# Patient Record
Sex: FEMALE | URBAN - METROPOLITAN AREA
[De-identification: names, ages, dates, MRNs, and addresses within clinical notes are randomized per-mention and may not be internally consistent; named-entity substitution may affect disease eponyms.]

---

## 2020-06-11 PROCEDURE — U0003 INFECTIOUS AGENT DETECTION BY NUCLEIC ACID (DNA OR RNA); SEVERE ACUTE RESPIRATORY SYNDROME CORONAVIRUS 2 (SARS-COV-2) (CORONAVIRUS DISEASE [COVID-19]), AMPLIFIED PROBE TECHNIQUE, MAKING USE OF HIGH THROUGHPUT TECHNOLOGIES AS DESCRIBED BY CMS-2020-01-R: HCPCS | Performed by: INTERNAL MEDICINE

## 2020-06-12 ENCOUNTER — LAB REQUISITION (OUTPATIENT)
Dept: LAB | Facility: HOSPITAL | Age: 70
End: 2020-06-12
Payer: COMMERCIAL

## 2020-06-12 DIAGNOSIS — U07.1 COVID-19: ICD-10-CM

## 2020-06-13 LAB — SARS-COV-2 N GENE RESP QL NAA+PROBE: NEGATIVE

## 2020-09-09 ENCOUNTER — LAB REQUISITION (OUTPATIENT)
Dept: LAB | Facility: HOSPITAL | Age: 70
End: 2020-09-09
Payer: COMMERCIAL

## 2020-09-09 DIAGNOSIS — U07.1 COVID-19: ICD-10-CM

## 2020-09-09 PROCEDURE — U0003 INFECTIOUS AGENT DETECTION BY NUCLEIC ACID (DNA OR RNA); SEVERE ACUTE RESPIRATORY SYNDROME CORONAVIRUS 2 (SARS-COV-2) (CORONAVIRUS DISEASE [COVID-19]), AMPLIFIED PROBE TECHNIQUE, MAKING USE OF HIGH THROUGHPUT TECHNOLOGIES AS DESCRIBED BY CMS-2020-01-R: HCPCS | Performed by: INTERNAL MEDICINE

## 2020-09-10 LAB — SARS-COV-2 RNA SPEC QL NAA+PROBE: NOT DETECTED

## 2020-09-15 ENCOUNTER — LAB REQUISITION (OUTPATIENT)
Dept: LAB | Facility: HOSPITAL | Age: 70
End: 2020-09-15
Payer: COMMERCIAL

## 2020-09-15 DIAGNOSIS — Z11.59 ENCOUNTER FOR SCREENING FOR OTHER VIRAL DISEASES: ICD-10-CM

## 2020-09-15 PROCEDURE — U0003 INFECTIOUS AGENT DETECTION BY NUCLEIC ACID (DNA OR RNA); SEVERE ACUTE RESPIRATORY SYNDROME CORONAVIRUS 2 (SARS-COV-2) (CORONAVIRUS DISEASE [COVID-19]), AMPLIFIED PROBE TECHNIQUE, MAKING USE OF HIGH THROUGHPUT TECHNOLOGIES AS DESCRIBED BY CMS-2020-01-R: HCPCS | Performed by: INTERNAL MEDICINE

## 2020-09-17 LAB — SARS-COV-2 RNA SPEC QL NAA+PROBE: NOT DETECTED

## 2020-09-22 ENCOUNTER — LAB REQUISITION (OUTPATIENT)
Dept: LAB | Facility: HOSPITAL | Age: 70
End: 2020-09-22
Payer: COMMERCIAL

## 2020-09-22 DIAGNOSIS — Z11.59 ENCOUNTER FOR SCREENING FOR OTHER VIRAL DISEASES: ICD-10-CM

## 2020-09-22 PROCEDURE — 87635 SARS-COV-2 COVID-19 AMP PRB: CPT | Performed by: INTERNAL MEDICINE

## 2020-09-23 LAB — SARS-COV-2 RNA RESP QL NAA+PROBE: NEGATIVE

## 2020-09-29 ENCOUNTER — LAB REQUISITION (OUTPATIENT)
Dept: LAB | Facility: HOSPITAL | Age: 70
End: 2020-09-29
Payer: COMMERCIAL

## 2020-09-29 DIAGNOSIS — Z11.59 ENCOUNTER FOR SCREENING FOR OTHER VIRAL DISEASES: ICD-10-CM

## 2020-09-29 PROCEDURE — U0003 INFECTIOUS AGENT DETECTION BY NUCLEIC ACID (DNA OR RNA); SEVERE ACUTE RESPIRATORY SYNDROME CORONAVIRUS 2 (SARS-COV-2) (CORONAVIRUS DISEASE [COVID-19]), AMPLIFIED PROBE TECHNIQUE, MAKING USE OF HIGH THROUGHPUT TECHNOLOGIES AS DESCRIBED BY CMS-2020-01-R: HCPCS | Performed by: INTERNAL MEDICINE

## 2020-09-30 LAB — SARS-COV-2 RNA RESP QL NAA+PROBE: NEGATIVE

## 2020-10-06 ENCOUNTER — LAB REQUISITION (OUTPATIENT)
Dept: LAB | Facility: HOSPITAL | Age: 70
End: 2020-10-06
Payer: COMMERCIAL

## 2020-10-06 DIAGNOSIS — U07.1 COVID-19: ICD-10-CM

## 2020-10-06 PROCEDURE — U0003 INFECTIOUS AGENT DETECTION BY NUCLEIC ACID (DNA OR RNA); SEVERE ACUTE RESPIRATORY SYNDROME CORONAVIRUS 2 (SARS-COV-2) (CORONAVIRUS DISEASE [COVID-19]), AMPLIFIED PROBE TECHNIQUE, MAKING USE OF HIGH THROUGHPUT TECHNOLOGIES AS DESCRIBED BY CMS-2020-01-R: HCPCS | Performed by: INTERNAL MEDICINE

## 2020-10-07 LAB — SARS-COV-2 RNA RESP QL NAA+PROBE: NEGATIVE

## 2020-10-13 PROCEDURE — U0003 INFECTIOUS AGENT DETECTION BY NUCLEIC ACID (DNA OR RNA); SEVERE ACUTE RESPIRATORY SYNDROME CORONAVIRUS 2 (SARS-COV-2) (CORONAVIRUS DISEASE [COVID-19]), AMPLIFIED PROBE TECHNIQUE, MAKING USE OF HIGH THROUGHPUT TECHNOLOGIES AS DESCRIBED BY CMS-2020-01-R: HCPCS | Performed by: INTERNAL MEDICINE

## 2020-10-14 ENCOUNTER — LAB REQUISITION (OUTPATIENT)
Dept: LAB | Facility: HOSPITAL | Age: 70
End: 2020-10-14
Payer: COMMERCIAL

## 2020-10-14 DIAGNOSIS — Z03.818 ENCOUNTER FOR OBSERVATION FOR SUSPECTED EXPOSURE TO OTHER BIOLOGICAL AGENTS RULED OUT: ICD-10-CM

## 2020-10-14 DIAGNOSIS — Z11.59 ENCOUNTER FOR SCREENING FOR OTHER VIRAL DISEASES: ICD-10-CM

## 2020-10-15 LAB — SARS-COV-2 RNA SPEC QL NAA+PROBE: NOT DETECTED

## 2020-11-17 ENCOUNTER — LAB REQUISITION (OUTPATIENT)
Dept: LAB | Facility: HOSPITAL | Age: 70
End: 2020-11-17
Payer: COMMERCIAL

## 2020-11-17 DIAGNOSIS — U07.1 COVID-19: ICD-10-CM

## 2020-11-17 PROCEDURE — U0003 INFECTIOUS AGENT DETECTION BY NUCLEIC ACID (DNA OR RNA); SEVERE ACUTE RESPIRATORY SYNDROME CORONAVIRUS 2 (SARS-COV-2) (CORONAVIRUS DISEASE [COVID-19]), AMPLIFIED PROBE TECHNIQUE, MAKING USE OF HIGH THROUGHPUT TECHNOLOGIES AS DESCRIBED BY CMS-2020-01-R: HCPCS | Performed by: INTERNAL MEDICINE

## 2020-11-19 LAB — SARS-COV-2 RNA SPEC QL NAA+PROBE: NOT DETECTED

## 2020-11-24 PROCEDURE — U0003 INFECTIOUS AGENT DETECTION BY NUCLEIC ACID (DNA OR RNA); SEVERE ACUTE RESPIRATORY SYNDROME CORONAVIRUS 2 (SARS-COV-2) (CORONAVIRUS DISEASE [COVID-19]), AMPLIFIED PROBE TECHNIQUE, MAKING USE OF HIGH THROUGHPUT TECHNOLOGIES AS DESCRIBED BY CMS-2020-01-R: HCPCS | Performed by: INTERNAL MEDICINE

## 2020-11-25 ENCOUNTER — LAB REQUISITION (OUTPATIENT)
Dept: LAB | Facility: HOSPITAL | Age: 70
End: 2020-11-25
Payer: COMMERCIAL

## 2020-11-25 DIAGNOSIS — U07.1 COVID-19: ICD-10-CM

## 2020-11-26 LAB — SARS-COV-2 RNA SPEC QL NAA+PROBE: NOT DETECTED

## 2020-12-01 PROCEDURE — U0003 INFECTIOUS AGENT DETECTION BY NUCLEIC ACID (DNA OR RNA); SEVERE ACUTE RESPIRATORY SYNDROME CORONAVIRUS 2 (SARS-COV-2) (CORONAVIRUS DISEASE [COVID-19]), AMPLIFIED PROBE TECHNIQUE, MAKING USE OF HIGH THROUGHPUT TECHNOLOGIES AS DESCRIBED BY CMS-2020-01-R: HCPCS | Performed by: INTERNAL MEDICINE

## 2020-12-02 ENCOUNTER — LAB REQUISITION (OUTPATIENT)
Dept: LAB | Facility: HOSPITAL | Age: 70
End: 2020-12-02
Payer: COMMERCIAL

## 2020-12-02 DIAGNOSIS — U07.1 COVID-19: ICD-10-CM

## 2020-12-03 ENCOUNTER — LAB REQUISITION (OUTPATIENT)
Dept: LAB | Facility: HOSPITAL | Age: 70
End: 2020-12-03
Payer: COMMERCIAL

## 2020-12-03 DIAGNOSIS — Z11.59 ENCOUNTER FOR SCREENING FOR OTHER VIRAL DISEASES: ICD-10-CM

## 2020-12-03 LAB — SARS-COV-2 RNA SPEC QL NAA+PROBE: NOT DETECTED

## 2020-12-03 PROCEDURE — U0003 INFECTIOUS AGENT DETECTION BY NUCLEIC ACID (DNA OR RNA); SEVERE ACUTE RESPIRATORY SYNDROME CORONAVIRUS 2 (SARS-COV-2) (CORONAVIRUS DISEASE [COVID-19]), AMPLIFIED PROBE TECHNIQUE, MAKING USE OF HIGH THROUGHPUT TECHNOLOGIES AS DESCRIBED BY CMS-2020-01-R: HCPCS | Performed by: INTERNAL MEDICINE

## 2020-12-05 LAB — SARS-COV-2 RNA SPEC QL NAA+PROBE: NOT DETECTED

## 2020-12-10 PROCEDURE — U0003 INFECTIOUS AGENT DETECTION BY NUCLEIC ACID (DNA OR RNA); SEVERE ACUTE RESPIRATORY SYNDROME CORONAVIRUS 2 (SARS-COV-2) (CORONAVIRUS DISEASE [COVID-19]), AMPLIFIED PROBE TECHNIQUE, MAKING USE OF HIGH THROUGHPUT TECHNOLOGIES AS DESCRIBED BY CMS-2020-01-R: HCPCS | Performed by: INTERNAL MEDICINE

## 2020-12-11 ENCOUNTER — LAB REQUISITION (OUTPATIENT)
Dept: LAB | Facility: HOSPITAL | Age: 70
End: 2020-12-11
Payer: COMMERCIAL

## 2020-12-11 DIAGNOSIS — Z11.59 ENCOUNTER FOR SCREENING FOR OTHER VIRAL DISEASES: ICD-10-CM

## 2020-12-12 LAB — SARS-COV-2 RNA SPEC QL NAA+PROBE: NOT DETECTED

## 2020-12-14 ENCOUNTER — LAB REQUISITION (OUTPATIENT)
Dept: LAB | Facility: HOSPITAL | Age: 70
End: 2020-12-14
Payer: COMMERCIAL

## 2020-12-14 DIAGNOSIS — Z11.59 ENCOUNTER FOR SCREENING FOR OTHER VIRAL DISEASES: ICD-10-CM

## 2020-12-14 PROCEDURE — U0003 INFECTIOUS AGENT DETECTION BY NUCLEIC ACID (DNA OR RNA); SEVERE ACUTE RESPIRATORY SYNDROME CORONAVIRUS 2 (SARS-COV-2) (CORONAVIRUS DISEASE [COVID-19]), AMPLIFIED PROBE TECHNIQUE, MAKING USE OF HIGH THROUGHPUT TECHNOLOGIES AS DESCRIBED BY CMS-2020-01-R: HCPCS | Performed by: INTERNAL MEDICINE

## 2020-12-15 LAB — SARS-COV-2 RNA SPEC QL NAA+PROBE: NOT DETECTED

## 2020-12-17 ENCOUNTER — LAB REQUISITION (OUTPATIENT)
Dept: LAB | Facility: HOSPITAL | Age: 70
End: 2020-12-17
Payer: COMMERCIAL

## 2020-12-17 DIAGNOSIS — Z11.59 ENCOUNTER FOR SCREENING FOR OTHER VIRAL DISEASES: ICD-10-CM

## 2020-12-17 PROCEDURE — U0003 INFECTIOUS AGENT DETECTION BY NUCLEIC ACID (DNA OR RNA); SEVERE ACUTE RESPIRATORY SYNDROME CORONAVIRUS 2 (SARS-COV-2) (CORONAVIRUS DISEASE [COVID-19]), AMPLIFIED PROBE TECHNIQUE, MAKING USE OF HIGH THROUGHPUT TECHNOLOGIES AS DESCRIBED BY CMS-2020-01-R: HCPCS | Performed by: INTERNAL MEDICINE

## 2020-12-19 LAB — SARS-COV-2 RNA SPEC QL NAA+PROBE: NOT DETECTED

## 2020-12-23 ENCOUNTER — LAB REQUISITION (OUTPATIENT)
Dept: LAB | Facility: HOSPITAL | Age: 70
End: 2020-12-23
Payer: COMMERCIAL

## 2020-12-23 DIAGNOSIS — Z11.59 ENCOUNTER FOR SCREENING FOR OTHER VIRAL DISEASES: ICD-10-CM

## 2020-12-23 PROCEDURE — U0003 INFECTIOUS AGENT DETECTION BY NUCLEIC ACID (DNA OR RNA); SEVERE ACUTE RESPIRATORY SYNDROME CORONAVIRUS 2 (SARS-COV-2) (CORONAVIRUS DISEASE [COVID-19]), AMPLIFIED PROBE TECHNIQUE, MAKING USE OF HIGH THROUGHPUT TECHNOLOGIES AS DESCRIBED BY CMS-2020-01-R: HCPCS | Performed by: INTERNAL MEDICINE

## 2020-12-24 LAB — SARS-COV-2 RNA SPEC QL NAA+PROBE: NOT DETECTED

## 2020-12-28 ENCOUNTER — LAB REQUISITION (OUTPATIENT)
Dept: LAB | Facility: HOSPITAL | Age: 70
End: 2020-12-28
Payer: COMMERCIAL

## 2020-12-28 DIAGNOSIS — Z11.59 ENCOUNTER FOR SCREENING FOR OTHER VIRAL DISEASES: ICD-10-CM

## 2020-12-28 PROCEDURE — U0003 INFECTIOUS AGENT DETECTION BY NUCLEIC ACID (DNA OR RNA); SEVERE ACUTE RESPIRATORY SYNDROME CORONAVIRUS 2 (SARS-COV-2) (CORONAVIRUS DISEASE [COVID-19]), AMPLIFIED PROBE TECHNIQUE, MAKING USE OF HIGH THROUGHPUT TECHNOLOGIES AS DESCRIBED BY CMS-2020-01-R: HCPCS | Performed by: INTERNAL MEDICINE

## 2020-12-29 LAB — SARS-COV-2 RNA SPEC QL NAA+PROBE: NOT DETECTED

## 2020-12-30 ENCOUNTER — LAB REQUISITION (OUTPATIENT)
Dept: LAB | Facility: HOSPITAL | Age: 70
End: 2020-12-30
Payer: COMMERCIAL

## 2020-12-30 DIAGNOSIS — Z11.59 ENCOUNTER FOR SCREENING FOR OTHER VIRAL DISEASES: ICD-10-CM

## 2020-12-30 PROCEDURE — U0003 INFECTIOUS AGENT DETECTION BY NUCLEIC ACID (DNA OR RNA); SEVERE ACUTE RESPIRATORY SYNDROME CORONAVIRUS 2 (SARS-COV-2) (CORONAVIRUS DISEASE [COVID-19]), AMPLIFIED PROBE TECHNIQUE, MAKING USE OF HIGH THROUGHPUT TECHNOLOGIES AS DESCRIBED BY CMS-2020-01-R: HCPCS | Performed by: INTERNAL MEDICINE

## 2020-12-31 LAB — SARS-COV-2 RNA SPEC QL NAA+PROBE: NOT DETECTED

## 2021-01-07 ENCOUNTER — LAB REQUISITION (OUTPATIENT)
Dept: LAB | Facility: HOSPITAL | Age: 71
End: 2021-01-07
Payer: COMMERCIAL

## 2021-01-07 DIAGNOSIS — Z11.59 ENCOUNTER FOR SCREENING FOR OTHER VIRAL DISEASES: ICD-10-CM

## 2021-01-07 PROCEDURE — U0003 INFECTIOUS AGENT DETECTION BY NUCLEIC ACID (DNA OR RNA); SEVERE ACUTE RESPIRATORY SYNDROME CORONAVIRUS 2 (SARS-COV-2) (CORONAVIRUS DISEASE [COVID-19]), AMPLIFIED PROBE TECHNIQUE, MAKING USE OF HIGH THROUGHPUT TECHNOLOGIES AS DESCRIBED BY CMS-2020-01-R: HCPCS | Performed by: INTERNAL MEDICINE

## 2021-01-07 PROCEDURE — U0005 INFEC AGEN DETEC AMPLI PROBE: HCPCS | Performed by: INTERNAL MEDICINE

## 2021-01-08 LAB — SARS-COV-2 RNA SPEC QL NAA+PROBE: NOT DETECTED

## 2021-01-11 ENCOUNTER — LAB REQUISITION (OUTPATIENT)
Dept: LAB | Facility: HOSPITAL | Age: 71
End: 2021-01-11
Payer: COMMERCIAL

## 2021-01-11 DIAGNOSIS — Z11.59 ENCOUNTER FOR SCREENING FOR OTHER VIRAL DISEASES: ICD-10-CM

## 2021-01-11 DIAGNOSIS — Z03.818 ENCOUNTER FOR OBSERVATION FOR SUSPECTED EXPOSURE TO OTHER BIOLOGICAL AGENTS RULED OUT: ICD-10-CM

## 2021-01-11 PROCEDURE — U0005 INFEC AGEN DETEC AMPLI PROBE: HCPCS | Performed by: INTERNAL MEDICINE

## 2021-01-11 PROCEDURE — U0003 INFECTIOUS AGENT DETECTION BY NUCLEIC ACID (DNA OR RNA); SEVERE ACUTE RESPIRATORY SYNDROME CORONAVIRUS 2 (SARS-COV-2) (CORONAVIRUS DISEASE [COVID-19]), AMPLIFIED PROBE TECHNIQUE, MAKING USE OF HIGH THROUGHPUT TECHNOLOGIES AS DESCRIBED BY CMS-2020-01-R: HCPCS | Performed by: INTERNAL MEDICINE

## 2021-01-12 LAB — SARS-COV-2 RNA SPEC QL NAA+PROBE: NOT DETECTED

## 2021-01-14 ENCOUNTER — LAB REQUISITION (OUTPATIENT)
Dept: LAB | Facility: HOSPITAL | Age: 71
End: 2021-01-14
Payer: COMMERCIAL

## 2021-01-14 DIAGNOSIS — Z11.59 ENCOUNTER FOR SCREENING FOR OTHER VIRAL DISEASES: ICD-10-CM

## 2021-01-14 PROCEDURE — U0005 INFEC AGEN DETEC AMPLI PROBE: HCPCS | Performed by: INTERNAL MEDICINE

## 2021-01-14 PROCEDURE — U0003 INFECTIOUS AGENT DETECTION BY NUCLEIC ACID (DNA OR RNA); SEVERE ACUTE RESPIRATORY SYNDROME CORONAVIRUS 2 (SARS-COV-2) (CORONAVIRUS DISEASE [COVID-19]), AMPLIFIED PROBE TECHNIQUE, MAKING USE OF HIGH THROUGHPUT TECHNOLOGIES AS DESCRIBED BY CMS-2020-01-R: HCPCS | Performed by: INTERNAL MEDICINE

## 2021-01-15 LAB — SARS-COV-2 RNA SPEC QL NAA+PROBE: NOT DETECTED

## 2021-01-20 ENCOUNTER — LAB REQUISITION (OUTPATIENT)
Dept: LAB | Facility: HOSPITAL | Age: 71
End: 2021-01-20
Payer: COMMERCIAL

## 2021-01-20 DIAGNOSIS — Z11.59 ENCOUNTER FOR SCREENING FOR OTHER VIRAL DISEASES: ICD-10-CM

## 2021-01-20 PROCEDURE — U0003 INFECTIOUS AGENT DETECTION BY NUCLEIC ACID (DNA OR RNA); SEVERE ACUTE RESPIRATORY SYNDROME CORONAVIRUS 2 (SARS-COV-2) (CORONAVIRUS DISEASE [COVID-19]), AMPLIFIED PROBE TECHNIQUE, MAKING USE OF HIGH THROUGHPUT TECHNOLOGIES AS DESCRIBED BY CMS-2020-01-R: HCPCS | Performed by: INTERNAL MEDICINE

## 2021-01-20 PROCEDURE — U0005 INFEC AGEN DETEC AMPLI PROBE: HCPCS | Performed by: INTERNAL MEDICINE

## 2021-01-22 LAB — SARS-COV-2 N GENE RESP QL NAA+PROBE: NEGATIVE

## 2021-01-25 ENCOUNTER — LAB REQUISITION (OUTPATIENT)
Dept: LAB | Facility: HOSPITAL | Age: 71
End: 2021-01-25
Payer: COMMERCIAL

## 2021-01-25 DIAGNOSIS — U07.1 COVID-19: ICD-10-CM

## 2021-01-25 LAB — SARS-COV-2 N GENE RESP QL NAA+PROBE: NEGATIVE

## 2021-01-25 PROCEDURE — U0003 INFECTIOUS AGENT DETECTION BY NUCLEIC ACID (DNA OR RNA); SEVERE ACUTE RESPIRATORY SYNDROME CORONAVIRUS 2 (SARS-COV-2) (CORONAVIRUS DISEASE [COVID-19]), AMPLIFIED PROBE TECHNIQUE, MAKING USE OF HIGH THROUGHPUT TECHNOLOGIES AS DESCRIBED BY CMS-2020-01-R: HCPCS | Performed by: INTERNAL MEDICINE

## 2021-01-25 PROCEDURE — U0005 INFEC AGEN DETEC AMPLI PROBE: HCPCS | Performed by: INTERNAL MEDICINE

## 2021-01-28 ENCOUNTER — LAB REQUISITION (OUTPATIENT)
Dept: LAB | Facility: HOSPITAL | Age: 71
End: 2021-01-28
Payer: COMMERCIAL

## 2021-01-28 DIAGNOSIS — Z11.59 ENCOUNTER FOR SCREENING FOR OTHER VIRAL DISEASES: ICD-10-CM

## 2021-01-28 PROCEDURE — U0005 INFEC AGEN DETEC AMPLI PROBE: HCPCS | Performed by: INTERNAL MEDICINE

## 2021-01-28 PROCEDURE — U0003 INFECTIOUS AGENT DETECTION BY NUCLEIC ACID (DNA OR RNA); SEVERE ACUTE RESPIRATORY SYNDROME CORONAVIRUS 2 (SARS-COV-2) (CORONAVIRUS DISEASE [COVID-19]), AMPLIFIED PROBE TECHNIQUE, MAKING USE OF HIGH THROUGHPUT TECHNOLOGIES AS DESCRIBED BY CMS-2020-01-R: HCPCS | Performed by: INTERNAL MEDICINE

## 2021-01-29 LAB — SARS-COV-2 N GENE RESP QL NAA+PROBE: NEGATIVE

## 2021-02-04 ENCOUNTER — LAB REQUISITION (OUTPATIENT)
Dept: LAB | Facility: HOSPITAL | Age: 71
End: 2021-02-04
Payer: COMMERCIAL

## 2021-02-04 DIAGNOSIS — Z11.59 ENCOUNTER FOR SCREENING FOR OTHER VIRAL DISEASES: ICD-10-CM

## 2021-02-04 PROCEDURE — U0005 INFEC AGEN DETEC AMPLI PROBE: HCPCS | Performed by: INTERNAL MEDICINE

## 2021-02-04 PROCEDURE — U0003 INFECTIOUS AGENT DETECTION BY NUCLEIC ACID (DNA OR RNA); SEVERE ACUTE RESPIRATORY SYNDROME CORONAVIRUS 2 (SARS-COV-2) (CORONAVIRUS DISEASE [COVID-19]), AMPLIFIED PROBE TECHNIQUE, MAKING USE OF HIGH THROUGHPUT TECHNOLOGIES AS DESCRIBED BY CMS-2020-01-R: HCPCS | Performed by: INTERNAL MEDICINE

## 2021-02-05 LAB — SARS-COV-2 RNA RESP QL NAA+PROBE: NEGATIVE

## 2021-02-08 ENCOUNTER — LAB REQUISITION (OUTPATIENT)
Dept: LAB | Facility: HOSPITAL | Age: 71
End: 2021-02-08
Payer: COMMERCIAL

## 2021-02-08 DIAGNOSIS — Z11.59 ENCOUNTER FOR SCREENING FOR OTHER VIRAL DISEASES: ICD-10-CM

## 2021-02-08 PROCEDURE — U0005 INFEC AGEN DETEC AMPLI PROBE: HCPCS | Performed by: INTERNAL MEDICINE

## 2021-02-08 PROCEDURE — U0003 INFECTIOUS AGENT DETECTION BY NUCLEIC ACID (DNA OR RNA); SEVERE ACUTE RESPIRATORY SYNDROME CORONAVIRUS 2 (SARS-COV-2) (CORONAVIRUS DISEASE [COVID-19]), AMPLIFIED PROBE TECHNIQUE, MAKING USE OF HIGH THROUGHPUT TECHNOLOGIES AS DESCRIBED BY CMS-2020-01-R: HCPCS | Performed by: INTERNAL MEDICINE

## 2021-02-09 LAB — SARS-COV-2 RNA RESP QL NAA+PROBE: NEGATIVE

## 2021-02-11 ENCOUNTER — LAB REQUISITION (OUTPATIENT)
Dept: LAB | Facility: HOSPITAL | Age: 71
End: 2021-02-11
Payer: COMMERCIAL

## 2021-02-11 DIAGNOSIS — U07.1 COVID-19: ICD-10-CM

## 2021-02-11 LAB — SARS-COV-2 RNA RESP QL NAA+PROBE: NEGATIVE

## 2021-02-11 PROCEDURE — U0005 INFEC AGEN DETEC AMPLI PROBE: HCPCS | Performed by: INTERNAL MEDICINE

## 2021-02-11 PROCEDURE — U0003 INFECTIOUS AGENT DETECTION BY NUCLEIC ACID (DNA OR RNA); SEVERE ACUTE RESPIRATORY SYNDROME CORONAVIRUS 2 (SARS-COV-2) (CORONAVIRUS DISEASE [COVID-19]), AMPLIFIED PROBE TECHNIQUE, MAKING USE OF HIGH THROUGHPUT TECHNOLOGIES AS DESCRIBED BY CMS-2020-01-R: HCPCS | Performed by: INTERNAL MEDICINE

## 2021-02-18 PROCEDURE — U0003 INFECTIOUS AGENT DETECTION BY NUCLEIC ACID (DNA OR RNA); SEVERE ACUTE RESPIRATORY SYNDROME CORONAVIRUS 2 (SARS-COV-2) (CORONAVIRUS DISEASE [COVID-19]), AMPLIFIED PROBE TECHNIQUE, MAKING USE OF HIGH THROUGHPUT TECHNOLOGIES AS DESCRIBED BY CMS-2020-01-R: HCPCS | Performed by: INTERNAL MEDICINE

## 2021-02-18 PROCEDURE — U0005 INFEC AGEN DETEC AMPLI PROBE: HCPCS | Performed by: INTERNAL MEDICINE

## 2021-02-19 ENCOUNTER — LAB REQUISITION (OUTPATIENT)
Dept: LAB | Facility: HOSPITAL | Age: 71
End: 2021-02-19
Payer: COMMERCIAL

## 2021-02-19 DIAGNOSIS — Z11.59 ENCOUNTER FOR SCREENING FOR OTHER VIRAL DISEASES: ICD-10-CM

## 2021-02-19 LAB — SARS-COV-2 RNA RESP QL NAA+PROBE: NEGATIVE

## 2021-02-22 ENCOUNTER — LAB REQUISITION (OUTPATIENT)
Dept: LAB | Facility: HOSPITAL | Age: 71
End: 2021-02-22
Payer: COMMERCIAL

## 2021-02-22 DIAGNOSIS — Z11.59 ENCOUNTER FOR SCREENING FOR OTHER VIRAL DISEASES: ICD-10-CM

## 2021-02-22 LAB — SARS-COV-2 RNA RESP QL NAA+PROBE: NEGATIVE

## 2021-02-22 PROCEDURE — U0005 INFEC AGEN DETEC AMPLI PROBE: HCPCS | Performed by: INTERNAL MEDICINE

## 2021-02-22 PROCEDURE — U0003 INFECTIOUS AGENT DETECTION BY NUCLEIC ACID (DNA OR RNA); SEVERE ACUTE RESPIRATORY SYNDROME CORONAVIRUS 2 (SARS-COV-2) (CORONAVIRUS DISEASE [COVID-19]), AMPLIFIED PROBE TECHNIQUE, MAKING USE OF HIGH THROUGHPUT TECHNOLOGIES AS DESCRIBED BY CMS-2020-01-R: HCPCS | Performed by: INTERNAL MEDICINE

## 2021-02-25 ENCOUNTER — LAB REQUISITION (OUTPATIENT)
Dept: LAB | Facility: HOSPITAL | Age: 71
End: 2021-02-25
Payer: COMMERCIAL

## 2021-02-25 DIAGNOSIS — Z11.59 ENCOUNTER FOR SCREENING FOR OTHER VIRAL DISEASES: ICD-10-CM

## 2021-02-25 LAB — SARS-COV-2 RNA RESP QL NAA+PROBE: NEGATIVE

## 2021-02-25 PROCEDURE — U0003 INFECTIOUS AGENT DETECTION BY NUCLEIC ACID (DNA OR RNA); SEVERE ACUTE RESPIRATORY SYNDROME CORONAVIRUS 2 (SARS-COV-2) (CORONAVIRUS DISEASE [COVID-19]), AMPLIFIED PROBE TECHNIQUE, MAKING USE OF HIGH THROUGHPUT TECHNOLOGIES AS DESCRIBED BY CMS-2020-01-R: HCPCS | Performed by: INTERNAL MEDICINE

## 2021-02-25 PROCEDURE — U0005 INFEC AGEN DETEC AMPLI PROBE: HCPCS | Performed by: INTERNAL MEDICINE

## 2021-03-04 ENCOUNTER — LAB REQUISITION (OUTPATIENT)
Dept: LAB | Facility: HOSPITAL | Age: 71
End: 2021-03-04
Payer: COMMERCIAL

## 2021-03-04 DIAGNOSIS — Z11.59 ENCOUNTER FOR SCREENING FOR OTHER VIRAL DISEASES: ICD-10-CM

## 2021-03-04 PROCEDURE — U0003 INFECTIOUS AGENT DETECTION BY NUCLEIC ACID (DNA OR RNA); SEVERE ACUTE RESPIRATORY SYNDROME CORONAVIRUS 2 (SARS-COV-2) (CORONAVIRUS DISEASE [COVID-19]), AMPLIFIED PROBE TECHNIQUE, MAKING USE OF HIGH THROUGHPUT TECHNOLOGIES AS DESCRIBED BY CMS-2020-01-R: HCPCS | Performed by: INTERNAL MEDICINE

## 2021-03-04 PROCEDURE — U0005 INFEC AGEN DETEC AMPLI PROBE: HCPCS | Performed by: INTERNAL MEDICINE

## 2021-03-05 LAB — SARS-COV-2 RNA RESP QL NAA+PROBE: NEGATIVE

## 2021-03-08 ENCOUNTER — LAB REQUISITION (OUTPATIENT)
Dept: LAB | Facility: HOSPITAL | Age: 71
End: 2021-03-08
Payer: COMMERCIAL

## 2021-03-08 DIAGNOSIS — Z11.59 ENCOUNTER FOR SCREENING FOR OTHER VIRAL DISEASES: ICD-10-CM

## 2021-03-08 PROCEDURE — U0003 INFECTIOUS AGENT DETECTION BY NUCLEIC ACID (DNA OR RNA); SEVERE ACUTE RESPIRATORY SYNDROME CORONAVIRUS 2 (SARS-COV-2) (CORONAVIRUS DISEASE [COVID-19]), AMPLIFIED PROBE TECHNIQUE, MAKING USE OF HIGH THROUGHPUT TECHNOLOGIES AS DESCRIBED BY CMS-2020-01-R: HCPCS | Performed by: INTERNAL MEDICINE

## 2021-03-08 PROCEDURE — U0005 INFEC AGEN DETEC AMPLI PROBE: HCPCS | Performed by: INTERNAL MEDICINE

## 2021-03-09 LAB — SARS-COV-2 RNA RESP QL NAA+PROBE: NEGATIVE

## 2021-03-18 ENCOUNTER — LAB REQUISITION (OUTPATIENT)
Dept: LAB | Facility: HOSPITAL | Age: 71
End: 2021-03-18
Payer: COMMERCIAL

## 2021-03-18 DIAGNOSIS — Z11.59 ENCOUNTER FOR SCREENING FOR OTHER VIRAL DISEASES: ICD-10-CM

## 2021-03-18 PROCEDURE — U0003 INFECTIOUS AGENT DETECTION BY NUCLEIC ACID (DNA OR RNA); SEVERE ACUTE RESPIRATORY SYNDROME CORONAVIRUS 2 (SARS-COV-2) (CORONAVIRUS DISEASE [COVID-19]), AMPLIFIED PROBE TECHNIQUE, MAKING USE OF HIGH THROUGHPUT TECHNOLOGIES AS DESCRIBED BY CMS-2020-01-R: HCPCS | Performed by: INTERNAL MEDICINE

## 2021-03-18 PROCEDURE — U0005 INFEC AGEN DETEC AMPLI PROBE: HCPCS | Performed by: INTERNAL MEDICINE

## 2021-03-19 LAB — SARS-COV-2 RNA RESP QL NAA+PROBE: NEGATIVE

## 2021-03-22 ENCOUNTER — LAB REQUISITION (OUTPATIENT)
Dept: LAB | Facility: HOSPITAL | Age: 71
End: 2021-03-22
Payer: COMMERCIAL

## 2021-03-22 DIAGNOSIS — Z11.59 ENCOUNTER FOR SCREENING FOR OTHER VIRAL DISEASES: ICD-10-CM

## 2021-03-22 LAB — SARS-COV-2 RNA RESP QL NAA+PROBE: NEGATIVE

## 2021-03-22 PROCEDURE — U0003 INFECTIOUS AGENT DETECTION BY NUCLEIC ACID (DNA OR RNA); SEVERE ACUTE RESPIRATORY SYNDROME CORONAVIRUS 2 (SARS-COV-2) (CORONAVIRUS DISEASE [COVID-19]), AMPLIFIED PROBE TECHNIQUE, MAKING USE OF HIGH THROUGHPUT TECHNOLOGIES AS DESCRIBED BY CMS-2020-01-R: HCPCS | Performed by: INTERNAL MEDICINE

## 2021-03-22 PROCEDURE — U0005 INFEC AGEN DETEC AMPLI PROBE: HCPCS | Performed by: INTERNAL MEDICINE

## 2021-04-01 ENCOUNTER — LAB REQUISITION (OUTPATIENT)
Dept: LAB | Facility: HOSPITAL | Age: 71
End: 2021-04-01
Payer: COMMERCIAL

## 2021-04-01 DIAGNOSIS — Z11.59 ENCOUNTER FOR SCREENING FOR OTHER VIRAL DISEASES: ICD-10-CM

## 2021-04-01 PROCEDURE — U0003 INFECTIOUS AGENT DETECTION BY NUCLEIC ACID (DNA OR RNA); SEVERE ACUTE RESPIRATORY SYNDROME CORONAVIRUS 2 (SARS-COV-2) (CORONAVIRUS DISEASE [COVID-19]), AMPLIFIED PROBE TECHNIQUE, MAKING USE OF HIGH THROUGHPUT TECHNOLOGIES AS DESCRIBED BY CMS-2020-01-R: HCPCS | Performed by: INTERNAL MEDICINE

## 2021-04-01 PROCEDURE — U0005 INFEC AGEN DETEC AMPLI PROBE: HCPCS | Performed by: INTERNAL MEDICINE

## 2021-04-02 LAB — SARS-COV-2 RNA RESP QL NAA+PROBE: NEGATIVE

## 2021-04-08 ENCOUNTER — LAB REQUISITION (OUTPATIENT)
Dept: LAB | Facility: HOSPITAL | Age: 71
End: 2021-04-08
Payer: COMMERCIAL

## 2021-04-08 DIAGNOSIS — Z11.59 ENCOUNTER FOR SCREENING FOR OTHER VIRAL DISEASES: ICD-10-CM

## 2021-04-08 PROCEDURE — U0003 INFECTIOUS AGENT DETECTION BY NUCLEIC ACID (DNA OR RNA); SEVERE ACUTE RESPIRATORY SYNDROME CORONAVIRUS 2 (SARS-COV-2) (CORONAVIRUS DISEASE [COVID-19]), AMPLIFIED PROBE TECHNIQUE, MAKING USE OF HIGH THROUGHPUT TECHNOLOGIES AS DESCRIBED BY CMS-2020-01-R: HCPCS | Performed by: INTERNAL MEDICINE

## 2021-04-08 PROCEDURE — U0005 INFEC AGEN DETEC AMPLI PROBE: HCPCS | Performed by: INTERNAL MEDICINE

## 2021-04-09 LAB — SARS-COV-2 RNA RESP QL NAA+PROBE: NEGATIVE

## 2021-04-14 ENCOUNTER — LAB REQUISITION (OUTPATIENT)
Dept: LAB | Facility: HOSPITAL | Age: 71
End: 2021-04-14
Payer: COMMERCIAL

## 2021-04-14 DIAGNOSIS — Z11.59 ENCOUNTER FOR SCREENING FOR OTHER VIRAL DISEASES: ICD-10-CM

## 2021-04-14 LAB — SARS-COV-2 RNA RESP QL NAA+PROBE: NEGATIVE

## 2021-04-14 PROCEDURE — U0005 INFEC AGEN DETEC AMPLI PROBE: HCPCS | Performed by: INTERNAL MEDICINE

## 2021-04-14 PROCEDURE — U0003 INFECTIOUS AGENT DETECTION BY NUCLEIC ACID (DNA OR RNA); SEVERE ACUTE RESPIRATORY SYNDROME CORONAVIRUS 2 (SARS-COV-2) (CORONAVIRUS DISEASE [COVID-19]), AMPLIFIED PROBE TECHNIQUE, MAKING USE OF HIGH THROUGHPUT TECHNOLOGIES AS DESCRIBED BY CMS-2020-01-R: HCPCS | Performed by: INTERNAL MEDICINE

## 2021-04-19 ENCOUNTER — LAB REQUISITION (OUTPATIENT)
Dept: LAB | Facility: HOSPITAL | Age: 71
End: 2021-04-19
Payer: COMMERCIAL

## 2021-04-19 DIAGNOSIS — Z11.59 ENCOUNTER FOR SCREENING FOR OTHER VIRAL DISEASES: ICD-10-CM

## 2021-04-19 PROCEDURE — U0003 INFECTIOUS AGENT DETECTION BY NUCLEIC ACID (DNA OR RNA); SEVERE ACUTE RESPIRATORY SYNDROME CORONAVIRUS 2 (SARS-COV-2) (CORONAVIRUS DISEASE [COVID-19]), AMPLIFIED PROBE TECHNIQUE, MAKING USE OF HIGH THROUGHPUT TECHNOLOGIES AS DESCRIBED BY CMS-2020-01-R: HCPCS | Performed by: INTERNAL MEDICINE

## 2021-04-19 PROCEDURE — U0005 INFEC AGEN DETEC AMPLI PROBE: HCPCS | Performed by: INTERNAL MEDICINE

## 2021-04-20 LAB — SARS-COV-2 RNA RESP QL NAA+PROBE: NEGATIVE

## 2021-04-21 ENCOUNTER — LAB REQUISITION (OUTPATIENT)
Dept: LAB | Facility: HOSPITAL | Age: 71
End: 2021-04-21
Payer: COMMERCIAL

## 2021-04-21 DIAGNOSIS — Z11.59 ENCOUNTER FOR SCREENING FOR OTHER VIRAL DISEASES: ICD-10-CM

## 2021-04-21 PROCEDURE — U0003 INFECTIOUS AGENT DETECTION BY NUCLEIC ACID (DNA OR RNA); SEVERE ACUTE RESPIRATORY SYNDROME CORONAVIRUS 2 (SARS-COV-2) (CORONAVIRUS DISEASE [COVID-19]), AMPLIFIED PROBE TECHNIQUE, MAKING USE OF HIGH THROUGHPUT TECHNOLOGIES AS DESCRIBED BY CMS-2020-01-R: HCPCS | Performed by: INTERNAL MEDICINE

## 2021-04-21 PROCEDURE — U0005 INFEC AGEN DETEC AMPLI PROBE: HCPCS | Performed by: INTERNAL MEDICINE

## 2021-04-22 LAB — SARS-COV-2 RNA RESP QL NAA+PROBE: NEGATIVE

## 2021-04-28 ENCOUNTER — LAB REQUISITION (OUTPATIENT)
Dept: LAB | Facility: HOSPITAL | Age: 71
End: 2021-04-28
Payer: COMMERCIAL

## 2021-04-28 DIAGNOSIS — Z11.59 ENCOUNTER FOR SCREENING FOR OTHER VIRAL DISEASES: ICD-10-CM

## 2021-04-28 PROCEDURE — U0005 INFEC AGEN DETEC AMPLI PROBE: HCPCS | Performed by: INTERNAL MEDICINE

## 2021-04-28 PROCEDURE — U0003 INFECTIOUS AGENT DETECTION BY NUCLEIC ACID (DNA OR RNA); SEVERE ACUTE RESPIRATORY SYNDROME CORONAVIRUS 2 (SARS-COV-2) (CORONAVIRUS DISEASE [COVID-19]), AMPLIFIED PROBE TECHNIQUE, MAKING USE OF HIGH THROUGHPUT TECHNOLOGIES AS DESCRIBED BY CMS-2020-01-R: HCPCS | Performed by: INTERNAL MEDICINE

## 2021-04-29 LAB — SARS-COV-2 RNA RESP QL NAA+PROBE: NEGATIVE

## 2021-05-03 ENCOUNTER — LAB REQUISITION (OUTPATIENT)
Dept: LAB | Facility: HOSPITAL | Age: 71
End: 2021-05-03
Payer: COMMERCIAL

## 2021-05-03 DIAGNOSIS — Z11.59 ENCOUNTER FOR SCREENING FOR OTHER VIRAL DISEASES: ICD-10-CM

## 2021-05-03 PROCEDURE — U0005 INFEC AGEN DETEC AMPLI PROBE: HCPCS | Performed by: INTERNAL MEDICINE

## 2021-05-03 PROCEDURE — U0003 INFECTIOUS AGENT DETECTION BY NUCLEIC ACID (DNA OR RNA); SEVERE ACUTE RESPIRATORY SYNDROME CORONAVIRUS 2 (SARS-COV-2) (CORONAVIRUS DISEASE [COVID-19]), AMPLIFIED PROBE TECHNIQUE, MAKING USE OF HIGH THROUGHPUT TECHNOLOGIES AS DESCRIBED BY CMS-2020-01-R: HCPCS | Performed by: INTERNAL MEDICINE

## 2021-05-04 LAB — SARS-COV-2 RNA RESP QL NAA+PROBE: NEGATIVE

## 2021-05-26 ENCOUNTER — LAB REQUISITION (OUTPATIENT)
Dept: LAB | Facility: HOSPITAL | Age: 71
End: 2021-05-26
Payer: COMMERCIAL

## 2021-05-26 DIAGNOSIS — Z11.59 ENCOUNTER FOR SCREENING FOR OTHER VIRAL DISEASES: ICD-10-CM

## 2021-05-26 PROCEDURE — U0003 INFECTIOUS AGENT DETECTION BY NUCLEIC ACID (DNA OR RNA); SEVERE ACUTE RESPIRATORY SYNDROME CORONAVIRUS 2 (SARS-COV-2) (CORONAVIRUS DISEASE [COVID-19]), AMPLIFIED PROBE TECHNIQUE, MAKING USE OF HIGH THROUGHPUT TECHNOLOGIES AS DESCRIBED BY CMS-2020-01-R: HCPCS | Performed by: INTERNAL MEDICINE

## 2021-05-26 PROCEDURE — U0005 INFEC AGEN DETEC AMPLI PROBE: HCPCS | Performed by: INTERNAL MEDICINE

## 2021-05-27 LAB — SARS-COV-2 RNA RESP QL NAA+PROBE: NEGATIVE

## 2021-06-01 ENCOUNTER — LAB REQUISITION (OUTPATIENT)
Dept: LAB | Facility: HOSPITAL | Age: 71
End: 2021-06-01
Payer: COMMERCIAL

## 2021-06-01 DIAGNOSIS — Z11.59 ENCOUNTER FOR SCREENING FOR OTHER VIRAL DISEASES: ICD-10-CM

## 2021-06-01 PROCEDURE — U0003 INFECTIOUS AGENT DETECTION BY NUCLEIC ACID (DNA OR RNA); SEVERE ACUTE RESPIRATORY SYNDROME CORONAVIRUS 2 (SARS-COV-2) (CORONAVIRUS DISEASE [COVID-19]), AMPLIFIED PROBE TECHNIQUE, MAKING USE OF HIGH THROUGHPUT TECHNOLOGIES AS DESCRIBED BY CMS-2020-01-R: HCPCS | Performed by: INTERNAL MEDICINE

## 2021-06-01 PROCEDURE — U0005 INFEC AGEN DETEC AMPLI PROBE: HCPCS | Performed by: INTERNAL MEDICINE

## 2021-06-02 LAB — SARS-COV-2 RNA RESP QL NAA+PROBE: NEGATIVE

## 2021-08-13 PROCEDURE — U0005 INFEC AGEN DETEC AMPLI PROBE: HCPCS | Performed by: INTERNAL MEDICINE

## 2021-08-13 PROCEDURE — U0003 INFECTIOUS AGENT DETECTION BY NUCLEIC ACID (DNA OR RNA); SEVERE ACUTE RESPIRATORY SYNDROME CORONAVIRUS 2 (SARS-COV-2) (CORONAVIRUS DISEASE [COVID-19]), AMPLIFIED PROBE TECHNIQUE, MAKING USE OF HIGH THROUGHPUT TECHNOLOGIES AS DESCRIBED BY CMS-2020-01-R: HCPCS | Performed by: INTERNAL MEDICINE

## 2021-08-14 ENCOUNTER — LAB REQUISITION (OUTPATIENT)
Dept: LAB | Facility: HOSPITAL | Age: 71
End: 2021-08-14
Payer: COMMERCIAL

## 2021-08-14 DIAGNOSIS — Z11.59 ENCOUNTER FOR SCREENING FOR OTHER VIRAL DISEASES: ICD-10-CM

## 2021-08-14 LAB — SARS-COV-2 RNA RESP QL NAA+PROBE: NEGATIVE

## 2021-08-17 PROCEDURE — U0003 INFECTIOUS AGENT DETECTION BY NUCLEIC ACID (DNA OR RNA); SEVERE ACUTE RESPIRATORY SYNDROME CORONAVIRUS 2 (SARS-COV-2) (CORONAVIRUS DISEASE [COVID-19]), AMPLIFIED PROBE TECHNIQUE, MAKING USE OF HIGH THROUGHPUT TECHNOLOGIES AS DESCRIBED BY CMS-2020-01-R: HCPCS | Performed by: INTERNAL MEDICINE

## 2021-08-17 PROCEDURE — U0005 INFEC AGEN DETEC AMPLI PROBE: HCPCS | Performed by: INTERNAL MEDICINE

## 2021-08-18 ENCOUNTER — LAB REQUISITION (OUTPATIENT)
Dept: LAB | Facility: HOSPITAL | Age: 71
End: 2021-08-18
Payer: COMMERCIAL

## 2021-08-18 DIAGNOSIS — Z11.59 ENCOUNTER FOR SCREENING FOR OTHER VIRAL DISEASES: ICD-10-CM

## 2021-08-18 LAB — SARS-COV-2 RNA RESP QL NAA+PROBE: NEGATIVE

## 2021-08-25 ENCOUNTER — LAB REQUISITION (OUTPATIENT)
Dept: LAB | Facility: HOSPITAL | Age: 71
End: 2021-08-25
Payer: COMMERCIAL

## 2021-08-25 DIAGNOSIS — Z11.59 ENCOUNTER FOR SCREENING FOR OTHER VIRAL DISEASES: ICD-10-CM

## 2021-08-25 LAB — SARS-COV-2 RNA RESP QL NAA+PROBE: NEGATIVE

## 2021-08-25 PROCEDURE — U0005 INFEC AGEN DETEC AMPLI PROBE: HCPCS | Performed by: INTERNAL MEDICINE

## 2021-08-25 PROCEDURE — U0003 INFECTIOUS AGENT DETECTION BY NUCLEIC ACID (DNA OR RNA); SEVERE ACUTE RESPIRATORY SYNDROME CORONAVIRUS 2 (SARS-COV-2) (CORONAVIRUS DISEASE [COVID-19]), AMPLIFIED PROBE TECHNIQUE, MAKING USE OF HIGH THROUGHPUT TECHNOLOGIES AS DESCRIBED BY CMS-2020-01-R: HCPCS | Performed by: INTERNAL MEDICINE

## 2021-08-31 PROCEDURE — U0005 INFEC AGEN DETEC AMPLI PROBE: HCPCS | Performed by: INTERNAL MEDICINE

## 2021-08-31 PROCEDURE — U0003 INFECTIOUS AGENT DETECTION BY NUCLEIC ACID (DNA OR RNA); SEVERE ACUTE RESPIRATORY SYNDROME CORONAVIRUS 2 (SARS-COV-2) (CORONAVIRUS DISEASE [COVID-19]), AMPLIFIED PROBE TECHNIQUE, MAKING USE OF HIGH THROUGHPUT TECHNOLOGIES AS DESCRIBED BY CMS-2020-01-R: HCPCS | Performed by: INTERNAL MEDICINE

## 2021-09-01 ENCOUNTER — LAB REQUISITION (OUTPATIENT)
Dept: LAB | Facility: HOSPITAL | Age: 71
End: 2021-09-01
Payer: COMMERCIAL

## 2021-09-01 DIAGNOSIS — Z11.59 ENCOUNTER FOR SCREENING FOR OTHER VIRAL DISEASES: ICD-10-CM

## 2021-09-01 LAB — SARS-COV-2 RNA RESP QL NAA+PROBE: NEGATIVE

## 2021-09-07 PROCEDURE — U0003 INFECTIOUS AGENT DETECTION BY NUCLEIC ACID (DNA OR RNA); SEVERE ACUTE RESPIRATORY SYNDROME CORONAVIRUS 2 (SARS-COV-2) (CORONAVIRUS DISEASE [COVID-19]), AMPLIFIED PROBE TECHNIQUE, MAKING USE OF HIGH THROUGHPUT TECHNOLOGIES AS DESCRIBED BY CMS-2020-01-R: HCPCS | Performed by: INTERNAL MEDICINE

## 2021-09-07 PROCEDURE — U0005 INFEC AGEN DETEC AMPLI PROBE: HCPCS | Performed by: INTERNAL MEDICINE

## 2021-09-08 ENCOUNTER — LAB REQUISITION (OUTPATIENT)
Dept: LAB | Facility: HOSPITAL | Age: 71
End: 2021-09-08
Payer: COMMERCIAL

## 2021-09-08 DIAGNOSIS — Z11.59 ENCOUNTER FOR SCREENING FOR OTHER VIRAL DISEASES: ICD-10-CM

## 2021-09-08 LAB — SARS-COV-2 RNA RESP QL NAA+PROBE: NEGATIVE

## 2023-01-18 ENCOUNTER — APPOINTMENT (EMERGENCY)
Dept: RADIOLOGY | Facility: HOSPITAL | Age: 73
End: 2023-01-18

## 2023-01-18 ENCOUNTER — HOSPITAL ENCOUNTER (INPATIENT)
Facility: HOSPITAL | Age: 73
LOS: 5 days | End: 2023-01-23
Attending: EMERGENCY MEDICINE | Admitting: FAMILY MEDICINE

## 2023-01-18 ENCOUNTER — APPOINTMENT (EMERGENCY)
Dept: CT IMAGING | Facility: HOSPITAL | Age: 73
End: 2023-01-18

## 2023-01-18 DIAGNOSIS — S72.002A CLOSED FRACTURE OF LEFT HIP, INITIAL ENCOUNTER (HCC): Primary | ICD-10-CM

## 2023-01-18 DIAGNOSIS — M25.552 LEFT HIP PAIN: ICD-10-CM

## 2023-01-18 DIAGNOSIS — R03.0 ELEVATED BLOOD PRESSURE READING: ICD-10-CM

## 2023-01-18 DIAGNOSIS — I10 ESSENTIAL HYPERTENSION: ICD-10-CM

## 2023-01-18 DIAGNOSIS — R09.02 HYPOXIA: ICD-10-CM
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
## 2023-01-18 DIAGNOSIS — I26.94 MULTIPLE SUBSEGMENTAL PULMONARY EMBOLI WITHOUT ACUTE COR PULMONALE (HCC): ICD-10-CM

## 2023-01-18 DIAGNOSIS — I82.452 ACUTE DEEP VEIN THROMBOSIS (DVT) OF LEFT PERONEAL VEIN (HCC): ICD-10-CM

## 2023-01-18 DIAGNOSIS — I44.7 LBBB (LEFT BUNDLE BRANCH BLOCK): ICD-10-CM

## 2023-01-18 DIAGNOSIS — I26.99 PULMONARY EMBOLI (HCC): ICD-10-CM

## 2023-01-18 DIAGNOSIS — S72.002D CLOSED FRACTURE OF LEFT HIP WITH ROUTINE HEALING, SUBSEQUENT ENCOUNTER: ICD-10-CM

## 2023-01-18 PROBLEM — D72.829 LEUKOCYTOSIS: Status: ACTIVE | Noted: 2023-01-18

## 2023-01-18 PROBLEM — I50.32 CHRONIC DIASTOLIC CONGESTIVE HEART FAILURE (HCC): Status: ACTIVE | Noted: 2023-01-18

## 2023-01-18 PROBLEM — E78.49 OTHER HYPERLIPIDEMIA: Status: ACTIVE | Noted: 2023-01-18

## 2023-01-18 PROBLEM — Z01.818 ENCOUNTER FOR PREOPERATIVE ASSESSMENT: Status: ACTIVE | Noted: 2023-01-18

## 2023-01-18 LAB
ANION GAP SERPL CALCULATED.3IONS-SCNC: 7 MMOL/L (ref 4–13)
ATRIAL RATE: 79 BPM
BASE EX.OXY STD BLDV CALC-SCNC: 90.6 % (ref 60–80)
BASE EXCESS BLDV CALC-SCNC: 1.5 MMOL/L
BASOPHILS # BLD AUTO: 0.05 THOUSANDS/ÂΜL (ref 0–0.1)
BASOPHILS NFR BLD AUTO: 0 % (ref 0–1)
BUN SERPL-MCNC: 19 MG/DL (ref 5–25)
CALCIUM SERPL-MCNC: 9.3 MG/DL (ref 8.4–10.2)
CHLORIDE SERPL-SCNC: 103 MMOL/L (ref 96–108)
CO2 SERPL-SCNC: 29 MMOL/L (ref 21–32)
CREAT SERPL-MCNC: 0.73 MG/DL (ref 0.6–1.3)
EOSINOPHIL # BLD AUTO: 0.06 THOUSAND/ÂΜL (ref 0–0.61)
EOSINOPHIL NFR BLD AUTO: 0 % (ref 0–6)
ERYTHROCYTE [DISTWIDTH] IN BLOOD BY AUTOMATED COUNT: 13.6 % (ref 11.6–15.1)
GFR SERPL CREATININE-BSD FRML MDRD: 82 ML/MIN/1.73SQ M
GLUCOSE SERPL-MCNC: 111 MG/DL (ref 65–140)
HCO3 BLDV-SCNC: 28.8 MMOL/L (ref 24–30)
HCT VFR BLD AUTO: 39 % (ref 34.8–46.1)
HGB BLD-MCNC: 13.1 G/DL (ref 11.5–15.4)
IMM GRANULOCYTES # BLD AUTO: 0.06 THOUSAND/UL (ref 0–0.2)
IMM GRANULOCYTES NFR BLD AUTO: 0 % (ref 0–2)
LYMPHOCYTES # BLD AUTO: 1.11 THOUSANDS/ÂΜL (ref 0.6–4.47)
LYMPHOCYTES NFR BLD AUTO: 8 % (ref 14–44)
MCH RBC QN AUTO: 30.2 PG (ref 26.8–34.3)
MCHC RBC AUTO-ENTMCNC: 33.6 G/DL (ref 31.4–37.4)
MCV RBC AUTO: 90 FL (ref 82–98)
MONOCYTES # BLD AUTO: 0.94 THOUSAND/ÂΜL (ref 0.17–1.22)
MONOCYTES NFR BLD AUTO: 7 % (ref 4–12)
NEUTROPHILS # BLD AUTO: 11.56 THOUSANDS/ÂΜL (ref 1.85–7.62)
NEUTS SEG NFR BLD AUTO: 85 % (ref 43–75)
NRBC BLD AUTO-RTO: 0 /100 WBCS
O2 CT BLDV-SCNC: 18 ML/DL
P AXIS: 65 DEGREES
PCO2 BLDV: 56.8 MM HG (ref 42–50)
PH BLDV: 7.32 [PH] (ref 7.3–7.4)
PLATELET # BLD AUTO: 237 THOUSANDS/UL (ref 149–390)
PMV BLD AUTO: 9.8 FL (ref 8.9–12.7)
PO2 BLDV: 70.9 MM HG (ref 35–45)
POTASSIUM SERPL-SCNC: 4 MMOL/L (ref 3.5–5.3)
PR INTERVAL: 168 MS
QRS AXIS: -80 DEGREES
QRSD INTERVAL: 146 MS
QT INTERVAL: 402 MS
QTC INTERVAL: 460 MS
RBC # BLD AUTO: 4.34 MILLION/UL (ref 3.81–5.12)
SODIUM SERPL-SCNC: 139 MMOL/L (ref 135–147)
T WAVE AXIS: 82 DEGREES
VENTRICULAR RATE: 79 BPM
WBC # BLD AUTO: 13.78 THOUSAND/UL (ref 4.31–10.16)

## 2023-01-18 RX ORDER — ENOXAPARIN SODIUM 100 MG/ML
40 INJECTION SUBCUTANEOUS DAILY
Status: DISCONTINUED | OUTPATIENT
Start: 2023-01-19 | End: 2023-01-19

## 2023-01-18 RX ORDER — OXYCODONE HYDROCHLORIDE 5 MG/1
5 TABLET ORAL EVERY 4 HOURS PRN
Status: DISCONTINUED | OUTPATIENT
Start: 2023-01-18 | End: 2023-01-19

## 2023-01-18 RX ORDER — CETIRIZINE HYDROCHLORIDE 10 MG/1
10 TABLET ORAL DAILY
COMMUNITY

## 2023-01-18 RX ORDER — ONDANSETRON 2 MG/ML
4 INJECTION INTRAMUSCULAR; INTRAVENOUS ONCE
Status: COMPLETED | OUTPATIENT
Start: 2023-01-18 | End: 2023-01-18

## 2023-01-18 RX ORDER — LORATADINE 10 MG/1
10 TABLET ORAL DAILY
Status: DISCONTINUED | OUTPATIENT
Start: 2023-01-19 | End: 2023-01-23 | Stop reason: HOSPADM

## 2023-01-18 RX ORDER — SIMVASTATIN 20 MG
20 TABLET ORAL EVERY EVENING
COMMUNITY
Start: 2022-09-11

## 2023-01-18 RX ORDER — ASPIRIN 81 MG/1
81 TABLET ORAL DAILY
COMMUNITY
End: 2023-01-23

## 2023-01-18 RX ORDER — ACETAMINOPHEN 325 MG/1
650 TABLET ORAL EVERY 6 HOURS PRN
Status: DISCONTINUED | OUTPATIENT
Start: 2023-01-18 | End: 2023-01-18

## 2023-01-18 RX ORDER — ACETAMINOPHEN 325 MG/1
975 TABLET ORAL EVERY 8 HOURS SCHEDULED
Status: DISCONTINUED | OUTPATIENT
Start: 2023-01-18 | End: 2023-01-23

## 2023-01-18 RX ORDER — LOSARTAN POTASSIUM 50 MG/1
100 TABLET ORAL DAILY
Status: DISCONTINUED | OUTPATIENT
Start: 2023-01-19 | End: 2023-01-21

## 2023-01-18 RX ORDER — ATORVASTATIN CALCIUM 20 MG/1
20 TABLET, FILM COATED ORAL
Status: DISCONTINUED | OUTPATIENT
Start: 2023-01-18 | End: 2023-01-23 | Stop reason: HOSPADM

## 2023-01-18 RX ORDER — FENTANYL CITRATE 50 UG/ML
25 INJECTION, SOLUTION INTRAMUSCULAR; INTRAVENOUS ONCE
Status: COMPLETED | OUTPATIENT
Start: 2023-01-18 | End: 2023-01-18

## 2023-01-18 RX ORDER — ONDANSETRON 2 MG/ML
4 INJECTION INTRAMUSCULAR; INTRAVENOUS EVERY 6 HOURS PRN
Status: DISCONTINUED | OUTPATIENT
Start: 2023-01-18 | End: 2023-01-19 | Stop reason: SDUPTHER

## 2023-01-18 RX ORDER — METOPROLOL TARTRATE 50 MG/1
50 TABLET, FILM COATED ORAL 2 TIMES DAILY
COMMUNITY
Start: 2022-10-13

## 2023-01-18 RX ORDER — METOPROLOL TARTRATE 50 MG/1
50 TABLET, FILM COATED ORAL 2 TIMES DAILY
Status: DISCONTINUED | OUTPATIENT
Start: 2023-01-18 | End: 2023-01-23 | Stop reason: HOSPADM

## 2023-01-18 RX ORDER — ASPIRIN 81 MG/1
81 TABLET ORAL DAILY
Status: DISCONTINUED | OUTPATIENT
Start: 2023-01-19 | End: 2023-01-21

## 2023-01-18 RX ORDER — HYDROMORPHONE HCL IN WATER/PF 6 MG/30 ML
0.2 PATIENT CONTROLLED ANALGESIA SYRINGE INTRAVENOUS EVERY 4 HOURS PRN
Status: DISCONTINUED | OUTPATIENT
Start: 2023-01-18 | End: 2023-01-23

## 2023-01-18 RX ORDER — LOSARTAN POTASSIUM 100 MG/1
100 TABLET ORAL DAILY
COMMUNITY
Start: 2023-01-10 | End: 2023-01-23

## 2023-01-18 RX ADMIN — HYDROMORPHONE HYDROCHLORIDE 0.2 MG: 0.2 INJECTION, SOLUTION INTRAMUSCULAR; INTRAVENOUS; SUBCUTANEOUS at 21:21

## 2023-01-18 RX ADMIN — FENTANYL CITRATE 25 MCG: 50 INJECTION, SOLUTION INTRAMUSCULAR; INTRAVENOUS at 15:20

## 2023-01-18 RX ADMIN — OXYCODONE HYDROCHLORIDE 5 MG: 5 TABLET ORAL at 17:41

## 2023-01-18 RX ADMIN — METOPROLOL TARTRATE 50 MG: 50 TABLET, FILM COATED ORAL at 17:42

## 2023-01-18 RX ADMIN — ONDANSETRON 4 MG: 2 INJECTION INTRAMUSCULAR; INTRAVENOUS at 15:20

## 2023-01-18 RX ADMIN — ACETAMINOPHEN 975 MG: 325 TABLET ORAL at 17:42

## 2023-01-18 RX ADMIN — ATORVASTATIN CALCIUM 20 MG: 20 TABLET, FILM COATED ORAL at 17:42

## 2023-01-18 NOTE — ASSESSMENT & PLAN NOTE
· Patient's risk factors include age, history of hypertension and hyperlipidemia  · Her recent 2D echo from 12/2/2022 at the [facility] was unremarkable with normal ejection fraction of 56%, grade 1 diastolic function, no evidence of valvular disease  · Patient tolerates moderate daily activity without significant symptoms >4 METs  · Patient is a intermediate preoperative risk, would proceed with surgery as indicated

## 2023-01-18 NOTE — H&P
[address] 1950, 67 y o  female MRN: [id number]  Unit/Bed#: E2 -01 Encounter: [id number]  Primary Care Provider: No primary care provider on file     Date and time admitted to hospital: 1/18/2023  2:15 PM    * Closed left hip fracture [facility])  Assessment & Plan  Patient presents with left hip fracture status post mechanical fall at [facility] facility question slipped on floor  · Admitted under medical service  · N p o  after midnight  · DVT prophylaxis  · Pain control  · PT/OT  · As below, the patient is deemed intermediate preoperative risk and would proceed with surgery as indicated    Encounter for preoperative assessment  Assessment & Plan  · Patient's risk factors include age, history of hypertension and hyperlipidemia  · Her recent 2D echo from 12/2/2022 at the [facility] was unremarkable with normal ejection fraction of 73%, grade 1 diastolic function, no evidence of valvular disease  · Patient tolerates moderate daily activity without significant symptoms >4 METs  · Patient is a intermediate preoperative risk, would proceed with surgery as indicated    Leukocytosis  Assessment & Plan  Most likely reactive process, no evidence of infection    Chronic diastolic congestive heart failure ([facility] [address] )  Assessment & Plan  Recent 2D echo done at [facility] 12/2022 revealed grade 1 diastolic dysfunction  Monitor volume status        Hypoxia  Assessment & Plan  · Patient developed hypoxia following dose of fentanyl for pain management requiring 2L O2  · Continue with narcotic medications with caution, nasal cannula to keep oxygen saturations above 90%    Other hyperlipidemia  Assessment & Plan  Continue statin     Essential hypertension  Assessment & Plan  Controlled on losartan and metoprolol, continue     VTE Pharmacologic Prophylaxis: VTE Score: 8 High Risk (Score >/= 5) - Pharmacological DVT Prophylaxis Ordered: enoxaparin (Lovenox)  Sequential Compression Devices Ordered  Code Status: Level 1 - Full Code   Discussion with family: patient    Anticipated Length of Stay: Patient will be admitted on an inpatient basis with an anticipated length of stay of greater than 2 midnights secondary to surgical intervention   Total Time for Visit, including Counseling / Coordination of Care: 75 minutes  Greater than 50% of this total time spent on direct patient counseling and coordination of care  Chief Complaint: fall     History of Present Illness:  Juan Carlos Martinez is a 67 y o  female with a PMH of hypertension, hyperlipidemia, grade 1 diastolic dysfunction and prior history of right hip fracture approximately 10 years ago who presents with left hip fracture status post mechanical fall  The patient states that 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[facility] where she slipped on the wet floor and fell onto her left side  She denies any prodromal symptoms such as chest pain, palpitations or lightheadedness  The patient reports pain when turning in bed or moving her left leg  She became hypoxic when receiving fentanyl in the emergency department  Prior to her fall she reports feeling well and being at her baseline state of health  She denies exertional symptoms including exertional shortness of breath or chest pain  She is able to tolerate moderate physical activity and 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  She denies recent illnesses  Denies GI or urinary symptoms  Remainder of 12 point review of systems is otherwise negative  Review of Systems:  Review of Systems   Constitutional: Negative for appetite change, fatigue and fever  HENT: Negative for congestion  Respiratory: Negative for shortness of breath and wheezing  Cardiovascular: Negative for chest pain, palpitations and leg swelling     Gastrointestinal: Negative for abdominal pain, constipation, diarrhea, nausea and vomiting  Endocrine: Negative for polydipsia and polyuria  Genitourinary: Negative for dysuria  Musculoskeletal: Negative for gait problem  Left hip pain    Neurological: Negative for dizziness  Hematological: Negative for adenopathy  Psychiatric/Behavioral: Negative for confusion  Past Medical and Surgical History:   Past Medical History:   Diagnosis Date   • Hypertension        Past Surgical History:   Procedure Laterality Date   • HIP FRACTURE SURGERY     • REDUCTION MAMMAPLASTY         Meds/Allergies:  Prior to Admission medications    Medication Sig Start Date End Date Taking? Authorizing Provider   cetirizine (ZyrTEC) 10 mg tablet Take 10 mg by mouth daily   Yes Historical Provider, MD   losartan (COZAAR) 100 MG tablet Take 100 mg by mouth daily 1/10/23 1/10/24 Yes Historical Provider, MD   metoprolol tartrate (LOPRESSOR) 50 mg tablet Take 50 mg by mouth 2 (two) times a day 10/13/22  Yes Historical Provider, MD   simvastatin (ZOCOR) 20 mg tablet Take 20 mg by mouth every evening 9/11/22  Yes Historical Provider, MD   aspirin (ECOTRIN LOW STRENGTH) 81 mg EC tablet Take 81 mg by mouth daily    Historical Provider, MD     I have reviewed home medications with patient personally  Allergies: Allergies   Allergen Reactions   • Amoxicillin Angioedema   • Sulfa Antibiotics Angioedema and Anaphylaxis     throat "closes "     • Lisinopril Swelling   • Medical Tape Other (See Comments)     rash, blisters       Social History:  Marital Status:     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  Patient Pre-hospital Living Situation: Home  Patient Pre-hospital Level of Mobility: walks  Patient Pre-hospital Diet Restrictions: none   Substance Use History:   Social History     Substance and Sexual Activity   Alcohol Use Never     Social History     Tobacco Use   Smoking Status Never   Smokeless Tobacco Never     Social History     Substance and Sexual Activity   Drug Use Never       Family History:  History reviewed  No pertinent family history  Physical Exam:     Vitals:   Blood Pressure: 120/73 (01/18/23 1741)  Pulse: 86 (01/18/23 1741)  Temperature: (!) 97 2 °F (36 2 °C) (01/18/23 1739)  Temp Source: Temporal (01/18/23 1739)  Respirations: 20 (01/18/23 1739)  SpO2: (!) 89 % (01/18/23 1748)    Physical Exam  Constitutional:       General: She is not in acute distress  HENT:      Head: Normocephalic and atraumatic  Nose: No congestion  Mouth/Throat:      Pharynx: Oropharynx is clear  Eyes:      Conjunctiva/sclera: Conjunctivae normal    Cardiovascular:      Rate and Rhythm: Normal rate and regular rhythm  Heart sounds: No murmur heard  Pulmonary:      Effort: No respiratory distress  Breath sounds: No wheezing or rales  Abdominal:      General: There is no distension  Tenderness: There is no abdominal tenderness  There is no guarding  Musculoskeletal:      Right lower leg: No edema  Left lower leg: No edema  Comments: LLE externally rotated    Skin:     General: Skin is warm and dry  Neurological:      Mental Status: She is oriented to person, place, and time     Psychiatric:         Mood and Affect: Mood normal           Additional Data:     Lab Results:  Results from last 7 days   Lab Units 01/18/23  1641   WBC Thousand/uL 13 78*   HEMOGLOBIN g/dL 13 1   HEMATOCRIT % 39 0   PLATELETS Thousands/uL 237   NEUTROS PCT % 85*   LYMPHS PCT % 8*   MONOS PCT % 7   EOS PCT % 0     Results from last 7 days   Lab Units 01/18/23  1641   SODIUM mmol/L 139   POTASSIUM mmol/L 4 0   CHLORIDE mmol/L 103   CO2 mmol/L 29   BUN mg/dL 19   CREATININE mg/dL 0 73   ANION GAP mmol/L 7   CALCIUM mg/dL 9 3   GLUCOSE RANDOM mg/dL 111                       Lines/Drains:  Invasive Devices     Peripheral Intravenous Line  Duration           Peripheral IV 01/18/23 Left Antecubital <1 day                    Imaging:   XR hip/pelv 2-3 vws left if performed   ED Interpretation by Lidya Wagner MD (01/18 1618)   Left hip/intertrochanteric fracture      CT head without contrast   Final Result by Tomy Escudero MD (01/18 1613)      No acute intracranial abnormality  Workstation performed: [id number]         XR femur 2 views LEFT    (Results Pending)       EKG and Other Studies Reviewed on Admission:   · EKG:   Normal sinus rhythm  Left axis deviation  Left bundle branch block  Abnormal ECG  No previous ECGs available  Confirmed by Ayad Rondon ([id number]) on 1/18/2023 4:53:16 PM    ** Please Note: This note has been constructed using a voice recognition system   **

## 2023-01-18 NOTE — ED PROVIDER NOTES
History  Chief Complaint   Patient presents with   • Hip Pain     Pt arrived via ems from work  Pt slipped and fell on L hip  Outward rotation noted  Hx R sided hip fracture  Patient is a 57-year-old female, with a history significant for osteoporosis and prior right hip fracture as well as daily aspirin use, who presents to the ED today, via EMS, due to inability to ambulate after fall  Patient was in her usual state of health shortly prior to arrival   When she was walking over a recently washed and wet floor  Patient reports that she slipped and she denies any prodromal symptoms such as lightheadedness, chest pain, dyspnea  Patient reports mild intensity discomfort at the left hip that is throbbing in character  Patient has not taken anything to remit her symptoms  Movement exacerbates her discomfort  Patient denies head strike as well as pain or injury anywhere else  Patient is without other concerns at this time     - No language barrier    - History obtained from patient  - There are no limitations to the history obtained  - Previous charting was reviewed          Prior to Admission Medications   Prescriptions Last Dose Informant Patient Reported? Taking?   aspirin (ECOTRIN LOW STRENGTH) 81 mg EC tablet   Yes No   Sig: Take 81 mg by mouth daily   cetirizine (ZyrTEC) 10 mg tablet   Yes Yes   Sig: Take 10 mg by mouth daily   losartan (COZAAR) 100 MG tablet   Yes Yes   Sig: Take 100 mg by mouth daily   metoprolol tartrate (LOPRESSOR) 50 mg tablet   Yes Yes   Sig: Take 50 mg by mouth 2 (two) times a day   simvastatin (ZOCOR) 20 mg tablet   Yes Yes   Sig: Take 20 mg by mouth every evening      Facility-Administered Medications: None       Past Medical History:   Diagnosis Date   • Hypertension        Past Surgical History:   Procedure Laterality Date   • HIP FRACTURE SURGERY     • REDUCTION MAMMAPLASTY         History reviewed  No pertinent family history    I have reviewed and agree with the history as documented  E-Cigarette/Vaping     E-Cigarette/Vaping Substances     Social History     Tobacco Use   • Smoking status: Never   • Smokeless tobacco: Never   Substance Use Topics   • Alcohol use: Never   • Drug use: Never        Review of Systems   Constitutional: Negative for fever  HENT: Negative for trouble swallowing  Eyes: Negative for visual disturbance  Respiratory: Negative for shortness of breath  Cardiovascular: Negative for chest pain  Gastrointestinal: Negative for abdominal pain  Endocrine: Negative for polyuria  Genitourinary: Negative for dysuria  Musculoskeletal: Positive for arthralgias and gait problem  Skin: Negative for rash  Allergic/Immunologic: Positive for environmental allergies  Neurological: Negative for weakness and numbness  Hematological: Negative for adenopathy  Psychiatric/Behavioral: Negative for confusion  All other systems reviewed and are negative  Physical Exam  ED Triage Vitals   Temperature Pulse Respirations Blood Pressure SpO2   01/18/23 1418 01/18/23 1418 01/18/23 1418 01/18/23 1418 01/18/23 1418   98 °F (36 7 °C) 82 22 132/92 91 %      Temp Source Heart Rate Source Patient Position - Orthostatic VS BP Location FiO2 (%)   01/18/23 1418 01/18/23 1418 01/18/23 1418 01/18/23 1418 --   Oral Monitor Lying Left arm       Pain Score       01/18/23 1520       10 - Worst Possible Pain             Orthostatic Vital Signs  Vitals:    01/18/23 1418 01/18/23 1608   BP: 132/92 132/67   Pulse: 82 80   Patient Position - Orthostatic VS: Lying        Physical Exam  Vitals and nursing note reviewed  Constitutional:       General: She is not in acute distress  Appearance: She is not toxic-appearing or diaphoretic  HENT:      Head: Normocephalic and atraumatic  Right Ear: External ear normal       Left Ear: External ear normal       Nose: Nose normal  No rhinorrhea        Mouth/Throat:      Mouth: Mucous membranes are moist       Pharynx: Oropharynx is clear  No oropharyngeal exudate or posterior oropharyngeal erythema  Comments: No submandibular mass/swelling  Eyes:      General: No scleral icterus  Right eye: No discharge  Left eye: No discharge  Conjunctiva/sclera: Conjunctivae normal       Pupils: Pupils are equal, round, and reactive to light  Neck:      Comments: Patient is spontaneously rotating their neck to the left and right during the history and physical exam interaction without difficulty or apparent discomfort    Cardiovascular:      Rate and Rhythm: Normal rate and regular rhythm  Pulses: Normal pulses  Heart sounds: Normal heart sounds  No murmur heard  No friction rub  No gallop  Comments: 2+ Radial and 2+ DP bilaterally  Pulmonary:      Effort: Pulmonary effort is normal  No respiratory distress  Breath sounds: Normal breath sounds  No stridor  No wheezing, rhonchi or rales  Abdominal:      General: Abdomen is flat  There is no distension  Palpations: Abdomen is soft  Tenderness: There is no abdominal tenderness  There is no right CVA tenderness, left CVA tenderness, guarding or rebound  Musculoskeletal:         General: Deformity present  Cervical back: Neck supple  No tenderness  No muscular tenderness  Comments: No tenderness to palpation of the bilateral shoulders, elbows, arms, thighs, knees, legs  No chest wall tenderness to palpation     No midline C, T, L spine tenderness to palpation     Left pelvic tenderness to palpation  No overlying skin defect    Left lower extremity shortened and externally rotated when compared to right   Lymphadenopathy:      Cervical: No cervical adenopathy  Skin:     General: Skin is warm and dry  Capillary Refill: Capillary refill takes less than 2 seconds  Neurological:      Mental Status: She is alert  Comments: Patient is speaking clearly in complete sentences    Patient is answering appropriately and able follow commands  Patient is moving all four extremities spontaneously  No facial droop  Tongue midline  Intact sensation in the superficial and deep peroneal nerves, dorsal lateral cutaneous nerve, saphenous nerve distributions bilaterally   Psychiatric:         Mood and Affect: Mood normal          Behavior: Behavior normal          ED Medications  Medications   fentanyl citrate (PF) 100 MCG/2ML 25 mcg (25 mcg Intravenous Given 1/18/23 1520)   ondansetron (ZOFRAN) injection 4 mg (4 mg Intravenous Given 1/18/23 1520)       Diagnostic Studies  Results Reviewed     Procedure Component Value Units Date/Time    Blood gas, venous [[id number]]     Lab Status: No result Specimen: Blood                  XR hip/pelv 2-3 vws left if performed   ED Interpretation by Luis Talley MD (01/18 1618)   Left hip/intertrochanteric fracture      CT head without contrast   Final Result by Yun Fitzpatrick MD (01/18 1613)      No acute intracranial abnormality  Workstation performed: [id number]         XR femur 2 views LEFT    (Results Pending)         Procedures  Procedures      ED Course  ED Course as of 01/18/23 1630   Wed Jan 18, 2023   1440 ECG: Normal rate, regular rhythm, no ectopy, leftward axis, LBBB present   1521 Patient now describing a sensation of numbness in the anterior left thigh  Pain medication requested prior to undergoing x-ray   1618 Results reviewed with patient  Patient mentating well  SPO2 noted to be 80% on room air after getting fentanyl  Patient denies chest pain or dyspnea  Shallow respirations noted  Improved to mid to high 90s on 3 L O2 via nasal cannula  SBIRT 20yo+    Flowsheet Row Most Recent Value   SBIRT (25 yo +)    In order to provide better care to our patients, we are screening all of our patients for alcohol and drug use  Would it be okay to ask you these screening questions?  Yes Filed at: 01/18/2023 1440   Initial Alcohol Screen: US AUDIT-C     1  How often do you have a drink containing alcohol? 0 Filed at: 01/18/2023 1440   2  How many drinks containing alcohol do you have on a typical day you are drinking? 0 Filed at: 01/18/2023 1440   3a  Male UNDER 65: How often do you have five or more drinks on one occasion? 0 Filed at: 01/18/2023 1440   3b  FEMALE Any Age, or MALE 65+: How often do you have 4 or more drinks on one occassion? 0 Filed at: 01/18/2023 1440   Audit-C Score 0 Filed at: 01/18/2023 1440   CELSO: How many times in the past year have you    Used an illegal drug or used a prescription medication for non-medical reasons? Never Filed at: 01/18/2023 1440                Medical Decision Making  Patient is a 77-year-old female, with a history significant for osteoporosis and prior right hip fracture as well as daily aspirin use, who presents to the ED today, via EMS, due to inability to ambulate after fall  Patient was in her usual state of health shortly prior to arrival   When she was walking over a recently washed and wet floor  Patient reports that she slipped and she denies any prodromal symptoms such as lightheadedness, chest pain, dyspnea  Patient is currently afebrile and hemodynamically stable  Her physical exam is notable for shortened and externally rotated left lower extremity as well as tenderness to palpation of the left pelvis with a grossly neurovascularly intact left lower extremity  This presentation is concerning for: Hip fracture  I also considered dislocation, ICH given aspirin use and age  No reason to suspect syncope at this time based on history and physical exam   Will investigate with x-rays of the left femur and pelvis as well as CT head  Will manage based upon work-up as patient does not desire analgesia or antiemetics at this time       Closed fracture of left hip, initial encounter [facility]): acute illness or injury  Elevated blood pressure reading: acute illness or injury  Fall, initial encounter: acute illness or injury  LBBB (left bundle branch block): acute illness or injury  Left hip pain: acute illness or injury  Amount and/or Complexity of Data Reviewed  Labs: ordered  Radiology: ordered and independent interpretation performed  Risk  Prescription drug management  Decision regarding hospitalization  Disposition  Final diagnoses:   Fall, initial encounter   Left hip pain   Elevated blood pressure reading   LBBB (left bundle branch block)   Closed fracture of left hip, initial encounter [facility])     Time reflects when diagnosis was documented in both MDM as applicable and the Disposition within this note     Time User Action Codes Description Comment    1/18/2023  2:41 PM Aureliano Marie Add [X18  RVCE] Fall, initial encounter     1/18/2023  2:41 PM Aureliano Marie A Add [M25 552] Left hip pain     1/18/2023  2:41 PM Legare, Gordan Oceana A Add [R03 0] Elevated blood pressure reading     1/18/2023  2:41 PM Legare, Gordan Oceana A Add [I44 7] LBBB (left bundle branch block)     1/18/2023  4:08 PM Legare, Gordan Oceana A Add [S72 002A] Closed fracture of left hip, initial encounter ([facility] [address] )     1/18/2023  4:08 PM Aureliano Marie Modify [Z32  Annette Compa Fall, initial encounter     1/18/2023  4:08 PM Legare, Gordan Oceana A Modify [S72 002A] Closed fracture of left hip, initial encounter [facility])       ED Disposition     ED Disposition   Admit    Condition   Stable    Date/Time   Wed Jan 18, 2023  4:29 PM    Comment   Case was discussed with NELIA and the patient's admission status was agreed to be Admission Status: inpatient status to the service of Dr Rancho Keith   Follow-up Information    None         Patient's Medications   Discharge Prescriptions    No medications on file     No discharge procedures on file  PDMP Review     None           ED Provider  Attending physically available and evaluated Mc Quinteros  JORGE managed the patient along with the ED Attending      Electronically Signed by         Deborah Vila MD  01/18/23 3272

## 2023-01-18 NOTE — ASSESSMENT & PLAN NOTE
Patient presents with left hip fracture status post mechanical fall at [facility] facility question slipped on floor  · Admitted under medical service  · N p o  after midnight  · DVT prophylaxis  · Pain control  · PT/OT  · As below, the patient is deemed intermediate preoperative risk and would proceed with surgery as indicated

## 2023-01-18 NOTE — ASSESSMENT & PLAN NOTE
· Patient developed hypoxia following dose of fentanyl for pain management requiring 2L O2  · Continue with narcotic medications with caution, nasal cannula to keep oxygen saturations above 90%

## 2023-01-18 NOTE — PLAN OF CARE
Problem: Potential for Falls  Goal: Patient will remain free of falls  Description: INTERVENTIONS:  - Educate patient/family on patient safety including physical limitations  - Instruct patient to call for assistance with activity   - Consult OT/PT to assist with strengthening/mobility   - Keep Call bell within reach  - Keep bed low and locked with side rails adjusted as appropriate  - Keep care items and personal belongings within reach  - Initiate and maintain comfort rounds  - Make Fall Risk Sign visible to staff  -Stephen Jose in place, assess every 8 hrs and change tubing and canister every 8 hrs   - Apply yellow socks and bracelet for high fall risk patients  - Consider moving patient to room near nurses station  Outcome: Progressing     Problem: MOBILITY - ADULT  Goal: Maintain or return to baseline ADL function  Description: INTERVENTIONS:  -  Assess patient's ability to carry out ADLs; assess patient's baseline for ADL function and identify physical deficits which impact ability to perform ADLs (bathing, care of mouth/teeth, toileting, grooming, dressing, etc )  - Assess/evaluate cause of self-care deficits   - Assess range of motion  - Assess patient's mobility; develop plan if impaired  - Assess patient's need for assistive devices and provide as appropriate  - Encourage maximum independence but intervene and supervise when necessary  - Involve family in performance of ADLs  - Assess for home care needs following discharge   - Consider OT consult to assist with ADL evaluation and planning for discharge  - Provide patient education as appropriate  Outcome: Progressing     Problem: Prexisting or High Potential for Compromised Skin Integrity  Goal: Skin integrity is maintained or improved  Description: INTERVENTIONS:  - Identify patients at risk for skin breakdown  - Assess and monitor skin integrity  - Assess and monitor nutrition and hydration status  - Monitor labs   - Assess for incontinence   - Turn and reposition patient  - Assist with mobility/ambulation  - Relieve pressure over bony prominences  - Avoid friction and shearing  - Provide appropriate hygiene as needed including keeping skin clean and dry  - Evaluate need for skin moisturizer/barrier cream  - Collaborate with interdisciplinary team   - Patient/family teaching  - Consider wound care consult   Outcome: Progressing

## 2023-01-18 NOTE — ASSESSMENT & PLAN NOTE
Recent 2D echo done at [facility] 12/2022 revealed grade 1 diastolic dysfunction  Monitor volume status

## 2023-01-19 ENCOUNTER — APPOINTMENT (INPATIENT)
Dept: RADIOLOGY | Facility: HOSPITAL | Age: 73
End: 2023-01-19

## 2023-01-19 ENCOUNTER — ANESTHESIA EVENT (INPATIENT)
Dept: PERIOP | Facility: HOSPITAL | Age: 73
End: 2023-01-19

## 2023-01-19 ENCOUNTER — ANESTHESIA (INPATIENT)
Dept: PERIOP | Facility: HOSPITAL | Age: 73
End: 2023-01-19

## 2023-01-19 LAB
ABO GROUP BLD: NORMAL
ALBUMIN SERPL BCP-MCNC: 4 G/DL (ref 3.5–5)
ALP SERPL-CCNC: 81 U/L (ref 34–104)
ALT SERPL W P-5'-P-CCNC: 17 U/L (ref 7–52)
ANION GAP SERPL CALCULATED.3IONS-SCNC: 5 MMOL/L (ref 4–13)
AST SERPL W P-5'-P-CCNC: 19 U/L (ref 13–39)
BASOPHILS # BLD AUTO: 0.04 THOUSANDS/ÂΜL (ref 0–0.1)
BASOPHILS NFR BLD AUTO: 0 % (ref 0–1)
BILIRUB SERPL-MCNC: 0.58 MG/DL (ref 0.2–1)
BLD GP AB SCN SERPL QL: NEGATIVE
BUN SERPL-MCNC: 19 MG/DL (ref 5–25)
CALCIUM SERPL-MCNC: 8.9 MG/DL (ref 8.4–10.2)
CHLORIDE SERPL-SCNC: 100 MMOL/L (ref 96–108)
CO2 SERPL-SCNC: 32 MMOL/L (ref 21–32)
CREAT SERPL-MCNC: 0.66 MG/DL (ref 0.6–1.3)
EOSINOPHIL # BLD AUTO: 0.04 THOUSAND/ÂΜL (ref 0–0.61)
EOSINOPHIL NFR BLD AUTO: 0 % (ref 0–6)
ERYTHROCYTE [DISTWIDTH] IN BLOOD BY AUTOMATED COUNT: 14 % (ref 11.6–15.1)
GFR SERPL CREATININE-BSD FRML MDRD: 88 ML/MIN/1.73SQ M
GLUCOSE SERPL-MCNC: 102 MG/DL (ref 65–140)
GLUCOSE SERPL-MCNC: 155 MG/DL (ref 65–140)
HCT VFR BLD AUTO: 38.9 % (ref 34.8–46.1)
HGB BLD-MCNC: 12.6 G/DL (ref 11.5–15.4)
IMM GRANULOCYTES # BLD AUTO: 0.06 THOUSAND/UL (ref 0–0.2)
IMM GRANULOCYTES NFR BLD AUTO: 1 % (ref 0–2)
INR PPP: 1 (ref 0.84–1.19)
LYMPHOCYTES # BLD AUTO: 1.45 THOUSANDS/ÂΜL (ref 0.6–4.47)
LYMPHOCYTES NFR BLD AUTO: 12 % (ref 14–44)
MCH RBC QN AUTO: 29.7 PG (ref 26.8–34.3)
MCHC RBC AUTO-ENTMCNC: 32.4 G/DL (ref 31.4–37.4)
MCV RBC AUTO: 92 FL (ref 82–98)
MONOCYTES # BLD AUTO: 0.9 THOUSAND/ÂΜL (ref 0.17–1.22)
MONOCYTES NFR BLD AUTO: 8 % (ref 4–12)
NEUTROPHILS # BLD AUTO: 9.26 THOUSANDS/ÂΜL (ref 1.85–7.62)
NEUTS SEG NFR BLD AUTO: 79 % (ref 43–75)
NRBC BLD AUTO-RTO: 0 /100 WBCS
PLATELET # BLD AUTO: 255 THOUSANDS/UL (ref 149–390)
PMV BLD AUTO: 10.6 FL (ref 8.9–12.7)
POTASSIUM SERPL-SCNC: 4.3 MMOL/L (ref 3.5–5.3)
PROT SERPL-MCNC: 6.7 G/DL (ref 6.4–8.4)
PROTHROMBIN TIME: 13.2 SECONDS (ref 11.6–14.5)
RBC # BLD AUTO: 4.24 MILLION/UL (ref 3.81–5.12)
RH BLD: POSITIVE
SODIUM SERPL-SCNC: 137 MMOL/L (ref 135–147)
SPECIMEN EXPIRATION DATE: NORMAL
WBC # BLD AUTO: 11.75 THOUSAND/UL (ref 4.31–10.16)

## 2023-01-19 PROCEDURE — 0QS706Z REPOSITION LEFT UPPER FEMUR WITH INTRAMEDULLARY INTERNAL FIXATION DEVICE, OPEN APPROACH: ICD-10-PCS | Performed by: ORTHOPAEDIC SURGERY

## 2023-01-19 DEVICE — 11MM/130 DEG TI CANN TFNA 360MM/LEFT - STERILE
Type: IMPLANTABLE DEVICE | Site: HIP | Status: FUNCTIONAL
Brand: TFN-ADVANCE

## 2023-01-19 DEVICE — 5.0MM TI LOCKING SCREW W/T25 STARDRIVE 38MM F/IM NAIL-STER: Type: IMPLANTABLE DEVICE | Site: HIP | Status: FUNCTIONAL

## 2023-01-19 DEVICE — TFNA FENESTRATED SCREW 95MM - STERILE
Type: IMPLANTABLE DEVICE | Site: HIP | Status: FUNCTIONAL
Brand: TFN-ADVANCE

## 2023-01-19 RX ORDER — EPHEDRINE SULFATE 50 MG/ML
INJECTION INTRAVENOUS AS NEEDED
Status: DISCONTINUED | OUTPATIENT
Start: 2023-01-19 | End: 2023-01-19

## 2023-01-19 RX ORDER — DOCUSATE SODIUM 100 MG/1
100 CAPSULE, LIQUID FILLED ORAL 2 TIMES DAILY
Status: DISCONTINUED | OUTPATIENT
Start: 2023-01-19 | End: 2023-01-23 | Stop reason: HOSPADM

## 2023-01-19 RX ORDER — PANTOPRAZOLE SODIUM 40 MG/1
40 TABLET, DELAYED RELEASE ORAL DAILY
Status: DISCONTINUED | OUTPATIENT
Start: 2023-01-20 | End: 2023-01-23 | Stop reason: HOSPADM

## 2023-01-19 RX ORDER — SODIUM CHLORIDE 9 MG/ML
125 INJECTION, SOLUTION INTRAVENOUS CONTINUOUS
Status: DISCONTINUED | OUTPATIENT
Start: 2023-01-19 | End: 2023-01-19

## 2023-01-19 RX ORDER — FLUMAZENIL 0.1 MG/ML
0.2 INJECTION INTRAVENOUS ONCE
Status: COMPLETED | OUTPATIENT
Start: 2023-01-19 | End: 2023-01-19

## 2023-01-19 RX ORDER — OXYCODONE HYDROCHLORIDE 5 MG/1
5 TABLET ORAL EVERY 6 HOURS PRN
Status: DISCONTINUED | OUTPATIENT
Start: 2023-01-19 | End: 2023-01-23

## 2023-01-19 RX ORDER — FLUMAZENIL 0.1 MG/ML
INJECTION INTRAVENOUS AS NEEDED
Status: DISCONTINUED | OUTPATIENT
Start: 2023-01-19 | End: 2023-01-19

## 2023-01-19 RX ORDER — ONDANSETRON 2 MG/ML
4 INJECTION INTRAMUSCULAR; INTRAVENOUS EVERY 6 HOURS PRN
Status: DISCONTINUED | OUTPATIENT
Start: 2023-01-19 | End: 2023-01-23

## 2023-01-19 RX ORDER — LEVALBUTEROL INHALATION SOLUTION 0.63 MG/3ML
0.63 SOLUTION RESPIRATORY (INHALATION)
Status: DISCONTINUED | OUTPATIENT
Start: 2023-01-19 | End: 2023-01-20

## 2023-01-19 RX ORDER — MIDAZOLAM HYDROCHLORIDE 2 MG/2ML
INJECTION, SOLUTION INTRAMUSCULAR; INTRAVENOUS AS NEEDED
Status: DISCONTINUED | OUTPATIENT
Start: 2023-01-19 | End: 2023-01-19

## 2023-01-19 RX ORDER — IBUPROFEN 400 MG/1
400 TABLET ORAL EVERY 8 HOURS PRN
Status: DISCONTINUED | OUTPATIENT
Start: 2023-01-19 | End: 2023-01-19

## 2023-01-19 RX ORDER — NALOXONE HYDROCHLORIDE 0.4 MG/ML
0.2 INJECTION, SOLUTION INTRAMUSCULAR; INTRAVENOUS; SUBCUTANEOUS ONCE
Status: COMPLETED | OUTPATIENT
Start: 2023-01-19 | End: 2023-01-19

## 2023-01-19 RX ORDER — CLINDAMYCIN PHOSPHATE 600 MG/50ML
600 INJECTION INTRAVENOUS
Status: COMPLETED | OUTPATIENT
Start: 2023-01-19 | End: 2023-01-19

## 2023-01-19 RX ORDER — SENNOSIDES 8.6 MG
1 TABLET ORAL DAILY
Status: DISCONTINUED | OUTPATIENT
Start: 2023-01-20 | End: 2023-01-23 | Stop reason: HOSPADM

## 2023-01-19 RX ORDER — OXYCODONE HYDROCHLORIDE 10 MG/1
10 TABLET ORAL EVERY 6 HOURS PRN
Status: DISCONTINUED | OUTPATIENT
Start: 2023-01-19 | End: 2023-01-23

## 2023-01-19 RX ORDER — CALCIUM CARBONATE 200(500)MG
1000 TABLET,CHEWABLE ORAL DAILY PRN
Status: DISCONTINUED | OUTPATIENT
Start: 2023-01-19 | End: 2023-01-23 | Stop reason: HOSPADM

## 2023-01-19 RX ORDER — ENOXAPARIN SODIUM 100 MG/ML
40 INJECTION SUBCUTANEOUS DAILY
Status: DISCONTINUED | OUTPATIENT
Start: 2023-01-20 | End: 2023-01-20

## 2023-01-19 RX ORDER — CLINDAMYCIN PHOSPHATE 600 MG/50ML
600 INJECTION INTRAVENOUS EVERY 8 HOURS
Status: COMPLETED | OUTPATIENT
Start: 2023-01-19 | End: 2023-01-20

## 2023-01-19 RX ORDER — PROPOFOL 10 MG/ML
INJECTION, EMULSION INTRAVENOUS AS NEEDED
Status: DISCONTINUED | OUTPATIENT
Start: 2023-01-19 | End: 2023-01-19

## 2023-01-19 RX ORDER — LIDOCAINE HYDROCHLORIDE 10 MG/ML
INJECTION, SOLUTION EPIDURAL; INFILTRATION; INTRACAUDAL; PERINEURAL AS NEEDED
Status: DISCONTINUED | OUTPATIENT
Start: 2023-01-19 | End: 2023-01-19

## 2023-01-19 RX ORDER — FENTANYL CITRATE 50 UG/ML
INJECTION, SOLUTION INTRAMUSCULAR; INTRAVENOUS AS NEEDED
Status: DISCONTINUED | OUTPATIENT
Start: 2023-01-19 | End: 2023-01-19

## 2023-01-19 RX ORDER — ALBUTEROL SULFATE 2.5 MG/3ML
2.5 SOLUTION RESPIRATORY (INHALATION) EVERY 6 HOURS PRN
Status: DISCONTINUED | OUTPATIENT
Start: 2023-01-19 | End: 2023-01-19 | Stop reason: HOSPADM

## 2023-01-19 RX ORDER — ROCURONIUM BROMIDE 10 MG/ML
INJECTION, SOLUTION INTRAVENOUS AS NEEDED
Status: DISCONTINUED | OUTPATIENT
Start: 2023-01-19 | End: 2023-01-19

## 2023-01-19 RX ORDER — SODIUM CHLORIDE, SODIUM LACTATE, POTASSIUM CHLORIDE, CALCIUM CHLORIDE 600; 310; 30; 20 MG/100ML; MG/100ML; MG/100ML; MG/100ML
75 INJECTION, SOLUTION INTRAVENOUS CONTINUOUS
Status: DISCONTINUED | OUTPATIENT
Start: 2023-01-19 | End: 2023-01-21

## 2023-01-19 RX ORDER — MAGNESIUM HYDROXIDE 1200 MG/15ML
LIQUID ORAL AS NEEDED
Status: DISCONTINUED | OUTPATIENT
Start: 2023-01-19 | End: 2023-01-19 | Stop reason: HOSPADM

## 2023-01-19 RX ORDER — DEXAMETHASONE SODIUM PHOSPHATE 10 MG/ML
INJECTION, SOLUTION INTRAMUSCULAR; INTRAVENOUS AS NEEDED
Status: DISCONTINUED | OUTPATIENT
Start: 2023-01-19 | End: 2023-01-19

## 2023-01-19 RX ORDER — ACETAMINOPHEN 325 MG/1
650 TABLET ORAL EVERY 6 HOURS PRN
Status: DISCONTINUED | OUTPATIENT
Start: 2023-01-19 | End: 2023-01-23 | Stop reason: HOSPADM

## 2023-01-19 RX ORDER — HYDROMORPHONE HCL/PF 1 MG/ML
0.5 SYRINGE (ML) INJECTION
Status: DISCONTINUED | OUTPATIENT
Start: 2023-01-19 | End: 2023-01-19 | Stop reason: HOSPADM

## 2023-01-19 RX ORDER — LABETALOL HYDROCHLORIDE 5 MG/ML
10 INJECTION, SOLUTION INTRAVENOUS ONCE
Status: COMPLETED | OUTPATIENT
Start: 2023-01-19 | End: 2023-01-19

## 2023-01-19 RX ORDER — ONDANSETRON 2 MG/ML
4 INJECTION INTRAMUSCULAR; INTRAVENOUS ONCE AS NEEDED
Status: DISCONTINUED | OUTPATIENT
Start: 2023-01-19 | End: 2023-01-19 | Stop reason: HOSPADM

## 2023-01-19 RX ADMIN — CLINDAMYCIN PHOSPHATE 600 MG: 600 INJECTION, SOLUTION INTRAVENOUS at 14:48

## 2023-01-19 RX ADMIN — DEXAMETHASONE SODIUM PHOSPHATE 6 MG: 10 INJECTION INTRAMUSCULAR; INTRAVENOUS at 15:07

## 2023-01-19 RX ADMIN — ACETAMINOPHEN 975 MG: 325 TABLET ORAL at 23:19

## 2023-01-19 RX ADMIN — SODIUM CHLORIDE, SODIUM LACTATE, POTASSIUM CHLORIDE, AND CALCIUM CHLORIDE 75 ML/HR: .6; .31; .03; .02 INJECTION, SOLUTION INTRAVENOUS at 21:38

## 2023-01-19 RX ADMIN — FLUMAZENIL 0.2 MG: 0.1 INJECTION, SOLUTION INTRAVENOUS at 18:17

## 2023-01-19 RX ADMIN — OXYCODONE HYDROCHLORIDE 5 MG: 5 TABLET ORAL at 05:36

## 2023-01-19 RX ADMIN — IBUPROFEN 400 MG: 400 TABLET ORAL at 02:47

## 2023-01-19 RX ADMIN — MIDAZOLAM 2 MG: 1 INJECTION INTRAMUSCULAR; INTRAVENOUS at 14:40

## 2023-01-19 RX ADMIN — DOCUSATE SODIUM 100 MG: 100 CAPSULE, LIQUID FILLED ORAL at 23:19

## 2023-01-19 RX ADMIN — ALBUTEROL SULFATE 2.5 MG: 2.5 SOLUTION RESPIRATORY (INHALATION) at 16:08

## 2023-01-19 RX ADMIN — HYDROMORPHONE HYDROCHLORIDE 0.5 MG: 1 INJECTION, SOLUTION INTRAMUSCULAR; INTRAVENOUS; SUBCUTANEOUS at 16:33

## 2023-01-19 RX ADMIN — EPHEDRINE SULFATE 10 MG: 50 INJECTION, SOLUTION INTRAVENOUS at 15:05

## 2023-01-19 RX ADMIN — SODIUM CHLORIDE 125 ML/HR: 0.9 INJECTION, SOLUTION INTRAVENOUS at 17:45

## 2023-01-19 RX ADMIN — SUGAMMADEX 200 MG: 100 INJECTION, SOLUTION INTRAVENOUS at 15:23

## 2023-01-19 RX ADMIN — SODIUM CHLORIDE: 0.9 INJECTION, SOLUTION INTRAVENOUS at 14:37

## 2023-01-19 RX ADMIN — METOPROLOL TARTRATE 50 MG: 50 TABLET, FILM COATED ORAL at 23:19

## 2023-01-19 RX ADMIN — CLINDAMYCIN IN 5 PERCENT DEXTROSE 600 MG: 12 INJECTION, SOLUTION INTRAVENOUS at 21:39

## 2023-01-19 RX ADMIN — SODIUM CHLORIDE: 0.9 INJECTION, SOLUTION INTRAVENOUS at 15:13

## 2023-01-19 RX ADMIN — FENTANYL CITRATE 50 MCG: 50 INJECTION INTRAMUSCULAR; INTRAVENOUS at 15:00

## 2023-01-19 RX ADMIN — NALOXONE HYDROCHLORIDE 0.2 MG: 0.4 INJECTION, SOLUTION INTRAMUSCULAR; INTRAVENOUS; SUBCUTANEOUS at 18:00

## 2023-01-19 RX ADMIN — ACETAMINOPHEN 975 MG: 325 TABLET ORAL at 05:29

## 2023-01-19 RX ADMIN — PROPOFOL 120 MG: 10 INJECTION, EMULSION INTRAVENOUS at 14:42

## 2023-01-19 RX ADMIN — HYDROMORPHONE HYDROCHLORIDE 0.2 MG: 0.2 INJECTION, SOLUTION INTRAMUSCULAR; INTRAVENOUS; SUBCUTANEOUS at 12:14

## 2023-01-19 RX ADMIN — METOPROLOL TARTRATE 50 MG: 50 TABLET, FILM COATED ORAL at 08:41

## 2023-01-19 RX ADMIN — NALOXONE HYDROCHLORIDE 0.2 MG: 0.4 INJECTION, SOLUTION INTRAMUSCULAR; INTRAVENOUS; SUBCUTANEOUS at 17:14

## 2023-01-19 RX ADMIN — LIDOCAINE HYDROCHLORIDE 80 MG: 10 INJECTION, SOLUTION EPIDURAL; INFILTRATION; INTRACAUDAL; PERINEURAL at 14:42

## 2023-01-19 RX ADMIN — FENTANYL CITRATE 50 MCG: 50 INJECTION INTRAMUSCULAR; INTRAVENOUS at 14:40

## 2023-01-19 RX ADMIN — ROCURONIUM BROMIDE 40 MG: 10 INJECTION, SOLUTION INTRAVENOUS at 14:42

## 2023-01-19 RX ADMIN — EPHEDRINE SULFATE 10 MG: 50 INJECTION, SOLUTION INTRAVENOUS at 15:03

## 2023-01-19 RX ADMIN — LABETALOL HYDROCHLORIDE 10 MG: 5 INJECTION, SOLUTION INTRAVENOUS at 18:04

## 2023-01-19 RX ADMIN — ONDANSETRON 4 MG: 2 INJECTION INTRAMUSCULAR; INTRAVENOUS at 15:10

## 2023-01-19 RX ADMIN — FLUMAZENIL 0.3 MG: 0.1 INJECTION INTRAVENOUS at 15:47

## 2023-01-19 NOTE — ANESTHESIA POSTPROCEDURE EVALUATION
Post-Op Assessment Note    CV Status:  Stable  Pain Score: 2    Pain management: adequate     Mental Status:  Alert and awake   Hydration Status:  Euvolemic   PONV Controlled:  Controlled   Airway Patency:  Patent      Post Op Vitals Reviewed: Yes      Staff: Anesthesiologist         No notable events documented      /54 (01/19/23 1724)    Temp     Pulse 92 (01/19/23 1724)   Resp 14 (01/19/23 1724)    SpO2 96 % (01/19/23 1724)

## 2023-01-19 NOTE — OCCUPATIONAL THERAPY NOTE
Occupational Therapy         Patient Name: Angeline Manrique  MIHBI'Q Date: 1/19/2023 01/19/23 0914   OT Last Visit   OT Visit Date 01/19/23   Note Type   Note type Cancelled Session   Cancel Reasons Medical status   Additional Comments OT consult received  Per chart review pt admitted w L hip fx  pending ortho consult and possible surgical intervention   Will defer evaluation and continue to follow as medically able with appropriate WBS and activity orders post-op     Libby Mzea

## 2023-01-19 NOTE — PLAN OF CARE
Problem: MOBILITY - ADULT  Goal: Maintain or return to baseline ADL function  Description: INTERVENTIONS:  -  Assess patient's ability to carry out ADLs; assess patient's baseline for ADL function and identify physical deficits which impact ability to perform ADLs (bathing, care of mouth/teeth, toileting, grooming, dressing, etc )  - Assess/evaluate cause of self-care deficits   - Assess range of motion  - Assess patient's mobility; develop plan if impaired  - Assess patient's need for assistive devices and provide as appropriate  - Encourage maximum independence but intervene and supervise when necessary  - Involve family in performance of ADLs  - Assess for home care needs following discharge   - Consider OT consult to assist with ADL evaluation and planning for discharge  - Provide patient education as appropriate  Outcome: Progressing  Goal: Maintains/Returns to pre admission functional level  Description: INTERVENTIONS:  - Perform BMAT or MOVE assessment daily    - Set and communicate daily mobility goal to care team and patient/family/caregiver  - Collaborate with rehabilitation services on mobility goals if consulted  - Perform Range of Motion 3 times a day  - Reposition patient every 2  hours    - Dangle patient 3  times a day  - Stand patient 3  times a day  - Ambulate patient 3 times a day  - Out of bed to chair 3  times a day   - Out of bed for meals 3 times a day  - Out of bed for toileting  - Record patient progress and toleration of activity level   Outcome: Progressing

## 2023-01-19 NOTE — CONSULTS
Orthopedics   Oralia Walters 67 y o  female MRN: [id number]  Unit/Bed#: E2 -01      Chief Complaint:   left hip pain    HPI:   67 y o  female community ambulator who does not use any kind of assist device at baseline status post mechanical fall after slipping on a puddle of water at work complaining of left hip pain and inability to bear weight  She denies striking her head or losing consciousness  She does take aspirin 81 mg daily as her only blood thinner  She currently states that she has no pain while lying still  The pain increases with movement  She denies any numbness or tingling in the left leg or foot  No open wounds noted  She states her main concern at this time is post nasal drip  She is worried this will affect her while she is asleep during surgery  No other complaints at this time  She lives with her brother in their own home  PMH significant for prior hip fracture on the contralateral side  Review Of Systems:   · Skin: Normal  · Neuro: See HPI  · Musculoskeletal: See HPI  · 14 point review of systems negative except as stated above     Past Medical History:   Past Medical History:   Diagnosis Date   • Hypertension        Past Surgical History:   Past Surgical History:   Procedure Laterality Date   • HIP FRACTURE SURGERY     • REDUCTION MAMMAPLASTY         Family History:  Family history reviewed and non-contributory  History reviewed  No pertinent family history  Social History:  Social History     Socioeconomic History   • Marital status:       Spouse name: None   • Number of children: None   • Years of education: None   • Highest education level: None   Occupational History   • None   Tobacco Use   • Smoking status: Never   • Smokeless tobacco: Never   Substance and Sexual Activity   • Alcohol use: Never   • Drug use: Never   • Sexual activity: None   Other Topics Concern   • None   Social History Narrative   • None     Social Determinants of Health     Financial Resource Strain: Not on file   Food Insecurity: Not on file   Transportation Needs: Not on file   Physical Activity: Not on file   Stress: Not on file   Social Connections: Not on file   Intimate Partner Violence: Not on file   Housing Stability: Not on file       Allergies:    Allergies   Allergen Reactions   • Amoxicillin Angioedema   • Sulfa Antibiotics Angioedema and Anaphylaxis     throat "closes "     • Lisinopril Swelling   • Medical Tape Other (See Comments)     rash, blisters           Labs:  0   Lab Value Date/Time    HCT 38 9 01/19/2023 0429    HCT 39 0 01/18/2023 1641    HGB 12 6 01/19/2023 0429    HGB 13 1 01/18/2023 1641    INR 1 00 01/19/2023 0429    WBC 11 75 (H) 01/19/2023 0429    WBC 13 78 (H) 01/18/2023 1641       Meds:    Current Facility-Administered Medications:   •  acetaminophen (TYLENOL) tablet 975 mg, 975 mg, Oral, Q8H [address], Debby Larios MD, 975 mg at 01/19/23 9632  •  aspirin (ECOTRIN LOW STRENGTH) EC tablet 81 mg, 81 mg, Oral, Daily, Debby Larios MD  •  atorvastatin (LIPITOR) tablet 20 mg, 20 mg, Oral, Daily With Dinner, Juan A Toledo MD, 20 mg at 01/18/23 1742  •  clindamycin (CLEOCIN) IVPB (premix in dextrose) 600 mg 50 mL, 600 mg, Intravenous, On Call To OR, Lisa Cleary PA-C  •  HYDROmorphone HCl (DILAUDID) injection 0 2 mg, 0 2 mg, Intravenous, Q4H PRN, Debby Larios MD, 0 2 mg at 01/18/23 2121  •  ibuprofen (MOTRIN) tablet 400 mg, 400 mg, Oral, Q8H PRN, Stephen Dennis PA-C, 400 mg at 01/19/23 0247  •  loratadine (CLARITIN) tablet 10 mg, 10 mg, Oral, Daily, Debby Larios MD  •  losartan (COZAAR) tablet 100 mg, 100 mg, Oral, Daily, Debby Larios MD  •  metoprolol tartrate (LOPRESSOR) tablet 50 mg, 50 mg, Oral, BID, Debby Larios MD, 50 mg at 01/18/23 1742  •  ondansetron (ZOFRAN) injection 4 mg, 4 mg, Intravenous, Q6H PRN, Debby Larios MD  •  oxyCODONE (ROXICODONE) IR tablet 5 mg, 5 mg, Oral, Q4H PRN, Debby Clarissa Rolon MD, 5 mg at 01/19/23 0536  •  tranexamic Acid 1,000 mg in sodium chloride 0 9 % 100 mL IVPB, 1,000 mg, Intravenous, On Call To OR, Homa Bach PA-C    Blood Culture:   No results found for: BLOODCX    Wound Culture:   No results found for: WOUNDCULT    Ins and Outs:  No intake/output data recorded  Physical Exam:   /77 (BP Location: Right arm)   Pulse 79   Temp 98 5 °F (36 9 °C) (Temporal)   Resp 18   SpO2 93%   Gen: No acute distress, resting comfortably in bed  HEENT: Eyes clear, moist mucus membranes, hearing intact  Respiratory: No audible wheezing or stridor  Cardiovascular: Well Perfused peripherally, 2+ distal pulse  Abdomen: nondistended, no peritoneal signs  Musculoskeletal: left lower extremity  · Skin intact with some ecchymosis, limb shortened and externally rotated  · Tender to palpation over hip  · ROM not assessed 2/2 known fracture  · Sensation intact over the toes  · Intact ankle dorsi/plantar flexion, EHL/FHL  · Toes WWP  · Musculature is soft and compressible      Radiology:   I personally reviewed the films  X-rays AP/Lateral views of left hip shows subtrochanteric femur fracture    Assessment:  67 y  o female status post mechanical fall with leftsubtrochanteric femur fracture    Plan:   · Non weight bearing left lower extremity  · Analgesics for pain  · NPO now  · Hold blood thinners  · Medicine team for all medical management and preoperative risk stratification; the patient has already been cleared for surgery by the medicine team  · The patient was marked this morning and signed a surgical consent form    · To OR for IM nail femur fracture of subtrochanteric femur fracture  · Dispo: Ortho will follow    [place] PA-C

## 2023-01-19 NOTE — ASSESSMENT & PLAN NOTE
· Patient with hypoxia requiring 2 L of nasal cannula to maintain sats greater than 90  · Check chest x-ray  · Wean oxygen as able  · Suspect component of atelectasis, versus opiate medications

## 2023-01-19 NOTE — QUICK NOTE
Patient reportedly significantly lethargic s/p surgery  Seen in the PACU  Currently on BiPAP    Per nursing staff, reportedly was in significant pain, given Dilaudid 0 5 mg with increasing lethargy, hypoxia in the 80s on 10 L of oxygen  Patient treated with Narcan with improvement, given nebulizer treatment      Plan  Transition to stepdown 2 for likely 24 hours  Continue BiPAP as needed  Check CXR, monitor am labs  Scheduled Xopenex, respiratory protocol  Caution regarding pain control, recommending utilizing oxycodone with decreased dose of Dilaudid as needed

## 2023-01-19 NOTE — ASSESSMENT & PLAN NOTE
Patient presents with left hip fracture status post mechanical fall at [facility] question slipped on floor  · Imaging consistent with left intertrochanteric comminuted fracture  · Ortho evaluated, recommending surgery for left hip repair  · N p o   · As needed pain control  · DVT prophylaxis  · PT and OT to evaluate postsurgery

## 2023-01-19 NOTE — ANESTHESIA PREPROCEDURE EVALUATION
Procedure:  INSERTION NAIL IM FEMUR ANTEGRADE (TROCHANTERIC) (Left: Leg Upper)    Relevant Problems   CARDIO   (+) Chronic diastolic congestive heart failure (HCC)   (+) Essential hypertension   (+) Other hyperlipidemia      Musculoskeletal and Integument   (+) Closed left hip fracture (HCC)        Physical Exam    Airway    Mallampati score: III         Dental       Cardiovascular  Rhythm: regular, Rate: normal,     Pulmonary  Breath sounds clear to auscultation,     Other Findings        Anesthesia Plan  ASA Score- 3 Emergent    Anesthesia Type- general with ASA Monitors  Additional Monitors:   Airway Plan:           Plan Factors-Exercise tolerance (METS): <4 METS  Chart reviewed  EKG reviewed  Existing labs reviewed  Patient summary reviewed  Patient is not a current smoker  Patient not instructed to abstain from smoking on day of procedure  Patient did not smoke on day of surgery  Obstructive sleep apnea risk education given perioperatively  Induction- intravenous  Postoperative Plan-     Informed Consent- Anesthetic plan and risks discussed with patient

## 2023-01-19 NOTE — OP NOTE
OPERATIVE REPORT    PATIENT NAME: Matthew Ferrera   :  1950  MRN: [id number]  Pt Location: [facility] OR ROOM 01    SURGERY DATE: 2023    SURGEON(S) and ROLE:  Primary: Xiomara Perez MD    NOTE:  I was present for the entire procedure and performed all essential portions of the surgery  PREOPERATIVE DIAGNOSES:  Left Subtrochanteric Hip Fracture    POSTOPERATIVE DIAGNOSES:  Same as Preoperatvie Diagnoses    PROCEDURES:  Intramedullary Fixation of Left Subtrochanteric Hip Fracture      ANESTHESIA TYPE:  General endotracheal    ANESTHESIA STAFF:   Anesthesiologist: Harriet Schneider DO  CRNA: Heriberto Del Real CRNA    ESTIMATED BLOOD LOSS:  150 mL    PERIOPERATIVE ANTIBIOTICS:  cefazolin, 2 grams    IMPLANTS:  Synthes TFNA (360x11 mm), 95 mm CM screw, 38 mm distal interlock screw    Implant Name Type Inv  Item Serial No   Lot No  LRB No  Used Action   NAIL IM 11MM 130 DEG TI GORGE TFNA 360MM LT  STRL - [id number]  NAIL IM 11MM 130 DEG TI GORGE TFNA 360MM LT  STRL  Synthes [id number] Left 1 Implanted   SCREW TFNA FENESTRATED 95MM  STRL - [id number]  SCREW TFNA FENESTRATED 95MM  STRL  Synthes [id number] Left 1 Implanted   SCREW LCK 5 X 38MM T25 STRDRV RECES STRL - [id number]  SCREW LCK 5 X 38MM T25 STRDRV RECES STRL  Synthes [id number] Left 1 Implanted       SPECIMENS:  * No specimens in log *    DRAINS:  None      OPERATIVE INDICATIONS:  The patient is a 67 y o  female with left hip pain and a displaced subtrochanteric fracture  Surgical treatment was indicated due to instability, displacement and liklihood of prolonged or permanent functional impairment with non-surgical treatment  After a thorough discussion of the potential risks, benefits, and alternative treatments, the patient agreed to proceed with surgery    The patient understands that the risks of surgery include, but are not limited to: failure of repair, nonunion, malunion, loss of fixation, infection, neurovascular injury, wound healing complications, venous thromboembolism, persistent pain, stiffness, instability, recurrence of symptoms, potential need for additional surgeries, and loss of limb or life  Oral and written consent for surgery was obtained from the patient preoperatively  PROCEDURE AND TECHNIQUE:  On the day of surgery, the patient was identified in the preoperative holding area  The operative site was marked by the surgeon  The patient was taken into the operating room  A time-out was conducted to confirm the patient's identity, the operative site, and the proposed procedure  The patient was anesthetized, and perioperative antibiotics were administered  The patient was positioned supine on the [place] table  All bony prominences were padded  The operative site was prepped and draped using standard sterile technique  Fracture reduction was achieved with gentle traction and rotation  An incision was made 5 cm proximal to the greater trochanter  The gluteus fascia was split with curved marsh scissors  Using fluoroscopic guidance, a guide pin was positioned on the tip of the greater trochanter and advanced into the proximal femur  The intramedullary canal was opened, and a ball-tip guide wire was advanced to the distal femoral physeal scar  A depth gauge was used to select the nail length  The femur was reamed to a diameter of 12 5 mm  A 360 x 11 mm TFNA was passed over the wire and seated within the intramedullary canal under fluoroscopic guidance  A second incision was made just distal to the greater trochanter, and the cephalomedullary guide was advanced onto the lateral femur  A cephalomedullary guide pin was advanced to the subchondral bone of the femoral head  Fluoroscopy confirmed that the wire was positioned centrally within the femoral neck on AP and lateral views  A depth gauge was used to select the cephalomedullary screw length    A cephalomedullary reamer was passed over the wire to the measured depth   A 95 mm cephalomedullary screw was placed by hand, and appropriated position was confirmed with fluoroscopy  The set screw was tightened  A 38 mm distal static interlocking screw was placed under fluoroscopic guidance using perfect circles  Final fluoroscopic images confirmed appropriate fracture alignment and hardware position  The wounds were irrigated with sterile saline  Incisions were closed with 2-0 vicryl and staples  Sterile dressings were applied  The drapes were removed  The patient was transferred to the hospital bed, awakened from anesthesia, and taken to the PACU in stable condition        COMPLICATIONS:  None    PATIENT DISPOSITION:  PACU       SIGNATURE:  Jamison Riggs MD  DATE:  January 19, 2023  TIME:  4:35 PM

## 2023-01-19 NOTE — ASSESSMENT & PLAN NOTE
· Patient's risk factors include age, history of hypertension and hyperlipidemia  · Recent 2D echo from 12/2/2022 at the [facility] was unremarkable with normal ejection fraction of 14%, grade 1 diastolic function, no evidence of valvular disease  · Patient tolerates moderate daily activity without significant symptoms >4 METs  · Patient is a intermediate preoperative risk, no further cardiac evaluation

## 2023-01-19 NOTE — UTILIZATION REVIEW
Initial Clinical Review    Admission: Date/Time/Statement:   Admission Orders (From admission, onward)     Ordered        01/18/23 1630  INPATIENT ADMISSION  Once                      Orders Placed This Encounter   Procedures   • INPATIENT ADMISSION     Standing Status:   Standing     Number of Occurrences:   1     Order Specific Question:   Level of Care     Answer:   Med Surg [16]     Order Specific Question:   Estimated length of stay     Answer:   More than 2 Midnights     Order Specific Question:   Certification     Answer:   I certify that inpatient services are medically necessary for this patient for a duration of greater than two midnights  See H&P and MD Progress Notes for additional information about the patient's course of treatment  ED Arrival Information     Expected   -    Arrival   1/18/2023 14:14    Acuity   Urgent            Means of arrival   Ambulance    Escorted by   [facility]    Admission type   Emergency            Arrival complaint   W/C HIP PAIN           Chief Complaint   Patient presents with   • Hip Pain     Pt arrived via ems from work  Pt slipped and fell on L hip  Outward rotation noted  Hx R sided hip fracture  Initial Presentation: 67 y o  female presents to ED via  EMS  From her  Work site after a  Mechanical fall  Slipped on a  Wet floot and fell on  Left side  Complains of pain left leg  Became hypoxic in ED  When receiving fentanyl  Imaging  Reveals  Left hip fracture  PMH  Is  HTN,  CHF  Admit  Ip with  Closed left hip fracture and plan is  Ortho consult, pain control, and surgical intervention  Date:    1/19        Day 2:   Ortho consult  Plan  Surgical intervention      ED Triage Vitals   Temperature Pulse Respirations Blood Pressure SpO2   01/18/23 1418 01/18/23 1418 01/18/23 1418 01/18/23 1418 01/18/23 1418   98 °F (36 7 °C) 82 22 132/92 91 %      Temp Source Heart Rate Source Patient Position - Orthostatic VS BP Location FiO2 (%)   01/18/23 1418 01/18/23 1418 01/18/23 1418 01/18/23 1418 --   Oral Monitor Lying Left arm       Pain Score       01/18/23 1520       10 - Worst Possible Pain          Wt Readings from Last 1 Encounters:   01/19/23 92 1 kg (203 lb)     Additional Vital Signs:   98 5 °F (36 9 °C) 79 18 157/77 -- 93 % 28 2 L/min Nasal cannula Lying    01/18/23 2300 97 °F (36 1 °C) Abnormal  64 20 127/64 -- 95 % -- -- Nasal cannula Lying   01/18/23 2100 -- 73 -- 136/63 -- 93 % 32 3 L/min Nasal cannula --   01/18/23 1748 -- -- -- -- -- 89 % Abnormal  32 3 L/min Nasal cannula --   01/18/23 1741 -- 86 -- 120/73 92 -- -- -- -- Lying   01/18/23 1739 97 2 °F (36 2 °C) Abnormal  82 20 101/59 -- 90 % -- -- Nasal cannula Lying   01/18/23 1614 -- -- -- -- -- -- -- -- Nasal cannula --   01/18/23 1608 -- 80 23 Abnormal  132/67 -- -- -- -- -- --   01/18/23 1418 98 °F (36 7 °C) 82 22 132/92 -- 91 % -- -- None (Room air) Lying       Pertinent Labs/Diagnostic Test Results:   XR hip/pelv 2-3 vws left if performed   ED Interpretation by Kimberlee Muller MD (01/18 1618)   Left hip/intertrochanteric fracture      Final Result by Queen Osiris MD (01/18 1949)      Acute intertrochanteric left hip fracture  This finding is noted in the ED preliminary report  Workstation performed: [id number]         XR femur 2 views LEFT   Final Result by Queen Osiris MD (1950)      Left hip fracture reported separately with dedicated left hip/pelvis study  No additional fractures  Workstation performed: [id number]         CT head without contrast   Final Result by Delores Levi MD (01/18 1613)      No acute intracranial abnormality                    Workstation performed: [id number]         FL < 1 hour    (Results Pending)         Results from last 7 days   Lab Units 01/19/23  0429 01/18/23  1641   WBC Thousand/uL 11 75* 13 78*   HEMOGLOBIN g/dL 12 6 13 1   HEMATOCRIT % 38 9 39 0   PLATELETS Thousands/uL 255 237   NEUTROS ABS Thousands/µL 9 26* 11 56*         Results from last 7 days   Lab Units 01/19/23  0429 01/18/23  1641   SODIUM mmol/L 137 139   POTASSIUM mmol/L 4 3 4 0   CHLORIDE mmol/L 100 103   CO2 mmol/L 32 29   ANION GAP mmol/L 5 7   BUN mg/dL 19 19   CREATININE mg/dL 0 66 0 73   EGFR ml/min/1 73sq m 88 82   CALCIUM mg/dL 8 9 9 3     Results from last 7 days   Lab Units 01/19/23  0429   AST U/L 19   ALT U/L 17   ALK PHOS U/L 81   TOTAL PROTEIN g/dL 6 7   ALBUMIN g/dL 4 0   TOTAL BILIRUBIN mg/dL 0 58         Results from last 7 days   Lab Units 01/19/23  0429 01/18/23  1641   GLUCOSE RANDOM mg/dL 102 111              Results from last 7 days   Lab Units 01/18/23  1641   PH [place]  7 323   PCO2 [place] mm Hg 56 8*   PO2 [place] mm Hg 70 9*   HCO3 [place] mmol/L 28 8   BASE EXC [place] mmol/L 1 5   O2 CONTENT [place] ml/dL 18 0   O2 HGB, VENOUS % 90 6*                     Results from last 7 days   Lab Units 01/19/23  0429   PROTIME seconds 13 2   INR  1 00         ED Treatment:   Medication Administration from 01/18/2023 1414 to 01/18/2023 1725       Date/Time Order Dose Route Action Comments     01/18/2023 1520 EST fentanyl citrate (PF) 100 MCG/2ML 25 mcg 25 mcg Intravenous Given --     01/18/2023 1520 EST ondansetron (ZOFRAN) injection 4 mg 4 mg Intravenous Given --            Admitting Diagnosis: LBBB (left bundle branch block) [I44 7]  Hip pain [M25 559]  Elevated blood pressure reading [R03 0]  Left hip pain [M25 552]  Closed fracture of left hip, initial encounter ([facility] [address] ) [S72 002A]  Age/Sex: 67 y o  female  Admission Orders:  Scheduled Medications:  acetaminophen, 975 mg, Oral, Q8H [address]  aspirin, 81 mg, Oral, Daily  atorvastatin, 20 mg, Oral, Daily With Dinner  clindamycin, 600 mg, Intravenous, On Call To OR  loratadine, 10 mg, Oral, Daily  losartan, 100 mg, Oral, Daily  metoprolol tartrate, 50 mg, Oral, BID  tranexamic acid (CYKLOKAPRON) IV bolus, 1,000 mg, Intravenous, On Call To OR      Continuous IV Infusions:     PRN Meds:  HYDROmorphone, 0 2 mg, Intravenous, Q4H PRN  ibuprofen, 400 mg, Oral, Q8H PRN  ondansetron, 4 mg, Intravenous, Q6H PRN  oxyCODONE, 5 mg, Oral, Q4H PRN        IP CONSULT TO ORTHOPEDIC SURGERY    Network Utilization Review Department  ATTENTION: Please call with any questions or concerns to [phone] and carefully listen to the prompts so that you are directed to the right person  All voicemails are confidential   Shelby Cortés all requests for admission clinical reviews, approved or denied determinations and any other requests to dedicated fax number below belonging to the campus where the patient is receiving treatment   List of dedicated fax numbers for the Facilities:  [address] DENIALS (Administrative/Medical Necessity) [phone]   [address] (Maternity/NICU/Pediatrics) [phone]   911 Lala Rice [phone]   [facility] 77 [phone]   [address] [address] [facility] 28 [phone]   1559 [facility] [place] 134 815 [facility] [phone]

## 2023-01-19 NOTE — PROGRESS NOTES
[address] [place] [address]  Progress Note [place] 1950, 67 y o  female MRN: [id number]  Unit/Bed#: E2 -01 Encounter: [id number]  Primary Care Provider: No primary care provider on file  Date and time admitted to hospital: 1/18/2023  2:15 PM    * Closed left hip fracture [facility])  Assessment & Plan  Patient presents with left hip fracture status post [facility] question slipped on floor  · Imaging consistent with left intertrochanteric comminuted fracture  · Ortho evaluated, recommending surgery for left hip repair  · N p o   · As needed pain control  · DVT prophylaxis  · PT and OT to evaluate postsurgery    Chronic diastolic congestive heart failure [facility])  Assessment & Plan  Recent 2D echo done at [facility] [place] 12/2022 revealed grade 1 diastolic dysfunction  Currently euvolemic, not on diuretics prior to admission      Hypoxia  Assessment & Plan  · Patient with hypoxia requiring 2 L of nasal cannula to maintain sats greater than 90  · Check chest x-ray  · Wean oxygen as able  · Suspect component of atelectasis, versus opiate medications    Encounter for preoperative assessment  Assessment & Plan  · Patient's risk factors include age, history of hypertension and hyperlipidemia  · Recent 2D echo from 12/2/2022 at the [facility] [place] was unremarkable with normal ejection fraction of 68%, grade 1 diastolic function, no evidence of valvular disease  · Patient tolerates moderate daily activity without significant symptoms >4 METs  · Patient is a intermediate preoperative risk, no further cardiac evaluation    Essential hypertension  Assessment & Plan  Continue metoprolol, losartan  Hold losartan day of surgery      VTE Pharmacologic Prophylaxis:   Pharmacologic: defer to ortho postsurgery  Mechanical VTE Prophylaxis in Place: Yes    Patient Centered Rounds: I have performed bedside rounds with nursing staff today      Current Length of Stay: 1 day(s)    Current Patient Status: Inpatient   Certification Statement: The patient will continue to require additional inpatient hospital stay due to pending surgery    Discharge Plan: pending    Code Status: Level 1 - Full Code      Subjective:   No events overnight, no complaints  Pain currently controlled  Continues to require 2L NC  Denies any fevers, chills, cough or nausea/vomiting  Objective:     Vitals:   Temp (24hrs), Av 7 °F (36 5 °C), Min:97 °F (36 1 °C), Max:98 5 °F (36 9 °C)    Temp:  [97 °F (36 1 °C)-98 5 °F (36 9 °C)] 98 5 °F (36 9 °C)  HR:  [64-86] 79  Resp:  [18-23] 18  BP: (101-157)/(59-92) 157/77  SpO2:  [89 %-95 %] 93 %  Body mass index is 34 84 kg/m²  Input and Output Summary (last 24 hours):     No intake or output data in the 24 hours ending 23 1051    Physical Exam:     Physical Exam  Vitals and nursing note reviewed  Constitutional:       Appearance: Normal appearance  She is obese  HENT:      Head: Normocephalic and atraumatic  Eyes:      General: No scleral icterus  Conjunctiva/sclera: Conjunctivae normal    Cardiovascular:      Rate and Rhythm: Normal rate and regular rhythm  Pulmonary:      Breath sounds: Normal breath sounds  No wheezing or rhonchi  Abdominal:      General: Bowel sounds are normal  There is no distension  Palpations: Abdomen is soft  Tenderness: There is no abdominal tenderness  Musculoskeletal:         General: No swelling  Normal range of motion  Skin:     General: Skin is warm and dry  Neurological:      General: No focal deficit present  Mental Status: She is alert and oriented to person, place, and time  Mental status is at baseline         Additional Data:     Labs:    Results from last 7 days   Lab Units 23  0429   WBC Thousand/uL 11 75*   HEMOGLOBIN g/dL 12 6   HEMATOCRIT % 38 9   PLATELETS Thousands/uL 255   NEUTROS PCT % 79*   LYMPHS PCT % 12*   MONOS PCT % 8   EOS PCT % 0     Results from last 7 days   Lab Units 01/19/23  0429   SODIUM mmol/L 137   POTASSIUM mmol/L 4 3   CHLORIDE mmol/L 100   CO2 mmol/L 32   BUN mg/dL 19   CREATININE mg/dL 0 66   ANION GAP mmol/L 5   CALCIUM mg/dL 8 9   ALBUMIN g/dL 4 0   TOTAL BILIRUBIN mg/dL 0 58   ALK PHOS U/L 81   ALT U/L 17   AST U/L 19   GLUCOSE RANDOM mg/dL 102     Results from last 7 days   Lab Units 01/19/23  0429   INR  1 00                       * I Have Reviewed All Lab Data Listed Above  * Additional Pertinent Lab Tests Reviewed: All [place] Admission Reviewed    Imaging:    XR hip/pelv 2-3 vws left if performed    Result Date: 1/18/2023  Impression: Acute intertrochanteric left hip fracture  This finding is noted in the ED preliminary report  Workstation performed: [id number]     XR femur 2 views LEFT    Result Date: 1/18/2023  Impression: Left hip fracture reported separately with dedicated left hip/pelvis study  No additional fractures  Workstation performed: [id number]     CT head without contrast    Result Date: 1/18/2023  Impression: No acute intracranial abnormality   Workstation performed: [id number]       Recent Cultures (last 7 days):           Last 24 Hours Medication List:   Current Facility-Administered Medications   Medication Dose Route Frequency Provider Last Rate   • acetaminophen  975 mg Oral Q8H Phuong Kam MD     • aspirin  81 mg Oral Daily Pineda Martinez MD     • atorvastatin  20 mg Oral Daily With Marianne Parham MD     • clindamycin  600 mg Intravenous On Call To OR Segundo Borrego PA-C     • HYDROmorphone  0 2 mg Intravenous Q4H PRN Debby Larios MD     • ibuprofen  400 mg Oral Q8H PRN Yenifer Lewis PA-C     • loratadine  10 mg Oral Daily Pineda Martinez MD     • losartan  100 mg Oral Daily Debby Larios MD     • metoprolol tartrate  50 mg Oral BID Pineda Martinez MD     • ondansetron  4 mg Intravenous Q6H PRN Pineda Martinez MD     • oxyCODONE  10 mg Oral Q6H PRN Nancy Lee MD     • oxyCODONE  5 mg Oral Q6H PRN Nancy Lee MD     • tranexamic acid (CYKLOKAPRON) IV bolus  1,000 mg Intravenous On Call To OR Nicola Byers PA-C          Today, Patient Was Seen By: Nancy Lee MD    ** Please Note: Dictation voice to text software may have been used in the creation of this document   **

## 2023-01-19 NOTE — PHYSICAL THERAPY NOTE
Physical Therapy Cancellation Note           01/19/23 0754   PT Last Visit   PT Visit Date 01/19/23   Note Type   Note type Cancelled Session   Cancel Reasons Medical status   Additional Comments PT consult received  Per chart review pt admitted w L hip fx  pending ortho consult and possible surgical intervention  Will defer evaluation and continue to follow as medically appropriate           Shane Zamora, PT

## 2023-01-19 NOTE — PLAN OF CARE
Problem: Potential for Falls  Goal: Patient will remain free of falls  Description: INTERVENTIONS:  - Educate patient/family on patient safety including physical limitations  - Instruct patient to call for assistance with activity   - Consult OT/PT to assist with strengthening/mobility   - Keep Call bell within reach  - Keep bed low and locked with side rails adjusted as appropriate  - Keep care items and personal belongings within reach  - Initiate and maintain comfort rounds  - Make Fall Risk Sign visible to staff  - Consider moving patient to room near nurses station  Outcome: Progressing     Problem: Prexisting or High Potential for Compromised Skin Integrity  Goal: Skin integrity is maintained or improved  Description: INTERVENTIONS:  - Identify patients at risk for skin breakdown  - Assess and monitor skin integrity  - Assess and monitor nutrition and hydration status  - Monitor labs   - Assess for incontinence   - Turn and reposition patient  - Assist with mobility/ambulation  - Relieve pressure over bony prominences  - Avoid friction and shearing  - Provide appropriate hygiene as needed including keeping skin clean and dry  - Evaluate need for skin moisturizer/barrier cream  - Collaborate with interdisciplinary team   - Patient/family teaching  - Consider wound care consult   Outcome: Progressing

## 2023-01-19 NOTE — DISCHARGE INSTR - AVS FIRST PAGE
POSTOPERATIVE INSTRUCTIONS    MEDICATIONS:    Blood Clot Prevention:  Lovenox 40 mg, 1 dose daily for 30 days  Pump your foot up and down 20 times per hour while you are less mobile  WOUND CARE:  Keep the dressing clean and dry  Light drainage may occur the first 2 days postop  Mypliex dressing for 7 days then dry dressing changes until staples removed  Please call our office ([phone]) if you experience any of the following:  Sudden increase in swelling, redness, or warmth at the surgical site  Excessive incisional drainage that persists beyond the 3rd day after surgery  Oral temperature greater than 101 degrees, not relieved with Tylenol  Shortness of breath, chest pain, nausea, or any other concerning symptoms    SWELLING CONTROL:  Cold Therapy:  Apply ice (20 min on, 20 min off) as often as you feel is necessary  Elevation:  Elevate the entire leg above heart level as much as possible  Compression:  Apply ACE wraps or a compression stocking as needed  RANGE OF MOTION:  You are allowed FULL RANGE OF MOTION as tolerated  IMMOBILIZATION:  None  You are allowed full range of motion as tolerated  ACTIVITY:  BEAR FULL WEIGHT AS TOLERATED on the operative leg  Use crutches to assist only as needed  Using Crutches on Stairs:  Going up, lead with your "good" (nonoperative) leg  Going down, lead with your "bad" (operative) leg  Use a hand rail when available  Quad Sets:  Sit or lie with your knee straight  Tighten your quadriceps (front thigh) muscle  Hold for 3 seconds, then relax  Repeat 20 times per hour while awake  PHYSICAL THERAPY:  Begin therapy 5 TO 7 DAYS AFTER SURGERY  You were given a prescription for therapy at your preoperative office visit  If you do not have physical therapy scheduled yet, please call our office for assistance  FOLLOW-UP APPOINTMENT:  2 weeks after surgery with:    [facility]  [address] [place] [address] Rodrigo Rowell, [address] [facility]  [phone] ([facility])  [phone] (After Hours)

## 2023-01-19 NOTE — ASSESSMENT & PLAN NOTE
Recent 2D echo done at [facility] 12/2022 revealed grade 1 diastolic dysfunction  Currently euvolemic, not on diuretics prior to admission

## 2023-01-20 ENCOUNTER — APPOINTMENT (INPATIENT)
Dept: NON INVASIVE DIAGNOSTICS | Facility: HOSPITAL | Age: 73
End: 2023-01-20

## 2023-01-20 ENCOUNTER — APPOINTMENT (INPATIENT)
Dept: CT IMAGING | Facility: HOSPITAL | Age: 73
End: 2023-01-20

## 2023-01-20 PROBLEM — I95.9 HYPOTENSION: Status: ACTIVE | Noted: 2023-01-20

## 2023-01-20 LAB
ALBUMIN SERPL BCP-MCNC: 3.4 G/DL (ref 3.5–5)
ALP SERPL-CCNC: 70 U/L (ref 34–104)
ALT SERPL W P-5'-P-CCNC: 16 U/L (ref 7–52)
ANION GAP SERPL CALCULATED.3IONS-SCNC: 2 MMOL/L (ref 4–13)
ANION GAP SERPL CALCULATED.3IONS-SCNC: 4 MMOL/L (ref 4–13)
AST SERPL W P-5'-P-CCNC: 18 U/L (ref 13–39)
BASE EX.OXY STD BLDV CALC-SCNC: 94.4 % (ref 60–80)
BASE EXCESS BLDV CALC-SCNC: -6.2 MMOL/L
BILIRUB SERPL-MCNC: 0.46 MG/DL (ref 0.2–1)
BNP SERPL-MCNC: 102 PG/ML (ref 0–100)
BUN SERPL-MCNC: 18 MG/DL (ref 5–25)
BUN SERPL-MCNC: 18 MG/DL (ref 5–25)
CALCIUM ALBUM COR SERPL-MCNC: 8.1 MG/DL (ref 8.3–10.1)
CALCIUM SERPL-MCNC: 7.6 MG/DL (ref 8.4–10.2)
CALCIUM SERPL-MCNC: 7.6 MG/DL (ref 8.4–10.2)
CHLORIDE SERPL-SCNC: 105 MMOL/L (ref 96–108)
CHLORIDE SERPL-SCNC: 99 MMOL/L (ref 96–108)
CO2 SERPL-SCNC: 29 MMOL/L (ref 21–32)
CO2 SERPL-SCNC: 29 MMOL/L (ref 21–32)
CREAT SERPL-MCNC: 0.57 MG/DL (ref 0.6–1.3)
CREAT SERPL-MCNC: 0.65 MG/DL (ref 0.6–1.3)
ERYTHROCYTE [DISTWIDTH] IN BLOOD BY AUTOMATED COUNT: 14.1 % (ref 11.6–15.1)
ERYTHROCYTE [DISTWIDTH] IN BLOOD BY AUTOMATED COUNT: 14.3 % (ref 11.6–15.1)
FLUAV RNA RESP QL NAA+PROBE: NEGATIVE
FLUBV RNA RESP QL NAA+PROBE: NEGATIVE
GFR SERPL CREATININE-BSD FRML MDRD: 88 ML/MIN/1.73SQ M
GFR SERPL CREATININE-BSD FRML MDRD: 92 ML/MIN/1.73SQ M
GLUCOSE SERPL-MCNC: 160 MG/DL (ref 65–140)
GLUCOSE SERPL-MCNC: 299 MG/DL (ref 65–140)
HCO3 BLDV-SCNC: 23.1 MMOL/L (ref 24–30)
HCT VFR BLD AUTO: 32 % (ref 34.8–46.1)
HCT VFR BLD AUTO: 33.3 % (ref 34.8–46.1)
HGB BLD-MCNC: 10.2 G/DL (ref 11.5–15.4)
HGB BLD-MCNC: 10.4 G/DL (ref 11.5–15.4)
LACTATE SERPL-SCNC: 1.1 MMOL/L (ref 0.5–2)
LACTATE SERPL-SCNC: 3.2 MMOL/L (ref 0.5–2)
MCH RBC QN AUTO: 29.7 PG (ref 26.8–34.3)
MCH RBC QN AUTO: 29.9 PG (ref 26.8–34.3)
MCHC RBC AUTO-ENTMCNC: 31.2 G/DL (ref 31.4–37.4)
MCHC RBC AUTO-ENTMCNC: 31.9 G/DL (ref 31.4–37.4)
MCV RBC AUTO: 94 FL (ref 82–98)
MCV RBC AUTO: 95 FL (ref 82–98)
O2 CT BLDV-SCNC: 14.3 ML/DL
PCO2 BLDV: 66.7 MM HG (ref 42–50)
PH BLDV: 7.16 [PH] (ref 7.3–7.4)
PLATELET # BLD AUTO: 236 THOUSANDS/UL (ref 149–390)
PLATELET # BLD AUTO: 292 THOUSANDS/UL (ref 149–390)
PMV BLD AUTO: 10.1 FL (ref 8.9–12.7)
PMV BLD AUTO: 9.9 FL (ref 8.9–12.7)
PO2 BLDV: 92.1 MM HG (ref 35–45)
POTASSIUM SERPL-SCNC: 4.5 MMOL/L (ref 3.5–5.3)
POTASSIUM SERPL-SCNC: 4.7 MMOL/L (ref 3.5–5.3)
PROCALCITONIN SERPL-MCNC: 0.08 NG/ML
PROT SERPL-MCNC: 5.7 G/DL (ref 6.4–8.4)
RBC # BLD AUTO: 3.41 MILLION/UL (ref 3.81–5.12)
RBC # BLD AUTO: 3.5 MILLION/UL (ref 3.81–5.12)
RSV RNA RESP QL NAA+PROBE: NEGATIVE
SARS-COV-2 RNA RESP QL NAA+PROBE: NEGATIVE
SODIUM SERPL-SCNC: 132 MMOL/L (ref 135–147)
SODIUM SERPL-SCNC: 136 MMOL/L (ref 135–147)
WBC # BLD AUTO: 17.06 THOUSAND/UL (ref 4.31–10.16)
WBC # BLD AUTO: 19.78 THOUSAND/UL (ref 4.31–10.16)

## 2023-01-20 RX ORDER — FUROSEMIDE 10 MG/ML
40 INJECTION INTRAMUSCULAR; INTRAVENOUS ONCE
Status: COMPLETED | OUTPATIENT
Start: 2023-01-20 | End: 2023-01-20

## 2023-01-20 RX ORDER — ENOXAPARIN SODIUM 100 MG/ML
1 INJECTION SUBCUTANEOUS EVERY 12 HOURS SCHEDULED
Status: DISCONTINUED | OUTPATIENT
Start: 2023-01-20 | End: 2023-01-22

## 2023-01-20 RX ORDER — LEVALBUTEROL INHALATION SOLUTION 0.63 MG/3ML
0.63 SOLUTION RESPIRATORY (INHALATION) EVERY 4 HOURS PRN
Status: DISCONTINUED | OUTPATIENT
Start: 2023-01-20 | End: 2023-01-23

## 2023-01-20 RX ORDER — LEVOFLOXACIN 5 MG/ML
750 INJECTION, SOLUTION INTRAVENOUS ONCE
Status: COMPLETED | OUTPATIENT
Start: 2023-01-20 | End: 2023-01-20

## 2023-01-20 RX ADMIN — ENOXAPARIN SODIUM 40 MG: 40 INJECTION SUBCUTANEOUS at 08:42

## 2023-01-20 RX ADMIN — DOCUSATE SODIUM 100 MG: 100 CAPSULE, LIQUID FILLED ORAL at 08:42

## 2023-01-20 RX ADMIN — ACETAMINOPHEN 975 MG: 325 TABLET ORAL at 22:19

## 2023-01-20 RX ADMIN — LOSARTAN POTASSIUM 100 MG: 50 TABLET, FILM COATED ORAL at 08:42

## 2023-01-20 RX ADMIN — LEVOFLOXACIN 750 MG: 750 INJECTION, SOLUTION INTRAVENOUS at 02:32

## 2023-01-20 RX ADMIN — DOCUSATE SODIUM 100 MG: 100 CAPSULE, LIQUID FILLED ORAL at 17:29

## 2023-01-20 RX ADMIN — LORATADINE 10 MG: 10 TABLET ORAL at 08:42

## 2023-01-20 RX ADMIN — PANTOPRAZOLE SODIUM 40 MG: 40 TABLET, DELAYED RELEASE ORAL at 08:42

## 2023-01-20 RX ADMIN — SODIUM CHLORIDE, SODIUM LACTATE, POTASSIUM CHLORIDE, AND CALCIUM CHLORIDE 1000 ML: .6; .31; .03; .02 INJECTION, SOLUTION INTRAVENOUS at 10:38

## 2023-01-20 RX ADMIN — ASPIRIN 81 MG: 81 TABLET, COATED ORAL at 08:42

## 2023-01-20 RX ADMIN — ACETAMINOPHEN 975 MG: 325 TABLET ORAL at 06:27

## 2023-01-20 RX ADMIN — METOPROLOL TARTRATE 50 MG: 50 TABLET, FILM COATED ORAL at 08:42

## 2023-01-20 RX ADMIN — SODIUM CHLORIDE, SODIUM LACTATE, POTASSIUM CHLORIDE, AND CALCIUM CHLORIDE 75 ML/HR: .6; .31; .03; .02 INJECTION, SOLUTION INTRAVENOUS at 08:45

## 2023-01-20 RX ADMIN — SENNOSIDES 8.6 MG: 8.6 TABLET ORAL at 08:42

## 2023-01-20 RX ADMIN — ENOXAPARIN SODIUM 100 MG: 100 INJECTION SUBCUTANEOUS at 17:29

## 2023-01-20 RX ADMIN — FUROSEMIDE 40 MG: 10 INJECTION, SOLUTION INTRAVENOUS at 17:29

## 2023-01-20 RX ADMIN — CLINDAMYCIN IN 5 PERCENT DEXTROSE 600 MG: 12 INJECTION, SOLUTION INTRAVENOUS at 06:27

## 2023-01-20 RX ADMIN — IOHEXOL 80 ML: 350 INJECTION, SOLUTION INTRAVENOUS at 13:40

## 2023-01-20 RX ADMIN — METOPROLOL TARTRATE 50 MG: 50 TABLET, FILM COATED ORAL at 17:29

## 2023-01-20 RX ADMIN — ACETAMINOPHEN 650 MG: 325 TABLET ORAL at 09:43

## 2023-01-20 RX ADMIN — LEVALBUTEROL HYDROCHLORIDE 0.63 MG: 0.63 SOLUTION RESPIRATORY (INHALATION) at 07:12

## 2023-01-20 RX ADMIN — ATORVASTATIN CALCIUM 20 MG: 20 TABLET, FILM COATED ORAL at 17:29

## 2023-01-20 NOTE — QUICK NOTE
Discussed  CAT scan results with radiologist  Dr Abdullahi Dutta   She has  distal subsegmental PEs in the posterior lower lobe with atelectasis  NO large  or saddle PE identified  Communicated with orthopedics Dr Raphael Parks    He had no objection to anticoagulation  Consult pulmonary regarding postoperative hypoxia and new PE  Check lower extremity venous Doppler and if positive for DVT, start full dose anticoagulation

## 2023-01-20 NOTE — UTILIZATION REVIEW
Continued Stay Review    Date: 1/20                       Current Patient Class: IP Current Level of Care: Level 2 SD    HPI:72 y o  female initially admitted on 1/18    Assessment/Plan:   Pt to OR 1/19 and then to PACU where she remained on BIPAP  She had significant PO pain treated with IV Dilaudid with increasing lethargy and hypoxia on 10L oxygen with sats in 80s, treated with Narcan with improvement  Neb tx given  Pt was d/c from PACU to Level 2 SD on BIPAP  Pt had lactic acidosis, WBC elevated to 17 and started on IV Levaquin  This AM WBC up to 19 78, is on oxygen via face mask  Repeat CXR completed - Increased bibasilar density, likely atelectasis   On IV fluids      +++++++++++++++++  1/19 OPERATIVE NOTE    PREOPERATIVE DIAGNOSES:  Left Subtrochanteric Hip Fracture     POSTOPERATIVE DIAGNOSES:  Same as Preoperatvie Diagnoses     PROCEDURES:  Intramedullary Fixation of Left Subtrochanteric Hip Fracture      ANESTHESIA TYPE:  General endotracheal  ++++++++++++++++++       Vital Signs:   01/20/23 08:02:54 98 6 °F (37 °C) 85 19 113/60 78 98 % -- -- -- -- -- --   01/20/23 0715 -- -- -- -- -- 96 % -- -- -- -- -- --   01/20/23 03:07:35 98 5 °F (36 9 °C) 90 18 122/64 83 96 % -- -- -- -- -- --   01/20/23 0300 -- -- -- -- -- 96 % -- -- -- -- Full face mask --   01/19/23 23:14:35 98 2 °F (36 8 °C) 80 18 125/62 83 97 % -- -- -- -- -- Lying   01/19/23 20:36:37 97 5 °F (36 4 °C) 82 18 141/70 94 97 % -- -- -- -- -- Lying   01/19/23 2033 -- -- -- -- -- 96 % -- -- -- -- Full face mask --   01/19/23 1958 97 1 °F (36 2 °C) Abnormal  80 17 104/58 77 97 % 50 -- -- BiPAP -- --   01/19/23 1943 97 1 °F (36 2 °C) Abnormal  80 12 142/64 92 95 % 50 -- -- BiPAP -- --   01/19/23 1928 -- 78 19 107/59 79 98 % 100 -- -- BiPAP -- --   01/19/23 1913 -- 82 19 150/72 104 90 % 50 -- -- BiPAP -- --   01/19/23 1858 -- 80 20 138/65 94 94 % 50 -- -- BiPAP -- --   01/19/23 1843 -- 78 14 144/70 101 94 % -- 44 6 L/min BiPAP -- --   01/19/23 1828 -- 74 18 147/76 106 95 % 50 -- -- BiPAP -- --   01/19/23 1823 -- 74 20 144/74 102 96 % 50 -- -- BiPAP -- --   01/19/23 1817 -- 80 17 137/74 98 96 % 50 -- -- BiPAP -- --   01/19/23 1813 -- 78 16 151/86 108 96 % 50 -- -- BiPAP -- --   01/19/23 1808 -- 88 20 160/75 108 97 % 50 -- -- BiPAP -- --   01/19/23 1800 -- 84 20 206/91 Abnormal   130 92 % -- -- -- -- -- --   BP: Dr Kaela Nguyen notified  10mg IV Labetalol being given  Also, repeat dose of 0 2mg Narcan being given now  at 01/19/23 1800   01/19/23 1758 -- 82 26 Abnormal  211/90 Abnormal   129 93 % 50 -- -- BiPAP -- --   BP: rechecking Blood pressure at 01/19/23 1758   01/19/23 1743 -- 80 20 176/77 Abnormal  111 96 % 50 -- -- BiPAP -- --   01/19/23 1733 -- 84 18 186/72 Abnormal  104 96 % 50 -- -- BiPAP -- --   01/19/23 1724 -- 92 14 165/54 -- 96 % 50 -- -- BiPAP -- --   01/19/23 1715 -- 84 24 Abnormal  -- -- 83 % Abnormal   50 -- -- BiPAP -- --   SpO2: Anesthesia/Dr Flanagan aware  At bedside with pt  IV Narcan being given  pt  sleepy after IV Dilaudid dose  at 01/19/23 1715 01/19/23 1714 -- 82 19 -- -- 91 % 50 -- -- BiPAP -- --   01/19/23 1713 -- 84 19 186/79 Abnormal  114 92 % 50 -- -- BiPAP -- --   01/19/23 1707 -- 80 15 -- -- 87 % Abnormal   50 -- -- BiPAP -- --   SpO2: pt  getting sleepy and desating with BIPAP on  Anesthesia/Dr Latoya Chawla being notified  at 01/19/23 1707   01/19/23 1658 -- 84 17 147/64 92 94 % -- -- -- BiPAP -- --   01/19/23 1633 -- 84 19 138/65 -- 95 % 50 -- -- BiPAP  -- --   O2 Device: see respiratory note for settings at 01/19/23 1633   01/19/23 1620 -- 86 19 -- -- 95 % 50 -- -- BiPAP -- --   01/19/23 1615 -- 88 16 -- -- 95 % 50  -- -- BiPAP -- --   FiO2 (%): swtiched from 40 to 50 now by Respiratory Therapist as recommended by Anesthesia  Dr Kaela Nguyen, Anesthesiolgist and Claudetta Maduro, CRNA at bedside with patient   at 01/19/23 1615   01/19/23 1613 -- 88 16 152/67 96 95 % 40 -- -- BiPAP -- --   01/19/23 1608 97 2 °F (36 2 °C) Abnormal  84 16 175/74 Abnormal  107 95 %  40 -- -- BiPAP Full face mask --   SpO2: pt  being switched over to BIPAP as per Anesthesia's recommendations   at 01/19/23 1608   01/19/23 1604 -- 78 15 -- -- 95 % -- -- -- -- -- --   01/19/23 1556 97 2 °F (36 2 °C) Abnormal  82 12 175/74 Abnormal  -- 98 % -- 44 6 L/min Simple mask  -- --   O2 Device: switched to BIPAP by respiratory therapist as per anesthesia recommendations at 01/19/23 1556   01/19/23 0655 98 5 °F (36 9 °C) 79 18 157/77 -- 93 % -- 28 2 L/min Nasal cannula -- Lying   01/18/23 2300 97 °F (36 1 °C) Abnormal  64 20 127/64 -- 95 % -- -- -- Nasal cannula -- Lying   01/18/23 2100 -- 73 -- 136/63 -- 93 % -- 32 3 L/min Nasal cannula -- --   01/18/23 1748 -- -- -- -- -- 89 % Abnormal  -- 32 3 L/min Nasal cannula -- --   01/18/23 1741 -- 86 -- 120/73 92 -- -- -- -- -- -- Lying   01/18/23 1739 97 2 °F (36 2 °C) Abnormal  82 20 101/59 -- 90 % -- -- -- Nasal cannula -- Lying   01/18/23 1614 -- -- -- -- -- -- -- -- -- Nasal cannula -- --   01/18/23 1608 -- 80 23 Abnormal  132/67 -- -- -- -- -- -- -- --   01/18/23 1418 98 °F (36 7 °C) 82 22 132/92 -- 91 % -- -- -- None (Room air) -- Lying     Pertinent Labs/Diagnostic Results:     1/19 CXR - Increased bibasilar density, likely atelectasis    Results from last 7 days   Lab Units 01/20/23  0051   SARS-COV-2  Negative     Results from last 7 days   Lab Units 01/20/23  0431 01/20/23  0007 01/19/23  0429 01/18/23  1641   WBC Thousand/uL 19 78* 17 06* 11 75* 13 78*   HEMOGLOBIN g/dL 10 4* 10 2* 12 6 13 1   HEMATOCRIT % 33 3* 32 0* 38 9 39 0   PLATELETS Thousands/uL 292 236 255 237   NEUTROS ABS Thousands/µL  --   --  9 26* 11 56*         Results from last 7 days   Lab Units 01/20/23  0431 01/20/23  0007 01/19/23  0429 01/18/23  1641   SODIUM mmol/L 132* 136 137 139   POTASSIUM mmol/L 4 5 4 7 4 3 4 0   CHLORIDE mmol/L 99 105 100 103   CO2 mmol/L 29 29 32 29   ANION GAP mmol/L 4 2* 5 7   BUN mg/dL 18 18 19 19   CREATININE mg/dL 0 65 0 57* 0 66 0 73   EGFR ml/min/1 73sq m 88 92 88 82   CALCIUM mg/dL 7 6* 7 6* 8 9 9 3     Results from last 7 days   Lab Units 01/20/23  0007 01/19/23  0429   AST U/L 18 19   ALT U/L 16 17   ALK PHOS U/L 70 81   TOTAL PROTEIN g/dL 5 7* 6 7   ALBUMIN g/dL 3 4* 4 0   TOTAL BILIRUBIN mg/dL 0 46 0 58     Results from last 7 days   Lab Units 01/19/23  2126   POC GLUCOSE mg/dl 155*     Results from last 7 days   Lab Units 01/20/23  0431 01/20/23  0007 01/19/23  0429 01/18/23  1641   GLUCOSE RANDOM mg/dL 299* 160* 102 111     Results from last 7 days   Lab Units 01/20/23  0007 01/18/23  1641   PH [place]  7 157* 7 323   PCO2 [place] mm Hg 66 7* 56 8*   PO2 [place] mm Hg 92 1* 70 9*   HCO3 [place] mmol/L 23 1* 28 8   BASE EXC [place] mmol/L -6 2 1 5   O2 CONTENT [place] ml/dL 14 3 18 0   O2 HGB, VENOUS % 94 4* 90 6*                     Results from last 7 days   Lab Units 01/19/23  0429   PROTIME seconds 13 2   INR  1 00         Results from last 7 days   Lab Units 01/20/23  0431   PROCALCITONIN ng/ml 0 08     Results from last 7 days   Lab Units 01/20/23  0300 01/20/23  0007   LACTIC ACID mmol/L 1 1 3 2*     Results from last 7 days   Lab Units 01/20/23  0051   INFLUENZA A PCR  Negative   INFLUENZA B PCR  Negative   RSV PCR  Negative     Medications:   Scheduled Medications:  acetaminophen, 975 mg, Oral, Q8H LAYLA  aspirin, 81 mg, Oral, Daily  atorvastatin, 20 mg, Oral, Daily With Dinner  docusate sodium, 100 mg, Oral, BID  enoxaparin, 40 mg, Subcutaneous, Daily  levalbuterol, 0 63 mg, Nebulization, TID  loratadine, 10 mg, Oral, Daily  losartan, 100 mg, Oral, Daily  metoprolol tartrate, 50 mg, Oral, BID  pantoprazole, 40 mg, Oral, Daily  senna, 1 tablet, Oral, Daily      Continuous IV Infusions:  lactated ringers, 75 mL/hr, Intravenous, Continuous      PRN Meds:  acetaminophen, 650 mg, Oral, Q6H PRN  calcium carbonate, 1,000 mg, Oral, Daily PRN  HYDROmorphone, 0 2 mg, Intravenous, Q4H PRN  lactated ringers, 1,000 mL, Intravenous, Once PRN   And  lactated ringers, 1,000 mL, Intravenous, Once PRN  ondansetron, 4 mg, Intravenous, Q6H PRN - x 1 1/19  oxyCODONE, 10 mg, Oral, Q6H PRN  oxyCODONE, 5 mg, Oral, Q6H PRN - x 1 1/19sodium chloride, 1,000 mL, Intravenous, Once PRN   And  sodium chloride, 1,000 mL, Intravenous, Once PRN    Discharge Plan: D    Network Utilization Review Department  ATTENTION: Please call with any questions or concerns to [phone] and carefully listen to the prompts so that you are directed to the right person  All voicemails are confidential   Jess Finder all requests for admission clinical reviews, approved or denied determinations and any other requests to dedicated fax number below belonging to the campus where the patient is receiving treatment   List of dedicated fax numbers for the Facilities:  [address] DENIALS (Administrative/Medical Necessity) [phone]   [address] (Maternity/NICU/Pediatrics) [phone]   5 Lala Rice [phone]   [facility] [phone]   [address] [place] [address] [facility] [phone] 1550 [facility] Gabe Nielson [place] 134 815 [facility] [phone]

## 2023-01-20 NOTE — PLAN OF CARE
Problem: PHYSICAL THERAPY ADULT  Goal: Performs mobility at highest level of function for planned discharge setting  See evaluation for individualized goals  Description: Treatment/Interventions: Functional transfer training, Elevations, LE strengthening/ROM, Therapeutic exercise, Endurance training, Equipment eval/education, Patient/family training, Bed mobility, Gait training, Compensatory technique education, Continued evaluation, Spoke to nursing, OT, Spoke to advanced practitioner          See flowsheet documentation for full assessment, interventions and recommendations  Note: Prognosis: Fair  Problem List: Decreased strength, Decreased endurance, Impaired balance, Decreased mobility, Impaired judgement, Decreased safety awareness, Obesity, Decreased skin integrity, Orthopedic restrictions, Pain  Assessment: Jevon Adames is a 67 y o  female admitted to [facility] on 1/18/2023 for Closed left hip fracture ([address] )  POD#1 IM nail  PT was consulted and pt was seen on 1/20/2023 for mobility assessment and d/c planning  Pt presents w high fall risk, multiple lines, level 2 steps down, acute post op pain, wbat BLE per ortho  At baseline is indep for ADLs, IADLs and ambulation  Pt is currently functioning at a maximum assistance x2 level for bed mobility, moderate assistance x2 level for transfers and ambulation w RW  Pt demonstrated deficits of judgement/ safety awareness, endurance, balance, strength  Unable to mobilize without Ax2, unable to ambulate any significant distance, limited tolerance to OOB mobility, high risk for recurrent falls  Unable to return to PLOF including ambulating community distances, negotiating steps, perform caregiver duties, return to work duties  Pt will benefit from continued skilled IP PT to address the above mentioned impairments  in order to maximize recovery and increase functional independence when completing mobility and ADLs   Currently PT recommendations for DME include RW if patient does not own  At this time PT recommendations for d/c are STR  Barriers to Discharge: Inaccessible home environment, Decreased caregiver support     PT Discharge Recommendation: Post acute rehabilitation services    See flowsheet documentation for full assessment

## 2023-01-20 NOTE — ASSESSMENT & PLAN NOTE
· Multifactorial due to postoperative state, ongoing use of metoprolol/losartan, pain medication  · She remains hypotensive despite 2 L of fluids given today    · Check CT chest stat rule out PE  · Maintain hold parameters

## 2023-01-20 NOTE — ASSESSMENT & PLAN NOTE
Recent 2D echo done at [facility] 12/2022 revealed grade 1 diastolic dysfunction  Chest x-ray without effusion or  Edema    Received 2 L of IV fluids today for hypotension  CT chest pending  Hold losartan and metoprolol for hypotension

## 2023-01-20 NOTE — QUICK NOTE
She has provoked PE and DVT    Will increase Lovenox to 100 mg twice daily (1 milligram per kilogram dose)  Anticipate at least 3 months of anticoagulation  Will need to check for coverage for Eliquis

## 2023-01-20 NOTE — ASSESSMENT & PLAN NOTE
· Multifactorial due to postoperative state, atelectasis,  BMI 37, hypoventilation  · Chest x-ray does not fully explain the degree of  oxygen she is requiring  · Given recent hip fracture and  orthopedic procedure, the most important thing  rule out is a PE    · Check stat CT of the chest  · Maintain oxygen saturation above 90%

## 2023-01-20 NOTE — CONSULTS
Consultation - Pulmonary Medicine   Willie Camarena 67 y o  female MRN: [id number]  Unit/Bed#: [place] [address] -01 Encounter: [id number]      Assessment/Plan:    1  Acute respiratory failure with hypoxia  · Continue to wean oxygen as tolerated  Titrate to maintain oxygen saturations greater than 88%  Patient is currently on 5 L mid flow nasal cannula  Does not wear home O2  · Pulmonary toilet: cough and deep breathe, incentive spirometer, OOB to chair as tolerated  Chest x-ray/CT of chest suggests atelectasis  · Patient will need outpatient sleep study to determine need for CPAP at bedtime    2  Pulmonary embolism  · Provoked secondary to left hip fracture  · Patient currently receiving Lovenox 100 mg twice daily  · Will need anticoagulation for 3 months  · Will discuss with case management for Eliquis coverage    3  Chronic diastolic congestive heart failure  · Echo from December 2022 shows grade 1 diastolic dysfunction  EF 55 to 59%  · Will give 1 dose of 40 mg IV Lasix today  Patient is currently 2,857L positive  · Will check BNP    4  DVT of left lower limb  · See plan for PE    5  Closed left hip fracture  · Post op day #1  · Caution with pain medications as patient became lethargic and required Narcan in PACU  · PT/OT  · Dissipate rehab placement    6  Possible NIRMAL/OHS  · Outpatient sleep study  · Lifestyle modifications on discharge    History of Present Illness   Physician Requesting Consult: Patrick Ramires Pe*  Reason for Consult / Principal Problem: Post op  Hypoxia, segmental PE's  Hx and PE limited by: None  Chief Complaint: "I have to pee "  HPI: Willie Camarena is a 67 y o  Cauc[place] female who presented to [facility] [place] [id number]  for left hip pain  Patient slipped and fell onto left hip while working  She was brought in via EMS with an outward rotation of her left leg  X-ray confirmed closed left hip fracture    Patient is currently postop day 1 status post left femur intramedullary fixation  After surgery, she had hypoxia with saturations in the 80s on 10 L of oxygen  She was transitioned to BiPAP  Patient was receiving Dilaudid and was treated with Narcan with improvement of lethargy  Pulmonary was consulted for postop hypoxia and segmental PEs  Patient does not see a pulmonologist   She has never worn oxygen  She has never been a smoker  Prior to fall , 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  She denies any recent travel  She denies personal history of cancer  She denies history of COPD or asthma  She denies any history of PE or DVTs  State's her brother, who passed away, had heart disease  Patient admits to snoring, has not had a sleep study, does not use a CPAP  Consults    Review of Systems   Constitutional: Negative for activity change, appetite change and fatigue  HENT: Negative for congestion and postnasal drip  Respiratory: Positive for shortness of breath  Negative for cough, choking and wheezing  Cardiovascular: Positive for chest pain (c/o of on and off CP while working)  Negative for palpitations and leg swelling  Gastrointestinal: Negative for abdominal pain  Genitourinary: Negative for difficulty urinating  Musculoskeletal: Positive for arthralgias  Neurological: Positive for light-headedness  All other systems reviewed and are negative  Historical Information   Past Medical History:   Diagnosis Date   • Hypertension      Past Surgical History:   Procedure Laterality Date   • HIP FRACTURE SURGERY     • [place] OPTX FEM SHFT FX W/INSJ IMED IMPLT W/WO SCREW Left 1/19/2023    Procedure: INSERTION NAIL IM FEMUR ANTEGRADE (TROCHANTERIC);   Surgeon: Mague Rice MD;  Location: [facility] OR;  Service: Orthopedics   • REDUCTION MAMMAPLASTY       Social History   Social History     Substance and Sexual Activity   Alcohol Use Never     Social History     Substance and Sexual Activity   Drug Use Never     Social History Tobacco Use   Smoking Status Never   Smokeless Tobacco Never     E-Cigarette/Vaping     E-Cigarette/Vaping Substances     Occupational History: nurses aid    Family History: History reviewed  No pertinent family history      Meds/Allergies   all current active meds have been reviewed, pertinent pulmonary meds have been reviewed and current meds:   Current Facility-Administered Medications   Medication Dose Route Frequency   • acetaminophen (TYLENOL) tablet 650 mg  650 mg Oral Q6H PRN   • acetaminophen (TYLENOL) tablet 975 mg  975 mg Oral Q8H [facility]   • aspirin (ECOTRIN LOW STRENGTH) EC tablet 81 mg  81 mg Oral Daily   • atorvastatin (LIPITOR) tablet 20 mg  20 mg Oral Daily With Dinner   • calcium carbonate (TUMS) chewable tablet 1,000 mg  1,000 mg Oral Daily PRN   • docusate sodium (COLACE) capsule 100 mg  100 mg Oral BID   • enoxaparin (LOVENOX) subcutaneous injection 40 mg  40 mg Subcutaneous Daily   • HYDROmorphone HCl (DILAUDID) injection 0 2 mg  0 2 mg Intravenous Q4H PRN   • lactated ringers bolus 1,000 mL  1,000 mL Intravenous Once PRN    And   • lactated ringers bolus 1,000 mL  1,000 mL Intravenous Once PRN   • lactated ringers infusion  75 mL/hr Intravenous Continuous   • levalbuterol (XOPENEX) inhalation solution 0 63 mg  0 63 mg Nebulization TID   • loratadine (CLARITIN) tablet 10 mg  10 mg Oral Daily   • losartan (COZAAR) tablet 100 mg  100 mg Oral Daily   • metoprolol tartrate (LOPRESSOR) tablet 50 mg  50 mg Oral BID   • ondansetron (ZOFRAN) injection 4 mg  4 mg Intravenous Q6H PRN   • oxyCODONE (ROXICODONE) immediate release tablet 10 mg  10 mg Oral Q6H PRN   • oxyCODONE (ROXICODONE) IR tablet 5 mg  5 mg Oral Q6H PRN   • pantoprazole (PROTONIX) EC tablet 40 mg  40 mg Oral Daily   • senna (SENOKOT) tablet 8 6 mg  1 tablet Oral Daily   • sodium chloride 0 9 % bolus 1,000 mL  1,000 mL Intravenous Once PRN    And   • sodium chloride 0 9 % bolus 1,000 mL  1,000 mL Intravenous Once PRN       Allergies Allergen Reactions   • Amoxicillin Angioedema   • Sulfa Antibiotics Angioedema and Anaphylaxis     throat "closes "     • Lisinopril Swelling   • Medical Tape Other (See Comments)     rash, blisters       Objective   Vitals: Blood pressure 115/62, pulse (!) 50, temperature 98 5 °F (36 9 °C), temperature source Oral, resp  rate 20, height 5' 4" (1 626 m), weight 100 kg (220 lb 7 4 oz), SpO2 100 %  5L NC,Body mass index is 37 84 kg/m²  Intake/Output Summary (Last 24 hours) at 1/20/2023 1628  Last data filed at 1/20/2023 1445  Gross per 24 hour   Intake 3782 5 ml   Output 1425 ml   Net 2357 5 ml     Invasive Devices     Peripheral Intravenous Line  Duration           Peripheral IV 01/18/23 Left Antecubital 2 days    Peripheral IV 01/19/23 Right;Dorsal (posterior) Hand 1 day                Physical Exam  Vitals reviewed  Constitutional:       General: She is not in acute distress  Appearance: She is obese  She is not ill-appearing or toxic-appearing  HENT:      Head: Normocephalic and atraumatic  Nose: Nose normal       Mouth/Throat:      Pharynx: Oropharynx is clear  Eyes:      Conjunctiva/sclera: Conjunctivae normal    Cardiovascular:      Rate and Rhythm: Normal rate and regular rhythm  Heart sounds: Heart sounds are distant  Pulmonary:      Effort: No respiratory distress  Breath sounds: Normal breath sounds  No stridor  No wheezing, rhonchi or rales  Chest:      Chest wall: No tenderness  Abdominal:      Palpations: Abdomen is soft  Musculoskeletal:         General: Signs of injury (left hip fx) present  No swelling  Cervical back: Normal range of motion  Right lower leg: No edema  Left lower leg: No edema  Skin:     General: Skin is warm and dry  Neurological:      General: No focal deficit present  Mental Status: She is alert and oriented to person, place, and time     Psychiatric:         Mood and Affect: Mood normal          Behavior: Behavior normal  Lab Results:   I have personally reviewed pertinent lab results  , CBC:   Lab Results   Component Value Date    WBC 19 78 (H) 01/20/2023    HGB 10 4 (L) 01/20/2023    HCT 33 3 (L) 01/20/2023    MCV 95 01/20/2023     01/20/2023    MCH 29 7 01/20/2023    MCHC 31 2 (L) 01/20/2023    RDW 14 3 01/20/2023    MPV 10 1 01/20/2023   , CMP:   Lab Results   Component Value Date    SODIUM 132 (L) 01/20/2023    K 4 5 01/20/2023    CL 99 01/20/2023    CO2 29 01/20/2023    BUN 18 01/20/2023    CREATININE 0 65 01/20/2023    CALCIUM 7 6 (L) 01/20/2023    AST 18 01/20/2023    ALT 16 01/20/2023    ALKPHOS 70 01/20/2023    EGFR 88 01/20/2023       Procalcitonin: 0 8    Lactic Acid: 1/20 at 0007-3 2, 1/20 at 0300- 1 1    BNP: will order    Imaging Studies: I have personally reviewed pertinent reports  and I have personally reviewed pertinent films in PACS   1/20/23 CTA chest PE study: Few subsegmental pulmonary emboli in the posterior lower lobes  RV/LV ratio is within normal limits at less than 0 9     1/19/23 CXR: Increased bibasilar density, likely atelectasis    EKG, Pathology, and Other Studies: I have personally reviewed pertinent reports  1/18/23 ECG: NSR    12/2/22 Echo: Left ventricle is normal in size  There is mild concentric hypertrophy  Systolic function is normal with an ejection fraction of 55-59%  There is grade I (mild) diastolic   dysfunction and normal left atrial pressure  Right ventricle cavity is normal  Systolic function is normal  Mitral valve has mild regurgitation  The estimated pulmonary artery systolic pressure KO 78 5 mmHg  Pulmonary Results (PFTs, PSG): None to review     VTE Prophylaxis: Sequential compression device (Venodyne)  and Enoxaparin (Lovenox)    Code Status: Level 1 - Full Code    Portions of the record may have been created with voice recognition software    Occasional wrong word or "sound a like" substitutions may have occurred due to the inherent limitations of voice recognition software  Read the chart carefully and recognize, using context, where substitutions have occurred

## 2023-01-20 NOTE — PHYSICAL THERAPY NOTE
PHYSICAL THERAPY EVALUATION          Patient Name: Mc Quinteros  XEPLB'Z Date: 1/20/2023  PT EVALUATION    67 y o     [id number]    LBBB (left bundle branch block) [I44 7]  Hip pain [M25 559]  Elevated blood pressure reading [R03 0]  Left hip pain [M25 552]  [facility] [S72 002A]    Past Medical History:   Diagnosis Date    Hypertension      Past Surgical History:   Procedure Laterality Date    HIP FRACTURE SURGERY      SC OPTX FEM SHFT FX W/INSJ IMED IMPLT W/WO SCREW Left 1/19/2023    Procedure: INSERTION NAIL IM FEMUR ANTEGRADE (TROCHANTERIC); Surgeon: Luc Gallegos MD;  Location: [facility];  Service: Orthopedics    REDUCTION MAMMAPLASTY          01/20/23 0944   PT Last Visit   PT Visit Date 01/20/23   Note Type   Note type Evaluation   Pain Assessment   Pain Assessment Tool 0-10   Pain Score No Pain   Restrictions/Precautions   Weight Bearing Precautions Per Order Yes   RLE Weight Bearing Per Order WBAT   LLE Weight Bearing Per Order WBAT   Other Precautions Chair Alarm; Bed Alarm;Multiple lines; Fall Risk;Pain;O2  (level 2 step down, 5L, PIV)   Home Living   Type of [address] [place] [address] Two level;Bed/bath upstairs;Stairs to enter with rails   Bathroom Shower/Tub Tub/shower unit   Bathroom Toilet Raised   Bathroom Equipment Shower chair   Prior Function   Level of [place] Independent with ADLs; Independent with functional mobility; Independent with IADLS   Lives With Family  (brother who is disabled)   Receives Help From   (supportive ex spouse- currently watching her brother)   IADLs Independent with driving; Independent with meal prep; Independent with medication management   Falls in the last 6 months 1 to 4   Vocational Full time employment   Comments indep at baseline without an AD  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)   General   Additional Pertinent History pt admitted 1/18/23 for closed L hip fx  POD# IM nail  wbat LLE and activity as tolerated per ortho  PMHx significant for obesity, CHF   Cognition   Overall Cognitive Status WFL   Arousal/Participation Cooperative   Orientation Level Oriented to person;Oriented to place;Oriented to time;Disoriented to situation   Memory Decreased recall of recent events   Following Commands Follows one step commands with increased time or repetition   Comments lethargic but arousal improved w activity  flat affect   RLE Assessment   RLE Assessment WFL   LLE Assessment   LLE Assessment X  (pain limited)   Coordination   Sensation WFL   Bed Mobility   Supine to Sit 2  Maximal assistance   Additional items Assist x 2;HOB elevated; Bedrails; Increased time required;Verbal cues;LE management; Other   Sit to Supine 2  Maximal assistance   Additional items Assist x 1;Assist x 2;Bedrails; Increased time required;Verbal cues;LE management; Other   Transfers   Sit to Stand 3  Moderate assistance   Additional items Assist x 2; Increased time required;Verbal cues; Other  (RW)   Stand to Sit 3  Moderate assistance   Additional items Assist x 1;Assist x 2; Increased time required;Verbal cues; Other  (RW)   Ambulation/Elevation   Gait pattern Poor UE support; Improper Weight shift; Forward Flexion;Decreased foot clearance;Decreased L stance;R Foot drag;Short stride; Excessively slow   Gait Assistance 3  Moderate assist   Additional items Assist x 2;Verbal cues; Tactile cues  (cues for proper use of AD, upright posture, gait sequencing)   Assistive Device Rolling walker   Distance 1' side step   Balance   Static Sitting Fair -  (frqeuent LOB due to R lean trying to avoid WB on L)   Dynamic Sitting Poor +   Static Standing Poor +   Dynamic Standing Poor   Ambulatory Poor   Endurance Deficit   Endurance Deficit Yes   Endurance Deficit Description consistent complaints of dizziness, seemingly unchanged w positional changes   vitals during session: 87/51 supine, 87/51 EOB, 90/51 after five min EOB, 83/46 supine end of session  O2 92% on 5L  Activity Tolerance   Activity Tolerance Patient limited by pain;Treatment limited secondary to medical complications (Comment)   Medical Staff Made Aware Jia Lynch OT; ortho PA   Nurse Made Aware Susan Goodwin RN   Assessment   Prognosis Fair   Problem List Decreased strength;Decreased endurance; Impaired balance;Decreased mobility; Impaired judgement;Decreased safety awareness; Obesity; Decreased skin integrity;Orthopedic restrictions;Pain   Assessment Myrna Larger is a 67 y o  female admitted to [facility] on 1/18/2023 for Closed left hip fracture ([address] )  POD#1 IM nail  PT was consulted and pt was seen on 1/20/2023 for mobility assessment and d/c planning  Pt presents w high fall risk, multiple lines, level 2 steps down, acute post op pain, wbat BLE per ortho  At baseline is indep for ADLs, IADLs and ambulation  Pt is currently functioning at a maximum assistance x2 level for bed mobility, moderate assistance x2 level for transfers and ambulation w RW  Pt demonstrated deficits of judgement/ safety awareness, endurance, balance, strength  Unable to mobilize without Ax2, unable to ambulate any significant distance, limited tolerance to OOB mobility, high risk for recurrent falls  Unable to return to PLOF including ambulating community distances, negotiating steps, perform caregiver duties, return to work duties  Pt will benefit from continued skilled IP PT to address the above mentioned impairments  in order to maximize recovery and increase functional independence when completing mobility and ADLs  Currently PT recommendations for DME include RW if patient does not own  At this time PT recommendations for d/c are STR  Barriers to Discharge Inaccessible home environment;Decreased caregiver support   Goals   Patient Goals feel better/ reduce pain   STG Expiration Date 02/03/23   Short Term Goal #1 1)    Pt will perform bed mobility with min A demonstrating appropriate technique 100% of the time in order to improve function  2)  Perform all transfers with min A demonstrating safe and appropriate technique 100% of the time in order to improve ability to negotiate safely in home environment  3) Amb with least restrictive AD > 25'x2 with min A in order to demonstrate ability to negotiate in home environment  4)  Improve overall strength and balance 1/2 grade in order to optimize ability to perform functional tasks and reduce fall risk  5) Increase activity tolerance to 45 minutes in order to improve endurance to functional tasks  6)  Negotiate stairs using most appropriate technique and min Ax2 in order to be able to negotiate safely in home environment  7) PT for ongoing patient and family/caregiver education, DME needs and d/c planning in order to promote highest level of function in least restrictive environment  Plan   Treatment/Interventions Functional transfer training;Elevations;LE strengthening/ROM; Therapeutic exercise; Endurance training;Equipment eval/education;Patient/family training;Bed mobility;Gait training; Compensatory technique education;Continued evaluation;Spoke to nursing;OT;Spoke to advanced practitioner   PT Frequency 5-7x/wk   Recommendation   PT Discharge Recommendation Post acute rehabilitation services   AM-PAC Basic Mobility Inpatient   Turning in Flat Bed Without Bedrails 1   Lying on Back to Sitting on Edge of Flat Bed Without Bedrails 1   Moving Bed to Chair 1   Standing Up From Chair Using Arms 1   Walk in Room 1   Climb 3-5 Stairs With Railing 1   Basic Mobility Inpatient Raw Score 6   Highest Level Of Mobility   JH-HLM Goal 2: Bed activities/Dependent transfer   JH-HLM Achieved 5: Stand (1 or more minutes)   End of Consult   Patient Position at End of Consult Supine;Bed/Chair alarm activated; All needs within reach   History: co - morbidities, social background, fall risk, multiple lines  Exam: impairments in systems including musculoskeletal (strength), neuromuscular (balance, gait, transfers, motor function and sensation), am-pac, cardiopulmonary, cognition  Clinical: unstable/unpredictable  Complexity:high      Adron Morteza, PT

## 2023-01-20 NOTE — ASSESSMENT & PLAN NOTE
· Postoperative day 1 status post left femur intramedullary fixation   · Continue DVT prophylaxis  · Cautious pain control   · Anticipate rehab placement

## 2023-01-20 NOTE — PLAN OF CARE
Problem: OCCUPATIONAL THERAPY ADULT  Goal: Performs self-care activities at highest level of function for planned discharge setting  See evaluation for individualized goals  Description: Treatment Interventions: ADL retraining, Functional transfer training, UE strengthening/ROM, Endurance training, Patient/family training, Equipment evaluation/education, Neuromuscular reeducation, Compensatory technique education, Continued evaluation, Energy conservation, Activityengagement          See flowsheet documentation for full assessment, interventions and recommendations  Note: Limitation: Decreased ADL status, Decreased UE strength, Decreased Safe judgement during ADL, Decreased endurance, Decreased self-care trans, Decreased high-level ADLs  Prognosis: Fair  Assessment: Patient is a 67y o  year old female seen for OT eval s/p admit to [facility] on 1/18/2023 s/p fall with closed L hip fx s/p L femur intramedullary fixation 1/19; pt with hypoxia and requiring supplemental O2 (5L) - RA at baseline  Patient with active OT orders, WBAT LLE, and activity orders for Activity as tolerated  Personal factors affecting pt at time of IE include: difficulty performing ADLs and IADLs, difficulty with functional mobility/transfers  Prior to admission, patient was (I) with ADLs/IADLs, 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, working FT and driving  Upon evaluation, patient’s functional status as follows: eating: SBA, grooming: SBA, UB bathing: MIN A , LB bathing: MAX A, UB dressing: MIN A , LB dressing: dependent, toileting: dependent; functional transfers: MOD A x2, bed mobility: MAX A x1-2, functional mobility: MOD Ax2 with FWW sidestepping, sitting/standing tolerance: ~1 min 30 sec with B UE support- due to the following deficits impacting occupational performance: decreased B UE strength, decreased static/dynamic sitting/standing balance, decreased activity tolerance, decreased safety awareness, and increased pain   Pt hypotensive t/o evaluation; RN made aware and at bedside at end of session  Patient would benefit from continued skilled OT therapy while in acute setting to address deficits as defined above and maximize (I) with ADLs and functional mobility  Occupational performance areas to address include: grooming, bathing, toileting, UB/LB dressing, functional mobility, household maintenance, and medication management  Based on the aforementioned OT evaluation, functional performance deficits, and assessments, pt has been identified as a high complexity evaluation    Co treatment with PT secondary to complex medical condition of pt, possible A of 2 required to achieve and maintain transitional movements, requiring the need of skilled therapeutic intervention of 2 therapists to achieve delivery of services       OT Discharge Recommendation: Post acute rehabilitation services

## 2023-01-20 NOTE — QUICK NOTE
RN reports hypoxia on BiPAP and lethargy s/p Narcan and Romazicon  Seen earlier by Dr Maximo Lino, upgraded to stepdown and placed on BiPAP    · CXR reviewed- no acute cardiopulmonary disease  · Patient lethargic, responds to verbal and tactile stimuli but nonverbal  · Lung sounds decreased, no rhonchi or wheezing or stridor  Does not appear to be triggering BiPAP although setting on BiPAP 16, patient rate 21  · Will check repeat CXR, respiratory viral panel, CMP/CBC, lactate and VBG  · Will discuss with critical care    Addendum:    · lactic acidosis- continue IV fluids and trend until cleared  · WBC increased from 11 to 17  Will  Check PCT  · Severe allergy to ampicillin  Give 1 dose of IV Levaquin

## 2023-01-20 NOTE — CASE MANAGEMENT
Case Management Assessment & Discharge Planning Note    Patient name Sukhdev Hicks  Location [place] /[place] [facility]* MRN [id number]  : 1950 Date 2023       Current Admission Date: 2023  Current Admission Diagnosis:Hypoxia   Patient Active Problem List    Diagnosis Date Noted   • Hypotension 2023   • Closed left hip fracture ([facility] [address] ) 2023   • Essential hypertension 2023   • Encounter for preoperative assessment 2023   • Other hyperlipidemia 2023   • Hypoxia 2023   • Chronic diastolic congestive heart failure ([facility] [address] ) 2023   • Leukocytosis 2023      LOS (days): 2  Geometric Mean LOS (GMLOS) (days): 3 50  Days to GMLOS:1 5     OBJECTIVE:    Risk of Unplanned Readmission Score: 12 67         Current admission status: Inpatient       Preferred Pharmacy:   [facility] #[id number] - [address] [facility] [address] [place] [address]  Phone: [phone] Fax: [phone]    Primary Care Provider: No primary care provider on file  Primary Insurance: WORKERS COMPENSATION  Secondary Insurance: [facility] REP    ASSESSMENT:  Florin Weiss, [address] Representative - Brother   Primary Phone: [phone] (Mobile)               Advance Directives  Primary Contact: Andrerizwan Go- brothwill         Readmission Root Cause  30 Day Readmission: No    Patient Information  Admitted from[de-identified] Home  Mental Status: Alert  During Assessment patient was accompanied by: Not accompanied during assessment  Assessment information provided by[de-identified] Patient  Primary Caregiver: Self  Support Systems: Son, Family members, Friend, Other (Comment) (coworkers)  [place] of Residence: [address] do you live in?: Roxane Brown 34 entry access options   Select all that apply : Stairs  Number of steps to enter home : 6  Do the steps have railings?: Yes (on right)  Type of Current Residence: 2 [place] home  Upon entering residence, is there a bedroom on the main floor (no further steps)?: No  A bedroom is located on the following floor levels of residence (select all that apply):: 2nd Floor  Upon entering residence, is there a bathroom on the main floor (no further steps)?: No  Indicate which floors of current residence have a bathroom (select all the apply):: 2nd Floor  Number of steps to 2nd floor from main floor: One Flight  In the last 12 months, was there a time when you were not able to pay the mortgage or rent on time?: No  In the last 12 months, how many places have you lived?: 1  In the last 12 months, was there a time when you did not have a steady place to sleep or slept in a shelter (including now)?: No  Homeless/housing insecurity resource given?: N/A  Living Arrangements: Other (Comment) (Lives with Disabled brother (doesn't need to give any physical assistance to him))  Is patient a ?: No    Activities of Daily Living Prior to Admission  Functional Status: Independent  Completes ADLs independently?: Yes  Ambulates independently?: Yes  Does patient use assisted devices?: No  Does patient currently own DME?: Yes  What DME does the patient currently own?: Shower Chair, Other (Comment) (raised toilet seat)  Does patient have a history of Outpatient Therapy (PT/OT)?: No  Does the patient have a history of Short-Term Rehab?: No  Does patient have a history of HHC?: No  Does patient currently have [address]?: No         Patient Information Continued  Income Source: Employed (works FT at [employer])  Does patient have prescription coverage?: Yes  Within the past 12 months, you worried that your food would run out before you got the money to buy more : Never true  Within the past 12 months, the food you bought just didn't last and you didn't have money to get more : Never true  Food insecurity resource given?: N/A  Does patient have a history of substance abuse?: No  Does patient have a history of Mental Health Diagnosis?: No         Means of Transportation  Means of Transport to Appts[de-identified] Drives Self  In the past 12 months, has lack of transportation kept you from medical appointments or from getting medications?: No  In the past 12 months, has lack of transportation kept you from meetings, work, or from getting things needed for daily living?: No  Was application for public transport provided?: N/A        DISCHARGE DETAILS:             CM contacted family/caregiver?: Yes  Were Treatment Team discharge recommendations reviewed with patient/caregiver?: Yes  Did patient/caregiver verbalize understanding of patient care needs?: Yes       Contacts  Patient Contacts: Candace Lopez- brother  Relationship to Patient[de-identified] Family  Contact Method: Phone  Phone Number: [phone]  Reason/Outcome: Discharge Planning, Referral    Requested [address]         Is the patient interested in [facility] at discharge?: No    DME Referral Provided  Referral made for DME?: No         Would you like to participate in our [facility] service program?  : No - Declined    Treatment Team Recommendation: Short Term Rehab  Discharge Destination Plan[de-identified] Short Term Rehab (Per Jon Fraser at [place] [address] (workman's comp) wants pt within the [employer] preferrably at [facility] but TCF approved as well )  Transport at Discharge : BLS Ambulance (Per Jon Fraser will HAVE to be SLETS to transport (already approved under claim [id number])- if unable to do the trip she will need 24hr notice (doesn't work on weekends) to obtain/auth a transport company)

## 2023-01-20 NOTE — PLAN OF CARE
Problem: Potential for Falls  Goal: Patient will remain free of falls  Description: INTERVENTIONS:  - Educate patient/family on patient safety including physical limitations  - Instruct patient to call for assistance with activity   - Consult OT/PT to assist with strengthening/mobility   - Keep Call bell within reach  - Keep bed low and locked with side rails adjusted as appropriate  - Keep care items and personal belongings within reach  - Initiate and maintain comfort rounds  - Make Fall Risk Sign visible to staff  - Offer Toileting every 2 Hours, in advance of need  - Initiate/Maintain bed alarm  - Obtain necessary fall risk management equipment:   - Apply yellow socks and bracelet for high fall risk patients  - Consider moving patient to room near nurses station  Outcome: Progressing     Problem: MOBILITY - ADULT  Goal: Maintain or return to baseline ADL function  Description: INTERVENTIONS:  -  Assess patient's ability to carry out ADLs; assess patient's baseline for ADL function and identify physical deficits which impact ability to perform ADLs (bathing, care of mouth/teeth, toileting, grooming, dressing, etc )  - Assess/evaluate cause of self-care deficits   - Assess range of motion  - Assess patient's mobility; develop plan if impaired  - Assess patient's need for assistive devices and provide as appropriate  - Encourage maximum independence but intervene and supervise when necessary  - Involve family in performance of ADLs  - Assess for home care needs following discharge   - Consider OT consult to assist with ADL evaluation and planning for discharge  - Provide patient education as appropriate  Outcome: Progressing  Goal: Maintains/Returns to pre admission functional level  Description: INTERVENTIONS:  - Perform BMAT or MOVE assessment daily    - Set and communicate daily mobility goal to care team and patient/family/caregiver     - Collaborate with rehabilitation services on mobility goals if consulted  - Perform Range of Motion 4 times a day  - Reposition patient every 2 hours    - Dangle patient 3 times a day  - Stand patient 3 times a day  - Ambulate patient 3 times a day  - Out of bed to chair 3 times a day   - Out of bed for meals 3 times a day  - Out of bed for toileting  - Record patient progress and toleration of activity level   Outcome: Progressing     Problem: Prexisting or High Potential for Compromised Skin Integrity  Goal: Skin integrity is maintained or improved  Description: INTERVENTIONS:  - Identify patients at risk for skin breakdown  - Assess and monitor skin integrity  - Assess and monitor nutrition and hydration status  - Monitor labs   - Assess for incontinence   - Turn and reposition patient  - Assist with mobility/ambulation  - Relieve pressure over bony prominences  - Avoid friction and shearing  - Provide appropriate hygiene as needed including keeping skin clean and dry  - Evaluate need for skin moisturizer/barrier cream  - Collaborate with interdisciplinary team   - Patient/family teaching  - Consider wound care consult   Outcome: Progressing

## 2023-01-20 NOTE — PROGRESS NOTES
[facility]  Progress Note - Mc Quinteros 1950, 67 y o  female MRN: [id number]  Unit/Bed#: [address] Encounter: [id number]  Primary Care Provider: No primary care provider on file  Date and time admitted to hospital: 1/18/2023  2:15 PM    * Hypoxia  Assessment & Plan  · Multifactorial due to postoperative state, atelectasis,  BMI 37, hypoventilation  · Chest x-ray does not fully explain the degree of  oxygen she is requiring  · Given recent hip fracture and  orthopedic procedure, the most important thing  rule out is a PE  · Check stat CT of the chest  · Maintain oxygen saturation above 90%    Hypotension  Assessment & Plan  · Multifactorial due to postoperative state, ongoing use of metoprolol/losartan, pain medication  · She remains hypotensive despite 2 L of fluids given today  · Check CT chest stat rule out PE  · Maintain hold parameters    Closed left hip fracture (HCC)  Assessment & Plan  · Postoperative day 1 status post left femur intramedullary fixation   · Continue DVT prophylaxis  · Cautious pain control   · Anticipate rehab placement    Chronic diastolic congestive heart failure [facility])  Assessment & Plan  Recent 2D echo done at [facility] 12/2022 revealed grade 1 diastolic dysfunction  Chest x-ray without effusion or  Edema  Received 2 L of IV fluids today for hypotension  CT chest pending  Hold losartan and metoprolol for hypotension      Other hyperlipidemia  Assessment & Plan  Continue  Lipitor 20 mg daily    Leukocytosis  Assessment & Plan  Likely reactive secondary to fracture and postoperative state  Monitor off antibiotics    Essential hypertension  Assessment & Plan  Currently hypotensive    Moving forward her metoprolol and losartan doses may need to be adjusted      VTE Pharmacologic Prophylaxis:   Pharmacologic: Heparin  Mechanical VTE Prophylaxis in Place: Yes    Patient Centered Rounds: discussed with her nurse    Discussions with Specialists or Other Care Team Provider: Hospital course reviewed  Communicated with Ortho  Discussed with critical care attending Dr Jose Figueroa    Education and Discussions with Family / Patient: spoke with son Zoe Leyva at   We discussed her condition plan  We discussed her low blood pressure and low oxygen and need for stat CT  He asked that tomorrow his uncle be the one to call for updates  Time Spent for Care: 35 minutes  More than 50% of total time spent on counseling and coordination of care as described above  Current Length of Stay: 2 day(s)    Current Patient Status: Inpatient   Certification Statement: The patient will continue to require additional inpatient hospital stay due to hypotension    Discharge Plan: Likely rehab    Code Status: Level 1 - Full Code      Subjective:   Patient seen and examined  I was notified by her nurse that she remained hypotensive despite 2 L of IV fluids given today  She was on BiPAP last night and currently on  5 L by nasal cannula  She denied dizziness or lightheadedness  Last blood pressure was 86/75  She denied calf pain  She denied chest pain  He is not usually on oxygen  Objective:     Vitals:   Temp (24hrs), Av 8 °F (36 6 °C), Min:97 1 °F (36 2 °C), Max:98 6 °F (37 °C)    Temp:  [97 1 °F (36 2 °C)-98 6 °F (37 °C)] 98 6 °F (37 °C)  HR:  [62-92] 63  Resp:  [12-26] 19  BP: ()/(43-91) 86/75  SpO2:  [83 %-98 %] 93 %  Body mass index is 37 84 kg/m²  Input and Output Summary (last 24 hours): Intake/Output Summary (Last 24 hours) at 2023 1259  Last data filed at 2023 1140  Gross per 24 hour   Intake 4782 5 ml   Output 1025 ml   Net 3757 5 ml       Physical Exam:     Physical Exam  Vitals reviewed  Constitutional:       Appearance: She is obese  She is not ill-appearing  HENT:      Head: Normocephalic and atraumatic  Nose: No congestion or rhinorrhea  Eyes:      General: No scleral icterus    Cardiovascular: Rate and Rhythm: Normal rate  Pulmonary:      Effort: Pulmonary effort is normal  No respiratory distress  Breath sounds: No wheezing or rales  Abdominal:      General: Abdomen is flat  There is no distension  Palpations: Abdomen is soft  Tenderness: There is no abdominal tenderness  Musculoskeletal:         General: No tenderness  Cervical back: Neck supple  Right lower leg: No edema  Left lower leg: No edema  Skin:     General: Skin is warm and dry  Coloration: Skin is not jaundiced or pale  Neurological:      Mental Status: She is oriented to person, place, and time  Psychiatric:         Mood and Affect: Mood normal          Behavior: Behavior normal      Additional Data:     Labs:    Results from last 7 days   Lab Units 01/20/23 0431 01/20/23  0007 01/19/23  0429   WBC Thousand/uL 19 78*   < > 11 75*   HEMOGLOBIN g/dL 10 4*   < > 12 6   HEMATOCRIT % 33 3*   < > 38 9   PLATELETS Thousands/uL 292   < > 255   NEUTROS PCT %  --   --  79*   LYMPHS PCT %  --   --  12*   MONOS PCT %  --   --  8   EOS PCT %  --   --  0    < > = values in this interval not displayed  Results from last 7 days   Lab Units 01/20/23 0431 01/20/23  0007   SODIUM mmol/L 132* 136   POTASSIUM mmol/L 4 5 4 7   CHLORIDE mmol/L 99 105   CO2 mmol/L 29 29   BUN mg/dL 18 18   CREATININE mg/dL 0 65 0 57*   ANION GAP mmol/L 4 2*   CALCIUM mg/dL 7 6* 7 6*   ALBUMIN g/dL  --  3 4*   TOTAL BILIRUBIN mg/dL  --  0 46   ALK PHOS U/L  --  70   ALT U/L  --  16   AST U/L  --  18   GLUCOSE RANDOM mg/dL 299* 160*     Results from last 7 days   Lab Units 01/19/23  0429   INR  1 00     Results from last 7 days   Lab Units 01/19/23  2126   POC GLUCOSE mg/dl 155*         Results from last 7 days   Lab Units 01/20/23  0431 01/20/23  0300 01/20/23  0007   LACTIC ACID mmol/L  --  1 1 3 2*   PROCALCITONIN ng/ml 0 08  --   --            * I Have Reviewed All Lab Data Listed Above    * Additional Pertinent Lab Tests Reviewed:  All Labs Within Last 24 Hours Reviewed    Imaging:    Imaging Reports Reviewed Today Include: Chest x-ray  Imaging Personally Reviewed by Myself Includes: Chest x-ray    Recent Cultures (last 7 days):           Last 24 Hours Medication List:   Current Facility-Administered Medications   Medication Dose Route Frequency Provider Last Rate   • acetaminophen  650 mg Oral Q6H PRN Josphine NEHA Youngblood     • acetaminophen  975 mg Oral Q8H [address] Ekta Youngblood PA-C     • aspirin  81 mg Oral Daily Juanhipratibha Youngblood PA-C     • atorvastatin  20 mg Oral Daily With Wilma Morgan PA-C     • calcium carbonate  1,000 mg Oral Daily PRN Josphine NEHA Youngblood     • docusate sodium  100 mg Oral BID Nicholesphine NEHA Youngblood     • enoxaparin  40 mg Subcutaneous Daily Nicholesphipratibha Youngblood PA-C     • HYDROmorphone  0 2 mg Intravenous Q4H PRN Josphine NEHA Youngblood     • lactated ringers  1,000 mL Intravenous Once PRN Jojonas Youngblood PA-C Stopped (01/20/23 1140)    And   • lactated ringers  1,000 mL Intravenous Once PRN Josphine NEHA Youngblood     • lactated ringers  75 mL/hr Intravenous Continuous Ekta Youngblood PA-C 75 mL/hr (01/20/23 0845)   • levalbuterol  0 63 mg Nebulization TID Mikael Jimenez MD     • loratadine  10 mg Oral Daily Josphine NEHA Youngblood     • losartan  100 mg Oral Daily Nicholesphine NEHA Youngblood     • metoprolol tartrate  50 mg Oral BID Josphine NEHA Youngblood     • ondansetron  4 mg Intravenous Q6H PRN Josphine NEHA Youngblood     • oxyCODONE  10 mg Oral Q6H PRN Josphine NEHA Youngblood     • oxyCODONE  5 mg Oral Q6H PRN Josphine NEHA Youngblood     • pantoprazole  40 mg Oral Daily Josphine NEHA Youngblood     • senna  1 tablet Oral Daily Josphine NEHA Youngblood     • sodium chloride  1,000 mL Intravenous Once PRN Josphine NEHA Youngblood      And   • sodium chloride  1,000 mL Intravenous Once PRN Josphine Ford PA-C          Today, Patient Was Seen By: Gaby Aguilar MD    ** Please Note: Dictation voice to text software may have been used in the creation of this document   **

## 2023-01-20 NOTE — PROGRESS NOTES
Progress Note - Orthopedics   Lesley Garcia 67 y o  female MRN: [id number]  Unit/Bed#: [place] 2 -01      Subjective:    67 y  o female POD 1 left femoral IM nail insertion  Overnight, the patient had hypoxia on BiPAP and lethargy and needed to be transitioned to stepdown  No other new complaints this morning  Patient seems to be doing better  She does report pain in the hip  She has some numbness over the incision site which does not radiate down the leg      Labs:  0   Lab Value Date/Time    HCT 33 3 (L) 01/20/2023 0431    HCT 32 0 (L) 01/20/2023 0007    HCT 38 9 01/19/2023 0429    HGB 10 4 (L) 01/20/2023 0431    HGB 10 2 (L) 01/20/2023 0007    HGB 12 6 01/19/2023 0429    INR 1 00 01/19/2023 0429    WBC 19 78 (H) 01/20/2023 0431    WBC 17 06 (H) 01/20/2023 0007    WBC 11 75 (H) 01/19/2023 0429       Meds:    Current Facility-Administered Medications:   •  acetaminophen (TYLENOL) tablet 650 mg, 650 mg, Oral, Q6H PRN, Brea Galvin PA-C  •  acetaminophen (TYLENOL) tablet 975 mg, 975 mg, Oral, Q8H [address], Osei Boogie PA-C, 975 mg at 01/20/23 4101  •  aspirin (ECOTRIN LOW STRENGTH) EC tablet 81 mg, 81 mg, Oral, Daily, Brea Galvin PA-C, 81 mg at 01/20/23 9239  •  atorvastatin (LIPITOR) tablet 20 mg, 20 mg, Oral, Daily With Jaleesa Schwartz PA-C, 20 mg at 01/18/23 9464  •  calcium carbonate (TUMS) chewable tablet 1,000 mg, 1,000 mg, Oral, Daily PRN, Brea Galvin PA-C  •  docusate sodium (COLACE) capsule 100 mg, 100 mg, Oral, BID, Brea Galvin PA-C, 100 mg at 01/20/23 3033  •  enoxaparin (LOVENOX) subcutaneous injection 40 mg, 40 mg, Subcutaneous, Daily, Brea Galvin PA-C, 40 mg at 01/20/23 3456  •  HYDROmorphone HCl (DILAUDID) injection 0 2 mg, 0 2 mg, Intravenous, Q4H PRN, Brea Galvin PA-C, 0 2 mg at 01/19/23 1214  •  lactated ringers bolus 1,000 mL, 1,000 mL, Intravenous, Once PRN **AND** lactated ringers bolus 1,000 mL, 1,000 mL, Intravenous, Once PRN, Brea Galvin PA-C  •  lactated ringers infusion, 75 mL/hr, Intravenous, Continuous, Segundo Eagles, PA-C, Last Rate: 75 mL/hr at 01/20/23 0845, 75 mL/hr at 01/20/23 0845  •  levalbuterol (Lorita Speaks) inhalation solution 0 63 mg, 0 63 mg, Nebulization, TID, Juli Lundberg MD, 0 63 mg at 01/20/23 9967  •  loratadine (CLARITIN) tablet 10 mg, 10 mg, Oral, Daily, Segundo Eagles, PA-C, 10 mg at 01/20/23 5575  •  losartan (COZAAR) tablet 100 mg, 100 mg, Oral, Daily, Segundo Eagles, PA-C, 100 mg at 01/20/23 2530  •  metoprolol tartrate (LOPRESSOR) tablet 50 mg, 50 mg, Oral, BID, Segundo Eagles, PA-C, 50 mg at 01/20/23 1659  •  ondansetron (ZOFRAN) injection 4 mg, 4 mg, Intravenous, Q6H PRN, Segundo Eagles, PA-C  •  oxyCODONE (ROXICODONE) immediate release tablet 10 mg, 10 mg, Oral, Q6H PRN, Segundo Eagles, PA-C  •  oxyCODONE (ROXICODONE) IR tablet 5 mg, 5 mg, Oral, Q6H PRN, Segundo Eagles, PA-C  •  pantoprazole (PROTONIX) EC tablet 40 mg, 40 mg, Oral, Daily, Segundo Eagles, PA-C, 40 mg at 01/20/23 1793  •  senna (SENOKOT) tablet 8 6 mg, 1 tablet, Oral, Daily, Segundo Eagles, PA-C, 8 6 mg at 01/20/23 5656  •  sodium chloride 0 9 % bolus 1,000 mL, 1,000 mL, Intravenous, Once PRN **AND** sodium chloride 0 9 % bolus 1,000 mL, 1,000 mL, Intravenous, Once PRN, Segundo Eagles, PA-C    Blood Culture:   No results found for: BLOODCX    Wound Culture:   No results found for: WOUNDCULT    Ins and Outs:  I/O last 24 hours: In: 2950 [P O :450; I V :2500]  Out: 1025 [Urine:1025]          Physical:  Vitals:    01/20/23 0802   BP: 113/60   Pulse: 85   Resp: 19   Temp: 98 6 °F (37 °C)   SpO2: 98%     Musculoskeletal: left Lower Extremity  · Visible skin without erythema  There is some mild ecchymosis  · Dressings C/D/I   · TTP over the hip  · Sensation intact over the toes  · Motor intact to +FHL/EHL, +ankle dorsi/plantar flexion  · Digits warm and well perfused  · Capillary refill < 2 seconds    Assessment:    67 y  o female POD 1 left femoral IM nail insertion  Patient improved this morning      Plan:  · WBAT LLE  · Greater than 2 gram drop which qualifies for diagnosis of acute blood loss anemia, will monitor and administer IVF/prbc as indicated   · PT/OT  · Pain control  · DVT ppx with Lovenox  · Medical co-morbidities include chronic diastolic CHF and HTN which are being managed per medicine team  · Dispo: Ortho will follow    Drexel Apley, PA-C

## 2023-01-20 NOTE — CASE MANAGEMENT
Case Management Progress Note    Patient name Kylie Meza  Location [place] [address] 2 /[place] 2 [facility]* MRN [id number]  : 1950 Date 2023       LOS (days): 2  Geometric Mean LOS (GMLOS) (days): 3 50  Days to GMLOS:1 6        OBJECTIVE:        Current admission status: Inpatient  Preferred Pharmacy:   [facility][id number] - [address] [facility] [address]  [address] [place] [address]  Phone: [phone] Fax: [phone]    Primary Care Provider: No primary care provider on file  Primary Insurance: [facility]  Secondary Insurance:     PROGRESS NOTE: CM received call from Kashmir Castillo (B)[phone] ([phone] at [address] [place] [address] this writer of pt being a workman's comp injury case under claim# [id number] and  being Stephen Cook (Q)[phone]  Per Tia Pimentel will need to be faxed to her at the above number  Due to pt already undergoing surgery, it is wished for care to continue with our physicians  Discussed Rehab needs when medically stable with Kashmir Castillo stating preference is for Acute Rehab and if unable to be accepted would prefer the [facility] TCF- any would be covered under the claim  Transportation at this point would need to be BLS due to nonambulatory with max asst of 2 persons to transfer- per Kashmir Casitllo, IF transported by [facility] this would be approved otherwise would need 24hr notice in order to obtain/authorize other transport companies  To update Katina Monday on dc disposition  UR Dept made aware of the above information for workman's comp with the insurance verifiers to be updated by their department

## 2023-01-20 NOTE — ARC ADMISSION
Referral received for consideration of patient for inpatient acute rehab  Will review patient's case with [facility] physician and update as able

## 2023-01-20 NOTE — UTILIZATION REVIEW
NOTIFICATION OF INPATIENT ADMISSION   AUTHORIZATION REQUEST   SERVICING FACILITY:   [address] [place] [address], [facility], [address]   Tax ID: [id number]  NPI: [id number] ATTENDING PROVIDER:  Attending Name and NPI#: Padmini Bolanos [[id number]]  Address: [address] [place] [address] [facility]  Phone: [phone]     ADMISSION INFORMATION:  Place of Service: Inpatient [facility]  [address]  Place of Service Code: 21  Inpatient Admission Date/Time: 1/18/23  4:30 PM  Discharge Date/Time: No discharge date for patient encounter  Admitting Diagnosis Code/Description:  LBBB (left bundle branch block) [I44 7]  Hip pain [M25 559]  Elevated blood pressure reading [R03 0]  Left hip pain [M25 552]  Closed [facility] of left [facility], [facility] Nagi Restrepo     UTILIZATION REVIEW CONTACT:  Sharlene Ferguson Utilization   Network Utilization Review Department  Phone: [phone]  Fax: [phone]  Email: [email or web address]  Contact for approvals/pending authorizations, clinical reviews, and discharge  PHYSICIAN ADVISORY SERVICES:  Medical Necessity Denial & Ippu-eg-Sebp Review  Phone: [phone]  Fax: [phone]  Email: [email or web address]

## 2023-01-20 NOTE — OCCUPATIONAL THERAPY NOTE
Occupational Therapy Evaluation     Patient Name: Raina ESPINOSA Date: 1/20/2023  Problem List  Principal Problem:    Hypoxia  Active Problems:    Closed left hip fracture ([address] )    Essential hypertension    Encounter for preoperative assessment    Other hyperlipidemia    Chronic diastolic congestive heart failure (HCC)    Leukocytosis    Hypotension    Past Medical History  Past Medical History:   Diagnosis Date    Hypertension      Past Surgical History  Past Surgical History:   Procedure Laterality Date    HIP FRACTURE SURGERY      ND OPTX FEM SHFT FX W/INSJ IMED IMPLT W/WO SCREW Left 1/19/2023    Procedure: INSERTION NAIL IM FEMUR ANTEGRADE (TROCHANTERIC); Surgeon: Nataly Prieto MD;  Location: [facility] OR;  Service: Orthopedics    REDUCTION MAMMAPLASTY           01/20/23 0918   OT Last Visit   OT Visit Date 01/20/23   Note Type   Note type Evaluation   Additional Comments Pt presents supine in bed, agreeable to OT/PT co-eval   Pain Assessment   Pain Assessment Tool 0-10   Pain Score No Pain   Restrictions/Precautions   Weight Bearing Precautions Per Order Yes   RLE Weight Bearing Per Order WBAT   LLE Weight Bearing Per Order WBAT   Other Precautions Chair Alarm; Bed Alarm;Multiple lines; Fall Risk;Pain;O2  (level 2 step down, 5L, PIV)   Home Living   Type of [address] [place] [address] Two level;Bed/bath upstairs;Stairs to enter with rails   Bathroom Shower/Tub Tub/shower unit   Bathroom Toilet Raised   Bathroom Equipment Shower chair;Grab bars in shower;Grab bars around toilet   Home Equipment Walker;Cane   Prior Function   Level of [place] Independent with ADLs; Independent with functional mobility; Independent with IADLS   Lives With Family  (brother who is disabled)   Receives Help From Other (Comment)  (supportive ex spouse- currently watching her brother)   IADLs Independent with driving; Independent with meal prep; Independent with medication management   Falls in the last 6 months 1 to 4 Vocational Full time employment   Comments indep at baseline without an AD  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)   Lifestyle   Autonomy 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, 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, and is his caretaker  Works [employer] as  at [employer]  (I) with ADLs/IADLs, no AD for mobility  (+)   Reciprocal Relationships Brother   Intrinsic Gratification Watching TV   Subjective   Subjective "I'm apprehensive about getting up   ADL   Where Assessed Edge of bed   Eating Assistance 5  Supervision/Setup   Grooming Assistance 5  Supervision/Setup   UB Bathing Assistance 4  Minimal Assistance   LB Bathing Assistance 2  Maximal [address] 4  [address] 1  Total [address]  1  Total Assistance   Bed Mobility   Supine to Sit 2  Maximal assistance   Additional items Assist x 2;HOB elevated; Bedrails; Increased time required;Verbal cues;LE management; Other   Sit to Supine 2  Maximal assistance   Additional items Assist x 1;Assist x 2;Bedrails; Increased time required;Verbal cues;LE management; Other   Transfers   Sit to Stand 3  Moderate assistance   Additional items Assist x 2; Increased time required;Verbal cues; Other  (FWW)   Stand to Sit 3  Moderate assistance   Additional items Assist x 1;Assist x 2; Increased time required;Verbal cues; Other  (FWW)   Functional Mobility   Functional Mobility 3  Moderate assistance   Additional Comments Ax2, FWW, lateral steps to R   Balance   Static Sitting Fair -  (Avoiding pressure to LLE thus with frequent posterior/R leaning)   Dynamic Sitting Poor +   Static Standing Poor +   Dynamic Standing Poor   Ambulatory Poor   Activity Tolerance   Activity Tolerance Patient limited by pain; Patient limited by fatigue;Treatment limited secondary to medical complications (Comment)  (Low BPs, increased O2 need)   Medical Staff Made Aware Ortho PA; PT; RN   Nurse Made [id number] [address] RN RUE Assessment   RUE Assessment WFL  (AROM; difficult to assess MMT in sitting 2/2 pt gaurded sitting position)   LUE Assessment   LUE Assessment   (AROM; difficult to assess MMT in sitting 2/2 pt gaurded sitting position)   Hand Function   Gross Motor Coordination Functional   Fine Motor Coordination Functional   Sensation   Light Touch Partial deficits in the LLE   Proprioception   Proprioception No apparent deficits   Vision-Basic Assessment   Current Vision Wears glasses only for reading   Vision - Complex Assessment   Ocular Range of Motion Intact   Perception   Inattention/Neglect Appears intact   Cognition   Overall Cognitive Status WFL   Arousal/Participation Alert; Responsive; Cooperative   Attention Attends with cues to redirect   Orientation Level Oriented to person;Oriented to place;Oriented to time;Disoriented to situation  (was aware she fell at work)   Memory Decreased recall of precautions;Decreased recall of recent events   Following Commands Follows one step commands with increased time or repetition   Comments lethargic but arousal improved w activity  flat affect   Assessment   Limitation Decreased ADL status; Decreased UE strength;Decreased Safe judgement during ADL;Decreased endurance;Decreased self-care trans;Decreased high-level ADLs   Prognosis Fair   Assessment Patient is a 67y o  year old female seen for OT eval s/p admit to [facility] on 1/18/2023 s/p fall with closed L hip fx s/p L femur intramedullary fixation 1/19; pt with hypoxia and requiring supplemental O2 (5L) - RA at baseline  Patient with active OT orders, WBAT LLE, and activity orders for Activity as tolerated  Personal factors affecting pt at time of IE include: difficulty performing ADLs and IADLs, difficulty with functional mobility/transfers  Prior to admission, patient was (I) with ADLs/IADLs, 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, working FT and driving   Upon evaluation, patient’s functional status as follows: eating: SBA, grooming: SBA, UB bathing: MIN A , LB bathing: MAX A, UB dressing: MIN A , LB dressing: dependent, toileting: dependent; functional transfers: MOD A x2, bed mobility: MAX A x1-2, functional mobility: MOD Ax2 with FWW sidestepping, sitting/standing tolerance: ~1 min 30 sec with B UE support- due to the following deficits impacting occupational performance: decreased B UE strength, decreased static/dynamic sitting/standing balance, decreased activity tolerance, decreased safety awareness, and increased pain  Pt hypotensive t/o evaluation; RN made aware and at bedside at end of session  Patient would benefit from continued skilled OT therapy while in acute setting to address deficits as defined above and maximize (I) with ADLs and functional mobility  Occupational performance areas to address include: grooming, bathing, toileting, UB/LB dressing, functional mobility, household maintenance, and medication management  Based on the aforementioned OT evaluation, functional performance deficits, and assessments, pt has been identified as a high complexity evaluation    Co treatment with PT secondary to complex medical condition of pt, possible A of 2 required to achieve and maintain transitional movements, requiring the need of skilled therapeutic intervention of 2 therapists to achieve delivery of services  Goals   Patient Goals to reduce pain   LTG Time Frame 10-14   Plan   Treatment Interventions ADL retraining;Functional transfer training;UE strengthening/ROM; Endurance training;Patient/family training;Equipment evaluation/education; Neuromuscular reeducation; Compensatory technique education;Continued evaluation; Energy conservation; Activityengagement   Goal Expiration Date 02/03/23   OT Treatment Day 0   OT Frequency 3-5x/wk   Recommendation   OT Discharge Recommendation Post acute rehabilitation services   Additional Comments  The patient's raw score on the AM-PAC Daily Activity inpatient short form is 14, standardized score is 33 39, less than 39 4  Patients at this level are likely to benefit from discharge to post-acute rehabilitation services  Please refer to the recommendation of the Occupational Therapist for safe discharge planning  AM-PAC Daily Activity Inpatient   Lower Body Dressing 1   Bathing 2   Toileting 1   Upper Body Dressing 3   Grooming 3   Eating 4   Daily Activity Raw Score 14   Daily Activity Standardized Score (Calc for Raw Score >=11) 33 39   AM-PAC Applied Cognition Inpatient   Following a Speech/Presentation 3   Understanding Ordinary Conversation 3   Taking Medications 2   Remembering Where Things Are Placed or Put Away 3   Remembering List of 4-5 Errands 3   Taking Care of Complicated Tasks 2   Applied Cognition Raw Score 16   Applied Cognition Standardized Score 35 03     Occupational Therapy goals: In 7-14 days:     1- Patient will verbalize and demonstrate use of energy conservation/deep breathing technique and work simplification skills during functional activity with no verbal cues  2- Patient will verbalize and demonstrate good body mechanics and joint protection techniques during ADLs/IADLs with no verbal cues     3- Pt will complete bed mobility at a Mod I level w/ G balance/safety demonstrated to decrease caregiver assistance required     4- Patient will increase OOB/ sitting tolerance to 2-4 hours per day for increased participation in self care and leisure tasks with no s/s of exertion  5-Patient will increase standing tolerance time to 5 minutes with unilateral UE support to complete sink level ADLs@ mod I level      5- Patient will increase sitting tolerance at edge of bed to 20 minutes to complete UB ADLs @ set up assist level       6- Pt will improve functional transfers to Mod I on/off all surfaces using DME as needed w/ G balance/safety     7- Patient will complete UB ADLs with Kermit utilizing appropriate DME/AE PRN     8- Patient will complete LB ADLs with Kermit utilizing appropriate DME/AE PRN     9- Patient will complete toileting hygiene with Kermit with G hygiene/thoroughness utilizing appropriate DME/AE PRN     10- Pt will improve functional mobility during ADL/IADL/leisure tasks to Mod I using DME as needed w/ G balance/safety      11- Pt will be attentive 100% of the time during ongoing cognitive assessment w/ G participation to assist w/ safe d/c planning/recommendations     12- Pt will participate in simulated IADL management task to increase independence to Mod I w/ G safety and endurance     13- Pt will increase BUE strength by 1MM grade via AROM/AAROM/PROM exercises to increase independence in ADLs and transfers       Melisa Murray OTR/L

## 2023-01-21 PROBLEM — Z01.818 ENCOUNTER FOR PREOPERATIVE ASSESSMENT: Status: RESOLVED | Noted: 2023-01-18 | Resolved: 2023-01-21

## 2023-01-21 PROBLEM — D62 ACUTE POSTOPERATIVE ANEMIA DUE TO EXPECTED BLOOD LOSS: Status: ACTIVE | Noted: 2023-01-21

## 2023-01-21 PROBLEM — I82.452 ACUTE DEEP VEIN THROMBOSIS (DVT) OF LEFT PERONEAL VEIN (HCC): Status: ACTIVE | Noted: 2023-01-21

## 2023-01-21 PROBLEM — I26.94 MULTIPLE SUBSEGMENTAL PULMONARY EMBOLI WITHOUT ACUTE COR PULMONALE (HCC): Status: ACTIVE | Noted: 2023-01-21

## 2023-01-21 LAB
ANION GAP SERPL CALCULATED.3IONS-SCNC: 6 MMOL/L (ref 4–13)
BUN SERPL-MCNC: 18 MG/DL (ref 5–25)
CALCIUM SERPL-MCNC: 8.6 MG/DL (ref 8.4–10.2)
CHLORIDE SERPL-SCNC: 101 MMOL/L (ref 96–108)
CO2 SERPL-SCNC: 31 MMOL/L (ref 21–32)
CREAT SERPL-MCNC: 0.54 MG/DL (ref 0.6–1.3)
ERYTHROCYTE [DISTWIDTH] IN BLOOD BY AUTOMATED COUNT: 13.7 % (ref 11.6–15.1)
GFR SERPL CREATININE-BSD FRML MDRD: 94 ML/MIN/1.73SQ M
GLUCOSE SERPL-MCNC: 97 MG/DL (ref 65–140)
HCT VFR BLD AUTO: 26.2 % (ref 34.8–46.1)
HGB BLD-MCNC: 8.8 G/DL (ref 11.5–15.4)
MCH RBC QN AUTO: 30.3 PG (ref 26.8–34.3)
MCHC RBC AUTO-ENTMCNC: 33.6 G/DL (ref 31.4–37.4)
MCV RBC AUTO: 90 FL (ref 82–98)
PLATELET # BLD AUTO: 160 THOUSANDS/UL (ref 149–390)
PMV BLD AUTO: 10.1 FL (ref 8.9–12.7)
POTASSIUM SERPL-SCNC: 3.7 MMOL/L (ref 3.5–5.3)
RBC # BLD AUTO: 2.9 MILLION/UL (ref 3.81–5.12)
SODIUM SERPL-SCNC: 138 MMOL/L (ref 135–147)
WBC # BLD AUTO: 8.7 THOUSAND/UL (ref 4.31–10.16)

## 2023-01-21 RX ORDER — LOSARTAN POTASSIUM 50 MG/1
50 TABLET ORAL DAILY
Status: DISCONTINUED | OUTPATIENT
Start: 2023-01-22 | End: 2023-01-23 | Stop reason: HOSPADM

## 2023-01-21 RX ADMIN — ACETAMINOPHEN 975 MG: 325 TABLET ORAL at 20:39

## 2023-01-21 RX ADMIN — ENOXAPARIN SODIUM 100 MG: 100 INJECTION SUBCUTANEOUS at 20:39

## 2023-01-21 RX ADMIN — ENOXAPARIN SODIUM 100 MG: 100 INJECTION SUBCUTANEOUS at 08:10

## 2023-01-21 RX ADMIN — ACETAMINOPHEN 975 MG: 325 TABLET ORAL at 15:39

## 2023-01-21 RX ADMIN — DOCUSATE SODIUM 100 MG: 100 CAPSULE, LIQUID FILLED ORAL at 17:01

## 2023-01-21 RX ADMIN — ACETAMINOPHEN 975 MG: 325 TABLET ORAL at 05:22

## 2023-01-21 RX ADMIN — SENNOSIDES 8.6 MG: 8.6 TABLET ORAL at 08:10

## 2023-01-21 RX ADMIN — ASPIRIN 81 MG: 81 TABLET, COATED ORAL at 08:10

## 2023-01-21 RX ADMIN — PANTOPRAZOLE SODIUM 40 MG: 40 TABLET, DELAYED RELEASE ORAL at 08:10

## 2023-01-21 RX ADMIN — CALCIUM CARBONATE (ANTACID) CHEW TAB 500 MG 1000 MG: 500 CHEW TAB at 05:22

## 2023-01-21 RX ADMIN — DOCUSATE SODIUM 100 MG: 100 CAPSULE, LIQUID FILLED ORAL at 08:10

## 2023-01-21 RX ADMIN — METOPROLOL TARTRATE 50 MG: 50 TABLET, FILM COATED ORAL at 17:01

## 2023-01-21 RX ADMIN — METOPROLOL TARTRATE 50 MG: 50 TABLET, FILM COATED ORAL at 08:10

## 2023-01-21 RX ADMIN — ATORVASTATIN CALCIUM 20 MG: 20 TABLET, FILM COATED ORAL at 17:01

## 2023-01-21 RX ADMIN — LORATADINE 10 MG: 10 TABLET ORAL at 08:10

## 2023-01-21 RX ADMIN — SODIUM CHLORIDE, SODIUM LACTATE, POTASSIUM CHLORIDE, AND CALCIUM CHLORIDE 75 ML/HR: .6; .31; .03; .02 INJECTION, SOLUTION INTRAVENOUS at 03:01

## 2023-01-21 NOTE — PROGRESS NOTES
[facility]  Progress Note - Ye Meeter 1950, 67 y o  female MRN: [id number]  Unit/Bed#: [place] [address] -01 Encounter: [id number]  Primary Care Provider: No primary care provider on file  Date and time admitted to hospital: 1/18/2023  2:15 PM    * Closed left hip fracture (HCC)  Assessment & Plan  · Postoperative day 2 status post left femur intramedullary fixation   · Continue cautious pain control   · PT OT as tolerated  · Anticipate rehab placement  · OP orthopedic follow up    Acute deep vein thrombosis (DVT) of left peroneal vein (HCC)  Assessment & Plan  · Provoked secondary to recent hip fracture and orthopedic surgery   · Continue therapeutic Lovenox  · Possibly switch to Eliquis if covered  · Anticipate 3 months of treatment    Multiple subsegmental pulmonary emboli without acute cor pulmonale (HCC)  Assessment & Plan  · Provoked secondary to recent hip fracture and orthopedic surgery  · Continue therapeutic Lovenox  · Prescription sent to pharmacy to check for Eliquis coverage  · If covered, switch to Eliquis  Hypotension  Assessment & Plan  · Multifactorial due to postoperative state, ongoing use of metoprolol/losartan, pain medication  · Resolved  · Continue metoprolol  · Hold losartan today  · If blood pressure remains elevated, restart losartan at previous dose 50 mg daily    Leukocytosis  Assessment & Plan  Reactive secondary to fracture, PE, DVT and postoperative state  Resolved    Hypoxia  Assessment & Plan  · Multifactorial due to postoperative state, atelectasis,  BMI 37, hypoventilation  · CT with small PE, not the main cause for her hypoxia  · She is now on room air  · Hypoxia resolved    Chronic diastolic congestive heart failure [facility])  Assessment & Plan  Recent 2D echo done at [facility] 12/2022 revealed grade 1 diastolic dysfunction  Stable volume status  CT chest without edema or effusion even after IV fluids  Continue metoprolol     if blood pressure remained stable, restart losartan tomorrow at previous dose 50 mg daily    Other hyperlipidemia  Assessment & Plan  Continue  Lipitor 20 mg daily    Acute postoperative anemia due to expected blood loss  Assessment & Plan  · Monitor  · Asymptomatic  · Transfuse as needed  Essential hypertension  Assessment & Plan   hypotension resolved  Continue metoprolol  If bood pressure remains elevated tomorrow, restart losartan at 50 mg daily      VTE Pharmacologic Prophylaxis:   Pharmacologic: Enoxaparin (Lovenox)  Mechanical VTE Prophylaxis in Place: Yes    Patient Centered Rounds: I have performed bedside rounds with nursing staff today  Discussions with Specialists or Other Care Team Provider:  case management    Education and Discussions with Family / Patient: Spoke with brother Jordon Ruth and gave update    Time Spent for Care: 25 minutes  More than 50% of total time spent on counseling and coordination of care as described above  Current Length of Stay: 3 day(s)    Current Patient Status: Inpatient   Certification Statement: The patient will continue to require additional inpatient hospital stay due to Postoperative state    Discharge Plan: Short-term rehab  Per discussion with her brother, they prefer [facility] rehab place    Code Status: Level 1 - Full Code      Subjective:   Seen and examined  No shortness of breath  No chest pain  No bleeding  Objective:     Vitals:   Temp (24hrs), Av 9 °F (36 6 °C), Min:97 4 °F (36 3 °C), Max:98 5 °F (36 9 °C)    Temp:  [97 4 °F (36 3 °C)-98 5 °F (36 9 °C)] 97 4 °F (36 3 °C)  HR:  [42-85] 65  Resp:  [16-20] 16  BP: ()/(54-98) 152/76  SpO2:  [83 %-100 %] 91 %  Body mass index is 37 35 kg/m²  Input and Output Summary (last 24 hours):        Intake/Output Summary (Last 24 hours) at 2023 1347  Last data filed at 2023 1101  Gross per 24 hour   Intake 1910 ml   Output 4200 ml   Net -2290 ml       Physical Exam:     Physical Exam  Constitutional:       Appearance: She is obese  HENT:      Head: Normocephalic and atraumatic  Nose: No congestion or rhinorrhea  Eyes:      General: No scleral icterus  Cardiovascular:      Rate and Rhythm: Normal rate and regular rhythm  Pulmonary:      Effort: Pulmonary effort is normal  No respiratory distress  Breath sounds: No wheezing or rales  Abdominal:      General: Abdomen is flat  There is no distension  Palpations: Abdomen is soft  Tenderness: There is no abdominal tenderness  Musculoskeletal:         General: No tenderness  Cervical back: Neck supple  Right lower leg: No edema  Left lower leg: No edema  Skin:     General: Skin is warm and dry  Coloration: Skin is not jaundiced or pale  Neurological:      Mental Status: She is alert and oriented to person, place, and time  Psychiatric:         Mood and Affect: Mood normal          Behavior: Behavior normal      Additional Data:     Labs:    Results from last 7 days   Lab Units 01/21/23  0543 01/20/23  0007 01/19/23  0429   WBC Thousand/uL 8 70   < > 11 75*   HEMOGLOBIN g/dL 8 8*   < > 12 6   HEMATOCRIT % 26 2*   < > 38 9   PLATELETS Thousands/uL 160   < > 255   NEUTROS PCT %  --   --  79*   LYMPHS PCT %  --   --  12*   MONOS PCT %  --   --  8   EOS PCT %  --   --  0    < > = values in this interval not displayed  Results from last 7 days   Lab Units 01/21/23  0543 01/20/23  0431 01/20/23  0007   SODIUM mmol/L 138   < > 136   POTASSIUM mmol/L 3 7   < > 4 7   CHLORIDE mmol/L 101   < > 105   CO2 mmol/L 31   < > 29   BUN mg/dL 18   < > 18   CREATININE mg/dL 0 54*   < > 0 57*   ANION GAP mmol/L 6   < > 2*   CALCIUM mg/dL 8 6   < > 7 6*   ALBUMIN g/dL  --   --  3 4*   TOTAL BILIRUBIN mg/dL  --   --  0 46   ALK PHOS U/L  --   --  70   ALT U/L  --   --  16   AST U/L  --   --  18   GLUCOSE RANDOM mg/dL 97   < > 160*    < > = values in this interval not displayed       Results from last 7 days Lab Units 01/19/23  0429   INR  1 00     Results from last 7 days   Lab Units 01/19/23  2126   POC GLUCOSE mg/dl 155*         Results from last 7 days   Lab Units 01/20/23  0431 01/20/23  0300 01/20/23  0007   LACTIC ACID mmol/L  --  1 1 3 2*   PROCALCITONIN ng/ml 0 08  --   --            * I Have Reviewed All Lab Data Listed Above  * Additional Pertinent Lab Tests Reviewed:  All Labs Within Last 24 Hours Reviewed    Imaging:    Imaging Reports Reviewed Today Include:  no new imaging    Recent Cultures (last 7 days):           Last 24 Hours Medication List:   Current Facility-Administered Medications   Medication Dose Route Frequency Provider Last Rate   • acetaminophen  650 mg Oral Q6H PRN Eura Pali, PA-C     • acetaminophen  975 mg Oral Q8H [address] Eura Pali, PA-C     • aspirin  81 mg Oral Daily Eura Pali, PA-C     • atorvastatin  20 mg Oral Daily With Nikkie Bermudez PA-C     • calcium carbonate  1,000 mg Oral Daily PRN Eura Pali, PA-C     • docusate sodium  100 mg Oral BID Eura Pali, PA-C     • enoxaparin  1 mg/kg Subcutaneous Q12H [facility] Mitzy Kumar MD     • HYDROmorphone  0 2 mg Intravenous Q4H PRN Eura Pali, PA-C     • levalbuterol  0 63 mg Nebulization Q4H PRN Rickey Rodriguez MD     • loratadine  10 mg Oral Daily Eura Maximilian, PA-C     • [START ON 1/22/2023] losartan  50 mg Oral Daily Rickey Rodriguez MD     • metoprolol tartrate  50 mg Oral BID Eura Pali, PA-C     • ondansetron  4 mg Intravenous Q6H PRN Eura Pali, PA-C     • oxyCODONE  10 mg Oral Q6H PRN Eura Pali, PA-C     • oxyCODONE  5 mg Oral Q6H PRN Eura Pali, PA-C     • pantoprazole  40 mg Oral Daily Eura Pali, PA-C     • senna  1 tablet Oral Daily Eura Pali, PA-C          Today, Patient Was Seen By: Rickey Rodriguez MD    ** Please Note: Dictation voice to text software may have been used in the creation of this document   **

## 2023-01-21 NOTE — PROGRESS NOTES
Progress Note - Pulmonary   Juan Carlos Martinez 67 y o  female MRN: [id number]  Unit/Bed#: [place] [address] -01 Encounter: [id number]      Assessment:  · Acute hypoxemic respiratory failure  · Acute small subsegmental pulmonary embolism and left small DVT  · Chronic diastolic congestive heart failure  · Status post hip surgery postop day #2  Plan:  · Wean off oxygen  Maintain O2 saturation above 88%  · Encourage incentive spirometry  · P o  diuretics  · Transition to p o  anticoagulation  She needs total of 3 months anticoagulation  · Increase activities as tolerated  Discussed with Dr Oly Messer    Subjective: The patient is feeling better  No overnight events  Denies pain  No cough  She diuresed well with the Lasix yesterday  I took her off oxygen and her O2 saturation remained at 95%  Vitals: Blood pressure 152/76, pulse 65, temperature (!) 97 4 °F (36 3 °C), resp  rate 16, height 5' 4" (1 626 m), weight 98 7 kg (217 lb 9 5 oz), SpO2 91 % , Body mass index is 37 35 kg/m²  Intake/Output Summary (Last 24 hours) at 1/21/2023 1243  Last data filed at 1/21/2023 1101  Gross per 24 hour   Intake 1910 ml   Output 4200 ml   Net -2290 ml       Physical Exam  Gen: Awake, alert, oriented x 3, no acute distress  HEENT: Mucous membranes moist, no oral lesions, no thrush  NECK: No accessory muscle use, JVP not elevated  Cardiac: Regular, single S1, single S2, no murmurs, no rubs, no gallops  Lungs: Decreased breath sounds  No wheezing or rhonchi    Abdomen: normoactive bowel sounds, soft nontender, nondistended, no rebound or rigidity, no guarding  Extremities: no cyanosis, no clubbing, no edema    Labs:   CBC:   Lab Results   Component Value Date    WBC 8 70 01/21/2023    HGB 8 8 (L) 01/21/2023    HCT 26 2 (L) 01/21/2023    MCV 90 01/21/2023     01/21/2023    MCH 30 3 01/21/2023    MCHC 33 6 01/21/2023    RDW 13 7 01/21/2023    MPV 10 1 01/21/2023   , CMP:   Lab Results   Component Value Date    SODIUM 138 01/21/2023    K 3 7 01/21/2023     01/21/2023    CO2 31 01/21/2023    BUN 18 01/21/2023    CREATININE 0 54 (L) 01/21/2023    CALCIUM 8 6 01/21/2023    EGFR 94 01/21/2023           Tianna Espinosa MD

## 2023-01-21 NOTE — PROGRESS NOTES
Progress Note - Orthopedics   Andrés Jones 67 y o  female MRN: [id number]  Unit/Bed#: [place] 2 -01      Subjective:    67 y  o female POD 2 s/p left long TFNA for subtrochanteric femur fracture  Diagnosed with PE yesterday and lovenox increased to 100 mg q 12 hours  Patient is feeling mild SOB this AM  Pain is well controlled in LLE  Denies fevers or chills    Labs:  0   Lab Value Date/Time    HCT 26 2 (L) 01/21/2023 0543    HCT 33 3 (L) 01/20/2023 0431    HCT 32 0 (L) 01/20/2023 0007    HGB 8 8 (L) 01/21/2023 0543    HGB 10 4 (L) 01/20/2023 0431    HGB 10 2 (L) 01/20/2023 0007    INR 1 00 01/19/2023 0429    WBC 8 70 01/21/2023 0543    WBC 19 78 (H) 01/20/2023 0431    WBC 17 06 (H) 01/20/2023 0007       Meds:    Current Facility-Administered Medications:   •  acetaminophen (TYLENOL) tablet 650 mg, 650 mg, Oral, Q6H PRN, Nica Cooper PA-C, 650 mg at 01/20/23 5049  •  acetaminophen (TYLENOL) tablet 975 mg, 975 mg, Oral, Q8H [address], Osei Boogie PA-C, 975 mg at 01/21/23 0522  •  aspirin (ECOTRIN LOW STRENGTH) EC tablet 81 mg, 81 mg, Oral, Daily, Nica Cooper PA-C, 81 mg at 01/21/23 7911  •  atorvastatin (LIPITOR) tablet 20 mg, 20 mg, Oral, Daily With Isma Carey PA-C, 20 mg at 01/20/23 1729  •  calcium carbonate (TUMS) chewable tablet 1,000 mg, 1,000 mg, Oral, Daily PRN, Nica Cooper PA-C, 1,000 mg at 01/21/23 0522  •  docusate sodium (COLACE) capsule 100 mg, 100 mg, Oral, BID, Nica Cooper PA-C, 100 mg at 01/21/23 0810  •  enoxaparin (LOVENOX) subcutaneous injection 100 mg, 1 mg/kg, Subcutaneous, Q12H [address], Stefani Jennings MD, 100 mg at 01/21/23 6222  •  HYDROmorphone HCl (DILAUDID) injection 0 2 mg, 0 2 mg, Intravenous, Q4H PRN, Nica Cooper PA-C, 0 2 mg at 01/19/23 1214  •  levalbuterol (XOPENEX) inhalation solution 0 63 mg, 0 63 mg, Nebulization, Q4H PRN, Stefani Jennings MD  •  loratadine (CLARITIN) tablet 10 mg, 10 mg, Oral, Daily, Nica Cooper, NEHA, 10 mg at 01/21/23 0810  •  metoprolol tartrate (LOPRESSOR) tablet 50 mg, 50 mg, Oral, BID, Carley Lockett PA-C, 50 mg at 01/21/23 0810  •  ondansetron (ZOFRAN) injection 4 mg, 4 mg, Intravenous, Q6H PRN, Carley Lockett PA-C  •  oxyCODONE (ROXICODONE) immediate release tablet 10 mg, 10 mg, Oral, Q6H PRN, Carley Lockett PA-C  •  oxyCODONE (ROXICODONE) IR tablet 5 mg, 5 mg, Oral, Q6H PRN, Carley Lockett PA-C  •  pantoprazole (PROTONIX) EC tablet 40 mg, 40 mg, Oral, Daily, Carley Lockett PA-C, 40 mg at 01/21/23 7621  •  senna (SENOKOT) tablet 8 6 mg, 1 tablet, Oral, Daily, Carley Lockett PA-C, 8 6 mg at 01/21/23 0810    Blood Culture:   No results found for: BLOODCX    Wound Culture:   No results found for: WOUNDCULT    Ins and Outs:  I/O last 24 hours: In: 4192 5 [P O :1410; I V :2782 5]  Out: 4200 [Urine:4200]          Physical:  Vitals:    01/21/23 0759   BP: (!) 182/98   Pulse: 85   Resp: 20   Temp: 97 6 °F (36 4 °C)   SpO2: 94%     Musculoskeletal: left Lower Extremity  · Visible skin without erythema  There is some mild ecchymosis  · Dressings C/D/I    · TTP over the hip  · Sensation intact over the toes  · Motor intact to +FHL/EHL, +ankle dorsi/plantar flexion  · Digits warm and well perfused  · Capillary refill < 2 seconds    Assessment:    67 y  o female POD 2 s/p left long TFNA for subtrochanteric femur fracture, acute PE    Plan:  · WBAT LLE  · Greater than 2 gram drop which qualifies for diagnosis of acute blood loss anemia, will monitor and administer IVF/prbc as indicated   Hbg 8 8 this AM  Continue to monitors vitals and H/H   · PT/OT  · Pain control  · DVT ppx with Lovenox  · Dispo: Ortho will follow    Theo Coley PA-C

## 2023-01-21 NOTE — ASSESSMENT & PLAN NOTE
· Provoked secondary to recent hip fracture and orthopedic surgery  · Continue therapeutic Lovenox  · Prescription sent to pharmacy to check for Eliquis coverage  · If covered, switch to Eliquis

## 2023-01-21 NOTE — PLAN OF CARE
Problem: Potential for Falls  Goal: Patient will remain free of falls  Description: INTERVENTIONS:  - Educate patient/family on patient safety including physical limitations  - Instruct patient to call for assistance with activity   - Consult OT/PT to assist with strengthening/mobility   - Keep Call bell within reach  - Keep bed low and locked with side rails adjusted as appropriate  - Keep care items and personal belongings within reach  - Initiate and maintain comfort rounds  - Make Fall Risk Sign visible to staff  - Offer Toileting every 2 Hours, in advance of need  - Initiate/Maintain bed alarm  - Obtain necessary fall risk management equipment: socks  - Apply yellow socks and bracelet for high fall risk patients  - Consider moving patient to room near nurses station  Outcome: Progressing     Problem: MOBILITY - ADULT  Goal: Maintain or return to baseline ADL function  Description: INTERVENTIONS:  -  Assess patient's ability to carry out ADLs; assess patient's baseline for ADL function and identify physical deficits which impact ability to perform ADLs (bathing, care of mouth/teeth, toileting, grooming, dressing, etc )  - Assess/evaluate cause of self-care deficits   - Assess range of motion  - Assess patient's mobility; develop plan if impaired  - Assess patient's need for assistive devices and provide as appropriate  - Encourage maximum independence but intervene and supervise when necessary  - Involve family in performance of ADLs  - Assess for home care needs following discharge   - Consider OT consult to assist with ADL evaluation and planning for discharge  - Provide patient education as appropriate  Outcome: Progressing  Goal: Maintains/Returns to pre admission functional level  Description: INTERVENTIONS:  - Perform BMAT or MOVE assessment daily    - Set and communicate daily mobility goal to care team and patient/family/caregiver     - Collaborate with rehabilitation services on mobility goals if consulted  - Perform Range of Motion 3 times a day  - Reposition patient every 2 hours    - Dangle patient 0 times a day  - Stand patient 0 times a day  - Ambulate patient 0 times a day  - Out of bed to chair 0 times a day   - Out of bed for meals 0 times a day  - Out of bed for toileting  - Record patient progress and toleration of activity level   Outcome: Progressing     Problem: Prexisting or High Potential for Compromised Skin Integrity  Goal: Skin integrity is maintained or improved  Description: INTERVENTIONS:  - Identify patients at risk for skin breakdown  - Assess and monitor skin integrity  - Assess and monitor nutrition and hydration status  - Monitor labs   - Assess for incontinence   - Turn and reposition patient  - Assist with mobility/ambulation  - Relieve pressure over bony prominences  - Avoid friction and shearing  - Provide appropriate hygiene as needed including keeping skin clean and dry  - Evaluate need for skin moisturizer/barrier cream  - Collaborate with interdisciplinary team   - Patient/family teaching  - Consider wound care consult   Outcome: Progressing

## 2023-01-21 NOTE — ASSESSMENT & PLAN NOTE
· Multifactorial due to postoperative state, ongoing use of metoprolol/losartan, pain medication  · Resolved  · Continue metoprolol  · Hold losartan today  · If blood pressure remains elevated, restart losartan at previous dose 50 mg daily

## 2023-01-21 NOTE — ASSESSMENT & PLAN NOTE
· Postoperative day 2 status post left femur intramedullary fixation   · Continue cautious pain control   · PT OT as tolerated  · Anticipate rehab placement  · OP orthopedic follow up

## 2023-01-21 NOTE — ASSESSMENT & PLAN NOTE
· Provoked secondary to recent hip fracture and orthopedic surgery   · Continue therapeutic Lovenox  · Possibly switch to Eliquis if covered    · Anticipate 3 months of treatment

## 2023-01-21 NOTE — ASSESSMENT & PLAN NOTE
Recent 2D echo done at [facility] 12/2022 revealed grade 1 diastolic dysfunction  Stable volume status  CT chest without edema or effusion even after IV fluids  Continue metoprolol     if blood pressure remained stable, restart losartan tomorrow at previous dose 50 mg daily

## 2023-01-21 NOTE — ASSESSMENT & PLAN NOTE
· Multifactorial due to postoperative state, atelectasis,  BMI 37, hypoventilation  · CT with small PE, not the main cause for her hypoxia  · She is now on room air  · Hypoxia resolved

## 2023-01-21 NOTE — SPEECH THERAPY NOTE
Speech Language/Pathology  Speech-Language Pathology Bedside Swallow Evaluation        Patient Name: Mary Simms    BATKQ'U Date: 1/21/2023     Problem List  Principal Problem:    Hypoxia  Active Problems:    Closed left hip fracture ([address] )    Essential hypertension    Encounter for preoperative assessment    Other hyperlipidemia    Chronic diastolic congestive heart failure (HCC)    Leukocytosis    Hypotension         Past Medical History  Past Medical History:   Diagnosis Date   • Hypertension        Past Surgical History  Past Surgical History:   Procedure Laterality Date   • HIP FRACTURE SURGERY     • ID OPTX FEM SHFT FX W/INSJ IMED IMPLT W/WO SCREW Left 1/19/2023    Procedure: INSERTION NAIL IM FEMUR ANTEGRADE (TROCHANTERIC); Surgeon: Eleuterio Flores MD;  Location: [facility];  Service: Orthopedics   • REDUCTION MAMMAPLASTY         Summary    Assessment was somewhat limited, as pt was just finishing up breakfast, but was willing to eat a few bites/sips  With limited assessment, pt presents with oropharyngeal swallows that appear WNL  There were no overt s/s of aspiration  Risk of aspiration appears low  Pt denies any difficulty swallowing today, but reports some trouble yesterday due to "feeling very dry"  Family does report some coughing while eating and also after, but this was not observed, and pt denies  Given respiratory status and family reports of coughing while eating, pt would benefit from dysphagia tx for at least one session, to observe and assess with a larger amount and variety of foods, and determine need for VBS, especially if respiratory status does not improve        Recommendations:   Diet: regular diet and thin liquids   Meds: whole with liquid   Frequent Oral care: 4x/day  Independent for feeding  Aspiration precautions and compensatory swallowing strategies: upright posture  Other Recommendations/ considerations: ST to see for dysphagia tx, to assess with a larger amount and variety of food, and to determine need for VBS  Current Medical Status  Copied from admission/physician notes:  Shaan Mckeon is a 67 y o  female with a PMH of hypertension, hyperlipidemia, grade 1 diastolic dysfunction and prior history of right hip fracture approximately 10 years ago who presents with left hip fracture status post mechanical fall  The patient states that 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[facility] where she slipped on the wet floor and fell onto her left side  She denies any prodromal symptoms such as chest pain, palpitations or lightheadedness  The patient reports pain when turning in bed or moving her left leg  She became hypoxic when receiving fentanyl in the emergency department  Prior to her fall she reports feeling well and being at her baseline state of health  She denies exertional symptoms including exertional shortness of breath or chest pain  She is able to tolerate moderate physical activity and 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  She denies recent illnesses  Denies GI or urinary symptoms  Remainder of 12 point review of systems is otherwise negative  Hypoxia  Assessment & Plan  • Patient developed hypoxia following dose of fentanyl for pain management requiring 2L O2  • Continue with narcotic medications with caution, nasal cannula to keep oxygen saturations above 90%       Order received and chart reviewed  Discussed with RN  Pt took meds without difficulty this morning  Family reportedly told physician that pt sometimes coughs while eating, and after eating  Pt became hypoxic after receiving fentanyl in the ER  CXR shows increasing density, likely atelectasis  Pt reports that she had difficulty swallowing yesterday because she had been so dry, but that today she denies difficulty         Past medical history:   Please see H&P for details    Special Studies:  CXR-  Increased bibasilar density, likely atelectasis  CT chest-  Few subsegmental pulmonary emboli in the posterior lower lobes  Measured RV/LV ratio is within normal limits at less than 0 9  Social/Education/Vocational Hx:  Pt lives with family    Swallow Information   Current Risks for Dysphagia & Aspiration: Pt denies current dysphagia, but family reports coughing while eating  and after eating  Current Symptoms/Concerns: change in respiratory status and family reports coughing with PO  Current Diet: regular diet and thin liquids   Baseline Diet: regular diet and thin liquids    Baseline Assessment   Behavior/Cognition: alert  Speech/Language Status: able to participate in conversation and able to follow commands  Patient Positioning: upright in bed     Swallow Mechanism Exam   Facial: symmetrical  Labial: WFL  Lingual: WFL  Velum: symmetrical  Mandible: adequate ROM  Dentition: adequate  Vocal quality:clear/adequate   Volitional Cough: strong/productive   Respiratory: NC, sats remained in mid to upper 90s      Consistencies Assessed and Performance   Consistencies Administered: thin liquids, soft solids and hard solids    Oral Stage: Adequate bolus retrieval and draw from straw, prompt mastication, bolus formation and transfer, no pocketing or residue, good lip seal      Pharyngeal Stage: Hyolaryngeal elevation observed and palpated, swallows appear prompt, with no overt s/s of aspiration  Esophageal Concerns: none reported      Results Reviewed with: patient and RN   Dysphagia Goals: 1  Pt will tolerate regular diet and thin liquids with prompt oropharyngeal swallows and no overt s/s of aspiration  2  Pt will tolerate LRD without s/s of aspiration     Discharge recommendation: likely no follow up needed for swallowing    Speech Therapy Prognosis   Prognosis: good    Prognosis Considerations: age, medical status, cognitive status and therapeutic potential

## 2023-01-21 NOTE — ASSESSMENT & PLAN NOTE
hypotension resolved    Continue metoprolol  If bood pressure remains elevated tomorrow, restart losartan at 50 mg daily

## 2023-01-22 PROBLEM — I95.9 HYPOTENSION: Status: RESOLVED | Noted: 2023-01-20 | Resolved: 2023-01-22

## 2023-01-22 PROBLEM — D72.829 LEUKOCYTOSIS: Status: RESOLVED | Noted: 2023-01-18 | Resolved: 2023-01-22

## 2023-01-22 LAB
ANION GAP SERPL CALCULATED.3IONS-SCNC: 6 MMOL/L (ref 4–13)
BUN SERPL-MCNC: 16 MG/DL (ref 5–25)
CALCIUM SERPL-MCNC: 8.4 MG/DL (ref 8.4–10.2)
CHLORIDE SERPL-SCNC: 100 MMOL/L (ref 96–108)
CO2 SERPL-SCNC: 29 MMOL/L (ref 21–32)
CREAT SERPL-MCNC: 0.45 MG/DL (ref 0.6–1.3)
ERYTHROCYTE [DISTWIDTH] IN BLOOD BY AUTOMATED COUNT: 13.4 % (ref 11.6–15.1)
GFR SERPL CREATININE-BSD FRML MDRD: 100 ML/MIN/1.73SQ M
GLUCOSE SERPL-MCNC: 114 MG/DL (ref 65–140)
HCT VFR BLD AUTO: 25.5 % (ref 34.8–46.1)
HGB BLD-MCNC: 8.8 G/DL (ref 11.5–15.4)
MCH RBC QN AUTO: 30 PG (ref 26.8–34.3)
MCHC RBC AUTO-ENTMCNC: 34.5 G/DL (ref 31.4–37.4)
MCV RBC AUTO: 87 FL (ref 82–98)
PLATELET # BLD AUTO: 196 THOUSANDS/UL (ref 149–390)
PMV BLD AUTO: 9.9 FL (ref 8.9–12.7)
POTASSIUM SERPL-SCNC: 3.2 MMOL/L (ref 3.5–5.3)
RBC # BLD AUTO: 2.93 MILLION/UL (ref 3.81–5.12)
SODIUM SERPL-SCNC: 135 MMOL/L (ref 135–147)
WBC # BLD AUTO: 9.59 THOUSAND/UL (ref 4.31–10.16)

## 2023-01-22 RX ADMIN — ACETAMINOPHEN 975 MG: 325 TABLET ORAL at 15:21

## 2023-01-22 RX ADMIN — OXYCODONE 5 MG: 5 TABLET ORAL at 11:52

## 2023-01-22 RX ADMIN — ATORVASTATIN CALCIUM 20 MG: 20 TABLET, FILM COATED ORAL at 16:03

## 2023-01-22 RX ADMIN — ENOXAPARIN SODIUM 100 MG: 100 INJECTION SUBCUTANEOUS at 08:48

## 2023-01-22 RX ADMIN — METOPROLOL TARTRATE 50 MG: 50 TABLET, FILM COATED ORAL at 08:47

## 2023-01-22 RX ADMIN — ACETAMINOPHEN 975 MG: 325 TABLET ORAL at 22:33

## 2023-01-22 RX ADMIN — OXYCODONE 5 MG: 5 TABLET ORAL at 22:38

## 2023-01-22 RX ADMIN — LOSARTAN POTASSIUM 50 MG: 50 TABLET, FILM COATED ORAL at 08:47

## 2023-01-22 RX ADMIN — PANTOPRAZOLE SODIUM 40 MG: 40 TABLET, DELAYED RELEASE ORAL at 08:48

## 2023-01-22 RX ADMIN — APIXABAN 10 MG: 5 TABLET, FILM COATED ORAL at 22:33

## 2023-01-22 RX ADMIN — METOPROLOL TARTRATE 50 MG: 50 TABLET, FILM COATED ORAL at 17:20

## 2023-01-22 RX ADMIN — SENNOSIDES 8.6 MG: 8.6 TABLET ORAL at 08:48

## 2023-01-22 RX ADMIN — DOCUSATE SODIUM 100 MG: 100 CAPSULE, LIQUID FILLED ORAL at 08:47

## 2023-01-22 RX ADMIN — ACETAMINOPHEN 975 MG: 325 TABLET ORAL at 05:35

## 2023-01-22 RX ADMIN — DOCUSATE SODIUM 100 MG: 100 CAPSULE, LIQUID FILLED ORAL at 17:00

## 2023-01-22 RX ADMIN — LORATADINE 10 MG: 10 TABLET ORAL at 08:48

## 2023-01-22 NOTE — ASSESSMENT & PLAN NOTE
· Postoperative day 3 status post left femur intramedullary fixation   · Continue cautious pain control   · PT OT as tolerated  · Anticipate rehab placement  · OP orthopedic follow up

## 2023-01-22 NOTE — CASE MANAGEMENT
Case Management Discharge Planning Note    Patient name Marce Terrell  Location [facility] 2 /[facility] 2 Reji Clemente* MRN [id number]  : 1950 Date 2023       Current Admission Date: 2023  Current Admission Diagnosis:Closed left hip fracture [facility])   Patient Active Problem List    Diagnosis Date Noted   • Multiple subsegmental pulmonary emboli without acute cor pulmonale ([facility] [address] ) 2023   • Acute deep vein thrombosis (DVT) of left peroneal vein ([facility] [address] ) 2023   • Acute postoperative anemia due to expected blood loss 2023   • Closed left hip fracture ([facility] [address] ) 2023   • Essential hypertension 2023   • Other hyperlipidemia 2023   • Hypoxia 2023   • Chronic diastolic congestive heart failure ([facility] [address] ) 2023      LOS (days): 4  Geometric Mean LOS (GMLOS) (days): 3 50  Days to GMLOS:-0 5     OBJECTIVE:  Risk of Unplanned Readmission Score: 10 65         Current admission status: Inpatient   Preferred Pharmacy:   [facility], [place] [id number] - [address] [place] [address]  Phone: [phone] Fax: [phone]    Primary Care Provider: No primary care provider on file  Primary Insurance: WORKERS COMPENSATION  Secondary Insurance: [facility] HOSPITAL REP    DISCHARGE DETAILS:                                          Other Referral/Resources/Interventions Provided:  Interventions: Prescription Price Check  Referral Comments: Attending requested CM perform price check for Eliqius to determine if it is appropriate for her to be on long-term or if they would need to resume Lovenox or choose alternate Rx  CM called Derrick in [facility] - 1 month is $47 00 THROUGH MEDICARE  CM notified attending of same  CM also updated  TCF and  [facility] in [place] that patient is medically ready for placement once they have a bed available  Per CM Daphnie, patient's workman's comp approved for STR at [facility]

## 2023-01-22 NOTE — ASSESSMENT & PLAN NOTE
Hypotension resolved    Continue metoprolol 50 mg twice daily  Losartan dose restarted at 50 mg daily

## 2023-01-22 NOTE — PLAN OF CARE
Problem: PHYSICAL THERAPY ADULT  Goal: Performs mobility at highest level of function for planned discharge setting  See evaluation for individualized goals  Description: Treatment/Interventions: Functional transfer training, Elevations, LE strengthening/ROM, Therapeutic exercise, Endurance training, Equipment eval/education, Patient/family training, Bed mobility, Gait training, Compensatory technique education, Continued evaluation, Spoke to nursing, OT, Spoke to advanced practitioner          See flowsheet documentation for full assessment, interventions and recommendations  Outcome: Progressing  Note: Prognosis: Fair  Problem List: Decreased strength, Decreased range of motion, Decreased endurance, Impaired balance, Decreased mobility, Impaired judgement, Pain, Orthopedic restrictions, Obesity  Assessment: Pt  needed extended education and encouragement for ambulation  Pt  given demonstration for STS transfers and ambulation with RW  Fair carry over noted  Cues for hand placement for STS given  Mod A progressed to min A x 1 for STS transfer  Pt  needed much encouragement to avoid toe and foot dragging of BLEs during ambulation  Pt  eventually able to take fair steps and strides  Pt  noted with fear and anxiety with WBing activiites  Education for relaxing and not to hold her breath while performing tasks  Pt  declined pain meds to RN Yo Casillas in the day however pateint agreeable to take pain meds during the session and RN provided the same  Educated patient in having pain under control to have better mobility  Pt  also declined ice pack and with education patient agreeable to trial applying ice pack for pain control at the end of session  Pt  seated on chair post session Pt  needed much cues and education to participate in activities  Will continue to follow per PT POC  proviced Mod A - Min A x 1 for amb  with max cues for techqniue    Barriers to Discharge: None     PT Discharge Recommendation: Post acute rehabilitation services    See flowsheet documentation for full assessment

## 2023-01-22 NOTE — ASSESSMENT & PLAN NOTE
Recent 2D echo done at [facility] 12/2022 revealed grade 1 diastolic dysfunction  Stable volume status  CT chest without edema or effusion even after IV fluids  Continue metoprolol    50 mg twice daily  Losartan dose restarted at 50 mg daily due to recent hypotension

## 2023-01-22 NOTE — PLAN OF CARE
Problem: Potential for Falls  Goal: Patient will remain free of falls  Description: INTERVENTIONS:  - Educate patient/family on patient safety including physical limitations  - Instruct patient to call for assistance with activity   - Consult OT/PT to assist with strengthening/mobility   - Keep Call bell within reach  - Keep bed low and locked with side rails adjusted as appropriate  - Keep care items and personal belongings within reach  - Initiate and maintain comfort rounds  - Make Fall Risk Sign visible to staff  - Offer Toileting every 2 Hours, in advance of need  - Apply yellow socks and bracelet for high fall risk patients  - Consider moving patient to room near nurses station  Outcome: Progressing     Problem: MOBILITY - ADULT  Goal: Maintain or return to baseline ADL function  Description: INTERVENTIONS:  -  Assess patient's ability to carry out ADLs; assess patient's baseline for ADL function and identify physical deficits which impact ability to perform ADLs (bathing, care of mouth/teeth, toileting, grooming, dressing, etc )  - Assess/evaluate cause of self-care deficits   - Assess range of motion  - Assess patient's mobility; develop plan if impaired  - Assess patient's need for assistive devices and provide as appropriate  - Encourage maximum independence but intervene and supervise when necessary  - Involve family in performance of ADLs  - Assess for home care needs following discharge   - Consider OT consult to assist with ADL evaluation and planning for discharge  - Provide patient education as appropriate  Outcome: Progressing  Goal: Maintains/Returns to pre admission functional level  Description: INTERVENTIONS:  - Perform BMAT or MOVE assessment daily    - Set and communicate daily mobility goal to care team and patient/family/caregiver     - Collaborate with rehabilitation services on mobility goals if consulted  - Stand patient 3 times a day  - Out of bed to chair 3 times a day   - Out of bed for meals 3 times a day  - Out of bed for toileting  - Record patient progress and toleration of activity level   Outcome: Progressing     Problem: Prexisting or High Potential for Compromised Skin Integrity  Goal: Skin integrity is maintained or improved  Description: INTERVENTIONS:  - Identify patients at risk for skin breakdown  - Assess and monitor skin integrity  - Assess and monitor nutrition and hydration status  - Monitor labs   - Assess for incontinence   - Turn and reposition patient  - Assist with mobility/ambulation  - Relieve pressure over bony prominences  - Avoid friction and shearing  - Provide appropriate hygiene as needed including keeping skin clean and dry  - Evaluate need for skin moisturizer/barrier cream  - Collaborate with interdisciplinary team   - Patient/family teaching  - Consider wound care consult   Outcome: Progressing

## 2023-01-22 NOTE — PROGRESS NOTES
[facility]  Progress Note - Myrna Larger 1950, 67 y o  female MRN: [id number]  Unit/Bed#: [place] [address] -01 Encounter: [id number]  Primary Care Provider: No primary care provider on file  Date and time admitted to hospital: 1/18/2023  2:15 PM    * Closed left hip fracture (HCC)  Assessment & Plan  · Postoperative day 3 status post left femur intramedullary fixation   · Continue cautious pain control   · PT OT as tolerated  · Anticipate rehab placement  · OP orthopedic follow up    Acute deep vein thrombosis (DVT) of left peroneal vein (HCC)  Assessment & Plan  · Provoked secondary to recent hip fracture and orthopedic surgery   · Continue therapeutic Lovenox  · Possibly switch to Eliquis if covered  · Anticipate 3 months of treatment    Multiple subsegmental pulmonary emboli without acute cor pulmonale (HCC)  Assessment & Plan  · Provoked secondary to recent hip fracture and orthopedic surgery  · Continue therapeutic Lovenox  · Prescription sent to pharmacy to check for Eliquis coverage  · If covered, switch to Eliquis  Hypoxia  Assessment & Plan  · Multifactorial due to postoperative state, atelectasis,  BMI 37, hypoventilation  · CT with small PE, not the main cause for her hypoxia  · She is now on room air  · Hypoxia resolved    Hypotension-resolved as of 1/22/2023  Assessment & Plan  · Multifactorial due to postoperative state, ongoing use of metoprolol/losartan, pain medication  · Resolved  · Continue metoprolol  · Hold losartan today  · If blood pressure remains elevated, restart losartan at previous dose 50 mg daily    Leukocytosis-resolved as of 1/22/2023  Assessment & Plan  Reactive secondary to fracture, PE, DVT and postoperative state    Resolved    Chronic diastolic congestive heart failure [facility])  Assessment & Plan  Recent 2D echo done at [facility] 12/2022 revealed grade 1 diastolic dysfunction  Stable volume status  CT chest without edema or effusion even after IV fluids  Continue metoprolol  50 mg twice daily  Losartan dose restarted at 50 mg daily due to recent hypotension    Other hyperlipidemia  Assessment & Plan  Continue  Lipitor 20 mg daily    Acute postoperative anemia due to expected blood loss  Assessment & Plan  · Stable  · Monitor  · Asymptomatic  · Transfuse as needed  Essential hypertension  Assessment & Plan  Hypotension resolved  Continue metoprolol 50 mg twice daily  Losartan dose restarted at 50 mg daily       VTE Pharmacologic Prophylaxis:   Pharmacologic: Enoxaparin (Lovenox)  Mechanical VTE Prophylaxis in Place: No    Patient Centered Rounds: Discussed with her nurse    Discussions with Specialists or Other Care Team Provider: Case management    Education and Discussions with Family / Patient: With brother and niece at bedside    Time Spent for Care: 25 minutes  More than 50% of total time spent on counseling and coordination of care as described above  Current Length of Stay: 4 day(s)    Current Patient Status: Inpatient   Certification Statement: The patient will continue to require additional inpatient hospital stay due to Rehab placement    Discharge Plan: Short-term rehab when available    Code Status: Level 1 - Full Code    Subjective:   No shortness of breath  No chest pain  No leg pain  No supplemental oxygen requirement since yesterday    Objective:     Vitals:   Temp (24hrs), Av 5 °F (36 4 °C), Min:97 4 °F (36 3 °C), Max:97 6 °F (36 4 °C)    Temp:  [97 4 °F (36 3 °C)-97 6 °F (36 4 °C)] 97 4 °F (36 3 °C)  HR:  [71-93] 93  Resp:  [19-24] 19  BP: (154-182)/(67-91) 180/91  SpO2:  [93 %-94 %] 94 %  Body mass index is 36 33 kg/m²  Input and Output Summary (last 24 hours): Intake/Output Summary (Last 24 hours) at 2023 1331  Last data filed at 2023 1024  Gross per 24 hour   Intake --   Output 1200 ml   Net -1200 ml       Physical Exam:     Physical Exam  Vitals reviewed     Constitutional: Appearance: She is obese  She is not ill-appearing or diaphoretic  HENT:      Head: Normocephalic and atraumatic  Nose: No congestion or rhinorrhea  Cardiovascular:      Rate and Rhythm: Normal rate and regular rhythm  Pulmonary:      Effort: Pulmonary effort is normal  No respiratory distress  Breath sounds: No wheezing or rales  Abdominal:      General: There is no distension  Palpations: Abdomen is soft  Tenderness: There is no abdominal tenderness  Musculoskeletal:         General: No tenderness  Cervical back: Neck supple  Right lower leg: No edema  Left lower leg: No edema  Neurological:      Mental Status: She is oriented to person, place, and time  Psychiatric:         Mood and Affect: Mood normal          Behavior: Behavior normal        Additional Data:     Labs:    Results from last 7 days   Lab Units 01/22/23 0441 01/20/23  0007 01/19/23  0429   WBC Thousand/uL 9 59   < > 11 75*   HEMOGLOBIN g/dL 8 8*   < > 12 6   HEMATOCRIT % 25 5*   < > 38 9   PLATELETS Thousands/uL 196   < > 255   NEUTROS PCT %  --   --  79*   LYMPHS PCT %  --   --  12*   MONOS PCT %  --   --  8   EOS PCT %  --   --  0    < > = values in this interval not displayed  Results from last 7 days   Lab Units 01/22/23  0441 01/20/23  0431 01/20/23  0007   SODIUM mmol/L 135   < > 136   POTASSIUM mmol/L 3 2*   < > 4 7   CHLORIDE mmol/L 100   < > 105   CO2 mmol/L 29   < > 29   BUN mg/dL 16   < > 18   CREATININE mg/dL 0 45*   < > 0 57*   ANION GAP mmol/L 6   < > 2*   CALCIUM mg/dL 8 4   < > 7 6*   ALBUMIN g/dL  --   --  3 4*   TOTAL BILIRUBIN mg/dL  --   --  0 46   ALK PHOS U/L  --   --  70   ALT U/L  --   --  16   AST U/L  --   --  18   GLUCOSE RANDOM mg/dL 114   < > 160*    < > = values in this interval not displayed       Results from last 7 days   Lab Units 01/19/23  0429   INR  1 00     Results from last 7 days   Lab Units 01/19/23  2126   POC GLUCOSE mg/dl 155*         Results from last 7 days   Lab Units 01/20/23  0431 01/20/23  0300 01/20/23  0007   LACTIC ACID mmol/L  --  1 1 3 2*   PROCALCITONIN ng/ml 0 08  --   --            * I Have Reviewed All Lab Data Listed Above  * Additional Pertinent Lab Tests Reviewed: All Labs Within Last 24 Hours Reviewed    Imaging:    Imaging Reports Reviewed Today Include: None      Recent Cultures (last 7 days):           Last 24 Hours Medication List:   Current Facility-Administered Medications   Medication Dose Route Frequency Provider Last Rate   • acetaminophen  650 mg Oral Q6H PRN Brea Galvin PA-C     • acetaminophen  975 mg Oral Q8H Rashad Bocanegra PA-C     • atorvastatin  20 mg Oral Daily With Stefani Lopez PA-C     • calcium carbonate  1,000 mg Oral Daily PRN Brea Galvin PA-C     • docusate sodium  100 mg Oral BID Brea Galvin PA-C     • enoxaparin  1 mg/kg Subcutaneous Q12H [facility] Shirin Hastings MD     • HYDROmorphone  0 2 mg Intravenous Q4H PRN Brea Galvin PA-C     • levalbuterol  0 63 mg Nebulization Q4H PRN Jhonatan Harris MD     • loratadine  10 mg Oral Daily Brea Galvin PA-C     • losartan  50 mg Oral Daily Jhonatan Harris MD     • metoprolol tartrate  50 mg Oral BID Brea Galvin PA-C     • ondansetron  4 mg Intravenous Q6H PRN Brea Galvin PA-C     • oxyCODONE  10 mg Oral Q6H PRN Brea Galvin PA-C     • oxyCODONE  5 mg Oral Q6H PRN Brea Galvin PA-C     • pantoprazole  40 mg Oral Daily Brea Galvin PA-C     • senna  1 tablet Oral Daily Brea Galvin PA-C          Today, Patient Was Seen By: hJonatan Harris MD    ** Please Note: Dictation voice to text software may have been used in the creation of this document   **

## 2023-01-22 NOTE — PROGRESS NOTES
Zak Reyes  67 y o   female  MR#: [id number]  1/22/2023    Post-op days: 3  Extremity: Left hip    Subjective: 17-year-old female status post left long TFNA for left subtrochanteric femur fracture  Patient was diagnosed with PE and DVT in the left lower leg  She is on Lovenox  Patient is doing well with her breathing  Her pain is under good control  She does notice swelling in her left legs  She denies fever, chill, or sweat  Vitals:   Vitals:    01/22/23 0739   BP: (!) 180/91   Pulse: 93   Resp: 19   Temp: (!) 97 4 °F (36 3 °C)   SpO2: 94%       Exam:   Patient is out of bed sitting in the chair   Alert, awake, and oriented x3  She is not in acute distress  Incision is intact  Swelling in the left leg  Calf is soft but swollen  No increased pain with passive ankle range of motion  Neurovascular is intact    Labs:   WBC   Recent Labs     01/20/23  0007 01/20/23  0431 01/21/23  0543 01/22/23  0441   WBC 17 06* 19 78* 8 70 9 59     H/H   Recent Labs     01/20/23  0007 01/20/23  0431 01/21/23  0543 01/22/23  0441   HGB 10 2* 10 4* 8 8* 8 8*   /  Recent Labs     01/20/23  0007 01/20/23  0431 01/21/23  0543 01/22/23  0441   HCT 32 0* 33 3* 26 2* 25 5*     Sed Rate No results for input(s): SEDRATE in the last 72 hours  CRP No results for input(s): CRP in the last 72 hours  Assessment:   Status post left long TFNA femoral nail      Plan:   Out of bed with PT  Weight-bear as tolerated left leg  Continue pain control  Lovenox  DC planning

## 2023-01-22 NOTE — PROGRESS NOTES
Progress Note - Pulmonary   Roland Snell 67 y o  female MRN: [id number]  [facility] Encounter: [id number]      Assessment:  · Acute hypoxemic respiratory failure  Resolved  Patient is on room air  · Acute small subsegmental pulmonary embolism and left small DVT  · Chronic diastolic congestive heart failure  · S/p hip surgery postop day #3  · Obstructive sleep apnea by history  Plan:  · Increase activities as tolerated  · Oral anticoagulation for 3 months  · Sleep study as outpatient  · Will sign off  Please call with questions  Subjective: The patient is sitting in the chair  She did not need oxygen  She feels her breathing has markedly improved  No cough  Vitals: Blood pressure 130/77, pulse 102, temperature 97 7 °F (36 5 °C), resp  rate 22, height 5' 4" (1 626 m), weight 96 kg (211 lb 10 3 oz), SpO2 96 %  , Body mass index is 36 33 kg/m²  Intake/Output Summary (Last 24 hours) at 1/22/2023 1737  Last data filed at 1/22/2023 1024  Gross per 24 hour   Intake --   Output 950 ml   Net -950 ml       Physical Exam  Gen: Awake, alert, oriented x 3, no acute distress  HEENT: Mucous membranes moist, no oral lesions, no thrush  NECK: No accessory muscle use, JVP not elevated  Cardiac: Regular, single S1, single S2, no murmurs, no rubs, no gallops  Lungs: Decreased breath sounds  No wheezing or rhonchi    Abdomen: normoactive bowel sounds, soft nontender, nondistended, no rebound or rigidity, no guarding  Extremities: no cyanosis, no clubbing, no edema    Labs:   CBC:   Lab Results   Component Value Date    WBC 9 59 01/22/2023    HGB 8 8 (L) 01/22/2023    HCT 25 5 (L) 01/22/2023    MCV 87 01/22/2023     01/22/2023    MCH 30 0 01/22/2023    MCHC 34 5 01/22/2023    RDW 13 4 01/22/2023    MPV 9 9 01/22/2023   , CMP:   Lab Results   Component Value Date    SODIUM 135 01/22/2023    K 3 2 (L) 01/22/2023     01/22/2023    CO2 29 01/22/2023    BUN 16 01/22/2023 CREATININE 0 45 (L) 01/22/2023    CALCIUM 8 4 01/22/2023    EGFR 100 01/22/2023           Arno Kocher, MD

## 2023-01-22 NOTE — PHYSICAL THERAPY NOTE
Physical Therapy Treatment Note     01/22/23 1208   PT Last Visit   PT Visit Date 01/22/23   Note Type   Note Type Treatment   Pain Assessment   Pain Assessment Tool 0-10   Pain Score 10 - Worst Possible Pain   Pain Location/Orientation Orientation: Left; Location: Hip;Location: Knee   Restrictions/Precautions   Weight Bearing Precautions Per Order Yes   RLE Weight Bearing Per Order WBAT   LLE Weight Bearing Per Order WBAT   Other Precautions Chair Alarm; Bed Alarm;WBS;Fall Risk;Pain;Telemetry   General   Chart Reviewed Yes   Subjective   Subjective Pt  agreeable to PT  Seated on chait upon entry   Transfers   Sit to Stand   (Mod progressed to Min)   Additional items Assist x 1; Armrests; Increased time required;Verbal cues   Stand to Sit 4  Minimal assistance   Additional items Assist x 1; Increased time required;Verbal cues;Armrests   Stand pivot 4  Minimal assistance   Additional items Assist x 1; Increased time required;Verbal cues   Ambulation/Elevation   Gait pattern Decreased foot clearance; Improper Weight shift; Antalgic; Forward Flexion;L Foot drag;R Foot drag; Inconsistent oksana;Decreased L stance; Excessively slow;Decreased heel strike;Decreased toe off   Gait Assistance 3  Moderate assist   Additional items Assist x 1;Verbal cues; Tactile cues   Assistive Device Rolling walker   Distance 2ft, 6ft, 10ft, 4ft   Balance   Static Sitting Fair +   Dynamic Sitting Fair   Static Standing Fair   Dynamic Standing Fair -   Ambulatory Fair -   Endurance Deficit   Endurance Deficit Yes   Endurance Deficit Description pain, anxiety, fear   Activity Tolerance   Activity Tolerance Patient tolerated treatment well   Nurse Made Aware Yes   Assessment   Prognosis Fair   Problem List Decreased strength;Decreased range of motion;Decreased endurance; Impaired balance;Decreased mobility; Impaired judgement;Pain;Orthopedic restrictions; Obesity   Assessment Pt  needed extended education and encouragement for ambulation   Pt  given demonstration for STS transfers and ambulation with RW  Fair carry over noted  Cues for hand placement for STS given  Mod A progressed to min A x 1 for STS transfer  Pt  needed much encouragement to avoid toe and foot dragging of BLEs during ambulation  Pt  eventually able to take fair steps and strides  Pt  noted with fear and anxiety with WBing activiites  Education for relaxing and not to hold her breath while performing tasks  Pt  declined pain meds to RN Brett Foster in the day however pateint agreeable to take pain meds during the session and RN provided the same  Educated patient in having pain under control to have better mobility  Pt  also declined ice pack and with education patient agreeable to trial applying ice pack for pain control at the end of session  Pt  seated on chair post session Pt  needed much cues and education to participate in activities  Will continue to follow per PT POC  proviced Mod A - Min A x 1 for amb  with max cues for techqniue  Barriers to Discharge None   Goals   Patient Goals None reported   STG Expiration Date 02/03/23   PT Treatment Day 1   Plan   Treatment/Interventions Functional transfer training;Gait training;Patient/family training;Spoke to nursing;Family   Progress Progressing toward goals   PT Frequency 5-7x/wk   Recommendation   PT Discharge Recommendation Post acute rehabilitation services   AM-PAC Basic Mobility Inpatient   Turning in Flat Bed Without Bedrails 1   Lying on Back to Sitting on Edge of Flat Bed Without Bedrails 1   Moving Bed to Chair 2   Standing Up From Chair Using Arms 3   Walk in Room 3   Climb 3-5 Stairs With Railing 2   Basic Mobility Inpatient Raw Score 12   Basic Mobility Standardized Score 32 23   Highest Level Of Mobility   -[facility] Goal 4: Move to chair/commode   -HL Achieved 6: Walk 10 steps or more   End of Consult   Patient Position at End of Consult Bedside chair; All needs within reach;Bed/Chair alarm activated           Garry Burger PTA    An AM-PAC basic mobility standardized score less than 42 9 suggest the patient may benefit from discharge to post-acute rehab services

## 2023-01-23 ENCOUNTER — HOSPITAL ENCOUNTER (INPATIENT)
Facility: HOSPITAL | Age: 73
LOS: 18 days | Discharge: HOME HEALTH | End: 2023-02-10
Attending: PHYSICAL MEDICINE & REHABILITATION | Admitting: PHYSICAL MEDICINE & REHABILITATION

## 2023-01-23 ENCOUNTER — APPOINTMENT (OUTPATIENT)
Dept: RADIOLOGY | Facility: HOSPITAL | Age: 73
End: 2023-01-23

## 2023-01-23 VITALS
OXYGEN SATURATION: 95 % | HEIGHT: 64 IN | DIASTOLIC BLOOD PRESSURE: 60 MMHG | WEIGHT: 209.88 LBS | SYSTOLIC BLOOD PRESSURE: 127 MMHG | BODY MASS INDEX: 35.83 KG/M2 | RESPIRATION RATE: 14 BRPM | HEART RATE: 76 BPM | TEMPERATURE: 97.2 F

## 2023-01-23 DIAGNOSIS — I82.452 ACUTE DEEP VEIN THROMBOSIS (DVT) OF LEFT PERONEAL VEIN (HCC): ICD-10-CM

## 2023-01-23 DIAGNOSIS — M25.561 CHRONIC PAIN OF RIGHT KNEE: ICD-10-CM

## 2023-01-23 DIAGNOSIS — R09.02 HYPOXEMIA: ICD-10-CM

## 2023-01-23 DIAGNOSIS — G89.29 CHRONIC PAIN OF RIGHT KNEE: ICD-10-CM

## 2023-01-23 DIAGNOSIS — D62 ACUTE POSTOPERATIVE ANEMIA DUE TO EXPECTED BLOOD LOSS: ICD-10-CM

## 2023-01-23 DIAGNOSIS — K29.60 REFLUX GASTRITIS: ICD-10-CM

## 2023-01-23 DIAGNOSIS — I26.94 MULTIPLE SUBSEGMENTAL PULMONARY EMBOLI WITHOUT ACUTE COR PULMONALE (HCC): ICD-10-CM

## 2023-01-23 DIAGNOSIS — M85.80 OSTEOPENIA, UNSPECIFIED LOCATION: ICD-10-CM

## 2023-01-23 DIAGNOSIS — S72.002D CLOSED FRACTURE OF LEFT HIP WITH ROUTINE HEALING, SUBSEQUENT ENCOUNTER: Primary | ICD-10-CM

## 2023-01-23 DIAGNOSIS — K27.9: ICD-10-CM

## 2023-01-23 DIAGNOSIS — F43.22 ADJUSTMENT DISORDER WITH ANXIETY: ICD-10-CM

## 2023-01-23 PROBLEM — E87.6 HYPOKALEMIA: Status: ACTIVE | Noted: 2023-01-23

## 2023-01-23 LAB
FERRITIN SERPL-MCNC: 81 NG/ML (ref 8–388)
IRON SATN MFR SERPL: 9 % (ref 15–50)
IRON SERPL-MCNC: 25 UG/DL (ref 50–170)
TIBC SERPL-MCNC: 279 UG/DL (ref 250–450)

## 2023-01-23 RX ORDER — SENNOSIDES 8.6 MG
2 TABLET ORAL DAILY
Status: DISCONTINUED | OUTPATIENT
Start: 2023-01-24 | End: 2023-01-28

## 2023-01-23 RX ORDER — LORATADINE 10 MG/1
10 TABLET ORAL DAILY
Status: DISCONTINUED | OUTPATIENT
Start: 2023-01-24 | End: 2023-02-10 | Stop reason: HOSPADM

## 2023-01-23 RX ORDER — LOSARTAN POTASSIUM 50 MG/1
50 TABLET ORAL DAILY
Status: DISCONTINUED | OUTPATIENT
Start: 2023-01-24 | End: 2023-01-24

## 2023-01-23 RX ORDER — SENNOSIDES 8.6 MG
8.6 TABLET ORAL DAILY
Refills: 0
Start: 2023-01-24 | End: 2023-02-10

## 2023-01-23 RX ORDER — PANTOPRAZOLE SODIUM 40 MG/1
40 TABLET, DELAYED RELEASE ORAL DAILY
Status: DISCONTINUED | OUTPATIENT
Start: 2023-01-24 | End: 2023-02-10 | Stop reason: HOSPADM

## 2023-01-23 RX ORDER — LOSARTAN POTASSIUM 50 MG/1
50 TABLET ORAL DAILY
Refills: 0
Start: 2023-01-24

## 2023-01-23 RX ORDER — OXYCODONE HYDROCHLORIDE 5 MG/1
5 TABLET ORAL EVERY 6 HOURS PRN
Status: DISCONTINUED | OUTPATIENT
Start: 2023-01-23 | End: 2023-01-25

## 2023-01-23 RX ORDER — MELATONIN
2000 DAILY
Status: DISCONTINUED | OUTPATIENT
Start: 2023-01-23 | End: 2023-01-24

## 2023-01-23 RX ORDER — CALCIUM CARBONATE 200(500)MG
1000 TABLET,CHEWABLE ORAL DAILY PRN
Status: DISCONTINUED | OUTPATIENT
Start: 2023-01-23 | End: 2023-02-10 | Stop reason: HOSPADM

## 2023-01-23 RX ORDER — OXYCODONE HYDROCHLORIDE 5 MG/1
5 TABLET ORAL EVERY 6 HOURS PRN
Status: DISCONTINUED | OUTPATIENT
Start: 2023-01-23 | End: 2023-01-23

## 2023-01-23 RX ORDER — SENNOSIDES 8.6 MG
1 TABLET ORAL DAILY
Status: DISCONTINUED | OUTPATIENT
Start: 2023-01-24 | End: 2023-01-23

## 2023-01-23 RX ORDER — OXYCODONE HYDROCHLORIDE 5 MG/1
5 TABLET ORAL EVERY 6 HOURS PRN
Refills: 0 | Status: SHIPPED | OUTPATIENT
Start: 2023-01-23 | End: 2023-02-10

## 2023-01-23 RX ORDER — POTASSIUM CHLORIDE 20 MEQ/1
40 TABLET, EXTENDED RELEASE ORAL ONCE
Status: COMPLETED | OUTPATIENT
Start: 2023-01-23 | End: 2023-01-23

## 2023-01-23 RX ORDER — OXYCODONE HYDROCHLORIDE 5 MG/1
5 TABLET ORAL EVERY 6 HOURS PRN
Status: DISCONTINUED | OUTPATIENT
Start: 2023-01-23 | End: 2023-01-23 | Stop reason: HOSPADM

## 2023-01-23 RX ORDER — LANOLIN ALCOHOL/MO/W.PET/CERES
2 CREAM (GRAM) TOPICAL
Status: DISCONTINUED | OUTPATIENT
Start: 2023-01-24 | End: 2023-02-10 | Stop reason: HOSPADM

## 2023-01-23 RX ORDER — METOPROLOL TARTRATE 50 MG/1
50 TABLET, FILM COATED ORAL 2 TIMES DAILY
Status: DISCONTINUED | OUTPATIENT
Start: 2023-01-23 | End: 2023-02-10 | Stop reason: HOSPADM

## 2023-01-23 RX ORDER — ATORVASTATIN CALCIUM 20 MG/1
20 TABLET, FILM COATED ORAL
Status: DISCONTINUED | OUTPATIENT
Start: 2023-01-23 | End: 2023-02-10 | Stop reason: HOSPADM

## 2023-01-23 RX ORDER — DOCUSATE SODIUM 100 MG/1
100 CAPSULE, LIQUID FILLED ORAL 2 TIMES DAILY
Status: DISCONTINUED | OUTPATIENT
Start: 2023-01-23 | End: 2023-02-10 | Stop reason: HOSPADM

## 2023-01-23 RX ORDER — POLYETHYLENE GLYCOL 3350 17 G/17G
17 POWDER, FOR SOLUTION ORAL DAILY
Status: DISCONTINUED | OUTPATIENT
Start: 2023-01-24 | End: 2023-01-27

## 2023-01-23 RX ORDER — ACETAMINOPHEN 325 MG/1
650 TABLET ORAL EVERY 6 HOURS PRN
Status: DISCONTINUED | OUTPATIENT
Start: 2023-01-23 | End: 2023-01-25

## 2023-01-23 RX ORDER — BISACODYL 10 MG
10 SUPPOSITORY, RECTAL RECTAL DAILY PRN
Status: DISCONTINUED | OUTPATIENT
Start: 2023-01-23 | End: 2023-01-27

## 2023-01-23 RX ORDER — DOCUSATE SODIUM 100 MG/1
100 CAPSULE, LIQUID FILLED ORAL 2 TIMES DAILY
Refills: 0
Start: 2023-01-23

## 2023-01-23 RX ORDER — ACETAMINOPHEN 325 MG/1
650 TABLET ORAL EVERY 6 HOURS PRN
Refills: 0
Start: 2023-01-23

## 2023-01-23 RX ADMIN — ACETAMINOPHEN 650 MG: 325 TABLET ORAL at 12:24

## 2023-01-23 RX ADMIN — LOSARTAN POTASSIUM 50 MG: 50 TABLET, FILM COATED ORAL at 08:17

## 2023-01-23 RX ADMIN — METOPROLOL TARTRATE 50 MG: 50 TABLET, FILM COATED ORAL at 08:17

## 2023-01-23 RX ADMIN — ACETAMINOPHEN 975 MG: 325 TABLET ORAL at 06:12

## 2023-01-23 RX ADMIN — DOCUSATE SODIUM 100 MG: 100 CAPSULE, LIQUID FILLED ORAL at 17:47

## 2023-01-23 RX ADMIN — OXYCODONE HYDROCHLORIDE 10 MG: 10 TABLET ORAL at 06:20

## 2023-01-23 RX ADMIN — CHOLECALCIFEROL TAB 25 MCG (1000 UNIT) 2000 UNITS: 25 TAB at 17:48

## 2023-01-23 RX ADMIN — APIXABAN 10 MG: 5 TABLET, FILM COATED ORAL at 08:17

## 2023-01-23 RX ADMIN — APIXABAN 10 MG: 5 TABLET, FILM COATED ORAL at 17:47

## 2023-01-23 RX ADMIN — SENNOSIDES 8.6 MG: 8.6 TABLET ORAL at 08:17

## 2023-01-23 RX ADMIN — DOCUSATE SODIUM 100 MG: 100 CAPSULE, LIQUID FILLED ORAL at 08:17

## 2023-01-23 RX ADMIN — METOPROLOL TARTRATE 50 MG: 50 TABLET, FILM COATED ORAL at 17:48

## 2023-01-23 RX ADMIN — LORATADINE 10 MG: 10 TABLET ORAL at 08:17

## 2023-01-23 RX ADMIN — POTASSIUM CHLORIDE 40 MEQ: 1500 TABLET, EXTENDED RELEASE ORAL at 17:46

## 2023-01-23 RX ADMIN — ATORVASTATIN CALCIUM 20 MG: 20 TABLET, FILM COATED ORAL at 17:47

## 2023-01-23 RX ADMIN — PANTOPRAZOLE SODIUM 40 MG: 40 TABLET, DELAYED RELEASE ORAL at 08:17

## 2023-01-23 NOTE — ASSESSMENT & PLAN NOTE
Recent 2D echo done at [facility] 12/2022 revealed grade 1 diastolic dysfunction  Stable volume status  CT chest without edema or effusion even after IV fluids  Continue metoprolol    50 mg twice daily  Losartan dose restarted at 50 mg daily due to recent hypotension

## 2023-01-23 NOTE — PLAN OF CARE
Problem: OCCUPATIONAL THERAPY ADULT  Goal: Performs self-care activities at highest level of function for planned discharge setting  See evaluation for individualized goals  Description: Treatment Interventions: ADL retraining, Functional transfer training, UE strengthening/ROM, Endurance training, Patient/family training, Equipment evaluation/education, Neuromuscular reeducation, Compensatory technique education, Continued evaluation, Energy conservation, Activityengagement          See flowsheet documentation for full assessment, interventions and recommendations  Outcome: Progressing  Note: Limitation: Decreased ADL status, Decreased UE strength, Decreased Safe judgement during ADL, Decreased endurance, Decreased self-care trans, Decreased high-level ADLs  Prognosis: Fair  Assessment: Pt seen for f/u OT tx today with focus on functional transfers/mobility, self-care tasks, standing tolerance  Pt with improved mentation noted today, increased activity tolerance  Pt able to STS with Stacia, takes 5 steps forward with demo on technique and VCs - with Stacia  Unable to step backwards at this time 2/2 fatigue and pain, requiring chair to be pulled closer to pt  UB dressing/bathing in sitting completed with s/u  LB dressing/bathing with maxA 2/2 impaired functional reach, pain  Completed grooming in standing - able to stand for 2 min with unilateral-BUE support, improvement noted from evaluation  Requires CGA-Stacia 2/2 impaired balance without BUE support  Pt remains motivated but anxious t/o tx session  Complains of 5/10 chest pain at end of session, does not radiate in which pt states she has felt before and told RN prior - BP: 127/60, HR 81 BPM, O2 94 on RA  Did share with RN and CM post session  Patient continues to be functioning below baseline level, occupational performance remains limited secondary to factors listed above and increased risk for falls and injury       OT Discharge Recommendation: Post acute rehabilitation services

## 2023-01-23 NOTE — DISCHARGE SUMMARY
[facility]  Discharge- Willie Camarena 1950, 67 y o  female MRN: [id number]  Unit/Bed#: Venita Wahl -01 Encounter: [id number]  Primary Care Provider: No primary care provider on file  Date and time admitted to hospital: 1/18/2023  2:15 PM    * Closed left hip fracture [facility])  Assessment & Plan  · Postoperative day 4 status post left femur intramedullary fixation on 1/19/2023  · Transfer to acute rehab today  · Outpatient orthopedic follow-up  · Continue oxycodone 5 mg every 6 hours for moderate to severe pain  Anticipate rehab placement  · Continue bowel regimen  · PT OT as tolerated at the facility    Acute deep vein thrombosis (DVT) of left peroneal vein (HCC)  Assessment & Plan  · Provoked secondary to recent hip fracture and orthopedic surgery   · Continue Eliquis as mentioned above  · Plan to treat for 3 months as this is provoked    Multiple subsegmental pulmonary emboli without acute cor pulmonale (HCC)  Assessment & Plan  · Provoked secondary to recent hip fracture and orthopedic surgery  · Anticoagulation switch from Lovenox to Eliquis  · Continue 10 mg twice daily dose until 1/28/2023 per protocol  · Start 5 mg twice a day dose  on 1/29/2023  · Hold aspirin while on anticoagulation to minimize bleeding  · She can resume aspirin in 3 months after anticoagulation course is completed    Hypoxia-resolved as of 1/23/2023  Assessment & Plan  · Resolved Multifactorial due to postoperative state, atelectasis,  BMI 37, hypoventilation    Chronic diastolic congestive heart failure [facility])  Assessment & Plan  Recent 2D echo done at [facility] 12/2022 revealed grade 1 diastolic dysfunction  Stable volume status  CT chest without edema or effusion even after IV fluids  Continue metoprolol    50 mg twice daily  Losartan dose restarted at 50 mg daily due to recent hypotension    Other hyperlipidemia  Assessment & Plan  Resume statin on discharge    Acute postoperative anemia due to expected blood loss  Assessment & Plan  · Stable  · Monitor  · Asymptomatic  · Repeat CBC in 48 hours at the facility  Essential hypertension  Assessment & Plan  Hypotension resolved  Continue metoprolol 50 mg twice daily  Losartan dose restarted at 50 mg daily (reduced from 100 mg daily)      Discharging Physician / Practitioner: Jhonatan Harris MD  PCP: No primary care provider on file  Admission Date:   Admission Orders (From admission, onward)     Ordered        01/18/23 [address]  Once                      Discharge Date: 01/23/23    Medical Problems     Resolved Problems  Date Reviewed: 1/23/2023          Resolved    Encounter for preoperative assessment 1/21/2023     Resolved by  Jhonatan Harris MD    Hypoxia 1/23/2023     Resolved by  Jhonatan Harris MD    Leukocytosis 1/22/2023     Resolved by  Jhonatan Harris MD    Hypotension 1/22/2023     Resolved by  Jhonatan Harris MD          Consultations During Hospital Stay:  · Orthopedics  · Pulmonary    Procedures Performed:   · 1/19/2023-intramedullary fixation of the left femur    Significant Findings / Test Results:     XR hip/pelv 2-3 vws left if performed  Impression: Acute intertrochanteric left hip fracture  XR femur 2 views LEFT  Result Date: 1/18/2023  Impression: Left hip fracture reported separately with dedicated left hip/pelvis study  No additional fractures  CT head without contrast  Result Date: 1/18/2023  Impression: No acute intracranial abnormality  CTA chest pe study  Result Date: 1/20/2023  Impression: Few subsegmental pulmonary emboli in the posterior lower lobes  Measured RV/LV ratio is within normal limits at less than 0 9  VAS lower limb venous duplex study, complete bilateral  Result Date: 1/20/2023  FINDINGS:   LEFT LOWER LIMB: There is evidence of a focal acute deep vein thrombosis in 1 of 2 peroneal veins   No evidence of superficial thrombophlebitis noted  Doppler evaluation shows a normal response to augmentation maneuvers  Popliteal, posterior tibial and anterior tibial arterial Doppler waveforms are triphasic  Incidental Findings:   · See above    Test Results Pending at Discharge (will require follow up): · None     Outpatient Tests Requested:  · CBC and BMP in 48 hours    Complications: None    Reason for Admission: [facility] Course: Oralia Walters is a 67 y o  female patient who originally presented to the hospital on 1/18/2023 due to a fall while at work  She was found to have an acute comminuted left intertrochanteric hip fracture  She was seen by orthopedics and underwent left femur intramedullary nail fixation on 1/19/2023  Perioperatively she was  hypoxic and required supplemental oxygen including BiPAP one-point  CT of the chest was performed which showed small segmental PE  Her hypoxia was felt to be multifactorial secondary to atelectasis, postoperative state, and obesity and not due to the PE since they were small  Follow-up extremity Doppler showed a DVT in one of the left peroneal veins  She was started on therapeutic Lovenox and was ultimately switched to Eliquis  Her hypoxia eventually resolved  Her blood counts were monitored closely as she did have postoperative anemia her surgery  her last hemoglobin was 8 8 and she was fairly asymptomatic  No transfusion was performed  Patient [facility] hip surgery  Please see above list of diagnoses and related plan for additional information  Condition at Discharge: good     Discharge Day Visit / Exam:     Subjective: She tried to do physical therapy today and admitted that it was hard and her left hip was stiff  She had  transient chest wall pain when she lifted herself up using the walker  This had subsided at the time of my examination        Vitals: Blood Pressure: 127/60 (01/23/23 1053)  Pulse: 76 (01/23/23 1053)  Temperature: (!) 97 2 °F (36 2 °C) (01/23/23 1053)  Temp Source: Oral (01/22/23 2243)  Respirations: 14 (01/23/23 1051)  Height: 5' 4" (162 6 cm) (01/19/23 0841)  Weight - Scale: 95 2 kg (209 lb 14 1 oz) (01/23/23 0600)  SpO2: 95 % (01/23/23 1053)  Exam:   Physical Exam  Vitals reviewed  Constitutional:       Appearance: She is obese  HENT:      Head: Normocephalic and atraumatic  Nose: No congestion or rhinorrhea  Eyes:      General: No scleral icterus  Cardiovascular:      Rate and Rhythm: Normal rate and regular rhythm  Pulmonary:      Effort: No respiratory distress  Breath sounds: No wheezing or rales  Comments: Reproducible pain on palpation of the sternum  Abdominal:      General: There is no distension  Palpations: Abdomen is soft  Tenderness: There is no abdominal tenderness  There is no guarding  Musculoskeletal:         General: No tenderness  Cervical back: Neck supple  Skin:     Comments: Left thigh ecchymosis from previous surgery   Neurological:      Mental Status: She is oriented to person, place, and time  Psychiatric:         Mood and Affect: Mood normal          Behavior: Behavior normal        Discussion with Family: With brother Diogo Romero and gave update    Discharge instructions/Information to patient and family:   See after visit summary for information provided to patient and family  Provisions for Follow-Up Care:  See after visit summary for information related to follow-up care and any pertinent home health orders  Disposition:     Acute Rehab at [facility] Readmission: no     Discharge Statement:  I spent >30 minutes discharging the patient  This time was spent on the day of discharge  I had direct contact with the patient on the day of discharge   Greater than 50% of the total time was spent examining patient, answering all patient questions, arranging and discussing plan of care with patient as well as directly providing post-discharge instructions  Additional time then spent on discharge activities  Discharge Medications:  See after visit summary for reconciled discharge medications provided to patient and family        ** Please Note: This note has been constructed using a voice recognition system **

## 2023-01-23 NOTE — ASSESSMENT & PLAN NOTE
Hypotension resolved    Continue metoprolol 50 mg twice daily  Losartan dose restarted at 50 mg daily (reduced from 100 mg daily)

## 2023-01-23 NOTE — PLAN OF CARE
Problem: SKIN/TISSUE INTEGRITY - ADULT  Goal: Skin Integrity remains intact(Skin Breakdown Prevention)  Description: Assess:  -Perform Rosales assessment every   -Clean and moisturize skin every   -Inspect skin when repositioning, toileting, and assisting with ADLS  -Assess under medical devices such as  every   -Assess extremities for adequate circulation and sensation     Bed Management:  -Have minimal linens on bed & keep smooth, unwrinkled  -Change linens as needed when moist or perspiring  -Avoid sitting or lying in one position for more than  hours while in bed  -Keep HOB at degrees     Toileting:  -Offer bedside commode  -Assess for incontinence every   -Use incontinent care products after each incontinent episode such as     Activity:  -Mobilize patient times a day  -Encourage activity and walks on unit  -Encourage or provide ROM exercises   -Turn and reposition patient every  Hours  -Use appropriate equipment to lift or move patient in bed  -Instruct/ Assist with weight shifting every  when out of bed in chair  -Consider limitation of chair time  hour intervals    Skin Care:  -Avoid use of baby powder, tape, friction and shearing, hot water or constrictive clothing  -Relieve pressure over bony prominences using   -Do not massage red bony areas    Next Steps:  -Teach patient strategies to minimize risks such as    -Consider consults to  interdisciplinary teams such as   Outcome: Progressing     Problem: MUSCULOSKELETAL - ADULT  Goal: Maintain or return mobility to safest level of function  Description: INTERVENTIONS:  - Assess patient's ability to carry out ADLs; assess patient's baseline for ADL function and identify physical deficits which impact ability to perform ADLs (bathing, care of mouth/teeth, toileting, grooming, dressing, etc )  - Assess/evaluate cause of self-care deficits   - Assess range of motion  - Assess patient's mobility  - Assess patient's need for assistive devices and provide as appropriate  - Encourage maximum independence but intervene and supervise when necessary  - Involve family in performance of ADLs  - Assess for home care needs following discharge   - Consider OT consult to assist with ADL evaluation and planning for discharge  - Provide patient education as appropriate  Outcome: Progressing

## 2023-01-23 NOTE — ASSESSMENT & PLAN NOTE
DXA scan in 12/2020 showed osteopenia  Continue home calcium carbonate and Vitamin D supplementation  No recent hx of Vitamin D level - obtain with next set of labs  Recommend repeat DXA scan as outpatient along with discussion about Prolia injections  Follow-up with PCP as outpatient

## 2023-01-23 NOTE — ASSESSMENT & PLAN NOTE
K+ 3 2 on 1/22  Had not received replacement at that time  Give 40mEq PO potassium chloride once today  Repeat BMP tomorrow

## 2023-01-23 NOTE — PLAN OF CARE
Problem: Potential for Falls  Goal: Patient will remain free of falls  Description: INTERVENTIONS:  - Educate patient/family on patient safety including physical limitations  - Instruct patient to call for assistance with activity   - Consult OT/PT to assist with strengthening/mobility   - Keep Call bell within reach  - Keep bed low and locked with side rails adjusted as appropriate  - Keep care items and personal belongings within reach  - Initiate and maintain comfort rounds  - Make Fall Risk Sign visible to staff  - Offer Toileting every 2 Hours, in advance of need  - Apply yellow socks and bracelet for high fall risk patients  - Consider moving patient to room near nurses station  1/23/2023 1138 by Allyssa Michael, RN  Outcome: Completed  1/23/2023 1023 by Allyssa Michael, RN  Outcome: Progressing     Problem: MOBILITY - ADULT  Goal: Maintain or return to baseline ADL function  Description: INTERVENTIONS:  -  Assess patient's ability to carry out ADLs; assess patient's baseline for ADL function and identify physical deficits which impact ability to perform ADLs (bathing, care of mouth/teeth, toileting, grooming, dressing, etc )  - Assess/evaluate cause of self-care deficits   - Assess range of motion  - Assess patient's mobility; develop plan if impaired  - Assess patient's need for assistive devices and provide as appropriate  - Encourage maximum independence but intervene and supervise when necessary  - Involve family in performance of ADLs  - Assess for home care needs following discharge   - Consider OT consult to assist with ADL evaluation and planning for discharge  - Provide patient education as appropriate  1/23/2023 1138 by Allyssa Michael, RN  Outcome: Completed  1/23/2023 1023 by Allyssa Michael, RN  Outcome: Progressing  Goal: Maintains/Returns to pre admission functional level  Description: INTERVENTIONS:  - Perform BMAT or MOVE assessment daily    - Set and communicate daily mobility goal to care team and patient/family/caregiver     - Collaborate with rehabilitation services on mobility goals if consulted  - Stand patient 3 times a day  - Out of bed to chair 3 times a day   - Out of bed for meals 3 times a day  - Out of bed for toileting  - Record patient progress and toleration of activity level   1/23/2023 1138 by Rj Perez RN  Outcome: Completed  1/23/2023 1023 by Rj Perez RN  Outcome: Progressing     Problem: Prexisting or High Potential for Compromised Skin Integrity  Goal: Skin integrity is maintained or improved  Description: INTERVENTIONS:  - Identify patients at risk for skin breakdown  - Assess and monitor skin integrity  - Assess and monitor nutrition and hydration status  - Monitor labs   - Assess for incontinence   - Turn and reposition patient  - Assist with mobility/ambulation  - Relieve pressure over bony prominences  - Avoid friction and shearing  - Provide appropriate hygiene as needed including keeping skin clean and dry  - Evaluate need for skin moisturizer/barrier cream  - Collaborate with interdisciplinary team   - Patient/family teaching  - Consider wound care consult   1/23/2023 1138 by Rj Perez RN  Outcome: Completed  1/23/2023 1023 by Rj Perez RN  Outcome: Progressing

## 2023-01-23 NOTE — ASSESSMENT & PLAN NOTE
· Postoperative day 4 status post left femur intramedullary fixation on 1/19/2023  · Transfer to acute rehab today  · Outpatient orthopedic follow-up  · Continue oxycodone 5 mg every 6 hours for moderate to severe pain   Anticipate rehab placement  · Continue bowel regimen  · PT OT as tolerated at the facility

## 2023-01-23 NOTE — ASSESSMENT & PLAN NOTE
S/p mechanical fall on 1/18  XR on 1/18 showed acute intertrochanteric L hip fracture  OR on 1/19 for IM fixation of L subtrochanteric hip fracture performed by Dr Marylen Peacock  WBAT to LLE  Neurovascular checks Q shift  Ensure adequate pain control  Monitor incision for s/s of infection  Eliquis for DVT ppx  Follow-up with Orthopedics in 2 weeks as outpatient (2/2)  Primary team following  PT/OT

## 2023-01-23 NOTE — CONSULTS
[address] 1950, 67 y o  female MRN: [id number]  Unit/Bed#: Florin Lynch 910-79 Encounter: [id number]  Primary Care Provider: No primary care provider on file  Date and time admitted to hospital: 1/23/2023  1:33 PM    Inpatient consult to Internal Medicine  Consult performed by: ELDA Goldberg  Consult ordered by: Ezekiel Leonard MD          Hypokalemia  Assessment & Plan  K+ 3 2 on 1/22  Had not received replacement at that time  Give 40mEq PO potassium chloride once today  Repeat BMP tomorrow  Hypoxemia  Assessment & Plan  Hypoxia in acute setting post-operatively  Developed PEs and was started on Eliquis  Per Pulmonary - recommending overnight noc ox as outpatient  Consider obtaining while in [facility]  Osteopenia  Assessment & Plan  DXA scan in 12/2020 showed osteopenia  Continue home calcium carbonate and Vitamin D supplementation  No recent hx of Vitamin D level - obtain with next set of labs  Recommend repeat DXA scan as outpatient along with discussion about Prolia injections  Follow-up with PCP as outpatient  Acute postoperative anemia due to expected blood loss  Assessment & Plan  Hgb currently 8 8  Hgb was 12 6 pre-operatively  Obtain iron panel with next set of labs  Asymptomatic  No active s/s of bleeding  Continue to trend CBC  Acute deep vein thrombosis (DVT) of left peroneal vein [facility])  Assessment & Plan  Lower extremity venous duplex on 1/20: Evidence of focal acute DVT in 1 of 2 peroneal veins on LLE  Started on Eliquis 10mg BID on 1/22  Continue for 7 days total and then decreased to 5mg BID for 3 months  Follow-up with PCP as outpatient  Neurovascular checks Q shift  Multiple subsegmental pulmonary emboli without acute cor pulmonale (HCC)  Assessment & Plan  CTA PE study on 1/20: Few subsegmental pulmonary emboli in the posterior lower lobes, measured RV/LV ratio within normal limits    Started on Eliquis 10mg BID on 1/22  Continue for 7 days total and then decrease to 5mg BID for 3 months  Follow-up with PCP as outpatient  Currently maintaining on RA  Encourage pulmonary toileting  Spot pulse ox checks  Chronic diastolic congestive heart failure (HCC)  Assessment & Plan  Wt Readings from Last 3 Encounters:   01/23/23 96 9 kg (213 lb 10 oz)   01/23/23 95 2 kg (209 lb 14 1 oz)     Stable without exacerbation   on 1/20  Last ECHO on 12/2/22 showed EF 55-59%, G1DD  Takes Lasix 20mg daily at home - currently on hold  Restart as able  Other hyperlipidemia  Assessment & Plan  Continue home Lipitor 20mg daily  Last lipid panel on 11/15/22: Cholesterol 147, Triglycerides 73, HDL 53, and LDL 79  Essential hypertension  Assessment & Plan  Home regimen: Lasix 20mg daily, metoprolol tartrate 50mg BID, losartan 50mg daily  Currently receiving metoprolol tartrate 50mg BID and losartan 50mg daily  Consider restarting Lasix when able  Monitor BP with routine VS   Follow-up with PCP as outpatient  * Closed left hip fracture [facility])  Assessment & Plan  S/p mechanical fall on 1/18  XR on 1/18 showed acute intertrochanteric L hip fracture  OR on 1/19 for IM fixation of L subtrochanteric hip fracture performed by Dr Sofi Bradshaw  WBAT to LLE  Neurovascular checks Q shift  Ensure adequate pain control  Monitor incision for s/s of infection  Eliquis for DVT ppx  Follow-up with Orthopedics in 2 weeks as outpatient (2/2)  Primary team following  PT/OT  History of Present Illness:    Chucho Avila is a 67 y o  female with a PMH of osteopenia, hx of R hip fracture, CHF, HTN, and HLD who originally presented to [address] on 1/18/2023 after experiencing a mechanical fall due to a slippery floor with residual L sided hip pain  XR on 1/18 showed acute intertrochanteric L hip fracture  Orthopedics was consulted and recommended surgical intervention    Patient was taken to the OR on 1/19 for IM fixation of L subtrochanteric fracture performed by Dr Yunior Abraham  Post-operatively, patient became lethargic and hypoxic after receiving Dilaudid which originally improved with Narcan administration  Patient required BiPAP and continued to have hypoxia along with hypotension and leukocytosis  PE CTA study was performed on 1/20 and showed few subsegmental pulmonary emboli in the posterior lower lobes with a measured RV/LV ratio within normal limits  Venous duplex showed acute DVT in 1 of 2 L peroneal veins  Patient was started on Eliquis for [facility] on 1/22 and will need to continue for 3 months  Post-operatively, patient also developed acute blood loss anemia but did not require any blood products  Patient is to be WBAT to E and will require follow-up with Orthopedics as outpatient  Pulmonary also recommended overnight sleep study as outpatient due to hypoxia  Admitted to [address] [place] [address] on 1/23/2023; we are consulted for medical clearance  Pt examined while pt sitting in bed in pt room  Had fallen on slippery floors prior to breaking her hip  Has hx of R hip fracture and states that this was due to jumping down from chair height and feeling a "pop "  Currently denies any dyspnea at rest but states that she does get SOB frequently due to anxiety/stress  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 [address]  Sounds congested on exam but states that this is normal for her while lying down and improves with sitting up  Sleeps in a recliner at home  Denies any cough, fevers, or chills  Does occasionally feel lightheaded when getting OOB  Denies any palpitations or CP  LBM was prior to her fall  Complaints of RLQ tenderness on exam   Passing gas  Decreased appetite at baseline  Has been receiving stool softeners, consider KUB if no BM later today  Denies any urinary complaints    Denies any L sided hip pain, numbness, or tingling  Review of Systems:    Review of Systems   Constitutional: Negative for appetite change, chills, fatigue and fever  HENT: Positive for congestion (states this is normal for her and improves with sitting up)  Negative for postnasal drip, rhinorrhea and trouble swallowing  Eyes: Negative for visual disturbance  Respiratory: Positive for shortness of breath (SOB when feeling anxious or exerting herself)  Negative for cough, chest tightness, wheezing and stridor  Cardiovascular: Negative for chest pain, palpitations and leg swelling  Gastrointestinal: Positive for abdominal pain and constipation  Negative for abdominal distention, diarrhea, nausea and vomiting  LBM prior to surgery, complaints of RLQ abdominal tenderness, passing gas   Genitourinary: Negative for difficulty urinating, dysuria, frequency, hematuria and urgency  Musculoskeletal: Negative for arthralgias, back pain and gait problem  Neurological: Positive for light-headedness (has been experiencing lightheadedness when getting OOB since surgery)  Negative for dizziness, weakness, numbness and headaches  Psychiatric/Behavioral: Negative for dysphoric mood, self-injury, sleep disturbance and suicidal ideas  The patient is nervous/anxious (anxious at baseline, caretaker for her brother)  All other systems reviewed and are negative  Past Medical and Surgical History:     Past Medical History:   Diagnosis Date   • Hypertension        Past Surgical History:   Procedure Laterality Date   • HIP FRACTURE SURGERY     • DE OPTX FEM SHFT FX W/INSJ IMED IMPLT W/WO SCREW Left 1/19/2023    Procedure: INSERTION NAIL IM FEMUR ANTEGRADE (TROCHANTERIC); Surgeon: Mega Menjivar MD;  Location: [facility];  Service: Orthopedics   • REDUCTION MAMMAPLASTY         Meds/Allergies:    all medications and allergies reviewed    Allergies:    Allergies   Allergen Reactions   • Amoxicillin Angioedema   • Sulfa Antibiotics Angioedema and Anaphylaxis     throat "closes "     • Lisinopril Swelling   • Medical Tape Other (See Comments)     rash, blisters       Social History:     Marital Status:     Substance Use History:   Social History     Substance and Sexual Activity   Alcohol Use Never     Social History     Tobacco Use   Smoking Status Never   Smokeless Tobacco Never     Social History     Substance and Sexual Activity   Drug Use Never       Family History:  Reviewed    Physical Exam:     Vitals:   Blood Pressure: 130/64 (01/23/23 1500)  Pulse: 82 (01/23/23 1500)  Temperature: 98 2 °F (36 8 °C) (01/23/23 1500)  Temp Source: Tympanic (01/23/23 1500)  Respirations: 18 (01/23/23 1500)  Height: 5' 3" (160 cm) (01/23/23 1330)  Weight - Scale: 96 9 kg (213 lb 10 oz) (01/23/23 1403)  SpO2: 94 % (01/23/23 1500)    Physical Exam  Vitals and nursing note reviewed  Constitutional:       General: She is not in acute distress  Appearance: Normal appearance  She is obese  She is not ill-appearing  HENT:      Head: Normocephalic and atraumatic  Cardiovascular:      Rate and Rhythm: Normal rate and regular rhythm  Pulses: Normal pulses  Heart sounds: Murmur heard  Systolic murmur is present with a grade of 1/6  No friction rub  Pulmonary:      Effort: Pulmonary effort is normal  No respiratory distress  Breath sounds: Normal breath sounds  No wheezing or rhonchi  Comments: Wearing 1L O2 via NC  Abdominal:      General: Abdomen is flat  Bowel sounds are decreased  There is no distension  Palpations: Abdomen is soft  There is no mass  Tenderness: There is abdominal tenderness (Mild RLQ tenderness)  There is no guarding or rebound  Hernia: No hernia is present  Musculoskeletal:         General: Swelling (Moderate L thigh edema) present  Cervical back: Normal range of motion and neck supple  Right lower leg: No edema  Left lower leg: No edema     Skin:     General: Skin is warm and dry       Capillary Refill: Capillary refill takes less than 2 seconds  Findings: Bruising (L thigh ecchymosis) present  Comments: L hip incision with Mepilex dressing  Dressing with old bloody drainage  Ecchymosis surrounding incision   Neurological:      Mental Status: She is alert and oriented to person, place, and time  Psychiatric:         Mood and Affect: Mood normal          Behavior: Behavior normal            Additional Data:     Labs and imaging reviewed  EKG Reviewed - last EKG on 1/18/23 showed NSR with LBBB, QTc 460  M*Modal software was used to dictate this note  It may contain errors with dictating incorrect words or incorrect spelling  Please contact the provider directly with any questions

## 2023-01-23 NOTE — ASSESSMENT & PLAN NOTE
Continue home Lipitor 20mg daily  Last lipid panel on 11/15/22: Cholesterol 147, Triglycerides 73, HDL 53, and LDL 79

## 2023-01-23 NOTE — ASSESSMENT & PLAN NOTE
Home regimen: Lasix 20mg daily, metoprolol tartrate 50mg BID, losartan 50mg daily  Currently receiving metoprolol tartrate 50mg BID and losartan 50mg daily  Consider restarting Lasix when able  Monitor BP with routine VS   Follow-up with PCP as outpatient

## 2023-01-23 NOTE — ASSESSMENT & PLAN NOTE
Hgb currently 8 8  Hgb was 12 6 pre-operatively  Obtain iron panel with next set of labs  Asymptomatic  No active s/s of bleeding  Continue to trend CBC

## 2023-01-23 NOTE — TREATMENT PLAN
Individualized Plan of [address] 67 y o  female MRN: [id number]  Unit/Bed#: -02 Encounter: [id number]     PATIENT INFORMATION  ADMISSION DATE: 1/23/2023  1:33 PM JAZMIN CATEGORY:Orthopedic Disorders:  08 11  Unilateral Hip Fracture   ADMISSION DIAGNOSIS: Femur fracture, left ([facility] [address] ) [S72 [id number]]  EXPECTED LOS: 2 weeks     MEDICAL/FUNCTIONAL PROGNOSIS  Based on my assessment of the patient's medical conditions and current functional status, the prognosis for attaining medical and functional goals or the IRF stay is:  Good    Medical Goals: Patient will be able to manage medical conditions and comorbid conditions with medications and follow up upon completion of rehab program     1  Adequate pain control  2  Prevention of recurrent VTE  3  Adequate secretion management, and monitoring of respiratory status  4  Bowel/bladder management  5  Maintain appropriate nutrition and hydration for healing and hemodynamic stability  6  Emotional adaptation to impairment  7  Monitor for polypharmacy  8  Fall prevention  9  Patient and family caregiver training/education  10  Skin protection and prevention of pressure injury  11  Incisional care to prevent infection/dehiscence  12  Prevention of delirium  13  Monitoring of volume status, respiratory status  14  Management of anemia, PE, and DVT      ANTICIPATED DISCHARGE DISPOSITION AND SERVICES  COMMUNITY SETTING: Home - independent/modified independent    ANTICIPATED FOLLOW-UP SERVICE:   Outpatient Therapy Services: PT and OT         DISCIPLINE SPECIFIC PLANS:  Required Disciplines & Services: Rehabillitation Nursing and Psychology    REQUIRED THERAPY:  Therapy Hours per Day Days per Week Total Days   Physical Therapy 1 5 5 5   Occupational Therapy 1 5 5 5   NOTE: Additional therapy time(s) or changes to allocation of therapies as appropriate to meet patient needs and to achieve functional goals        Patient will participate in above therapy regimen consisting of PT and OT due to the following medical procedure/condition:Orthopedic Disorders:  08 11  Unilateral Hip Fracture    ANTICIPATED FUNCTIONAL OUTCOMES:  ADL:  mod Ind - Sup   Bladder/Bowel: Patient will return to premorbid level for bladder/bowel management upon completion of rehab program   Transfers:   mod Ind    Locomotion:   mod Ind   Cognitive:  pre morbid     DISCHARGE PLANNING NEEDS  Equipment needs: Discharge needs to be reviewed with team      REHAB ANTICIPATED PARTICIPATION RESTRICTIONS:  None

## 2023-01-23 NOTE — CASE MANAGEMENT
Case Management Discharge Planning Note    Patient name [facility] Ned Aguayo* MRN [id number]  : 1950 Date 2023       Current Admission Date: 2023  Current Admission Diagnosis:Closed left hip fracture [facility])   Patient Active Problem List    Diagnosis Date Noted   • Multiple subsegmental pulmonary emboli without acute cor pulmonale ([facility] [address] ) 2023   • Acute deep vein thrombosis (DVT) of left peroneal vein ([facility] [address] ) 2023   • Acute postoperative anemia due to expected blood loss 2023   • Closed left hip fracture ([facility] [address] ) 2023   • Essential hypertension 2023   • Other hyperlipidemia 2023   • Hypoxia 2023   • Chronic diastolic congestive heart failure ([facility] [address] ) 2023      LOS (days): 5  Geometric Mean LOS (GMLOS) (days): 3 50  Days to GMLOS:-1 3     OBJECTIVE:  Risk of Unplanned Readmission Score: 10 79         Current admission status: Inpatient   Preferred Pharmacy:   [id number] - [address] [address]  [address] [place] [address]  Phone: [phone] Fax: [phone]    Primary Care Provider: No primary care provider on file  Primary Insurance: WORKERS COMPENSATION  Secondary Insurance: [facility] HOSPITAL REP    DISCHARGE DETAILS:                           Contacts  Patient Contacts: Joesph Donaldson- brother  Relationship to Patient[de-identified] Family  Contact Method: Phone  Phone Number: [phone]  Reason/Outcome: Other (Comment) ( time)                        Treatment Team Recommendation: Short Term Rehab  Discharge Destination Plan[de-identified] Acute Rehab, Short Term Rehab ([facility] )  Transport at Discharge : BLS Ambulance     Number/Name of Dispatcher: Roundtrip  Transported by [employer] and Unit #):  [employer]- (company approved under current workman's comp claim)  ETA of Transport (Date): 23  ETA of Transport (Time): 1300              IMM Given (Date):: 01/23/23  IMM Given to[de-identified] Patient     Additional Comments: Pt agreeable to [facility]- understaning and agreeable to transfer this afternoon- will update Katina from [address] Name, [address] : [address]   Receiving Facility/Agency Phone Number: [phone]

## 2023-01-23 NOTE — PROGRESS NOTES
PHYSICAL MEDICINE AND REHABILITATION   PREADMISSION ASSESSMENT     Projected [facility] Diagnoses:  Impairment of mobility, safety and Activities of Daily Living (ADLs) due to Orthopedic Disorders:  08 11  Unilateral Hip Fracture  Etiologic Dx: Left Intertrochanteric Femur Fracture  Date of Onset: 1/18/2023   Date of surgery: 1/19/2023 Intramedullary Fixation of Left Subtrochanteric Hip Fracture    PATIENT INFORMATION  Name: Matthew Ferrera Phone #: [phone] (home)   Address: [address] [place] [address]  YOB: 1950 Age: 67 y o  SS#   Marital Status:   Ethnicity:   Employment Status: currently employed  Extended Emergency Contact Information  Primary Emergency Contact: veronicaeduardo  Mobile Phone: [phone]  Relation: Brother  Secondary Emergency Contact: valentin sherwood  Home Phone: [phone]  Relation: Son  Advance Directive: Level 1 Full Code - unknown advanced directive    INSURANCE/COVERAGE:     Primary Payor: WORKERS COMPENSATION / Plan: [employer] / Product Type: Workers Compensation /   Secondary Payer: [address] [facility] Rep/[employer] Medicare Advantage [address]: [address] [facility],[address]:   Contact Phone: [phone]  Contact Fax: [phone] Contact Phone:     Claim #: [id number]  1/23/2023 - Spoke with patient's worker's comp claim adjustor, Noe Solomon (P): [phone]  Confirmed claim #, and confirmed patient is approved for [facility] admission, with no authorization required  [facility] admission will be paid according to PA fee schedule  Medical Record #: [id number]    REFERRAL SOURCE:   Referring provider: Arthurine Pencil*  Referring facility: [address] [place] [address]  Room: [place] [address]/[facility] 2 *  PCP: No primary care provider on file   PCP phone number: None    MEDICAL INFORMATION  HPI: Patient is a 67year old female that presented to [address] on 1/18/2023 via EMS s/p fall at work  Patient states she slipped on a wet floor and had immediate left hip pain  She denies hitting head or LOC  CT of head was negative  Left hip/pelvis x-ray showed acute intertrochanteric left hip fracture  Patient was deemed by SLIM for intermediate operative risk and was cleared for surgical intervention  Orthopedics was consulted, with recommendations for non-weight bearing to LLE and plan for operative intervention  On 1/19, patient went to the OR with Orthopedics for left IM fixation; EBL 150ml  Patient was cleared for weight bearing as tolerated to LLE and was initiated on Lovenox SQ for DVT prophylaxis  Postoperatively, patient was with lethargy, requiring Narcan and some improvement  She initially required 10L O2 via NC, however required increase to BiPap  CXR was negative for acute findings  Labs revealed lactic acidosis and leukocytosis, and patient was initiated on IV fluids and IV antibiotics  On 1/20, patient was with noted hypotension, required additional IV fluid bolus  CTA of chest PE was completed, which showed few subsegmental PE in posterior lower lobes  VAS LE duplex study showed evidence of focal acute DVT in 1 of 2 peroneal veins  Patient was initiated on Lovenox SQ BID  Pulmonology was consulted, with suspected cause for acute hypoxemic respiratory failure due to atelectasis and possible volume overload  Administered IV Lasix and planned for 3 months anticoagulation due to PE and LLE DVT  On 1/21, patient was transitioned to Eliquis 10mg PO BID for 3 months  Patient is overall hemodynamically stable and medically cleared for discharge to [facility]  PT/OT therapies were consulted, as well as patient's case reviewed with [facility] physician, and they are recommending patient for inpatient Acute Rehab  She has demonstrated that she can tolerate and participate in 3 hours of therapy per day  Received both Pfizer vaccines; COVID test resulted negative on 1/20/2023      Past Medical History:   Past Surgical History: Allergies:     Past Medical History:   Diagnosis Date   • Hypertension     Past Surgical History:   Procedure Laterality Date   • HIP FRACTURE SURGERY     • OR OPTX FEM SHFT FX W/INSJ IMED IMPLT W/WO SCREW Left 1/19/2023    Procedure: INSERTION NAIL IM FEMUR ANTEGRADE (TROCHANTERIC);   Surgeon: Elizabeth Flores MD;  Location: [facility] OR;  Service: Orthopedics   • REDUCTION MAMMAPLASTY       Allergies   Allergen Reactions   • Amoxicillin Angioedema   • Sulfa Antibiotics Angioedema and Anaphylaxis     throat "closes "     • Lisinopril Swelling   • Medical Tape Other (See Comments)     rash, blisters         Medical/functional conditions requiring inpatient rehabilitation: s/p fall, left intertrochanteric femur fracture s/p IM fixation, acute pain, ABLA, leukocytosis, PE, LLE DVT, acute respiratory failure with hypoxia, impaired mobility and self care    Risk for medical/clinical complications: risk for falls, risk for uncontrolled pain, risk for skin breakdown/wound dehiscence, risk for infection, risk for respiratory distress, risk for additional DVT/PE, risk for hypo/hypertensive episodes    Comorbidities/Surgeries in the last 100 days: osteoporosis, right hip fracture, HTN, HLD, CHF, obesity, s/p IM fixation left hip fracture on 1/19/2023    CURRENT VITAL SIGNS:   Temp:  [97 2 °F (36 2 °C)-97 8 °F (36 6 °C)] 97 2 °F (36 2 °C)  HR:  [] 76  Resp:  [14-22] 14  BP: (127-160)/(60-77) 127/60   Intake/Output Summary (Last 24 hours) at 1/23/2023 1216  Last data filed at 1/22/2023 1730  Gross per 24 hour   Intake 600 ml   Output --   Net 600 ml        LABORATORY RESULTS:      Lab Results   Component Value Date    HGB 8 8 (L) 01/22/2023    HCT 25 5 (L) 01/22/2023    WBC 9 59 01/22/2023     Lab Results   Component Value Date    BUN 16 01/22/2023    K 3 2 (L) 01/22/2023     01/22/2023    CREATININE 0 45 (L) 01/22/2023     Lab Results   Component Value Date    PROTIME 13 2 01/19/2023    INR 1 00 01/19/2023        DIAGNOSTIC STUDIES:  XR chest portable    Result Date: 1/20/2023  Impression: No acute cardiopulmonary disease  Workstation performed: [id number]     XR chest portable    Result Date: 1/20/2023  Impression: Increased bibasilar density, likely atelectasis  Workstation performed: [id number]     XR hip/pelv 2-3 vws left if performed    Result Date: 1/18/2023  Impression: Acute intertrochanteric left hip fracture  This finding is noted in the ED preliminary report  Workstation performed: [id number]     XR femur 2 vw left    Result Date: 1/20/2023  Impression: Fluoroscopic guidance provided for procedure guidance  Please refer to the separate procedure notes for additional details  Workstation performed: [id number]     XR femur 2 views LEFT    Result Date: 1/18/2023  Impression: Left hip fracture reported separately with dedicated left hip/pelvis study  No additional fractures  Workstation performed: [id number]     CT head without contrast    Result Date: 1/18/2023  Impression: No acute intracranial abnormality  Workstation performed: [id number]     CTA chest pe study    Result Date: 1/20/2023  Impression: Few subsegmental pulmonary emboli in the posterior lower lobes  Measured RV/LV ratio is within normal limits at less than 0 9  I personally discussed this study with Harris Art on 1/20/2023 at 2:36 PM  Workstation performed: [id number]       PRECAUTIONS/SPECIAL NEEDS:  Tobacco:   Social History     Tobacco Use   Smoking Status Never   Smokeless Tobacco Never   , Alcohol:    Social History     Substance and Sexual Activity   Alcohol Use Never   , Weight Bearing Precautions:  weight bearing as tolerated, Anticoagulation:  Eliquis 10mg PO BID, Edema Management, Safety Concerns, Pain Management, Language Preference: English and Fall Precautions      MEDICATIONS:     Current Facility-Administered Medications:   •  acetaminophen (TYLENOL) tablet 650 mg, 650 mg, Oral, Q6H PRN, RADHA Maldonado-C, 650 mg at 01/20/23 7764  •  apixaban (ELIQUIS) tablet 10 mg, 10 mg, Oral, BID, Alfredo Patton MD, 10 mg at 01/23/23 0739  •  atorvastatin (LIPITOR) tablet 20 mg, 20 mg, Oral, Daily With RADHA Prieto-C, 20 mg at 01/22/23 1603  •  calcium carbonate (TUMS) chewable tablet 1,000 mg, 1,000 mg, Oral, Daily PRN, Chris Bermeo PA-C, 1,000 mg at 01/21/23 0522  •  docusate sodium (COLACE) capsule 100 mg, 100 mg, Oral, BID, Chris Bermeo PA-C, 100 mg at 01/23/23 7945  •  loratadine (CLARITIN) tablet 10 mg, 10 mg, Oral, Daily, Chris Bermeo PA-C, 10 mg at 01/23/23 5050  •  losartan (COZAAR) tablet 50 mg, 50 mg, Oral, Daily, Alfredo Patton MD, 50 mg at 01/23/23 9756  •  metoprolol tartrate (LOPRESSOR) tablet 50 mg, 50 mg, Oral, BID, Chris Bermeo PA-C, 50 mg at 01/23/23 7837  •  oxyCODONE (ROXICODONE) IR tablet 5 mg, 5 mg, Oral, Q6H PRN, Alfredo Patton MD  •  pantoprazole (PROTONIX) EC tablet 40 mg, 40 mg, Oral, Daily, Chris Bermeo PA-C, 40 mg at 01/23/23 8106  •  senna (SENOKOT) tablet 8 6 mg, 1 tablet, Oral, Daily, RADHA Maldonado-C, 8 6 mg at 01/23/23 2776    SKIN INTEGRITY:   Incision: healing well, no significant drainage, no dehiscence, no significant erythema, left hip incision with silver dressing    PRIOR LEVEL OF FUNCTION:  She lives in a(n) single family home  Raina Silva is  and lives with their brother  Self Care: Independent, Indoor Mobility: Independent, Stairs (in/outdoor): Independent and Cognition: Independent  Prior to patient's admission, patient was fully Independent with ADLs and IADLs, including driving and working full-time as an  at [employer]  She was Independent without use of AD for mobility  FALLS IN THE LAST 6 MONTHS: one    HOME ENVIRONMENT:  The living area: bedroom on 2nd floor and bathroom on 2nd floor  There are 6 steps to enter the home      The patient will not have 24 hour supervision/physical assistance available upon discharge  Patient's brother is disabled, however does not require physical assistance  Patient's ex-spouse is supportive and currently assisting with her brother's care  PREVIOUS DME:  Equipment in home (previous DME): Shower Chair, Grab Bars, Rolling Walker, Single [employer] and Raised Toilet    FUNCTIONAL STATUS:  Physical Therapy Occupational Therapy Speech Therapy   1/22/2023, per PTA    Subjective   Subjective Pt  agreeable to PT  Seated on chait upon entry   Transfers   Sit to Stand    (Mod progressed to Min)   Additional items Assist x 1; Armrests; Increased time required;Verbal cues   Stand to Sit 4  Minimal assistance   Additional items Assist x 1; Increased time required;Verbal cues;Armrests   Stand pivot 4  Minimal assistance   Additional items Assist x 1; Increased time required;Verbal cues   Ambulation/Elevation   Gait pattern Decreased foot clearance; Improper Weight shift; Antalgic; Forward Flexion;L Foot drag;R Foot drag; Inconsistent oksana;Decreased L stance; Excessively slow;Decreased heel strike;Decreased toe off   Gait Assistance 3  Moderate assist   Additional items Assist x 1;Verbal cues; Tactile cues   Assistive Device Rolling walker   Distance 2ft, 6ft, 10ft, 4ft   Balance   Static Sitting Fair +   Dynamic Sitting Fair   Static Standing Fair   Dynamic Standing Fair -   Ambulatory Fair -   Endurance Deficit   Endurance Deficit Yes   Endurance Deficit Description pain, anxiety, fear   Activity Tolerance   Activity Tolerance Patient tolerated treatment well   Nurse Made Aware Yes   Assessment   Prognosis Fair   Problem List Decreased strength;Decreased range of motion;Decreased endurance; Impaired balance;Decreased mobility; Impaired judgement;Pain;Orthopedic restrictions; Obesity   Assessment Pt  needed extended education and encouragement for ambulation  Pt  given demonstration for STS transfers and ambulation with RW  Fair carry over noted  Cues for hand placement for STS given  Mod A progressed to min A x 1 for STS transfer  Pt  needed much encouragement to avoid toe and foot dragging of BLEs during ambulation  Pt  eventually able to take fair steps and strides  Pt  noted with fear and anxiety with WBing activiites  Education for relaxing and not to hold her breath while performing tasks  Pt  declined pain meds to RN Kimberly Valladares in the day however pateint agreeable to take pain meds during the session and RN provided the same  Educated patient in having pain under control to have better mobility  Pt  also declined ice pack and with education patient agreeable to trial applying ice pack for pain control at the end of session  Pt  seated on chair post session Pt  needed much cues and education to participate in activities  Will continue to follow per PT POC  proviced Mod A - Min A x 1 for amb  with max cues for techqniue  1/23/2023, per OT    ADL   Where Assessed Chair   Eating Assistance 7  Independent   Grooming Assistance 4  Minimal Assistance   Grooming Deficit Setup;Steadying;Verbal cueing;Supervision/safety; Increased time to complete;Standing with assistive device; Wash/dry face; Wash/dry hands   Grooming Comments Increased balance support needed for grooming in standing with unilateral UE support   UB Bathing Assistance 5  Supervision/Setup   UB Bathing Deficit Supervision/safety; Increased time to complete;Setup;Steadying   UB Bathing Comments Seated in chair   LB Bathing Assistance 2  Maximal Assistance   LB Bathing Deficit Setup;Steadying;Supervision/safety; Increased time to complete; Buttocks; Perineal area;Right lower leg including foot; Left lower leg including foot   LB Bathing Comments Seated in chair   UB Dressing Assistance 5  Supervision/Setup   UB Dressing Deficit Setup; Increased time to complete   LB Dressing Assistance 2  Maximal Assistance   LB Dressing Deficit Setup;Steadying; Requires assistive device for steadying;Verbal cueing;Supervision/safety; Increased time to complete; Don/doff R sock; Don/doff L sock; Thread RLE into underwear; Thread LLE into underwear   LB Dressing Comments Able to don over hips with unilateral UE support and cuing for pacing   Bed Mobility   Additional Comments Pt OOB to chair pre- and post-session   Transfers   Sit to Stand 4  Minimal assistance   Additional items Assist x 1; Armrests; Increased time required;Verbal cues   Stand to Sit 3  Moderate assistance  (min-modA with fatigue)   Additional items Assist x 1; Increased time required;Verbal cues;Armrests   Stand pivot 4  Minimal assistance   Additional items Assist x 1; Increased time required;Verbal cues   Toilet transfer 4  Minimal assistance   Additional items Armrests; Increased time required;Verbal cues; Commode   Functional Mobility   Functional Mobility 4  Minimal assistance   Additional Comments Ax1 with FWW, 2 steps forward and backwards with demo on technique and verbal cuing   Subjective   Subjective "I'm anxious about falling"   Cognition   Overall Cognitive Status [facility]   Arousal/Participation Alert; Responsive; Cooperative   Attention Attends with cues to redirect   Orientation Level Oriented X4   Memory Decreased recall of recent events   Following Commands Follows multistep commands without difficulty   Comments improved mentation noted from prior session; cotninues with anxiety   Activity Tolerance   Activity Tolerance Patient tolerated treatment well;Treatment limited secondary to medical complications (Comment)  (anxiety, chest pain?)   Medical Staff Made Aware KIRK Castellanos CM   Assessment   Assessment Pt seen for f/u OT tx today with focus on functional transfers/mobility, self-care tasks, standing tolerance  Pt with improved mentation noted today, increased activity tolerance  Pt able to STS with Stacia, takes 5 steps forward with demo on technique and VCs - with Stacia   Unable to step backwards at this time 2/2 fatigue and pain, requiring chair to be pulled closer to pt  UB dressing/bathing in sitting completed with s/u  LB dressing/bathing with maxA 2/2 impaired functional reach, pain  Completed grooming in standing - able to stand for 2 min with unilateral-BUE support, improvement noted from evaluation  Requires CGA-Stacia 2/2 impaired balance without BUE support  Pt remains motivated but anxious t/o tx session  Complains of 5/10 chest pain at end of session, does not radiate in which pt states she has felt before and told RN prior - BP: 127/60, HR 81 BPM, O2 94 on RA  Did share with RN and CM post session  Patient continues to be functioning below baseline level, occupational performance remains limited secondary to factors listed above and increased risk for falls and injury  1/21/2023, per SLP    Summary    Assessment was somewhat limited, as pt was just finishing up breakfast, but was willing to eat a few bites/sips  With limited assessment, pt presents with oropharyngeal swallows that appear WNL  There were no overt s/s of aspiration  Risk of aspiration appears low  Pt denies any difficulty swallowing today, but reports some trouble yesterday due to "feeling very dry"  Family does report some coughing while eating and also after, but this was not observed, and pt denies  Given respiratory status and family reports of coughing while eating, pt would benefit from dysphagia tx for at least one session, to observe and assess with a larger amount and variety of foods, and determine need for VBS, especially if respiratory status does not improve        Recommendations:   Diet: regular diet and thin liquids   Meds: whole with liquid   Frequent Oral care: 4x/day  Independent for feeding  Aspiration precautions and compensatory swallowing strategies: upright posture  Other Recommendations/ considerations: ST to see for dysphagia tx, to assess with a larger amount and variety of food, and to determine need for VBS            Current Medical Status  Copied from admission/physician notes:  Brad Méndez a 67 y  o  female with a PMH of hypertension, hyperlipidemia, grade 1 diastolic dysfunction and prior history of right hip fracture approximately 10 years ago who presents with left hip fracture status post mechanical fall   The patient states that 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[employer] where she slipped on the wet floor and fell onto her left side   She denies any prodromal symptoms such as chest pain, palpitations or lightheadedness   The patient reports pain when turning in bed or moving her left leg   She became hypoxic when receiving fentanyl in the emergency department  Afua Truong to her fall she reports feeling well and being at her baseline state of health  Maik Trinidad denies exertional symptoms including exertional shortness of breath or chest pain   She is able to tolerate moderate physical activity and 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  Maik Trinidad denies recent illnesses   Denies GI or urinary symptoms   Remainder of 12 point review of systems is otherwise negative  Hypoxia  Assessment & Plan  • Patient developed hypoxia following dose of fentanyl for pain management requiring 2L O2  • Continue with narcotic medications with caution, nasal cannula to keep oxygen saturations above 90%        Order received and chart reviewed  Discussed with RN  Pt took meds without difficulty this morning  Family reportedly told physician that pt sometimes coughs while eating, and after eating  Pt became hypoxic after receiving fentanyl in the ER  CXR shows increasing density, likely atelectasis  Pt reports that she had difficulty swallowing yesterday because she had been so dry, but that today she denies difficulty          Past medical history:   Please see H&P for details     Special Studies:  CXR-  Increased bibasilar density, likely atelectasis          CT chest-  Few subsegmental pulmonary emboli in the posterior lower lobes  Measured RV/LV ratio is within normal limits at less than 0 9              Social/Education/Vocational Hx:  Pt lives with family    Swallow Information   Current Risks for Dysphagia & Aspiration: Pt denies current dysphagia, but family reports coughing while eating  and after eating  Current Symptoms/Concerns: change in respiratory status and family reports coughing with PO  Current Diet: regular diet and thin liquids   Baseline Diet: regular diet and thin liquids    Baseline Assessment   Behavior/Cognition: alert  Speech/Language Status: able to participate in conversation and able to follow commands  Patient Positioning: upright in bed      Swallow Mechanism Exam   Facial: symmetrical  Labial: WFL  Lingual: WFL  Velum: symmetrical  Mandible: adequate ROM  Dentition: adequate  Vocal quality:clear/adequate   Volitional Cough: strong/productive   Respiratory: NC, sats remained in mid to upper 90s        Consistencies Assessed and Performance   Consistencies Administered: thin liquids, soft solids and hard solids    Oral Stage: Adequate bolus retrieval and draw from straw, prompt mastication, bolus formation and transfer, no pocketing or residue, good lip seal       Pharyngeal Stage: Hyolaryngeal elevation observed and palpated, swallows appear prompt, with no overt s/s of aspiration  Esophageal Concerns: none reported       Results Reviewed with: patient and RN   Dysphagia Goals: 1  Pt will tolerate regular diet and thin liquids with prompt oropharyngeal swallows and no overt s/s of aspiration  2  Pt will tolerate LRD without s/s of aspiration  Discharge recommendation: likely no follow up needed for swallowing     Speech Therapy Prognosis   Prognosis: good     Prognosis Considerations: age, medical status, cognitive status and therapeutic potential     CARE SCORES:  Self Care:  Eatin: Independent  Oral hygiene: 04: Supervision or touching  assistance  Toilet hygiene: 09:  Not applicable  Shower/bathing self: 02: Substantial/maximal assistance  Upper body dressin: Supervision or touching  assistance  Lower body dressin: Substantial/maximal assistance  Putting on/taking off footwear: 02: Substantial/maximal assistance  Transfers:  Roll left and right: 09: Not applicable  Sit to lyin: Not applicable  Lying to sitting on side of bed: 09: Not applicable  Sit to stand: 03: Partial/moderate assistance  Chair/bed to chair transfer: 04: Supervision or touching  assistance  Toilet transfer: 09: Not applicable  Mobility:  Walk 10 ft: 03: Partial/moderate assistance  Walk 50 ft with two turns: 10: Not attempted due to environmental limitations  Walk 150ft: 88: Not attempted due to medical conditions or safety concerns    CURRENT GAP IN FUNCTION  Prior to Admission: Functional Status: Patient was independent with mobility/ambulation, transfers, ADL's, IADL's  Estimated length of stay: 10 to 14 days    Anticipated Post-Discharge Disposition/Treatment  Disposition: Return to previous home/apartment  Outpatient Services: Physical Therapy (PT) and Occupational Therapy (OT)    BARRIERS TO DISCHARGE  Weakness, Pain, Balance Difficulty, Fatigue, Home Accessibility, Caregiver Accessibility, Financial Resources, Equipment Needs and Resource Availability    INTERVENTIONS FOR DISCHARGE  Adaptive equipment, Patient/Family/Caregiver Education, Community Resources, Financial Assistance, Arrange DME needs, Medication Changes as per MD recommendations, Therapy exercises, Center of balance support  and Energy conservation education     REQUIRED THERAPY:  Patient will require PT and OT 90 minutes each per day, five days per week to achieve rehab goals       REQUIRED FUNCTIONAL AND MEDICAL MANAGEMENT FOR INPATIENT REHABILITATION:  Skin:  There are no pressure sores currently, left hip incision with silver dressing, Pain Management: Overall pain is moderately controlled, Deep Vein Thrombosis (DVT) Prophylaxis:  Eliquis 10mg PO BID, further IM management of additional medical conditions while on the ARC, she needs PT/OT intervention, patient/family education and training, possible Neuropsych and Pulmonology consults with any other needed consults prn, nursing medication review and management of bowel/bladder function  RECOMMENDED LEVEL OF CARE:   Patient is a 67year old female that presented to [address] [place] [address] on 1/18/2023 via EMS s/p fall at work  Patient states she slipped on a wet floor and had immediate left hip pain  She denies hitting head or LOC  CT of head was negative  Left hip/pelvis x-ray showed acute intertrochanteric left hip fracture  Patient was deemed by SLIM for intermediate operative risk and was cleared for surgical intervention  Orthopedics was consulted, with recommendations for non-weight bearing to LLE and plan for operative intervention  On 1/19, patient went to the OR with Orthopedics for left IM fixation; EBL 150ml  Patient was cleared for weight bearing as tolerated to LLE and was initiated on Lovenox SQ for DVT prophylaxis  Postoperatively, patient was with lethargy, requiring Narcan and some improvement  She initially required 10L O2 via NC, however required increase to BiPap  CXR was negative for acute findings  Labs revealed lactic acidosis and leukocytosis, and patient was initiated on IV fluids and IV antibiotics  On 1/20, patient was with noted hypotension, required additional IV fluid bolus  CTA of chest PE was completed, which showed few subsegmental PE in posterior lower lobes  VAS LE duplex study showed evidence of focal acute DVT in 1 of 2 peroneal veins  Patient was initiated on Lovenox SQ BID  Pulmonology was consulted, with suspected cause for acute hypoxemic respiratory failure due to atelectasis and possible volume overload  Administered IV Lasix and planned for 3 months anticoagulation due to PE and LLE DVT   On 1/21, patient was transitioned to Eliquis 10mg PO BID for 3 months  Prior to patient's admission, patient was fully Independent with ADLs and IADLs, including driving and working full-time as an  at [employer]  She was Independent without use of AD for mobility  Currently, patient is Min-Mod assist with use of RW for gait and transfers, and Supervision for UB ADLs, Max assist for LB ADLs  Close medical management and PM&R management is recommended at this time while patient is on the [facility] acute rehab is recommended for patient to maximize overall strength and mobility upon discharge to home with support of family

## 2023-01-23 NOTE — ASSESSMENT & PLAN NOTE
Hypoxia in acute setting post-operatively  Developed PEs and was started on Eliquis  Per Pulmonary - recommending overnight noc ox as outpatient  Consider obtaining while in [facility]

## 2023-01-23 NOTE — OCCUPATIONAL THERAPY NOTE
Occupational Therapy Progress Note     Patient Name: Angeline Manrique  KBBNO'K Date: 1/23/2023  Problem List  Principal Problem:    Closed left hip fracture [facility])  Active Problems:    Essential hypertension    Other hyperlipidemia    Hypoxia    Chronic diastolic congestive heart failure (HCC)    Multiple subsegmental pulmonary emboli without acute cor pulmonale (HCC)    Acute deep vein thrombosis (DVT) of left peroneal vein (HCC)    Acute postoperative anemia due to expected blood loss        01/23/23 1045   OT Last Visit   OT Visit Date 01/23/23   Note Type   Note Type Treatment for insurance authorization   Pain Assessment   Pain Assessment Tool 0-10   Pain Score 5   Pain Location/Orientation Location: Chest;Orientation: Left; Location: Hip   Hospital Pain Intervention(s) Cold applied;Repositioned; Ambulation/increased activity; Emotional support;Relaxation technique   Restrictions/Precautions   Weight Bearing Precautions Per Order Yes   RLE Weight Bearing Per Order WBAT   LLE Weight Bearing Per Order WBAT   Other Precautions Chair Alarm; Bed Alarm;WBS;Fall Risk;Pain;Telemetry   ADL   Where Assessed Chair   Eating Assistance 7  Independent   Grooming Assistance 4  Minimal Assistance   Grooming Deficit Setup;Steadying;Verbal cueing;Supervision/safety; Increased time to complete;Standing with assistive device; Wash/dry face; Wash/dry hands   Grooming Comments Increased balance support needed for grooming in standing with unilateral UE support   UB Bathing Assistance 5  Supervision/Setup   UB Bathing Deficit Supervision/safety; Increased time to complete;Setup;Steadying   UB Bathing Comments Seated in chair   LB Bathing Assistance 2  Maximal Assistance   LB Bathing Deficit Setup;Steadying;Supervision/safety; Increased time to complete; Buttocks; Perineal area;Right lower leg including foot; Left lower leg including foot   LB Bathing Comments Seated in chair   UB Dressing Assistance 5  Supervision/Setup   UB Dressing Deficit Setup; Increased time to complete   LB Dressing Assistance 2  Maximal Assistance   LB Dressing Deficit Setup;Steadying; Requires assistive device for steadying;Verbal cueing;Supervision/safety; Increased time to complete; Don/doff R sock; Don/doff L sock; Thread RLE into underwear; Thread LLE into underwear   LB Dressing Comments Able to don over hips with unilateral UE support and cuing for pacing   Bed Mobility   Additional Comments Pt OOB to chair pre- and post-session   Transfers   Sit to Stand 4  Minimal assistance   Additional items Assist x 1; Armrests; Increased time required;Verbal cues   Stand to Sit 3  Moderate assistance  (min-modA with fatigue)   Additional items Assist x 1; Increased time required;Verbal cues;Armrests   Stand pivot 4  Minimal assistance   Additional items Assist x 1; Increased time required;Verbal cues   Toilet transfer 4  Minimal assistance   Additional items Armrests; Increased time required;Verbal cues; Commode   Functional Mobility   Functional Mobility 4  Minimal assistance   Additional Comments Ax1 with FWW, 2 steps forward and backwards with demo on technique and verbal cuing   Subjective   Subjective "I'm anxious about falling"   Cognition   Overall Cognitive Status [facility]   Arousal/Participation Alert; Responsive; Cooperative   Attention Attends with cues to redirect   Orientation Level Oriented X4   Memory Decreased recall of recent events   Following Commands Follows multistep commands without difficulty   Comments improved mentation noted from prior session; cotninues with anxiety   Activity Tolerance   Activity Tolerance Patient tolerated treatment well;Treatment limited secondary to medical complications (Comment)  (anxiety, chest pain?)   Medical Staff Made Aware KIRK Francisco CM   Assessment   Assessment Pt seen for f/u OT tx today with focus on functional transfers/mobility, self-care tasks, standing tolerance  Pt with improved mentation noted today, increased activity tolerance   Pt able to STS with Stacia, takes 5 steps forward with demo on technique and VCs - with Stacia  Unable to step backwards at this time 2/2 fatigue and pain, requiring chair to be pulled closer to pt  UB dressing/bathing in sitting completed with s/u  LB dressing/bathing with maxA 2/2 impaired functional reach, pain  Completed grooming in standing - able to stand for 2 min with unilateral-BUE support, improvement noted from evaluation  Requires CGA-Stacia 2/2 impaired balance without BUE support  Pt remains motivated but anxious t/o tx session  Complains of 5/10 chest pain at end of session, does not radiate in which pt states she has felt before and told RN prior - BP: 127/60, HR 81 BPM, O2 94 on RA  Did share with RN and CM post session  Patient continues to be functioning below baseline level, occupational performance remains limited secondary to factors listed above and increased risk for falls and injury  Plan   Treatment Interventions ADL retraining;Functional transfer training;UE strengthening/ROM; Endurance training;Patient/family training;Equipment evaluation/education; Neuromuscular reeducation; Compensatory technique education;Continued evaluation; Energy conservation; Activityengagement   Goal Expiration Date 02/03/23   OT Treatment Day 1   OT Frequency 3-5x/wk   Recommendation   OT Discharge Recommendation Post acute rehabilitation services   AM-PAC Daily Activity Inpatient   Lower Body Dressing 2   Bathing 2   Toileting 2   Upper Body Dressing 3   Grooming 3   Eating 4   Daily Activity Raw Score 16   Daily Activity Standardized Score (Calc for Raw Score >=11) 35 96   AM-PAC Applied Cognition Inpatient   Following a Speech/Presentation 4   Understanding Ordinary Conversation 4   Taking Medications 3   Remembering Where Things Are Placed or Put Away 4   Remembering List of 4-5 Errands 4   Taking Care of Complicated Tasks 3   Applied Cognition Raw Score 22   Applied Cognition Standardized Score 47 83     Bladimir Barclay

## 2023-01-23 NOTE — PROGRESS NOTES
Chata Miller  67 y o   female  MR#: [id number]  1/23/2023    Post-op days: 4  Extremity: Left hip    Subjective: 80-year-old female status post left long TFNA for left subtrochanteric femur fracture  Patient was diagnosed with PE and DVT in the left lower leg  She is on Eliquis  Patient is doing well with her breathing, she denies chest pain  Her pain is under good control  She has been trying to walk with PT, but was having trouble due to pain  She denies fever, chilsl, or sweats  Vitals:   Vitals:    01/23/23 0818   BP: 160/75   Pulse: 99   Resp: 20   Temp: (!) 97 2 °F (36 2 °C)   SpO2: 96%       Exam:   Patient is laying in her bed comfortably  Alert, awake, and oriented x3  She is not in acute distress  Dressings are CDI  Mild drainage of most proximal mepilex dressing  Swelling in the left thigh with ecchymosis  Thigh is non-tender, soft, and compressible  Calf is soft and nontender  No increased pain with passive ankle range of motion  Neurovascular is intact distally, able to actively wiggle toes  sensation grossly intact L4, L5, S1, palpable pedal pulse, EHL/AT/GS intact      Labs:   WBC   Recent Labs     01/21/23  0543 01/22/23  0441   WBC 8 70 9 59     H/H   Recent Labs     01/21/23  0543 01/22/23  0441   HGB 8 8* 8 8*   /  Recent Labs     01/21/23  0543 01/22/23  0441   HCT 26 2* 25 5*     Sed Rate No results for input(s): SEDRATE in the last 72 hours  CRP No results for input(s): CRP in the last 72 hours  Assessment:   Status post left long TFNA femoral nail  Plan:   WBAT LLE  PT/OT  Continue pain control as needed  Medical management per primary team  Continue Eliquis per primary team   Hemoglobin is stable at 8 8, will continue to monitor  DC planning, orthopedically stable for discharge  Will require follow up with Dr Felix Montoya post operatively        Army Ira PA-C

## 2023-01-23 NOTE — ASSESSMENT & PLAN NOTE
Lower extremity venous duplex on 1/20: Evidence of focal acute DVT in 1 of 2 peroneal veins on LLE  Started on Eliquis 10mg BID on 1/22  Continue for 7 days total and then decreased to 5mg BID for 3 months  Follow-up with PCP as outpatient  Neurovascular checks Q shift

## 2023-01-23 NOTE — ASSESSMENT & PLAN NOTE
Wt Readings from Last 3 Encounters:   01/23/23 96 9 kg (213 lb 10 oz)   01/23/23 95 2 kg (209 lb 14 1 oz)     Stable without exacerbation   on 1/20  Last ECHO on 12/2/22 showed EF 55-59%, G1DD  Takes Lasix 20mg daily at home - currently on hold  Restart as able

## 2023-01-23 NOTE — [facility] ADMISSION
Reviewed updates with [facility] physician - patient is approved for [facility] pending insurance approval by workman's comp and bed availability  Also awaiting updated OT notes  CM has been updated  Will continue to follow at this time  Spoke with Shonda Stallings, claim adjustor for [address] [place] [address] handling patient's worker's comp claim  Confirmed claim #, and confirmed patient is approved for [facility] admission, with no authorization required, and admission will be paid according to PA fee schedule  Patient will admit to [facility] [address] with transport at 1:00pm  Report can be called to [phone]  CM has been updated

## 2023-01-23 NOTE — CASE MANAGEMENT
TCF Admission Specialist had a telephone conversation with patient  Information on [facility] Services, length of stay, visitation, unit’s current Covid status, and discharge support discussed

## 2023-01-23 NOTE — H&P
PHYSICAL MEDICINE AND REHABILITATION H&P/ADMISSION NOTE  Alfonso Cordoba 67 y o  female MRN: [id number]  Unit/Bed#: -02 Encounter: [id number]     Rehab Diagnosis: Impairment of mobility, safety and Activities of Daily Living (ADLs) due to Orthopedic Disorders:  08 11  Unilateral Hip Fracture Subtrochanteric femur fracture    History of Present Illness:   Alfonso Cordoba is a 67 y o  female  With medical history of HLD, HTN, Chronic diastolic heart failure (P8MJ), previous R hip fracture who presented to the [address] [place] [address] on 1/18 after slipping on some water while at work at [employer]  No prodromal symptoms  CTH showed no acute intracranial abnormality, and XR L hip showed a subtrochanteric femur fracture  Ortho recommended ORIF with IMN which was performed on 1/19 by Dr Arreola Section  Made WBAT post-op Post-op lethargi and hypoxic, CXR showed no acute cardiopulmonary disease  Required BiPAP, Narcan ,and Flumazenil  There was some improvement in her lethargy, but remained hypoxic  Pulm was consulted  CTA PE ordered which showed distal subsegmental PEs in the posterior lower lobe with atelectasis  Doppler performed showed a DVT in 1 peroneal vein in LLE  Pulm did not feel that the findings were responsible for the extent of her hypoxia - they felt multifactorial 2/2 atelectasis some volume overload  She got 1 dose of IV Lasix  Primary did not feel there was a volume overload component  Ultimately recommended for a/c for at least 3 months  Eliquis cost $47 a month  Course also c/b ABLA, but that stabilized and she did not require transfusion She was stabilized and admitted to the [facility] on 1/23/2023  Subjective: She is very anxious  She is nervous about making progress and being able to tolerate therapies  She states that this is worse than her R hip fracture  She denies any new CP, but does get SOB  She is on NC right now, which is new   She has decreased appetite, but hasn't had a BM since prior to her admission  She normally does not have issues with her bowel  She is passing gas, denies any N/V, abdominal pain  No new fevers, chills, numbness/tingling  LLE with a lot of bruising and swelling  She has no other concerns at this time  She states she has been able to tolerate Oxycodone 5mg, but anything above that makes her feel off/loopy  Sees flashing lights, etc      Review of Systems: A 10-point review of systems was performed  Negative except as listed above  Plan:     * Closed left hip fracture ([address] )  Assessment & Plan  After slipping on water at work  Workers Comp  Subtrochanteric femur fracture s/p long nail (antegrade) on 1/19 by Dr Jarrod Tse  Pain control as documented below  Remove mepilex 1/26  Follow-up with Orthopedics 2 weeks (2/2)    PT/ OT 3-5 hours a day, 5-7 days/week in acute comprehensive inpatient rehab    Adjustment disorder with anxiety  Assessment & Plan  Will provide non-pharmacologic strategies for now  Consulted rehab psychology for support  Hypokalemia  Assessment & Plan  1/22 K 3 2 - did not get supplemented in the hospital  Given 40mEq on admission  Repeat BMP in the AM      Hypoxemia  Assessment & Plan  Multifactorial: PE, atelectasis, ?volume overload s/p Lasix IV x1  Was weaned to room air in acute hospital  Outpatient Noc Ox overnight  Will try to wean off oxygen - this is new for her   - Ambulatory sats while here  Monitor closely  Goals > 90%  Outpatient f/u with Pulmonology  Osteopenia  Assessment & Plan  DXA 12/2020 with osteopenia  Continue home calcium and Vitamin D  Will check Vitamin D with next labs given fracture from fall from standing  Outpatient f/u DXA with PCP  Acute postoperative anemia due to expected blood loss  Assessment & Plan  Hgb 12 6 pre-op  1/22 8 8 - Normocytic  Iron panel with next labs  Trend, transfuse as appropriate     Outpatient f/u with PCP    Acute deep vein thrombosis (DVT) of left peroneal vein [facility])  Assessment & Plan  1/20 LE Venous Duplex with L acute DVT in 1 of 2 peroneal veins  Now on Eliquis  See PE for more details  No SCD on that leg  Can do ace wrapping for edema  Outpatient f/u with PCP    Multiple subsegmental pulmonary emboli without acute cor pulmonale (HCC)  Assessment & Plan  Noted on CTA PE from 1/20   RV/LV ratio WNL  Started on Eliquis 10mg BID on 1/22 for 7 days before transitioning to 5mg BID for 3 months as per Pulm  Monitor respiratory status  Chronic diastolic congestive heart failure (HCC)  Assessment & Plan  Wt Readings from Last 3 Encounters:   01/23/23 96 9 kg (213 lb 10 oz)   01/23/23 95 2 kg (209 lb 14 1 oz)     Does not appear to have acute exacerbation  12/2/2022 Echo with EF 55-59% and G1DD   on 1/20  Home: Lopressor, Losartan, Lasix  Here: Lopressor, Losartan  Monitor I/O and daily weights  Adjust as appropriate  Outpatient f/u with PCP  Other hyperlipidemia  Assessment & Plan  Home: Simvastatin 20mg nightly  Here: Lipitor 20mg nightly for therapeutic substitution   Outpatient f/u with PCP    Essential hypertension  Assessment & Plan  Home: Lasix 20mg daily, Lopressor 50mg BID, Losartan 50mg daily  Here: Lopressor 50mg BID and Lopressor 50mg daily  Can try to restart Lasix while here  Monitor BP, adjust as appropriate  Follow-up with PCP  Health Maintenance  #Delirium/Sleep: At risk has had hallucinations on oxycodone 10mg, and was lethargic requiring narcan on fentanyl and dilaudid  #Pain: Tylenol ordered PRN, Oxycodone 2 5-5mg PRN  #Bowel: Last BM 1/18  Increased senna to 2 tabs, added daily miralax, and continue colace  Added PRN suppository  Checking KUB  Abdomen not acute  #Bladder: Voiding and continent  Monitor  #Skin/Pressure Injury Prevention: Turn Q2hr in bed, with weight shifts [id number] in wheelchair  Float heels in bed  #DVT Prophylaxis: Fully anticoagulated on Eliquis  SCD on RLE only     #GI Prophylaxis: PPI started when full a/c started  Can also do famotidine  #Code Status:  Full Code  #FEN: Reg Diet  #Dispo: ELOS 2 weeks with goals to be modified Ind with mobility, ADLs  Drug regimen reviewed, all potential adverse effects identified and addressed:    Scheduled Meds:  Current Facility-Administered Medications   Medication Dose Route Frequency Provider Last Rate   • acetaminophen  650 mg Oral Q6H PRN Ralf Lake MD     • apixaban  10 mg Oral BID Ralf Lake MD     • atorvastatin  20 mg Oral Daily With Kayli Catalan MD     • calcium carbonate  1,000 mg Oral Daily PRN Ralf Lake MD     • [START ON 1/24/2023] calcium carbonate-vitamin D  2 tablet Oral Daily With Breakfast ELDA Reddy     • cholecalciferol  2,000 Units Oral Daily ELDA Reddy     • docusate sodium  100 mg Oral BID Ralf Lake MD     • [START ON 1/24/2023] loratadine  10 mg Oral Daily Ralf Lake MD     • [START ON 1/24/2023] losartan  50 mg Oral Daily Ralf Lake MD     • metoprolol tartrate  50 mg Oral BID Ralf Lake MD     • oxyCODONE  5 mg Oral Q6H PRN Ralf Lake MD     • [START ON 1/24/2023] pantoprazole  40 mg Oral Daily Ralf Lake MD     • potassium chloride  40 mEq Oral Once ELDA Reddy     • [START ON 1/24/2023] senna  1 tablet Oral Daily Ralf Lake MD          Restrictions include:  left lower extremity weight bearing as tolerated Fall precautions      Functional History/Home Set-up - Prior to Admission:    Lives with her brother who is disabled  Her ex-spouse is supportive and helping her brother  Lives in a [facility] with bed/bath upstairs and ABY with rails  Has a shower chair and raised toilet seat  Was independent with ADLs, functional mobility, IADLs, driving, meal prep, medication management  Brother does not require physical assistance  Works full time       Functional Status Upon Admission to ARC:  Mobility: modA ambulation 10' with RW  Transfers: [place]  ADLs: [place] grooming, Sup UB bathing, maxA LB bathing, Sup UB dressing, max A LB dressing     Physical Exam:  Temp:  [97 2 °F (36 2 °C)-98 3 °F (36 8 °C)] 98 2 °F (36 8 °C)  HR:  [] 82  Resp:  [14-20] 18  BP: (124-160)/(58-77) 130/64  SpO2:  [92 %-97 %] 94 %    Gen: No acute distress, Well-nourished, well-appearing  HEENT: Moist mucus membranes, Normocephalic/Atraumatic  Cardiovascular: Regular rate, rhythm, S1/S2  Distal pulses palpable  Heme/Extr: LLE 2+ pitting edema  Pulmonary: Non-labored breathing  Lungs CTAB  : No garcia  GI: Soft, non-tender, non-distended  BS+  MSK: Decreased AROM and strength in proximal LLE related to her fracture  Integumentary: Skin is warm, dry  Incision sites with mepilex  Proximal mepilex with dried blood  No surrounding signs of infection  Diffuse ecchymoses  Neuro: AAOx3,  Speech is intact  Appropriate to questioning  Tone is normal    MMT:   Strength:   Right  Left  Site  Right  Left  Site    5 5  S Ab: Shoulder Abductors  4  2+ HF: Hip Flexors    5 5  EF: Elbow Flexors  3  3- KF: Knee Flexors    5  5  EE: Elbow Extensors  3  2  KE: Knee Extensors    5  5  WE: Wrist Extensors  5  5  DR: Dorsi Flexors    5  5  FF: Finger Flexors  5  5  PF: Plantar Flexors    5  5  HI: Hand Intrinsics  5  5  EHL: Extensor Hallucis Longus   Psych: Normal mood and affect       Laboratory:    Results from last 7 days   Lab Units 01/22/23  0441 01/21/23  0543 01/20/23  0431   HEMOGLOBIN g/dL 8 8* 8 8* 10 4*   HEMATOCRIT % 25 5* 26 2* 33 3*   WBC Thousand/uL 9 59 8 70 19 78*     Results from last 7 days   Lab Units 01/22/23  0441 01/21/23  0543 01/20/23  0431 01/20/23  0007 01/19/23  0429   BUN mg/dL 16 18 18 18 19   SODIUM mmol/L 135 138 132* 136 137   POTASSIUM mmol/L 3 2* 3 7 4 5 4 7 4 3   CHLORIDE mmol/L 100 101 99 105 100   CREATININE mg/dL 0 45* 0 54* 0 65 0 57* 0 66   AST U/L  --   --   --  18 19   ALT U/L  --   --   --  16 17     Results from last 7 days   Lab Units 01/19/23  0429   PROTIME seconds 13 2   INR  1 00        Wt Readings from Last 1 Encounters:   01/23/23 96 9 kg (213 lb 10 oz)     Estimated body mass index is 37 84 kg/m² as calculated from the following:    Height as of this encounter: 5' 3" (1 6 m)  Weight as of this encounter: 96 9 kg (213 lb 10 oz)  Imaging: reviewed   1/18/23 CTH:  No acute intracranial abnormality  1/18 XR Hip/pelvis:  Acute intertrochanteric left hip fracture  1/18 XR Femur L:  Left hip fracture reported separately with dedicated left hip/pelvis study  No additional fractures  1/19 CXR:  No acute cardiopulmonary disease       1/19 CXR:  Increased bibasilar density, likely atelectasis  1/20 CTA Chest:     Few subsegmental pulmonary emboli in the posterior lower lobes  Measured RV/LV ratio is within normal limits at less than 0 9       1/20 LE Venous Dopplers  RIGHT LOWER LIMB:  No evidence of acute or chronic deep vein thrombosis  No evidence of superficial thrombophlebitis noted  Doppler evaluation shows a normal response to augmentation maneuvers  Popliteal, posterior tibial and anterior tibial arterial Doppler waveforms are  triphasic  LEFT LOWER LIMB:  There is evidence of a focal acute deep vein thrombosis in 1 of 2 peroneal  veins  No evidence of superficial thrombophlebitis noted  Doppler evaluation shows a normal response to augmentation maneuvers  Popliteal, posterior tibial and anterior tibial arterial Doppler waveforms are  Triphasic  Rehabilitation Prognosis: good     Tolerance for three hours of therapy a day: good     Family/Patient Goals:  Patient/family's goals: Return to previous home/apartment  Patient will receive PT and OT 90 minutes each per day, five days per week to achieve rehab goals or participate in 900 minutes of therapy within a 7 day week period      Mobility Goals: Modified [place]  Transfer Goals: Modified [place]  Activities of Daily Living (ADLs) Goals: Modified [place]    Discharge Planning:  Rehabilitation and discharge goals discussed with the patient and/or family  Case Managment and Social Work to review patient/family resources and to coordinate Discharge Planning  Estimated length of stay: 2 weeks    Patient and Family Education and Training:  Rehabilitation and discharge goals discussed with the patient and/or family  Patient/family education/training needs to be discussed in weekly team meeting  Equipment/DME needs: Therapy teams to assess and evaluate for additional equipment/DME needs throughout rehabilitation stay    Past Medical History:   Past Surgical History:   Family History:   Social history:   Past Medical History:   Diagnosis Date   • Hypertension     Past Surgical History:   Procedure Laterality Date   • HIP FRACTURE SURGERY     • [place] OPTX FEM SHFT FX W/INSJ IMED IMPLT W/WO SCREW Left 1/19/2023    Procedure: INSERTION NAIL IM FEMUR ANTEGRADE (TROCHANTERIC); Surgeon: Chauncey Lala MD;  Location: [facility] OR;  Service: Orthopedics   • REDUCTION MAMMAPLASTY       History reviewed  No pertinent family history  Social History     Socioeconomic History   • Marital status:      Spouse name: None   • Number of children: None   • Years of education: None   • Highest education level: None   Occupational History   • None   Tobacco Use   • Smoking status: Never   • Smokeless tobacco: Never   Vaping Use   • Vaping Use: Never used   Substance and Sexual Activity   • Alcohol use: Never   • Drug use: Never   • Sexual activity: None   Other Topics Concern   • None   Social History Narrative   • None     Social Determinants of Health     Financial Resource Strain: Not on file   Food Insecurity: No Food Insecurity   • Worried About Running Out of Food in the Last Year: Never true   • Ran Out of Food in the Last Year: Never true   Transportation Needs: No Transportation Needs   • Lack of Transportation (Medical): No   • Lack of Transportation (Non-Medical):  No Physical Activity: Not on file   Stress: Not on file   Social Connections: Not on file   Intimate Partner Violence: Not on file   Housing Stability: Low Risk    • Unable to Pay for Housing in the Last Year: No   • Number of Places Lived in the Last Year: 1   • Unstable Housing in the Last Year: No          Current Medical Diagnosis Allergies   Patient Active Problem List   Diagnosis   • Closed left hip fracture ([facility] [address] )   • Essential hypertension   • Other hyperlipidemia   • Chronic diastolic congestive heart failure ([facility] )   • Multiple subsegmental pulmonary emboli without acute cor pulmonale (HCC)   • Acute deep vein thrombosis (DVT) of left peroneal vein (HCC)   • Acute postoperative anemia due to expected blood loss   • Osteopenia   • Hypoxemia   • Hypokalemia    Allergies   Allergen Reactions   • Amoxicillin Angioedema   • Sulfa Antibiotics Angioedema and Anaphylaxis     throat "closes "     • Lisinopril Swelling   • Medical Tape Other (See Comments)     rash, blisters           Medical Necessity Criteria for ARC Admission: She is of Moderate Risk due to the following comorbid conditions: [id number] with narcotics, newly diagnosed subsegmental PE and DVT despite lovenox, now on eliquis, ahrf 2/2 atelectasis and volume overloaded requiring IV diuretics, HTN, HLD, CHF, obesity, risk of nonunion, local incisional care, risk of delirium, risk of additoinal vte and bleed, acute pain management    In addition, the preadmission screen, post-admission physical evaluation, overall plan of care and admissions order demonstrate a reasonable expectation that the following criteria were met at the time of admission to the [facility]  1  The patient requires active and ongoing therapeutic intervention of multiple therapy disciplines (physical therapy, occupational therapy, speech-language pathology, or prosthetics/orthotics), one of which is physical or occupational therapy      2  Patient requires an intensive rehabilitation therapy program, as defined in Chapter 1, section 110 2 2 of the CMS Medicare Policy Manual  This intensive rehabilitation therapy program will consist of at least 3 hours of therapy per day at least 5 days per week or at least 15 hours of intensive rehabilitation therapy within a 7 consecutive day period, beginning with the date of admission to the [facility]  3  The patient is reasonably expected to actively participate in, and benefit significantly from, the intensive rehabilitation therapy program as defined in Chapter 1, section 110 2 2 of the CMS Medicare Policy Manual at this time of admission to the [facility]  She can reasonably be expected to make measurable improvement (that will be of practical value to improve the patient’s functional capacity or adaptation to impairments) as a result of the rehabilitation treatment, as defined in section 110 3, and such improvement can be expected to be made within the prescribed period of time  As noted in the CMS Medicare Policy Manual, the patient need not be expected to achieve complete independence in the domain of self-care nor be expected to return to his or her prior level of functioning in order to meet this standard  4  The patient must require physician supervision by a rehabilitation physician  As such, a rehabilitation physician will conduct face-to-face visits with the patient at least 3 days per week throughout the patient’s stay in the [facility] to assess the patient both medically and functionally, as well as to modify the course of treatment as needed to maximize the patient’s capacity to benefit from the rehabilitation process    5  The patient requires an intensive and coordinated interdisciplinary approach to providing rehabilitation, as defined in Chapter 1, section 110 2 5 of the CMS Medicare Policy Manual  This will be achieved through periodic team conferences, conducted at least once in a 7-day period, and comprising of an interdisciplinary team of medical professionals consisting of: a rehabilitation physician, registered nurse,  and/or , and a licensed/certified therapist from each therapy discipline involved in treating the patient  Ezekiel Leonard MD  Physical Medicine and Rehabilitation    ** Please Note: Fluency Direct voice to text software may have been used in the creation of this document   **

## 2023-01-23 NOTE — ASSESSMENT & PLAN NOTE
CTA PE study on 1/20: Few subsegmental pulmonary emboli in the posterior lower lobes, measured RV/LV ratio within normal limits  Started on Eliquis 10mg BID on 1/22  Continue for 7 days total and then decrease to 5mg BID for 3 months  Follow-up with PCP as outpatient  Currently maintaining on RA  Encourage pulmonary toileting  Spot pulse ox checks

## 2023-01-23 NOTE — ASSESSMENT & PLAN NOTE
· Provoked secondary to recent hip fracture and orthopedic surgery   · Continue Eliquis as mentioned above  · Plan to treat for 3 months as this is provoked

## 2023-01-24 PROBLEM — K59.00 CONSTIPATION: Status: ACTIVE | Noted: 2023-01-24

## 2023-01-24 LAB
25(OH)D3 SERPL-MCNC: 29.8 NG/ML (ref 30–100)
ABO GROUP BLD: NORMAL
ANION GAP SERPL CALCULATED.3IONS-SCNC: 3 MMOL/L (ref 5–14)
BASOPHILS # BLD AUTO: 0.02 THOUSANDS/ÂΜL (ref 0–0.1)
BASOPHILS NFR BLD AUTO: 0 % (ref 0–1)
BLD GP AB SCN SERPL QL: NEGATIVE
BUN SERPL-MCNC: 16 MG/DL (ref 5–25)
CALCIUM SERPL-MCNC: 8.4 MG/DL (ref 8.4–10.2)
CHLORIDE SERPL-SCNC: 101 MMOL/L (ref 96–108)
CO2 SERPL-SCNC: 30 MMOL/L (ref 21–32)
CREAT SERPL-MCNC: 0.54 MG/DL (ref 0.6–1.2)
EOSINOPHIL # BLD AUTO: 0.1 THOUSAND/ÂΜL (ref 0–0.61)
EOSINOPHIL NFR BLD AUTO: 1 % (ref 0–6)
ERYTHROCYTE [DISTWIDTH] IN BLOOD BY AUTOMATED COUNT: 14.2 % (ref 11.6–15.1)
GFR SERPL CREATININE-BSD FRML MDRD: 94 ML/MIN/1.73SQ M
GLUCOSE P FAST SERPL-MCNC: 99 MG/DL (ref 70–99)
GLUCOSE SERPL-MCNC: 99 MG/DL (ref 70–99)
HCT VFR BLD AUTO: 21.8 % (ref 34.8–46.1)
HCT VFR BLD AUTO: 22.7 % (ref 34.8–46.1)
HGB BLD-MCNC: 7.2 G/DL (ref 11.5–15.4)
HGB BLD-MCNC: 7.4 G/DL (ref 11.5–15.4)
IMM GRANULOCYTES # BLD AUTO: 0.07 THOUSAND/UL (ref 0–0.2)
IMM GRANULOCYTES NFR BLD AUTO: 1 % (ref 0–2)
LYMPHOCYTES # BLD AUTO: 1.32 THOUSANDS/ÂΜL (ref 0.6–4.47)
LYMPHOCYTES NFR BLD AUTO: 14 % (ref 14–44)
MCH RBC QN AUTO: 29.8 PG (ref 26.8–34.3)
MCHC RBC AUTO-ENTMCNC: 33 G/DL (ref 31.4–37.4)
MCV RBC AUTO: 90 FL (ref 82–98)
MONOCYTES # BLD AUTO: 0.97 THOUSAND/ÂΜL (ref 0.17–1.22)
MONOCYTES NFR BLD AUTO: 10 % (ref 4–12)
NEUTROPHILS # BLD AUTO: 7.32 THOUSANDS/ÂΜL (ref 1.85–7.62)
NEUTS SEG NFR BLD AUTO: 74 % (ref 43–75)
NRBC BLD AUTO-RTO: 0 /100 WBCS
PLATELET # BLD AUTO: 192 THOUSANDS/UL (ref 149–390)
PMV BLD AUTO: 9.9 FL (ref 8.9–12.7)
POTASSIUM SERPL-SCNC: 3.4 MMOL/L (ref 3.5–5.3)
RBC # BLD AUTO: 2.42 MILLION/UL (ref 3.81–5.12)
RH BLD: POSITIVE
SODIUM SERPL-SCNC: 134 MMOL/L (ref 135–147)
SPECIMEN EXPIRATION DATE: NORMAL
WBC # BLD AUTO: 9.8 THOUSAND/UL (ref 4.31–10.16)

## 2023-01-24 RX ORDER — MELATONIN
3000 DAILY
Status: DISCONTINUED | OUTPATIENT
Start: 2023-01-25 | End: 2023-02-10 | Stop reason: HOSPADM

## 2023-01-24 RX ORDER — POTASSIUM CHLORIDE 20 MEQ/1
40 TABLET, EXTENDED RELEASE ORAL ONCE
Status: COMPLETED | OUTPATIENT
Start: 2023-01-24 | End: 2023-01-24

## 2023-01-24 RX ADMIN — OXYCODONE HYDROCHLORIDE 5 MG: 5 TABLET ORAL at 06:42

## 2023-01-24 RX ADMIN — APIXABAN 10 MG: 5 TABLET, FILM COATED ORAL at 08:21

## 2023-01-24 RX ADMIN — IRON SUCROSE 200 MG: 20 INJECTION, SOLUTION INTRAVENOUS at 11:46

## 2023-01-24 RX ADMIN — ACETAMINOPHEN 650 MG: 325 TABLET ORAL at 12:23

## 2023-01-24 RX ADMIN — DOCUSATE SODIUM 100 MG: 100 CAPSULE, LIQUID FILLED ORAL at 08:21

## 2023-01-24 RX ADMIN — METOPROLOL TARTRATE 50 MG: 50 TABLET, FILM COATED ORAL at 08:21

## 2023-01-24 RX ADMIN — LOSARTAN POTASSIUM 50 MG: 50 TABLET, FILM COATED ORAL at 08:20

## 2023-01-24 RX ADMIN — ACETAMINOPHEN 650 MG: 325 TABLET ORAL at 21:31

## 2023-01-24 RX ADMIN — APIXABAN 10 MG: 5 TABLET, FILM COATED ORAL at 17:53

## 2023-01-24 RX ADMIN — Medication 2 TABLET: at 08:20

## 2023-01-24 RX ADMIN — LORATADINE 10 MG: 10 TABLET ORAL at 08:22

## 2023-01-24 RX ADMIN — CHOLECALCIFEROL TAB 25 MCG (1000 UNIT) 2000 UNITS: 25 TAB at 08:21

## 2023-01-24 RX ADMIN — ATORVASTATIN CALCIUM 20 MG: 20 TABLET, FILM COATED ORAL at 17:53

## 2023-01-24 RX ADMIN — PANTOPRAZOLE SODIUM 40 MG: 40 TABLET, DELAYED RELEASE ORAL at 06:41

## 2023-01-24 RX ADMIN — SENNOSIDES 17.2 MG: 8.6 TABLET, FILM COATED ORAL at 08:20

## 2023-01-24 RX ADMIN — POTASSIUM CHLORIDE 40 MEQ: 1500 TABLET, EXTENDED RELEASE ORAL at 11:46

## 2023-01-24 RX ADMIN — METOPROLOL TARTRATE 50 MG: 50 TABLET, FILM COATED ORAL at 17:53

## 2023-01-24 RX ADMIN — ACETAMINOPHEN 650 MG: 325 TABLET ORAL at 00:52

## 2023-01-24 NOTE — PROGRESS NOTES
Physical Medicine and Rehabilitation Progress Note  hCata Miller 67 y o  female MRN: [id number]  Unit/Bed#: [facility] 268-02 Encounter: [id number]    HPI: Chata Miller is a 67 y o  female  With medical history of HLD, HTN, Chronic diastolic heart failure (D9BW), previous R hip fracture who presented to the [address] [place], [place] [address] on 1/18 after slipping on some water while at work at [employer]  No prodromal symptoms  CTH showed no acute intracranial abnormality, and XR L hip showed a subtrochanteric femur fracture  Ortho recommended ORIF with IMN which was performed on 1/19 by Dr Felix Montoya  Made WBAT post-op Post-op lethargi and hypoxic, CXR showed no acute cardiopulmonary disease  Required BiPAP, Narcan ,and Flumazenil  There was some improvement in her lethargy, but remained hypoxic  Pulm was consulted  CTA PE ordered which showed distal subsegmental PEs in the posterior lower lobe with atelectasis  Doppler performed showed a DVT in 1 peroneal vein in LLE  Pulm did not feel that the findings were responsible for the extent of her hypoxia - they felt multifactorial 2/2 atelectasis some volume overload  She got 1 dose of IV Lasix  Primary did not feel there was a volume overload component  Ultimately recommended for a/c for at least 3 months  Eliquis cost $47 a month  Course also c/b ABLA, but that stabilized and she did not require transfusion She was stabilized and admitted to the [facility] on 1/23/2023  Chief Complaint: A lot of drainage yet  Some lightheadedness today  Interval History/Subjective:  No acute events overnight  Has had a fair amount of serosanguinous drainage from her two proximal incisions with saturation of her mepilex which were reinforced by nursing last night  Denies any new headaches, CP, SOB, fevers, chills, N/V, abdominal pain  Last BM was 1/23  She had some lightheadedness and orthostasis today in therapy       ROS:  A 10 point review of systems was negative except for what is noted in the HPI  Today's Changes:  1  Reviewed KUB - nonobstructive  Large amount of stool in colon with a good amount in rectal vault (this was prior to her BM last night)  Would continue bowel meds for now  2  Hgb down to 7 2 today  Iron does suggest blood loss but also iron deficiency   - Giving venofer today and starting oral iron and monitoring closely   - Ordered type and screen and repeat CBC in the AM   - Blood consent reviewed with patient, signed and in the chart    - Repeat Hgb this afternoon was 7 4  3  Potassium supplemented with 40 mEq  4  Losartan discontinued by IM  Increase fluid intake, abdominal binder and ACE wrap for legs  - If no improvement, inclined to transfuse for symptomatic anemia with Hgb < 8    5  Continue eliquis  6  Vitamin D 29 8  Continue daily supplementation  7  Discussed with nursing - remove saturated/compromised mepilex and replace with mepilex Ag  Betadine on incision  Total visit time: 25 minutes, with more than 50% spent counseling/coordinating care  Counseling includes discussion with patient re: progress in therapies, functional issues observed by therapy staff, and discussion with patient regarding their current medical state and wellbeing  Coordination of patient's care was performed in conjunction with Internal Medicine service to monitor patient's labs, vitals, and management of their comorbidities  Assessment/Plan:    * Closed left hip fracture ([address] )  Assessment & Plan  After slipping on water at work  Workers Comp  Subtrochanteric femur fracture s/p long nail (antegrade) on 1/19 by Dr Yogi Metcalf  Pain control as documented below  Remove mepilex 1/26  Follow-up with Orthopedics 2 weeks (2/2)    PT/ OT 3-5 hours a day, 5-7 days/week in acute comprehensive inpatient rehab    Constipation  Assessment & Plan  Last BM 1/23  Continue current bowel regimen  Adjust as appropriate      Adjustment disorder with anxiety  Assessment & Plan  Will provide non-pharmacologic strategies for now  Consulted rehab psychology for support  Hypokalemia  Assessment & Plan  1/24 3 4 after 40mEq yesterday  Giving additional 40mEq today   Monitor    Hypoxemia  Assessment & Plan  Multifactorial: PE, atelectasis, ?volume overload s/p Lasix IV x1  Was weaned to room air in acute hospital  Outpatient Noc Ox overnight  Will try to wean off oxygen - this is new for her   - Ambulatory sats while here  Monitor closely  Goals > 90%  Outpatient f/u with Pulmonology  Osteopenia  Assessment & Plan  DXA 12/2020 with osteopenia  Continue home calcium and Vitamin D  1/24 Vitamin D is 29 8  Outpatient f/u DXA with PCP  Acute postoperative anemia due to expected blood loss  Assessment & Plan  Hgb 12 6 pre-op  1/24 7 2 from 8 8   - Iron deficiency on labs  - Also post-operative blood loss  - Given IV venofer   - Type and screen   - Consent in chart    - Repeat Hgb 1/24 in afternoon is 7 4  Trend, transfuse as appropriate  Outpatient f/u with PCP    Acute deep vein thrombosis (DVT) of left peroneal vein [facility])  Assessment & Plan  1/20 LE Venous Duplex with L acute DVT in 1 of 2 peroneal veins  Now on Eliquis  See PE for more details  No SCD on that leg  Can do ace wrapping for edema  Outpatient f/u with PCP    Multiple subsegmental pulmonary emboli without acute cor pulmonale (HCC)  Assessment & Plan  Noted on CTA PE from 1/20   RV/LV ratio WNL  Started on Eliquis 10mg BID on 1/22 for 7 days before transitioning to 5mg BID for 3 months as per Pulm  Monitor respiratory status  Chronic diastolic congestive heart failure (HCC)  Assessment & Plan  Wt Readings from Last 3 Encounters:   01/24/23 97 2 kg (214 lb 4 6 oz)   01/23/23 95 2 kg (209 lb 14 1 oz)     Does not appear to have acute exacerbation  12/2/2022 Echo with EF 55-59% and G1DD   on 1/20  Home: Lopressor, Losartan, Lasix  Here: Lopressor  - Holding ARB for orthostasis    Monitor I/O and daily weights  Adjust as appropriate  Outpatient f/u with PCP  Other hyperlipidemia  Assessment & Plan  Home: Simvastatin 20mg nightly  Here: Lipitor 20mg nightly for therapeutic substitution   Outpatient f/u with PCP    Essential hypertension  Assessment & Plan  Home: Lasix 20mg daily, Lopressor 50mg BID, Losartan 50mg daily  Here: Lopressor 50mg BID  1/24 Losartan held for orthostasis  Can try to restart Lasix while here  Monitor BP, adjust as appropriate  Follow-up with PCP  Health Maintenance  #Delirium/Sleep: At risk has had hallucinations on oxycodone 10mg, and was lethargic requiring narcan on fentanyl and dilaudid  #Pain: Tylenol ordered PRN, Oxycodone 2 5-5mg PRN  #Bowel: Last BM 1/23  Continue colace/miralax/senna 2 tabs  Added PRN suppository  #Bladder: Voiding and continent  Monitor  #Skin/Pressure Injury Prevention: Turn Q2hr in bed, with weight shifts [id number] in wheelchair  Float heels in bed  #DVT Prophylaxis: Fully anticoagulated on Eliquis  SCD on RLE only  #GI Prophylaxis: PPI started when full a/c started  Can also do famotidine  #Code Status:  Full Code  #FEN: Reg Diet  #Dispo: ELOS 2 weeks with goals to be modified Ind with mobility, ADLs  Must be able to do stairs ideally  Son may be able to come down closer to DC to assist with her care  Objective:    Functional Update:  PT: modA transfers, too scared to proceed with ambulation today  Very anxious in therapy today  OT: mod-maxA with ADLs    Allergies per EMR    Physical Exam:  Temp:  [97 2 °F (36 2 °C)-98 3 °F (36 8 °C)] 98 °F (36 7 °C)  HR:  [75-89] 84  Resp:  [14-20] 18  BP: (124-141)/(58-66) 141/66  Oxygen Therapy  SpO2: 92 %  O2 Flow Rate (L/min): 2 L/min    Gen: No acute distress, upright in wheelchair today, appears brighter and more comfortable  HEENT: Moist mucus membranes  Cardiovascular: Regular rate, rhythm, S1/S2   Distal pulses palpable +murmur  Heme/Extr: L sided surgical site edema is stable  Pulmonary: Non-labored breathing, CTAB  : No garcia  GI: Soft, non-tender, non-distended  MSK: Performed sit to stand, about modA  Limited tolerance to standing  Integumentary: Skin is warm, dry  L two proximal incision sites with absolutely saturated mepilex with dried sanguinous drainage from surgery, and newer serosanguinous drainage  No dehiscence  Neuro: AAOx3, Speech is intact  Appropriate to questioning  Psych: Normal mood and affect  Diagnostic Studies:   1/23 KUB: Non-obstructive  Constipated  Laboratory:  Reviewed   Results from last 7 days   Lab Units 01/24/23  1434 01/24/23  0521 01/22/23  0441 01/21/23  0543   HEMOGLOBIN g/dL 7 4* 7 2* 8 8* 8 8*   HEMATOCRIT % 22 7* 21 8* 25 5* 26 2*   WBC Thousand/uL  --  9 80 9 59 8 70     Results from last 7 days   Lab Units 01/24/23  0521 01/22/23  0441 01/21/23  0543 01/20/23  0431 01/20/23  0007 01/19/23  0429   BUN mg/dL 16 16 18   < > 18 19   POTASSIUM mmol/L 3 4* 3 2* 3 7   < > 4 7 4 3   CHLORIDE mmol/L 101 100 101   < > 105 100   CREATININE mg/dL 0 54* 0 45* 0 54*   < > 0 57* 0 66   AST U/L  --   --   --   --  18 19   ALT U/L  --   --   --   --  16 17    < > = values in this interval not displayed       Results from last 7 days   Lab Units 01/19/23  0429   PROTIME seconds 13 2   INR  1 00        Patient Active Problem List   Diagnosis   • Closed left hip fracture ([facility] )   • Essential hypertension   • Other hyperlipidemia   • Chronic diastolic congestive heart failure ([facility] )   • Multiple subsegmental pulmonary emboli without acute cor pulmonale (HCC)   • Acute deep vein thrombosis (DVT) of left peroneal vein (HCC)   • Acute postoperative anemia due to expected blood loss   • Osteopenia   • Hypoxemia   • Hypokalemia   • Adjustment disorder with anxiety         Medications  Current Facility-Administered Medications   Medication Dose Route Frequency Provider Last Rate   • acetaminophen  650 mg Oral Q6H PRN Arely Ervin MD     • apixaban  10 mg Oral BID Lexis Wharton MD     • atorvastatin  20 mg Oral Daily With Isaiah Mcrae MD     • bisacodyl  10 mg Rectal Daily PRN Lexis Wharton MD     • calcium carbonate  1,000 mg Oral Daily PRN Lexis Wharton MD     • calcium carbonate-vitamin D  2 tablet Oral Daily With Breakfast ELDA Oquendo     • cholecalciferol  2,000 Units Oral Daily ELDA Oquendo     • docusate sodium  100 mg Oral BID Lexis Wharton MD     • loratadine  10 mg Oral Daily Lexis Wharton MD     • losartan  50 mg Oral Daily Lexis Wharton MD     • metoprolol tartrate  50 mg Oral BID Lexis Wharton MD     • oxyCODONE  5 mg Oral Q6H PRN Lexis Wharton MD     • oxyCODONE  2 5 mg Oral Q6H PRN Lexis Wharton MD     • pantoprazole  40 mg Oral Daily Lexis Wharton MD     • polyethylene glycol  17 g Oral Daily Lexis Wharton MD     • senna  2 tablet Oral Daily Lexis Wharton MD            ** Please Note: Fluency Direct voice to text software may have been used in the creation of this document   **

## 2023-01-24 NOTE — ASSESSMENT & PLAN NOTE
1/20 LE Venous Duplex with L acute DVT in 1 of 2 peroneal veins  Now on Eliquis  See PE for more details  No SCD on that leg  Can do ace wrapping for edema     Outpatient f/u with PCP

## 2023-01-24 NOTE — ASSESSMENT & PLAN NOTE
Will provide non-pharmacologic strategies  Continued PRN hydroxyzine  1/30 started Lexapro  Consulted rehab psychology for support

## 2023-01-24 NOTE — ASSESSMENT & PLAN NOTE
Wt Readings from Last 3 Encounters:   01/24/23 97 2 kg (214 lb 4 6 oz)   01/23/23 95 2 kg (209 lb 14 1 oz)     Stable without exacerbation   on 1/20  Last ECHO on 12/2/22 showed EF 55-59%, G1DD  Takes Lasix 20mg daily at home - currently on hold  Restart as able

## 2023-01-24 NOTE — ASSESSMENT & PLAN NOTE
Hgb 12 6 pre-op  2/6  6 9 > 8 2 > 8 5 > 8 5 > 9 1   - Iron deficiency on labs  - Also post-operative blood loss  - Given IV venofer x2   - Type and screen   - Consent in chart  - 1/26 Transfuse with 1 unit pRBC  Trend, transfuse as appropriate     Outpatient f/u with PCP

## 2023-01-24 NOTE — PROGRESS NOTES
01/24/23 0830   Rehab Team Goals   ADL Team Goal Patient will be independent with ADLs with least restrictive device upon completion of rehab program   Rehab Team Interventions   OT Interventions Self Care; Therapeutic Exercise; Energy Conservation;Patient/Family Education;Group Therapy   Eating Goal   Eating Goal 06  Independent - Patient completes the activity by him/herself with no assistance from a helper  Meal Complete All meals   Status Ongoing; Target goal - two weeks   Interventions Optimal Position   Tub/Shower Transfer Goal   Method   (recommend SB at DC)   Bathing Goal   Shower/bathe self Goal 06  Independent - Patient completes the activity by him/herself with no assistance from a helper  Environment Seated;Standing;Sponge Bath   Safety Precautions WBAT;Safety Precaution   Status Ongoing; Target goal - two weeks   Intervention ADL Training; Therapeutic Exercise;Assistive Device   Upper Body Dressing Goal   Upper body dressing Goal 06  Independent - Patient completes the activity by him/herself with no assistance from a helper  Task Upper Body;Arms in/out; Over Head   Environment Seated;Standing   Safety Precautions Safety Precaution   Status Ongoing; Target goal - two weeks   Intervention Balance Work; Therapeutic Exercise; Tolerance Work   Lower Body Dressing Goal   Lower body dressing Goal 06  Independent - Patient completes the activity by him/herself with no assistance from a helper  Putting on/taking off footwear Goal 03  Partial/moderate assistance - Kegley does less than half the effort  Kegley lifts or holds trunk or limbs and provides more than half the effort  (TEDS)   Task Lower Body;Shoe/Slipper;Socks; Undergarment;Pants   Adaptive Equipment Reacher; Shoe Horn;Sock Aide;Elastic Laces;Dressing Stick   Environment Seated;Standing   Safety Precautions Safety Precaution;WBAT   Status Ongoing; Target goal - two weeks   Intervention Balance Work; Therapeutic Exercise; Tolerance Work   Toileting Transfer Goal   Toilet transfer Goal 06  Independent - Patient completes the activity by him/herself with no assistance from a helper  Assistive Device [facility] Commode   Safety WBAT;Safety Precaution   Status Ongoing; Target goal - two weeks   Intervention ADL Training;Balance Work;Assistive Device   Toileting Goal   Toileting hygiene Goal 06  Independent - Patient completes the activity by him/herself with no assistance from a helper  Task Pants Up;Pants Down;Hygiene   Safety Balance   Status Ongoing; Target goal - two weeks   Intervention ADL Training;Balance Work;Assistive Device   Comprehension Goal   Comprehension Assist Level Moderate [place]   Status Ongoing; Target goal - two weeks   Expression Goal   Expression Assist Level Moderate [place]   Status Ongoing; Target goal - two weeks   Meal Prep and Kitchen Mobility   Assist Level Independent   Status Ongoing; Target goal - two weeks   Medication Management   Assist Level Independent   Status Ongoing; Target goal - two weeks   Laundry   Assist Level   (brother completes)

## 2023-01-24 NOTE — PROGRESS NOTES
PT EVAL  ARC TAA       01/24/23 1230   Patient Data   Rehab Impairment Impairment of mobility, safety and Activities of Daily Living (ADLs) due to Orthopedic Disorders:  08 11  Unilateral Hip Fracture   Etiologic Diagnosis Left Intertrochanteric Femur Fracture   Date of Onset 01/18/23  (DOS 1/19/23)   Support System   Name Jessie Hammer   Relationship Brother   Able to provide 24 hour supervision Yes   Home Setup   Type of Home Multi Level   Method of Entry Hand Rail Bilateral   Number of Stairs 6   Number of Stairs in Home 11   In Home Hand Rail Left   First [address] Full   Home Modification Comment pending progress   Available Equipment Single Point Cane;Roller [address] Work Full Time   Transportation    Prior Level of Function   Self-Care 3  Independent - Patient completed the activities by him/herself, with or without an assistive device, with no assistance from a helper  Indoor-Mobility (Ambulation) 3  Independent - Patient completed the activities by him/herself, with or without an assistive device, with no assistance from a helper  Stairs 3  Independent - Patient completed the activities by him/herself, with or without an assistive device, with no assistance from a helper  Functional Cognition 3  Independent - Patient completed the activities by him/herself, with or without an assistive device, with no assistance from a helper  Prior Device Used Z  None of the above   Falls in the Last Year   Number of falls in the past 12 months 1   Type of Injury Associated with Fall Major injury  (this incident)   Patient Preference   Nickname (Patient Preference) wilber   Psychosocial   Psychosocial (WDL) WDL   Patient Behaviors/Mood Cooperative; Fearful; Anxious   Needs Expressed Physical;Emotional   Restrictions/Precautions   Precautions Fall Risk;Pain   RLE Weight Bearing Per Order WBAT   LLE Weight Bearing Per Order WBAT   Pain Assessment Pain Assessment Tool 0-10   Pain Score 4   Pain Location/Orientation Orientation: Left; Location: Hip   Pain Onset/Description Frequency: Intermittent; Descriptor: Aching;Descriptor: Discomfort   Effect of Pain on Daily Activities worse and anxious with mobility   Transfer Bed/Chair/Wheelchair   Positioning Concerns Other  (DVT/PE)   Limitations Noted In Balance;Confidence; Endurance;Pain Management;Problem Solving;LE Strength   Adaptive Equipment Roller Walker   Type of Assistance Needed Physical assistance   Physical Assistance Level 51%-75%   Comment VERY fearful to step on L LE, would not advance R LE, heel/toe pivot only to pts R side  Chair/Bed-to-Chair Transfer CARE Score 2   Roll Left and Right   Comment TBA pt requested to stay OOB   Sit to Lying   Comment TBA pt requested to stay OOB   Lying to Sitting on Side of Bed   Comment TBA- Pt OOB upon entry, requested to stay OOB  Sit to Stand   Type of Assistance Needed Physical assistance   Physical Assistance Level 26%-50%   Comment mod A with Rw, very fearful to offload R LE  Sit to Stand CARE Score 3   Picking Up Object   Reason if not Attempted Safety concerns   Picking Up Object CARE Score 88   Car Transfer   Comment poor transfer olvin at time of eval    Reason if not Attempted Safety concerns   Car Transfer CARE Score 88   Ambulation   Primary Mode of Locomotion Prior to Admission Walk   Findings pt completely fearful and unwilling to attempt to advance R LE     Walk 10 Feet   Reason if not Attempted Safety concerns   Walk 10 Feet CARE Score 88   Walk 50 Feet with Two Turns   Reason if not Attempted Safety concerns   Walk 50 Feet with Two Turns CARE Score 88   Walk 150 Feet   Reason if not Attempted Safety concerns   Walk 150 Feet CARE Score 88   Walking 10 Feet on Uneven Surfaces   Reason if not Attempted Safety concerns   Walking 10 Feet on Uneven Surfaces CARE Score 88   Wheelchair mobility   Findings TBD- anticipate DC ambulatory, pending tolerance and progress  Curb or Single Stair   Reason if not Attempted Safety concerns   1 Step (Curb) CARE Score 88   4 Steps   Reason if not Attempted Safety concerns   4 Steps CARE Score 88   12 Steps   Reason if not Attempted Safety concerns   12 Steps CARE Score 88   Stairs   Findings poor transfer/wt shift olvin, unsafe for stair trial at this time  Memory   Initiates Tasks Yes   Short-Term Intact   Long Term Intact   Recalls Precaution Yes   Findings VERY ANXIOUS, heavy breathing, cues to calm and slow self down  RLE Assessment   RLE Assessment WFL   LLE Assessment   LLE Assessment   (grossly 3-/5 limited by pain, edema, fear )   Coordination   Movements are Fluid and Coordinated 1   Sensation   Light Touch No apparent deficits   Cognition   Overall Cognitive Status [facility]   Arousal/Participation Alert; Responsive; Cooperative   Attention Attends with cues to redirect   Orientation Level Oriented X4   Memory Decreased recall of recent events   Following Commands Follows multistep commands without difficulty   Comments HIGHLY ANXIOUS with all mobility, except sit to stand  Vision   Vision Comments wears reading glasses  Perception   Inattention/Neglect Appears intact   Objective Measure   PT Measure(s) orthostatic BPS:   PT Findings seated L UE manually- 128/74; standing 96/62  CRNP notified, Sana and abd binder donned   Therapeutic Exercise   Therapeutic Exercise/Activity seated L LE APs, quad sets, AA LAQ x30  Discharge Information   Vocational Plan Return to work   Barriers to Return to [employer] Limitation;Strength;Transportation Issues; Endurance; Environmental Access   Patient's Discharge Plan return home with family support  Patient's Rehab Expectations "I need to be able to walk out of here"   Barriers to Discharge Home Limited Family Support; Unsafe Home Setup; Decreased Cognitive Function;Decreased Strength;Decreased Endurance;Pain; Safety Considerations   Impressions Pt is 67 yr old female slipped and fell at work on 1/18/23 sustaining + L hip IT/subtroch hip fx requiring surgical fixation with long TFN done 1/19/23 by Dr García [place]  Cleared post op for L LE WBAT  Pt with post op complications including hypoxia, L LE DVT + PE, anemia  Other PMH: HTN, inc lipids, R hip fx sp ORIF 2011  At baseline pt fully I without AD, works FT as  at [employer]  +  Pt lives in [address] [facility] home with brother (on disability), 6 ABY B HR, bed and Full bath on second floor, 1/2 bath first level  Pt sleeps in recliner at baseline  Pt OOB in recliner upon entry, reports being highly uncomfortable and she did not think she was going to be able to be able to therapy this afternoon  Edcuation and encouragement provided  +R UE IV venofer running, pt with low Hgb (7 4), potential transfusion next day pending progress  Pt presents with HIGH ANXIETY needing inc time and cues for deep breaths t/o all efforts  Very concerned for R UE IV site and RW use  Pt able to complete sit to stand with mod A however pt unwilling to attempt to unload and advance R LE  Required WC to be pulled closer and completed only stand pivot with RW, use of heel/toe pivot to pts R side  +Orthostatic BPS with sit to stand, Gray and abd binder donned  RN present towards end of session for incisional dressing change, +superior incision with sig bloody drainage  Pt stood with RW >2min and setup in WC at end of session with cold pack to Lhip  Pt reports not wanting to move again and prefers WC over recliner at this time  Pt to greatly benefit from ongoing aggressive skilled PT intervention to maximize functional mobility as pts goal is to be mod I for DC home  Pt demonstrates fair to good rehab potential to reach set goals pending progress and activity tolerance in ELOS x2 weeks for safe DC home with family support and likely out pt PT if able  Pt has RW, SPC,and shower chair     PT Therapy Minutes   PT Time In 1230   PT Time Out 1400   PT Total Time (minutes) 90   PT Mode of treatment - Individual (minutes) 90   PT Mode of treatment - Concurrent (minutes) 0   PT Mode of treatment - Group (minutes) 0   PT Mode of treatment - Co-treat (minutes) 0   PT Mode of Treatment - Total time(minutes) 90 minutes   PT Cumulative Minutes 90   Cumulative Minutes   Cumulative therapy minutes 180

## 2023-01-24 NOTE — PROGRESS NOTES
[facility] ICP  PT LTGS  ELOS x2 weeks  01/24/23 1230   Rehab Team Goals   Transfer Team Goal Patient will be independent with transfers with least restrictive device upon completion of rehab program   Locomotion Team Goal Patient will be independent with locomotion with least restrictive device upon completion of rehab program   Rehab Team Interventions   PT Interventions Gait Training; Therapeutic Exercise;Neuromuscualr Reeducation;Transfer Training;Community Reintegration;Bed Mobility;Modalities; Patient/Family Education   PT Transfer Goal   Roll left and right Goal 88  Not attempted due to medical condition or safety concerns  (pt sleeps in recliner and plans to do at DC )   Sit to lying Goal 88  Not attempted due to medical condition or safety concerns  (pt sleeps in recliner and plans to do at DC )   Lying to sitting on side of bed Goal 88  Not attempted due to medical condition or safety concerns  (pt sleeps in recliner and plans to do at DC )   Sit to stand Goal 06  Independent - Patient completes the activity by him/herself with no assistance from a helper  Chair/bed-to-chair transfer Goal 06  Independent - Patient completes the activity by him/herself with no assistance from a helper  Car Transfer Goal 04  Supervision or touching assistance- [place] provides VERBAL CUES or supervision throughout activity  Assistive Device Thumbtack   Status Ongoing; Target goal - two weeks   Locomotion Goal   Primary discharge mode of locomotion Walking   Assist Device Roller Walker   Gait Pattern Improvement Inconsistant [place]; Excess Slow;Poor UE WB;Improper weight shift;Decreased L stance; Forward Flexion; Antalgic   Environment Level Surface   Walk 10 feet Goal 06  Independent - Patient completes the activity by him/herself with no assistance from a helper  Walk 50 feet with 2 turns Goal 06  Independent - Patient completes the activity by him/herself with no assistance from a helper  Walk 150 feet Goal 04  Supervision or touching assistance- [place] provides VERBAL CUES or supervision throughout activity  Walking 10 feet on uneven surface 04  Supervision or touching assistance- [place] provides VERBAL CUES or supervision throughout activity  Walking Goal Status Ongoing; Target goal - two weeks   Wheel 50 feet with 2 turns Goal 09  Not applicable   Wheel 245 feet Goal 09  Not applicable   Stairs Goal   1 step or curb goal 04  Supervision or touching assistance- [place] provides VERBAL CUES or supervision throughout activity  4 steps Goal 04  Supervision or touching assistance- [place] provides VERBAL CUES or supervision throughout activity  12 steps Goal 04  Supervision or touching assistance- [place] provides VERBAL CUES or supervision throughout activity  Assist Level Supervision   Number of Stairs 12   Technique Non-reciprocal   Hand Rail Left   Status Ongoing; Target goal - two weeks   Object Retrieval Goal   Picking up object Goal 04  Supervision or touching assistance- [place] provides VERBAL CUES or supervision throughout activity  Assistive Device  Reacher   Small Object Picked Up marker from floor- target goal x2 weeks

## 2023-01-24 NOTE — UTILIZATION REVIEW
NOTIFICATION OF ADMISSION DISCHARGE   This is a Notification of Discharge from [address]  Please be advised that this patient has been discharge from our facility  Below you will find the admission and discharge date and time including the patient’s disposition  UTILIZATION REVIEW CONTACT:  Walter Ames  Utilization   Network Utilization Review Department  Phone: [phone] x carefully listen to the prompts  All voicemails are confidential   Email: [email or web address]     ADMISSION INFORMATION  PRESENTATION DATE: 1/18/2023  2:15 PM  OBERVATION ADMISSION DATE:   INPATIENT ADMISSION DATE: 1/18/23  4:30 PM   DISCHARGE DATE: 1/23/2023  1:12 PM   [facility]    IMPORTANT INFORMATION:  Send all requests for admission clinical reviews, approved or denied determinations and any other requests to dedicated fax number below belonging to the campus where the patient is receiving treatment   List of dedicated fax numbers:  [address] DENIALS (Administrative/Medical Necessity) [phone]   [address] (Maternity/NICU/Pediatrics) [phone]   [facility] [phone]   [place] [address] [phone]   [address] [phone]   220 [facility] [phone] 90 [facility] [phone]   [address] [place] [address] [phone]   [facility]  [phone] 405[address] [facility] [phone] 412 [facility] [address] [facility] [phone]

## 2023-01-24 NOTE — ASSESSMENT & PLAN NOTE
Home: Lasix 20mg daily PRN, Lopressor 50mg BID, Losartan 50mg daily  Here: Lopressor 50mg BID, Losartan 50mg daily  1/24 Losartan held for orthostasis  Has since improved  Can try to restart Lasix while here once BP appropriate  Monitor BP, adjust as appropriate  Follow-up with PCP

## 2023-01-24 NOTE — ASSESSMENT & PLAN NOTE
S/p mechanical fall on 1/18  XR on 1/18 showed acute intertrochanteric L hip fracture  OR on 1/19 for IM fixation of L subtrochanteric hip fracture performed by Dr Arreola Section  WBAT to LLE  Neurovascular checks Q shift  Ensure adequate pain control  Monitor incision for s/s of infection  Eliquis for DVT ppx  Follow-up with Orthopedics in 2 weeks as outpatient (2/2)  Primary team following  PT/OT

## 2023-01-24 NOTE — PROGRESS NOTES
01/24/23 0830   Patient Data   Rehab Impairment Impairment of mobility, safety and Activities of Daily Living (ADLs) due to Orthopedic Disorders:  08 11  Unilateral Hip Fracture   Etiologic Diagnosis Left Intertrochanteric Femur Fracture   Date of Onset 01/18/23  (sx 1/19/23 Intramedullary Fixation of Left Subtrochanteric Hip Fracture)   Support System   Name Diogo Romero   Relationship (S)  brother  (brother is "disabled" reports he hasnt't been working because of his diabetes  Pts ex-spouse is assisting with brothers care  Reports he can drive locally, assists with laundry)   Able to provide 24 hour supervision Yes   Able to provide physical help? Yes   Multiple Support Systems Yes   Support System 2   Name 2 (S)  Jeffcarlos a Avis  (will be coming down from [place] for approx 2 weeks  Pt had therapist speak to son during session  SOn reporting he was planning on coming down this Thurs, however, therapist educ pt with approx LOS for 2 weeks  Son reports he may cancel flight and come [place])   Relationship 2 son   Able to provide 24 hour supervision 2 Yes   Home Setup   Type of Home Multi Level   Method of Entry Stairs;Hand Rail Bilateral  (ascending)   Number of Stairs 6   Number of Stairs in Home   (FF to 2nd flr where bed/bath are located)   In Home Hand Rail Left  (ascending  Reports when she had last hip done when up in hands/knees and down on bottom)   First Floor Bathroom Half  (does fit RW)   Second Floor Bathroom Full; Shower; Door   Second Floor Bathroom Accessibility Built in shower seat;Grab bars in [address] [place] [address] Available No  (reports she can stay in basement but has 8 steps to go down   RHR)   Home Modifications Necessary?   (pending progress)   Home Modification Comment pending progress   Available Equipment Shower Chair;Roller Walker;Single [employer]  (raised toilet)   Baseline Information   Vocation Work Full Time  ( at [employer])   Transportation    Prior Device(s) Used   (None)   Prior IADL Participation   Money Management Identify Money;Estimate Costs;Estimate Change;Combine Bills;Manage Checkbook   Meal Preparation Full Participation   Laundry Full Participation   Home Cleaning Full Participation   Prior Level of Function   Self-Care 3  Independent - Patient completed the activities by him/herself, with or without an assistive device, with no assistance from a helper  Indoor-Mobility (Ambulation) 3  Independent - Patient completed the activities by him/herself, with or without an assistive device, with no assistance from a helper  Stairs 3  Independent - Patient completed the activities by him/herself, with or without an assistive device, with no assistance from a helper  Functional Cognition 3  Independent - Patient completed the activities by him/herself, with or without an assistive device, with no assistance from a helper  Prior Device Used Z  None of the above   Falls in the Last Year   Number of falls in the past 12 months 1   Type of Injury Associated with Fall Major injury  (current admission)   Patient Preference   Nickname (Patient Preference) Amparo   Psychosocial   Psychosocial (WDL) WDL   Patient Behaviors/Mood Cooperative; Fearful; Anxious   Needs Expressed Physical;Emotional   Restrictions/Precautions   Precautions Fall Risk;Pain  (anxious, manual ortho BP)   RLE Weight Bearing Per Order WBAT   LLE Weight Bearing Per Order WBAT   ROM Restrictions No   Pain Assessment   Pain Assessment Tool 0-10   Pain Score 7   Pain Location/Orientation Orientation: Left; Location: Hip   Pain Radiating Towards nonradiating   Pain Onset/Description Onset: Ongoing   Effect of Pain on Daily Activities inc pain in stance   Patient's Stated Pain Goal No pain   Hospital Pain Intervention(s) Cold applied;Elevated; Rest   Oral Hygiene   Type of Assistance Needed Physical assistance   Physical Assistance Level Total assistance   Comment seated in wc at sink   PTA was completed in stance w/o AD   Oral Hygiene CARE Score 1   Tub/Shower Transfer   Reason Not Assessed Safety;Sponge Bath  (inc L hip draining)   Shower/Bathe Self   Type of Assistance Needed Physical assistance   Physical Assistance Level 51%-75%   Comment completed SB this session as pt cont with L hip drainage  Pt able to complete UB bathing seated  Able to wash upper thighs, TA for feet 2* to body habitus and dec fx reach  Req modA/maxA in stance when completing unilateral release for washing jon/buttock   Shower/Bathe Self CARE Score 2   Bathing   Assessed Bath Style Sponge Bath   Anticipated D/C Bath Style Shower;Sponge Bath  (pending progress)   Able to Gather/Transport No   Able to [employer] Temperature No   Able to Wash/Rinse/Dry (body part) Left Arm;Right Arm;L Upper Leg;R Upper Leg;Chest;Abdomen;Perineal Area; Buttocks   Limitations Noted in Balance; Endurance;ROM;Strength;Timeliness   Positioning Seated;Standing   Dressing/Undressing Clothing   Remove UB Clothes Other  (hospital gown)   Don UB Clothes Pullover Shirt   Type of Assistance Needed Supervision   Comment seated   Upper Body Dressing CARE Score 4   Remove LB Clothes Socks   Don LB Clothes Pants; Shoes   Type of Assistance Needed Physical assistance   Physical Assistance Level 76% or more   Comment pt req TA to thread pants, modA for CM when completing unilateral release   Lower Body Dressing CARE Score 2   Limitations Noted In Balance; Endurance; Safety;Strength;Timeliness   Positioning Supported Sit;Standing   Putting On/Taking Off Footwear   Type of Assistance Needed Physical assistance   Physical Assistance Level Total assistance   Putting On/Taking Off Footwear CARE Score 1   Toileting Hygiene   Type of Assistance Needed Physical assistance   Physical Assistance Level 51%-75%   Comment TA for buttock hygiene after BM  modA for balance when completing CM   Ramses Gonzalez 83 Score 2   Toilet Transfer   Surface Assessed Standard Commode   Transfer Technique Stand Pivot   Limitations Noted In Balance;Confidence; Endurance;UE Strength;LE Strength   Positioning Concerns LE Support   Type of Assistance Needed Physical assistance   Physical Assistance Level 51%-75%   Comment modA for SPT  Pt reports inc fear and dec WB of LE during pivots   Toilet Transfer CARE Score 2   Toileting   Able to Pull Clothing down no, up no  Able to Manage Clothing Closures No   Limitations Noted In Balance;ROM;UE Strength;LE Strength   Transfer Bed/Chair/Wheelchair   Limitations Noted In Balance; Endurance;UE Strength;LE Strength   Adaptive Equipment Roller Walker   Type of Assistance Needed Physical assistance   Physical Assistance Level 51%-75%   Comment modA SPT with RW  Dec LLE WB and inc fear of falling   Chair/Bed-to-Chair Transfer CARE Score 2   Lying to Sitting on Side of Bed   Type of Assistance Needed Physical assistance   Physical Assistance Level 51%-75%   Comment pt req maxA for bed mobility  Pt reports she sleeps in recliner PTA   Lying to Sitting on Side of Bed CARE Score 2   Sit to Stand   Type of Assistance Needed Physical assistance   Physical Assistance Level 26%-50%   Comment modA STS with RW with inc time and vc to push up from arm rests   Sit to Stand CARE Score 3   Walk 10 Feet   Reason if not Attempted Safety concerns   Walk 10 Feet CARE Score 88   Comprehension   Auditory Complex   Visual Complex   QI: Comprehension 4  Undestands: Clear comprehension without cues or repetitions   Expression   Verbal Complex   Non-Verbal Complex   Intelligibility Sentence   QI: Expression 4  Express complex messages without difficulty and with speech that is clear and easy to [place]   Expression (FIM) 7 - Expresses complex/abstract ideas in a reasonable time w/o devices or helper     RUE Assessment   RUE Assessment WFL   LUE Assessment   LUE Assessment WFL   Sensation   Light Touch Partial deficits in the LLE   Cognition   Overall Cognitive Status [facility]   Arousal/Participation Alert; Responsive; Cooperative   Attention Attends with cues to redirect   Orientation Level Oriented X4   Memory Decreased recall of recent events   Following Commands Follows multistep commands without difficulty   Perception   Inattention/Neglect Appears intact   Discharge Information   Vocational Plan Return to work; Full Time  (pending progress)   Barriers to Return to [facility]; Endurance   Patient's Discharge Plan Return home with brother and son   Patient's Rehab Expectations "Get back to what I used to"   Barriers to Discharge Home Decreased Strength;Decreased Endurance;Limited Family Support;Pain   Impressions Pt is a 67year old female that presented to [address] [place] [address] on 1/18/2023 via EMS s/p fall at work  Patient states she slipped on a wet floor and had immediate left hip pain  She denies hitting head or LOC  CT of head was negative  Left hip/pelvis x-ray showed acute intertrochanteric left hip fracture  Orthopedics was consulted, with recommendations for non-weight bearing to LLE and plan for operative intervention  On 1/19, patient went to the OR with Orthopedics for left IM fixation  Patient was cleared for weight bearing as tolerated to LLE  Pt supine in bed upon arrival Pt reporting she lives with brother who is "disabled" but is able to assist physically at DC if needed  Also reports son will be coming down from [place] to assist  [facility], with [facility] questionable  Pt has therapist speak to son  Therapist communicated pt ELOS about 2 weeks  Son reporting he may change flight closer to time of DC  Pt reports she works FT, +, no AD PTA  Pt at this time completing ADLs at modA/maxA  Therapist completed SB this session 2* to L hip drainage, communicated ot Dr Pia Krabbe who ws present to see during session  RN to change dressing later today  Therapist educated pt on rehab process, goal setting and ELOS which pt verbalized understanding   Pt presents with the following performance component deficits impacting ADL/IADL skills: fearful, anxious, weakness, impaired balance, decreased endurance, decreased coordination, increased fall risk, dec fx reach, new onset of impairment of functional mobility, decreased ADLS, decreased IADLS, decreased activity tolerance, decreased safety awareness, and SOB upon exertion, that result in activity limitations and/or participation restrictions  Pt to continue to benefit from continued acute rehab OT services during hospital stay to address defined deficits and to maximize level of functional independence in the following Occupational Performance areas: grooming, bathing/shower, toilet hygiene, dressing, medication management, functional mobility, community mobility, clothing management, cleaning, meal prep  Pt presents with good rehab potential  This evaluation requires clinical decision making of high complexity, because the patient presents with comorbidites that affect occupational performance and required significant modification of tasks or assistance with consideration of multiple treatment options  Pt is unsafe to D/C home at this time, recommending ELOS 2 weeks to achieve [place]/supervision level goals with least restrictive device to address these areas and resume prior occupational roles to maximize independence to reduce risk of fall and decrease risk of readmission          OT Therapy Minutes   OT Time In 0830   OT Time Out 1000   OT Total Time (minutes) 90   OT Mode of treatment - Individual (minutes) 90   OT Mode of treatment - Concurrent (minutes) 0   OT Mode of treatment - Group (minutes) 0   OT Mode of treatment - Co-treat (minutes) 0   OT Mode of Treatment - Total time(minutes) 90 minutes   OT Cumulative Minutes 90   Cumulative Minutes   Cumulative therapy minutes 90

## 2023-01-24 NOTE — ASSESSMENT & PLAN NOTE
DXA scan in 12/2020 showed osteopenia  Continue home calcium carbonate and Vitamin D supplementation  No recent hx of Vitamin D level - pending  Recommend repeat DXA scan as outpatient along with discussion about Prolia injections  Follow-up with PCP as outpatient

## 2023-01-24 NOTE — ASSESSMENT & PLAN NOTE
After slipping on water at work  Workers Comp  Subtrochanteric femur fracture s/p long nail (antegrade) on 1/19 by Dr Xuan Paz  Pain control as documented below  Surgical dressings removed 1/26  Monitor drainage  Follow-up with Orthopedics done 2/2   - Consult placed, XR stable  Staples removed      - f/u in 4 weeks for 6 week POV (3/2)    PT/ OT 3-5 hours a day, 5-7 days/week in acute comprehensive inpatient rehab

## 2023-01-24 NOTE — ASSESSMENT & PLAN NOTE
Wt Readings from Last 3 Encounters:   02/09/23 92 kg (202 lb 13 2 oz)   01/23/23 95 2 kg (209 lb 14 1 oz)     Does not appear to have acute exacerbation  12/2/2022 Echo with EF 55-59% and G1DD   on 1/20  Home: Lopressor, Losartan, Lasix  Here: Lopressor, Losartan 50mg daily  - Orthostasis has improved  Monitor and add back as appropriate  Monitor I/O and daily weights  Adjust as appropriate  Outpatient f/u with PCP

## 2023-01-24 NOTE — ASSESSMENT & PLAN NOTE
DXA 12/2020 with osteopenia  Continue home calcium and Vitamin D  1/24 Vitamin D is 29 8  Outpatient f/u DXA with PCP

## 2023-01-24 NOTE — PROGRESS NOTES
[facility]  Progress Note Bethany Marmolejo 1950, 67 y o  female MRN: [id number]  Unit/Bed#: [facility] 268-02 Encounter: [id number]  Primary Care Provider: No primary care provider on file  Date and time admitted to hospital: 1/23/2023  1:33 PM    Constipation  Assessment & Plan  KUB obtained on 1/23 due to no BM for 6 days  Showed nonobstructive bowel gas pattern moderate to large amount of stool throughout the colon, suggesting constipation  Had a BM yesterday evening  Bowel regimen increased  Encourage PO hydration and mobilization  Primary team following  Monitor for s/s of obstruction  Hypokalemia  Assessment & Plan  K+ 3 4 on 1/24  Give 40mEq potassium chloride once today  Continue to trend BMP  Hypoxemia  Assessment & Plan  Hypoxia in acute setting post-operatively  Developed PEs and was started on Eliquis  Per Pulmonary - recommending overnight noc ox as outpatient  Consider obtaining while in [facility]  Osteopenia  Assessment & Plan  DXA scan in 12/2020 showed osteopenia  Continue home calcium carbonate and Vitamin D supplementation  No recent hx of Vitamin D level - pending  Recommend repeat DXA scan as outpatient along with discussion about Prolia injections  Follow-up with PCP as outpatient  Acute postoperative anemia due to expected blood loss  Assessment & Plan  Hgb currently 7 2 from 8 8  Hgb was 12 6 pre-operatively  Iron panel on 1/23: Iron Saturation 9%, TIBC 279, Iron 25, and Ferritin 81  Giving 1 dose of IV Venofer today  Blood consent signed  Obtain type and screen and repeat CBC tomorrow  Asymptomatic  No active s/s of bleeding  Transfuse for Hgb <7 0 or if becomes symptomatic  Continue to trend CBC  Acute deep vein thrombosis (DVT) of left peroneal vein [facility])  Assessment & Plan  Lower extremity venous duplex on 1/20: Evidence of focal acute DVT in 1 of 2 peroneal veins on LLE  Started on Eliquis 10mg BID on 1/22    Continue for 7 days total and then decreased to 5mg BID for 3 months  Follow-up with PCP as outpatient  Neurovascular checks Q shift  Multiple subsegmental pulmonary emboli without acute cor pulmonale (HCC)  Assessment & Plan  CTA PE study on 1/20: Few subsegmental pulmonary emboli in the posterior lower lobes, measured RV/LV ratio within normal limits  Started on Eliquis 10mg BID on 1/22  Continue for 7 days total and then decrease to 5mg BID for 3 months  Follow-up with PCP as outpatient  Currently maintaining on RA  Encourage pulmonary toileting  Spot pulse ox checks  Chronic diastolic congestive heart failure (HCC)  Assessment & Plan  Wt Readings from Last 3 Encounters:   01/24/23 97 2 kg (214 lb 4 6 oz)   01/23/23 95 2 kg (209 lb 14 1 oz)     Stable without exacerbation   on 1/20  Last ECHO on 12/2/22 showed EF 55-59%, G1DD  Takes Lasix 20mg daily at home - currently on hold  Restart as able  Other hyperlipidemia  Assessment & Plan  Continue home Lipitor 20mg daily  Last lipid panel on 11/15/22: Cholesterol 147, Triglycerides 73, HDL 53, and LDL 79  Essential hypertension  Assessment & Plan  Home regimen: Lasix 20mg daily, metoprolol tartrate 50mg BID, losartan 50mg daily  Currently receiving metoprolol tartrate 50mg BID and losartan 50mg daily  Discontinue losartan today due to orthostasis with therapy  Apply abdominal binder/TEDs when getting OOB  Consider restarting Lasix when able  Monitor BP with routine VS   Follow-up with PCP as outpatient  * Closed left hip fracture [facility])  Assessment & Plan  S/p mechanical fall on 1/18  XR on 1/18 showed acute intertrochanteric L hip fracture  OR on 1/19 for IM fixation of L subtrochanteric hip fracture performed by Dr Michele Moraes  WBAT to LLE  Neurovascular checks Q shift  Ensure adequate pain control  Monitor incision for s/s of infection  Eliquis for DVT ppx      Follow-up with Orthopedics in 2 weeks as outpatient (2/2)  Primary team following  PT/OT  VTE Pharmacologic Prophylaxis:   Pharmacologic: Apixaban (Eliquis)  Mechanical VTE Prophylaxis in Place: Yes - sequential compression devices  Current Length of Stay: 1 day(s)    Current Patient Status: Inpatient Rehab     Discharge Plan: As per primary team     Code Status: Level 1 - Full Code    Subjective:   Pt examined while pt sitting in recliner in pt room  Currently denies any L hip pain  Felt lightheaded this morning after washing up and was noted to be orthostatic for therapy  Improved after remaining seated  Complaints of feeling fatigued and did sleep well last night  Had a small BM last night and larger BM this morning and feels better  Feels that her breathing has improved after having a BM  Denies any dyspnea or coughing  Denies any fevers or chills  Low threshold to give PRBCs today if lightheadedness and fatigue continues  TEDs and abdominal binder ordered  Losartan to be held tomorrow  Objective:     Vitals:   Temp (24hrs), Av 8 °F (36 6 °C), Min:97 2 °F (36 2 °C), Max:98 3 °F (36 8 °C)    Temp:  [97 2 °F (36 2 °C)-98 3 °F (36 8 °C)] 98 °F (36 7 °C)  HR:  [75-89] 84  Resp:  [14-20] 18  BP: (124-141)/(58-66) 141/66  SpO2:  [92 %-95 %] 92 %  Body mass index is 37 96 kg/m²  Review of Systems   Constitutional: Positive for fatigue  Negative for appetite change, chills and fever  HENT: Negative for trouble swallowing  Eyes: Negative for visual disturbance  Respiratory: Negative for cough, chest tightness, shortness of breath, wheezing and stridor  Cardiovascular: Negative for chest pain, palpitations and leg swelling  Gastrointestinal: Negative for abdominal distention, abdominal pain, constipation, diarrhea, nausea and vomiting  LBM , feels better after having BM this morning   Genitourinary: Negative for difficulty urinating  Musculoskeletal: Negative for arthralgias, back pain and gait problem  Neurological: Positive for light-headedness (lightheadedness after washing up with therapy, improved after resting, noted to be orthostatic)  Negative for dizziness and headaches  Psychiatric/Behavioral: Negative for dysphoric mood and sleep disturbance  The patient is not nervous/anxious  All other systems reviewed and are negative  Input and Output Summary (last 24 hours):     No intake or output data in the 24 hours ending 01/24/23 1011    Physical Exam:     Physical Exam  Vitals and nursing note reviewed  Constitutional:       General: She is not in acute distress  Appearance: Normal appearance  She is obese  She is not ill-appearing  HENT:      Head: Normocephalic and atraumatic  Cardiovascular:      Rate and Rhythm: Normal rate and regular rhythm  Pulses: Normal pulses  Heart sounds: Murmur heard  Systolic murmur is present with a grade of 1/6  No friction rub  Pulmonary:      Effort: Pulmonary effort is normal  No respiratory distress  Breath sounds: Normal breath sounds  No wheezing or rhonchi  Abdominal:      General: Abdomen is flat  Bowel sounds are normal  There is no distension  Palpations: Abdomen is soft  There is no mass  Tenderness: There is no abdominal tenderness  There is no guarding or rebound  Hernia: No hernia is present  Musculoskeletal:      Cervical back: Normal range of motion and neck supple  No tenderness  Right lower leg: No edema  Left lower leg: Edema (+1 pitting edema) present  Skin:     General: Skin is warm and dry  Capillary Refill: Capillary refill takes less than 2 seconds  Findings: Bruising (L hip ecchymosis and moderate edema) present  Neurological:      Mental Status: She is alert and oriented to person, place, and time     Psychiatric:         Mood and Affect: Mood normal          Behavior: Behavior normal          Additional Data:     Labs:    Results from last 7 days   Lab Units 01/24/23  0521   WBC Thousand/uL 9 80   HEMOGLOBIN g/dL 7 2*   HEMATOCRIT % 21 8*   PLATELETS Thousands/uL 192   NEUTROS PCT % 74   LYMPHS PCT % 14   MONOS PCT % 10   EOS PCT % 1     Results from last 7 days   Lab Units 01/24/23  0521 01/20/23  0431 01/20/23  0007   SODIUM mmol/L 134*   < > 136   POTASSIUM mmol/L 3 4*   < > 4 7   CHLORIDE mmol/L 101   < > 105   CO2 mmol/L 30   < > 29   BUN mg/dL 16   < > 18   CREATININE mg/dL 0 54*   < > 0 57*   ANION GAP mmol/L 3*   < > 2*   CALCIUM mg/dL 8 4   < > 7 6*   ALBUMIN g/dL  --   --  3 4*   TOTAL BILIRUBIN mg/dL  --   --  0 46   ALK PHOS U/L  --   --  70   ALT U/L  --   --  16   AST U/L  --   --  18   GLUCOSE RANDOM mg/dL 99   < > 160*    < > = values in this interval not displayed       Results from last 7 days   Lab Units 01/19/23  0429   INR  1 00     Results from last 7 days   Lab Units 01/19/23  2126   POC GLUCOSE mg/dl 155*         Results from last 7 days   Lab Units 01/20/23  0431 01/20/23  0300 01/20/23  0007   LACTIC ACID mmol/L  --  1 1 3 2*   PROCALCITONIN ng/ml 0 08  --   --        Labs reviewed    Imaging:    Imaging reviewed    Recent Cultures (last 7 days):           Last 24 Hours Medication List:   Current Facility-Administered Medications   Medication Dose Route Frequency Provider Last Rate   • acetaminophen  650 mg Oral Q6H PRN Marya Miller MD     • apixaban  10 mg Oral BID Marya Miller MD     • atorvastatin  20 mg Oral Daily With Mikael Valderrama MD     • bisacodyl  10 mg Rectal Daily PRN Marya Miller MD     • calcium carbonate  1,000 mg Oral Daily PRN Marya Miller MD     • calcium carbonate-vitamin D  2 tablet Oral Daily With Breakfast ELDA Mcnamara     • cholecalciferol  2,000 Units Oral Daily ELDA Mcnamara     • docusate sodium  100 mg Oral BID Marya Miller MD     • iron sucrose  200 mg Intravenous Once ELDA Mcnamara     • loratadine  10 mg Oral Daily Marya Miller MD     • metoprolol tartrate  50 mg Oral BID Arely Ervin MD     • oxyCODONE  5 mg Oral Q6H PRN Arely Ervin MD     • oxyCODONE  2 5 mg Oral Q6H PRN Arely Ervin MD     • pantoprazole  40 mg Oral Daily Arely Ervin MD     • polyethylene glycol  17 g Oral Daily Arely rEvin MD     • potassium chloride  40 mEq Oral Once Arely Ervin MD     • senna  2 tablet Oral Daily Arely Ervin MD          M*Modal software was used to dictate this note  It may contain errors with dictating incorrect words or incorrect spelling  Please contact the provider directly with any questions

## 2023-01-24 NOTE — ASSESSMENT & PLAN NOTE
Hgb currently 7 2 from 8 8  Hgb was 12 6 pre-operatively  Iron panel on 1/23: Iron Saturation 9%, TIBC 279, Iron 25, and Ferritin 81  Giving 1 dose of IV Venofer today  Blood consent signed  Obtain type and screen and repeat CBC tomorrow  Asymptomatic  No active s/s of bleeding  Transfuse for Hgb <7 0 or if becomes symptomatic  Continue to trend CBC

## 2023-01-24 NOTE — ASSESSMENT & PLAN NOTE
Home regimen: Lasix 20mg daily, metoprolol tartrate 50mg BID, losartan 50mg daily  Currently receiving metoprolol tartrate 50mg BID and losartan 50mg daily  Discontinue losartan today due to orthostasis with therapy  Apply abdominal binder/TEDs when getting OOB  Consider restarting Lasix when able  Monitor BP with routine VS   Follow-up with PCP as outpatient

## 2023-01-24 NOTE — ASSESSMENT & PLAN NOTE
KUB obtained on 1/23 due to no BM for 6 days  Showed nonobstructive bowel gas pattern moderate to large amount of stool throughout the colon, suggesting constipation  Had a BM yesterday evening  Bowel regimen increased  Encourage PO hydration and mobilization  Primary team following  Monitor for s/s of obstruction

## 2023-01-24 NOTE — ASSESSMENT & PLAN NOTE
Noted on CTA PE from 1/20   RV/LV ratio WNL  Completed Eliquis 10mg BID 7 days  Now on 5mg BID for 3 months as per Pulm  Monitor respiratory status

## 2023-01-24 NOTE — ASSESSMENT & PLAN NOTE
Home: Simvastatin 20mg nightly  Here: Lipitor 20mg nightly for therapeutic substitution   Outpatient f/u with PCP

## 2023-01-24 NOTE — ASSESSMENT & PLAN NOTE
Resolved and on RA  Previously multifactorial: PE, atelectasis, ?volume overload s/p Lasix IV x1  Does have nocturnal hypoxemia on Friday Noc Ox for 1 hour   - O2 at night for now   - Noc Ox performed on 2/8 overnight  Equipment ordered for discharge  - Will need formal evaluation with Sleep Medicine at discharge  Monitor closely  Goals > 90%       Outpatient f/u with Pulmonology/Sleep Medicine

## 2023-01-25 LAB
BASOPHILS # BLD AUTO: 0.04 THOUSANDS/ÂΜL (ref 0–0.1)
BASOPHILS NFR BLD AUTO: 0 % (ref 0–1)
EOSINOPHIL # BLD AUTO: 0.08 THOUSAND/ÂΜL (ref 0–0.61)
EOSINOPHIL NFR BLD AUTO: 1 % (ref 0–6)
ERYTHROCYTE [DISTWIDTH] IN BLOOD BY AUTOMATED COUNT: 15.3 % (ref 11.6–15.1)
HCT VFR BLD AUTO: 21.7 % (ref 34.8–46.1)
HGB BLD-MCNC: 7.2 G/DL (ref 11.5–15.4)
IMM GRANULOCYTES # BLD AUTO: 0.21 THOUSAND/UL (ref 0–0.2)
IMM GRANULOCYTES NFR BLD AUTO: 2 % (ref 0–2)
LYMPHOCYTES # BLD AUTO: 1.3 THOUSANDS/ÂΜL (ref 0.6–4.47)
LYMPHOCYTES NFR BLD AUTO: 13 % (ref 14–44)
MCH RBC QN AUTO: 29.6 PG (ref 26.8–34.3)
MCHC RBC AUTO-ENTMCNC: 31.5 G/DL (ref 31.4–37.4)
MCV RBC AUTO: 94 FL (ref 82–98)
MONOCYTES # BLD AUTO: 1.02 THOUSAND/ÂΜL (ref 0.17–1.22)
MONOCYTES NFR BLD AUTO: 10 % (ref 4–12)
NEUTROPHILS # BLD AUTO: 7.29 THOUSANDS/ÂΜL (ref 1.85–7.62)
NEUTS SEG NFR BLD AUTO: 74 % (ref 43–75)
NRBC BLD AUTO-RTO: 0 /100 WBCS
PLATELET # BLD AUTO: 213 THOUSANDS/UL (ref 149–390)
PMV BLD AUTO: 10 FL (ref 8.9–12.7)
RBC # BLD AUTO: 2.33 MILLION/UL (ref 3.81–5.12)
WBC # BLD AUTO: 9.94 THOUSAND/UL (ref 4.31–10.16)

## 2023-01-25 RX ORDER — ACETAMINOPHEN 325 MG/1
650 TABLET ORAL EVERY 6 HOURS PRN
Status: DISCONTINUED | OUTPATIENT
Start: 2023-01-25 | End: 2023-02-10 | Stop reason: HOSPADM

## 2023-01-25 RX ORDER — OXYCODONE HYDROCHLORIDE 5 MG/1
5 TABLET ORAL EVERY 6 HOURS PRN
Status: DISCONTINUED | OUTPATIENT
Start: 2023-01-25 | End: 2023-02-10 | Stop reason: HOSPADM

## 2023-01-25 RX ADMIN — Medication 2.5 MG: at 15:27

## 2023-01-25 RX ADMIN — PANTOPRAZOLE SODIUM 40 MG: 40 TABLET, DELAYED RELEASE ORAL at 06:40

## 2023-01-25 RX ADMIN — Medication 2 TABLET: at 08:25

## 2023-01-25 RX ADMIN — APIXABAN 10 MG: 5 TABLET, FILM COATED ORAL at 17:24

## 2023-01-25 RX ADMIN — ACETAMINOPHEN 650 MG: 325 TABLET ORAL at 11:35

## 2023-01-25 RX ADMIN — Medication 2.5 MG: at 08:26

## 2023-01-25 RX ADMIN — LORATADINE 10 MG: 10 TABLET ORAL at 08:25

## 2023-01-25 RX ADMIN — METOPROLOL TARTRATE 50 MG: 50 TABLET, FILM COATED ORAL at 17:24

## 2023-01-25 RX ADMIN — METOPROLOL TARTRATE 50 MG: 50 TABLET, FILM COATED ORAL at 08:25

## 2023-01-25 RX ADMIN — ACETAMINOPHEN 650 MG: 325 TABLET ORAL at 03:55

## 2023-01-25 RX ADMIN — ATORVASTATIN CALCIUM 20 MG: 20 TABLET, FILM COATED ORAL at 17:22

## 2023-01-25 RX ADMIN — DOCUSATE SODIUM 100 MG: 100 CAPSULE, LIQUID FILLED ORAL at 08:25

## 2023-01-25 RX ADMIN — SODIUM CHLORIDE 200 MG: 9 INJECTION, SOLUTION INTRAVENOUS at 15:27

## 2023-01-25 RX ADMIN — CHOLECALCIFEROL TAB 25 MCG (1000 UNIT) 3000 UNITS: 25 TAB at 08:25

## 2023-01-25 RX ADMIN — APIXABAN 10 MG: 5 TABLET, FILM COATED ORAL at 08:25

## 2023-01-25 NOTE — PCC PHYSICAL THERAPY
Pt is 67 yr old female slipped and fell at work on 1/18/23 sustaining + L hip IT/subtroch hip fx requiring surgical fixation with long TFN done 1/19/23 by Dr Tim Fox  Cleared post op for L LE WBAT  Pt with post op complications including hypoxia, L LE DVT + PE, anemia  Other PMH: HTN, inc lipids, R hip fx sp ORIF 2011  At baseline pt fully I without AD, works FT as  at [employer]  +  Pt lives in [address] [facility] home with brother (on disability), 6 ABY B HR, bed and Full bath on second floor, 1/2 bath first level  Pt sleeps in recliner at baseline on first floor  Pt highly limited amb olvin due to poor wt shift to unload and advance R LE due to sig dec L LE wt bearing tolerance  Unable to ambulate at this time   Pt to greatly benefit from ongoing aggressive skilled PT intervention to maximize functional mobility as pts goal is to be mod I for DC home  Pt demonstrates fair to good rehab potential to reach set goals pending progress and activity tolerance in ELOS x2 weeks for safe DC home with family support and likely out pt PT if able  Pt has RW, SPC,and shower chair  2/1/23 Pt has demonstrated improved mobility over the past week  Participating well, however does require frequent rest breaks due to pain and/or fatigue  Pt reports she wishes to sleep upstairs in her bed and will use recliner on 1st floor if needed  Pt requires Mod A for bed mobility at this time, limited by weakness of L LE  Walking is slowly improving, with her now able to walk HHD with RW  Gait remains antalgic, but has initaited step through and improved wt bearing L LE  Stairs remain greatest barrier  Pt has anxiety along with Weakness/Pain L LE limiting her ability to ascend stairs  Ice with elevation being used on L LE for edema & pain  Progressing toward goals  2/8: Pt has progressed to mod I for in room mobility using RW and also improving gait distances in hallway with reduced LLE pain reported   Pt has difficulty on stairs when only utilizing one handrail; trialed sidestepping with BUE support on rail but too difficult so then trialed using cane in RUE  Pt requires incidental touching to min A with this technique  She progressed to independent for sit > supine but does still require CGA and cueing for supine > sit from flat bed without railings  Due to difficulty with stairs and bed mobility, discussed recommendation of first floor set up with recliner at this time and continue working towards goals of going upstairs to bedroom with home PT  DME ordered for pt and being delivered to facility prior to DC

## 2023-01-25 NOTE — PROGRESS NOTES
01/25/23 1155   Pain Assessment   Pain Assessment Tool 0-10   Pain Score 6   Pain Location/Orientation Orientation: Left; Location: Hip   Pain Radiating Towards nonradiating   Pain Onset/Description Onset: Gradual   Effect of Pain on Daily Activities inc in stance and when anxious   Patient's Stated Pain Goal No pain   Hospital Pain Intervention(s) Rest   Restrictions/Precautions   Precautions Fall Risk;Pain   RLE Weight Bearing Per Order WBAT   LLE Weight Bearing Per Order WBAT   ROM Restrictions No   Eating   Type of Assistance Needed Independent   Eating CARE Score 6   Sit to Stand   Type of Assistance Needed Physical assistance   Physical Assistance Level 25% or less   Comment Stacia/CGA STS iwth RW  improved hand placement noted  Engaged pt in repetitive STS to inc carryover   Sit to Stand CARE Score 3   Bed-Chair Transfer   Type of Assistance Needed Physical assistance   Physical Assistance Level 26%-50%   Comment modA SPT with RW  pt with P WB ability and sliding feet during transfer   Chair/Bed-to-Chair Transfer CARE Score 3   Health Management   Health Management Level of Assistance Close supervision   Health Management pt reporting PTA she was indep with med mgnmtn and utilized AM/PM pill organizer  Therapist printed pts current meds with pt able to only verbalize previous meds not new meds  But once completed reference sheet pt able to verbalize 9/9 meds and verbalize which slot in pill organizer  Will cont to assess as meds get updated  Therapeutic Excerise-Strength   UE Strength Yes   Right Upper Extremity- Strength   R Shoulder Flexion; Extension;Horizontal ABduction   R Elbow Elbow flexion;Elbow extension   R Position Seated   Equipment Dumbbell  (3#)   R Weight/Reps/Sets 2x15   RUE Strength Comment engaged pt in UE TE with 3# 2x15 to cont to inc fx strength for improved STS and SPT   pt toelrated well with rest break in between   Left Upper Extremity-Strength   LUE Strength Comment see above Cognition   Overall Cognitive Status WFL   Arousal/Participation Alert; Responsive; Cooperative   Attention Attends with cues to redirect   Orientation Level Oriented X4   Memory Decreased recall of recent events   Following Commands Follows multistep commands without difficulty   Additional Activities   Additional Activities Comments Therapist attempted to engage pt in weight shifting and tapping on 2in block  Pt able to weight shifitng on RLE and tap with LLE  However, inc difficulty when WB on LLE and unable to complete  Completed x4 with rest break only lifting LLE   Activity Tolerance   Activity Tolerance Patient limited by fatigue;Patient limited by pain  (anxious, fear of falling)   Medical Staff Made Aware ortho BP sit 130/70; stand 100/65   Assessment   Treatment Assessment Engaged pt in 90mins of skilled OT services with focus on transfers, STS, weight shifting, UE TE, med mngmnt  Pt overall tolerated tx session well  Pt with imrpoved STS, however, cont to req modA for SPT as pt has dec weight shifting and drag RLE during SPT  Pt can cont to benefit from further skilled OT services with focus on transfers, weight shifting, activity tolerance, kitchen mobility, UE TE to inc fx performance and dec CG burden  Prognosis Fair   Problem List Decreased strength;Decreased endurance; Impaired balance;Decreased mobility;Obesity;Orthopedic restrictions;Pain   Barriers to Discharge Inaccessible home environment;Decreased caregiver support   Plan   Treatment/Interventions ADL retraining;Functional transfer training; Therapeutic exercise; Endurance training;Patient/family training;Bed mobility; Compensatory technique education   Progress Progressing toward goals   Recommendation   OT Discharge Recommendation Home with home health rehabilitation  (pending progress)   OT Therapy Minutes   OT Time In 1155   OT Time Out 1325   OT Total Time (minutes) 90   OT Mode of treatment - Individual (minutes) 90   OT Mode of treatment - Concurrent (minutes) 0   OT Mode of treatment - Group (minutes) 0   OT Mode of treatment - Co-treat (minutes) 0   OT Mode of Treatment - Total time(minutes) 90 minutes   OT Cumulative Minutes 180   Therapy Time missed   Time missed?  No

## 2023-01-25 NOTE — PCC OCCUPATIONAL THERAPY
2/8/23  Pt has met all OT goals and fx at Kermit level with use of LHAE  Therapist completed DME sheet for RW, hip kit, shower chair and BSC  Pt with anticipated dc 2/10/23 home PT and brother support  Pt cont to demonstrate deficits in steps up to 2nd flr  However, recommending first floor setup (see PT notes)         Barriers Intervention   Steps to 2nd flr Recommend 1st flr setup

## 2023-01-25 NOTE — ASSESSMENT & PLAN NOTE
S/p mechanical fall on 1/18  XR on 1/18 showed acute intertrochanteric L hip fracture  OR on 1/19 for IM fixation of L subtrochanteric hip fracture performed by Dr Lida Tee  WBAT to LLE  Neurovascular checks Q shift  Ensure adequate pain control  Monitor incision for s/s of infection  Eliquis for DVT ppx  Follow-up with Orthopedics in 2 weeks as outpatient (2/2)  Primary team following  PT/OT

## 2023-01-25 NOTE — PROGRESS NOTES
[facility]  Progress Note Kamlesh Hugo 1950, 67 y o  female MRN: [id number]  Unit/Bed#: [facility] 268-02 Encounter: [id number]  Primary Care Provider: No primary care provider on file  Date and time admitted to hospital: 1/23/2023  1:33 PM    Constipation  Assessment & Plan  KUB obtained on 1/23 due to no BM for 6 days  Showed nonobstructive bowel gas pattern moderate to large amount of stool throughout the colon, suggesting constipation  Had a BM on 1/24  Bowel regimen increased  Encourage PO hydration and mobilization  Primary team following  Monitor for s/s of obstruction  Hypokalemia  Assessment & Plan  K+ 3 4 on 1/24  Given 40mEq potassium chloride once on 1/24  Continue to trend BMP  Hypoxemia  Assessment & Plan  Hypoxia in acute setting post-operatively  Developed PEs and was started on Eliquis  Per Pulmonary - recommending overnight noc ox as outpatient  Consider obtaining while in [facility]  Osteopenia  Assessment & Plan  DXA scan in 12/2020 showed osteopenia  Continue home calcium carbonate and Vitamin D supplementation  Vitamin D level 29 8 on 1/24  Increased Vitamin D3 to 3000u daily  Recommend repeat DXA scan as outpatient along with discussion about Prolia injections  Follow-up with PCP as outpatient  Acute postoperative anemia due to expected blood loss  Assessment & Plan  Hgb currently stable at 7 2  Hgb was 12 6 pre-operatively  Iron panel on 1/23: Iron Saturation 9%, TIBC 279, Iron 25, and Ferritin 81  Giving 1 dose of IV Venofer on 1/24  Give additional dose of Venofer today  Blood consent signed and type and screen obtained  No active s/s of bleeding  Transfuse for Hgb <7 0 or if becomes symptomatic  Continue to trend CBC  Acute deep vein thrombosis (DVT) of left peroneal vein [facility])  Assessment & Plan  Lower extremity venous duplex on 1/20: Evidence of focal acute DVT in 1 of 2 peroneal veins on LLE    Started on Eliquis 10mg BID on 1/22  Continue for 7 days total and then decreased to 5mg BID for 3 months  Follow-up with PCP as outpatient  Neurovascular checks Q shift  Multiple subsegmental pulmonary emboli without acute cor pulmonale (HCC)  Assessment & Plan  CTA PE study on 1/20: Few subsegmental pulmonary emboli in the posterior lower lobes, measured RV/LV ratio within normal limits  Started on Eliquis 10mg BID on 1/22  Continue for 7 days total and then decrease to 5mg BID for 3 months  Follow-up with PCP as outpatient  Currently maintaining on RA  Encourage pulmonary toileting  Spot pulse ox checks  Chronic diastolic congestive heart failure (HCC)  Assessment & Plan  Wt Readings from Last 3 Encounters:   01/25/23 97 3 kg (214 lb 8 1 oz)   01/23/23 95 2 kg (209 lb 14 1 oz)     Stable without exacerbation   on 1/20  Last ECHO on 12/2/22 showed EF 55-59%, G1DD  Takes Lasix 20mg daily at home - currently on hold  Restart as able  Other hyperlipidemia  Assessment & Plan  Continue home Lipitor 20mg daily  Last lipid panel on 11/15/22: Cholesterol 147, Triglycerides 73, HDL 53, and LDL 79  Essential hypertension  Assessment & Plan  Home regimen: Lasix 20mg daily, metoprolol tartrate 50mg BID, losartan 50mg daily  Currently receiving metoprolol tartrate 50mg BID  Discontinued losartan on 1/24 due to orthostasis with therapy  Apply abdominal binder/TEDs when getting OOB  Consider restarting Lasix when able  Monitor BP with routine VS   Follow-up with PCP as outpatient  * Closed left hip fracture [facility])  Assessment & Plan  S/p mechanical fall on 1/18  XR on 1/18 showed acute intertrochanteric L hip fracture  OR on 1/19 for IM fixation of L subtrochanteric hip fracture performed by Dr Raphael Parks  WBAT to LLE  Neurovascular checks Q shift  Ensure adequate pain control  Monitor incision for s/s of infection  Eliquis for DVT ppx      Follow-up with Orthopedics in 2 weeks as outpatient (2/2)  Primary team following  PT/OT  VTE Pharmacologic Prophylaxis:   Pharmacologic: Apixaban (Eliquis)  Mechanical VTE Prophylaxis in Place: Yes - sequential compression devices  Current Length of Stay: 2 day(s)    Current Patient Status: Inpatient Rehab     Discharge Plan: As per primary team     Code Status: Level 1 - Full Code    Subjective:   Pt examined while sitting in WC in pt room  Currently denies any pain to the L hip but just had finished using ice  Denies any lightheadedness or dizziness today and feels that she was doing much better in therapy  Denies any SOB or palpitations  Had a BM this morning without any issues  Asking to have her IV removed due to it being uncomfortable  Will give extra dose of Venofer after therapy and then IV can be removed  Did discuss that the IV may need to be reinserted if she ends up requiring further transfusions or fluids and pt is agreeable  States that she is having a lot of drainage from her hip, appears to be serosanguinous and discussed that this is most likely fluid shifting due to the edema in her L hip and pt acknowledged understanding  Objective:     Vitals:   Temp (24hrs), Av 7 °F (37 1 °C), Min:97 8 °F (36 6 °C), Max:99 6 °F (37 6 °C)    Temp:  [97 8 °F (36 6 °C)-99 6 °F (37 6 °C)] 97 8 °F (36 6 °C)  HR:  [73-85] 81  Resp:  [18] 18  BP: (100-146)/(55-74) 100/65  SpO2:  [85 %-94 %] 90 %  Body mass index is 38 kg/m²  Review of Systems   Constitutional: Negative for appetite change, chills, fatigue and fever  HENT: Negative for trouble swallowing  Eyes: Negative for visual disturbance  Respiratory: Negative for cough, shortness of breath, wheezing and stridor  Cardiovascular: Negative for chest pain, palpitations and leg swelling  Gastrointestinal: Negative for abdominal distention, abdominal pain, constipation, diarrhea, nausea and vomiting  LBM    Genitourinary: Negative for difficulty urinating  Musculoskeletal: Negative for arthralgias and back pain  Neurological: Negative for dizziness, weakness, light-headedness, numbness and headaches  Psychiatric/Behavioral: Negative for dysphoric mood and sleep disturbance  The patient is not nervous/anxious  All other systems reviewed and are negative  Input and Output Summary (last 24 hours): Intake/Output Summary (Last 24 hours) at 1/25/2023 1441  Last data filed at 1/25/2023 1005  Gross per 24 hour   Intake 240 ml   Output 200 ml   Net 40 ml       Physical Exam:     Physical Exam  Vitals and nursing note reviewed  Constitutional:       General: She is not in acute distress  Appearance: Normal appearance  She is obese  She is not ill-appearing  HENT:      Head: Normocephalic and atraumatic  Cardiovascular:      Rate and Rhythm: Normal rate and regular rhythm  Pulses: Normal pulses  Heart sounds: Murmur heard  Systolic murmur is present with a grade of 1/6  No friction rub  Pulmonary:      Effort: Pulmonary effort is normal  No respiratory distress  Breath sounds: Normal breath sounds  No wheezing or rhonchi  Abdominal:      General: Abdomen is flat  Bowel sounds are normal  There is no distension  Palpations: Abdomen is soft  There is no mass  Tenderness: There is no abdominal tenderness  There is no guarding or rebound  Hernia: No hernia is present  Musculoskeletal:         General: Swelling (Moderate L thigh edema) present  Cervical back: Normal range of motion and neck supple  No tenderness  Right lower leg: No edema  Left lower leg: Edema (Moderate non-pitting edema) present  Skin:     General: Skin is warm and dry  Findings: Bruising (Scattered ecchymosis of L thigh/hip) present  Comments: L hip incision with staples  Well-approximated  Moderate amount of serosanguinous drainage  No edema or erythema present     Neurological:      Mental Status: She is alert and oriented to person, place, and time  Psychiatric:         Mood and Affect: Mood normal          Behavior: Behavior normal          Additional Data:     Labs:    Results from last 7 days   Lab Units 01/25/23  0612   WBC Thousand/uL 9 94   HEMOGLOBIN g/dL 7 2*   HEMATOCRIT % 21 7*   PLATELETS Thousands/uL 213   NEUTROS PCT % 74   LYMPHS PCT % 13*   MONOS PCT % 10   EOS PCT % 1     Results from last 7 days   Lab Units 01/24/23  0521 01/20/23  0431 01/20/23  0007   SODIUM mmol/L 134*   < > 136   POTASSIUM mmol/L 3 4*   < > 4 7   CHLORIDE mmol/L 101   < > 105   CO2 mmol/L 30   < > 29   BUN mg/dL 16   < > 18   CREATININE mg/dL 0 54*   < > 0 57*   ANION GAP mmol/L 3*   < > 2*   CALCIUM mg/dL 8 4   < > 7 6*   ALBUMIN g/dL  --   --  3 4*   TOTAL BILIRUBIN mg/dL  --   --  0 46   ALK PHOS U/L  --   --  70   ALT U/L  --   --  16   AST U/L  --   --  18   GLUCOSE RANDOM mg/dL 99   < > 160*    < > = values in this interval not displayed       Results from last 7 days   Lab Units 01/19/23  0429   INR  1 00     Results from last 7 days   Lab Units 01/19/23  2126   POC GLUCOSE mg/dl 155*         Results from last 7 days   Lab Units 01/20/23  0431 01/20/23  0300 01/20/23  0007   LACTIC ACID mmol/L  --  1 1 3 2*   PROCALCITONIN ng/ml 0 08  --   --        Labs reviewed    Imaging:    Imaging reviewed    Recent Cultures (last 7 days):           Last 24 Hours Medication List:   Current Facility-Administered Medications   Medication Dose Route Frequency Provider Last Rate   • acetaminophen  650 mg Oral Q6H PRN Leopold Gage, CRNP     • apixaban  10 mg Oral BID Sohail Combs MD     • atorvastatin  20 mg Oral Daily With Linda Hamman, MD     • bisacodyl  10 mg Rectal Daily PRN Sohail Combs MD     • calcium carbonate  1,000 mg Oral Daily PRN Sohail Combs MD     • calcium carbonate-vitamin D  2 tablet Oral Daily With Breakfast Leopold Gage, CRNP     • cholecalciferol  3,000 Units Oral Daily Leopold Gage, CRNP     • docusate sodium  100 mg Oral BID Neda Kayser, MD     • iron sucrose (VENOFER) 200 mg IVPB  200 mg Intravenous Once ELDA Garcia     • loratadine  10 mg Oral Daily Neda Kayser, MD     • metoprolol tartrate  50 mg Oral BID Neda Kayser, MD     • oxyCODONE  5 mg Oral Q6H PRN Neda Kayser, MD     • oxyCODONE  2 5 mg Oral Q6H PRN Neda Kayser, MD     • pantoprazole  40 mg Oral Daily Neda Kayser, MD     • polyethylene glycol  17 g Oral Daily Neda Kayser, MD     • senna  2 tablet Oral Daily Neda Kayser, MD          M*Modal software was used to dictate this note  It may contain errors with dictating incorrect words or incorrect spelling  Please contact the provider directly with any questions

## 2023-01-25 NOTE — PLAN OF CARE
Problem: RESPIRATORY - ADULT  Goal: Achieves optimal ventilation and oxygenation  Description: INTERVENTIONS:  - Assess for changes in respiratory status  - Assess for changes in mentation and behavior  - Position to facilitate oxygenation and minimize respiratory effort  - Oxygen administered by appropriate delivery if ordered  - Initiate smoking cessation education as indicated  - Encourage broncho-pulmonary hygiene including cough, deep breathe, Incentive Spirometry  - Assess the need for suctioning and aspirate as needed  - Assess and instruct to report SOB or any respiratory difficulty  - Respiratory Therapy support as indicated  Outcome: Progressing     Problem: SKIN/TISSUE INTEGRITY - ADULT  Goal: Incision(s), wounds(s) or drain site(s) healing without S/S of infection  Description: INTERVENTIONS  - Assess and document dressing, incision, wound bed, drain sites and surrounding tissue  - Provide patient and family education  - Perform skin care/dressing changes every shift  Outcome: Progressing

## 2023-01-25 NOTE — ASSESSMENT & PLAN NOTE
Hgb currently stable at 7 2  Hgb was 12 6 pre-operatively  Iron panel on 1/23: Iron Saturation 9%, TIBC 279, Iron 25, and Ferritin 81  Giving 1 dose of IV Venofer on 1/24  Give additional dose of Venofer today  Blood consent signed and type and screen obtained  No active s/s of bleeding  Transfuse for Hgb <7 0 or if becomes symptomatic  Continue to trend CBC

## 2023-01-25 NOTE — ASSESSMENT & PLAN NOTE
Home regimen: Lasix 20mg daily, metoprolol tartrate 50mg BID, losartan 50mg daily  Currently receiving metoprolol tartrate 50mg BID  Discontinued losartan on 1/24 due to orthostasis with therapy  Apply abdominal binder/TEDs when getting OOB  Consider restarting Lasix when able  Monitor BP with routine VS   Follow-up with PCP as outpatient

## 2023-01-25 NOTE — ASSESSMENT & PLAN NOTE
DXA scan in 12/2020 showed osteopenia  Continue home calcium carbonate and Vitamin D supplementation  Vitamin D level 29 8 on 1/24  Increased Vitamin D3 to 3000u daily  Recommend repeat DXA scan as outpatient along with discussion about Prolia injections  Follow-up with PCP as outpatient

## 2023-01-25 NOTE — ASSESSMENT & PLAN NOTE
Wt Readings from Last 3 Encounters:   01/25/23 97 3 kg (214 lb 8 1 oz)   01/23/23 95 2 kg (209 lb 14 1 oz)     Stable without exacerbation   on 1/20  Last ECHO on 12/2/22 showed EF 55-59%, G1DD  Takes Lasix 20mg daily at home - currently on hold  Restart as able

## 2023-01-25 NOTE — PROGRESS NOTES
01/25/23 0830   Pain Assessment   Pain Assessment Tool 0-10   Pain Score 4   Pain Location/Orientation Orientation: Left; Location: Hip   Restrictions/Precautions   Precautions Fall Risk;Pain   General   Change In Medical/Functional Status Hgb remains low  Teds donned  Cognition   Overall Cognitive Status WFL   Arousal/Participation Alert; Responsive; Cooperative   Attention Attends with cues to redirect   Orientation Level Oriented X4   Memory Decreased recall of recent events   Following Commands Follows multistep commands without difficulty   Comments continued HIGH ANXIETY with mobility  Subjective   Subjective "I do feel better today but its still bleeding"   Roll Left and Right   Comment high level pain when attempting roll to L  Reason if not Attempted Safety concerns   Roll Left and Right CARE Score 88   Sit to Lying   Type of Assistance Needed Physical assistance   Physical Assistance Level 76% or more   Comment max A for LE lifting and trunk repositioning  Sit to Lying CARE Score 2   Lying to Sitting on Side of Bed   Type of Assistance Needed Physical assistance   Physical Assistance Level 51%-75%   Lying to Sitting on Side of Bed CARE Score 2   Sit to Stand   Type of Assistance Needed Physical assistance   Physical Assistance Level 26%-50%   Sit to Stand CARE Score 3   Bed-Chair Transfer   Type of Assistance Needed Physical assistance   Physical Assistance Level 26%-50%   Comment stand pivot with RW, inc time  Chair/Bed-to-Chair Transfer CARE Score 3   Car Transfer   Reason if not Attempted Safety concerns   Car Transfer CARE Score 88   Walk 10 Feet   Reason if not Attempted Safety concerns   Walk 10 Feet CARE Score 88   Ambulation   Primary Mode of Locomotion Prior to Admission Walk   Assist Device Parallel Bars   Findings x4 steps, POOR L st shift to unlaod and advance R LE  Does the patient walk? 2  Yes   Wheelchair mobility   Does the patient use a wheelchair? 0   No   Toilet Transfer Type of Assistance Needed Physical assistance   Physical Assistance Level 26%-50%   Comment mod A for stand pivot iwth RW, marked inc time due to dec L LE wt shift  Toilet Transfer CARE Score 3   Therapeutic Interventions   Strengthening Supine L LE TE: L LE elevation for L APs, quad sets x30; SAQ, mod bridges/glute sets x20; abd and heel slides with slide board AAROM 2x10  Seated L LE heel slides x30; AA LAQ 2x10  Balance x3 stand trials with RW, for standing BP, incsional dressing change, and pant mgmt post toileting  Assessment   Treatment Assessment Pt engaged in 90 min skilled PT intervention with ongoing focus on transfers and gait trial  +ongoing proximal incsional bleeding, reports feeling better however hgb remains low which is concerning to her  Continued HIGH ANXEITY with mobility trials  TEDS donned prior to session, Ortho BP taken and charted, +drop sup to sit  mild symptoms with stand  incisional dressing reinforced due to ongoing bloody drainage, RN aware  +BM at end of session, RN notified  Continue strength and mobility to progress amb and wt shift tolerance  May benefit from anti-anxiety meds as pt Highly anxious and becomes SOB  c/o chest pain/discomfort with inc anxiety and exersion at times, cues to breathe   with activity sats 96%   Problem List Decreased strength;Decreased endurance; Impaired balance;Decreased mobility;Obesity;Orthopedic restrictions;Pain   Barriers to Discharge Inaccessible home environment;Decreased caregiver support   Barriers to Discharge Comments ABY, ML home  Plan   Treatment/Interventions Functional transfer training;LE strengthening/ROM; Therapeutic exercise; Endurance training;Bed mobility;Gait training   Progress Progressing toward goals   Recommendation   PT Discharge Recommendation Home with home health rehabilitation   PT Therapy Minutes   PT Time In 0830   PT Time Out 1000   PT Total Time (minutes) 90   PT Mode of treatment - Individual (minutes) 90 PT Mode of treatment - Concurrent (minutes) 0   PT Mode of treatment - Group (minutes) 0   PT Mode of treatment - Co-treat (minutes) 0   PT Mode of Treatment - Total time(minutes) 90 minutes   PT Cumulative Minutes 180   Therapy Time missed   Time missed?  No

## 2023-01-25 NOTE — ASSESSMENT & PLAN NOTE
KUB obtained on 1/23 due to no BM for 6 days  Showed nonobstructive bowel gas pattern moderate to large amount of stool throughout the colon, suggesting constipation  Had a BM on 1/24  Bowel regimen increased  Encourage PO hydration and mobilization  Primary team following  Monitor for s/s of obstruction

## 2023-01-25 NOTE — PROGRESS NOTES
Physical Medicine and Rehabilitation Progress Note  Sukhdev Hicks 67 y o  female MRN: [id number]  Unit/Bed#: [facility] 268-02 Encounter: [id number]    HPI: Sukhdev Hicks is a 67 y o  female  With medical history of HLD, HTN, Chronic diastolic heart failure ([id number]), previous R hip fracture who presented to the [address] [place], [place] [address] on 1/18 after slipping on some water while at work at [employer]  No prodromal symptoms  CTH showed no acute intracranial abnormality, and XR L hip showed a subtrochanteric femur fracture  Ortho recommended ORIF with IMN which was performed on 1/19 by Dr Natalee Frank  Made WBAT post-op Post-op lethargi and hypoxic, CXR showed no acute cardiopulmonary disease  Required BiPAP, Narcan ,and Flumazenil  There was some improvement in her lethargy, but remained hypoxic  Pulm was consulted  CTA PE ordered which showed distal subsegmental PEs in the posterior lower lobe with atelectasis  Doppler performed showed a DVT in 1 peroneal vein in LLE  Pulm did not feel that the findings were responsible for the extent of her hypoxia - they felt multifactorial 2/2 atelectasis some volume overload  She got 1 dose of IV Lasix  Primary did not feel there was a volume overload component  Ultimately recommended for a/c for at least 3 months  Eliquis cost $47 a month  Course also c/b ABLA, but that stabilized and she did not require transfusion She was stabilized and admitted to the [facility] on 1/23/2023  Chief Complaint: Feeling better, improved pain  Interval History/Subjective:  No acute events overnight  Feeling much better today  BP dropped from supine to sit with SBP from 146 to 124, DBP maintained  No change with standing and asymptomatic throughout  Feels tired, but more confident in herself  Denies any CP, SOB, fevers, chills, N/V, abdominal pain, Last BM 1/23  Yesterday felt therapy didn't go well and was beating herself up about it  Asking when IV can come out      ROS:  A 10 point review of systems was negative except for what is noted in the HPI  Today's Changes:  1  Reviewed cost of Eliquis with her - she is fine with $47 a month  2  Will change dressing on upper incisions as they keep getting saturated  3  More iron today, hgb stable  4  DIDI/Abdominal binders  Ensure adequate hydration  Total visit time: 25 minutes, with more than 50% spent counseling/coordinating care  Counseling includes discussion with patient re: progress in therapies, functional issues observed by therapy staff, and discussion with patient regarding their current medical state and wellbeing  Coordination of patient's care was performed in conjunction with Internal Medicine service to monitor patient's labs, vitals, and management of their comorbidities  Assessment/Plan:    * Closed left hip fracture ([address] )  Assessment & Plan  After slipping on water at work  Workers Comp  Subtrochanteric femur fracture s/p long nail (antegrade) on 1/19 by Dr Tomlinson Pac  WBAT  Pain control as documented below  Follow-up with Orthopedics 2 weeks (2/2)    PT/ OT 3-5 hours a day, 5-7 days/week in acute comprehensive inpatient rehab    Constipation  Assessment & Plan  Last BM 1/25  Continue current bowel regimen  Adjust as appropriate  Adjustment disorder with anxiety  Assessment & Plan  Will provide non-pharmacologic strategies for now  Consulted rehab psychology for support  Hypokalemia  Assessment & Plan  1/24 3 4 after 40mEq yesterday  Giving additional 40mEq today   Monitor    Hypoxemia  Assessment & Plan  Multifactorial: PE, atelectasis, ?volume overload s/p Lasix IV x1  Was weaned to room air in acute hospital  Outpatient Noc Ox overnight  Will try to wean off oxygen - this is new for her   - Ambulatory sats while here  Monitor closely  Goals > 90%  Outpatient f/u with Pulmonology       Osteopenia  Assessment & Plan  DXA 12/2020 with osteopenia  Continue home calcium and Vitamin D  1/24 Vitamin D is 29 8  Outpatient f/u DXA with PCP  Acute postoperative anemia due to expected blood loss  Assessment & Plan  Hgb 12 6 pre-op  1/25  8 8 > 7 2 > 7 4 > 7 2   - Iron deficiency on labs  - Also post-operative blood loss  - Given IV venofer x2   - Type and screen   - Consent in chart  Trend, transfuse as appropriate  Outpatient f/u with PCP    Acute deep vein thrombosis (DVT) of left peroneal vein [facility])  Assessment & Plan  1/20 LE Venous Duplex with L acute DVT in 1 of 2 peroneal veins  Now on Eliquis  See PE for more details  No SCD on that leg  Can do ace wrapping for edema  Outpatient f/u with PCP    Multiple subsegmental pulmonary emboli without acute cor pulmonale (HCC)  Assessment & Plan  Noted on CTA PE from 1/20   RV/LV ratio WNL  Started on Eliquis 10mg BID on 1/22 for 7 days before transitioning to 5mg BID for 3 months as per Pulm  Monitor respiratory status  Chronic diastolic congestive heart failure (HCC)  Assessment & Plan  Wt Readings from Last 3 Encounters:   01/25/23 97 3 kg (214 lb 8 1 oz)   01/23/23 95 2 kg (209 lb 14 1 oz)     Does not appear to have acute exacerbation  12/2/2022 Echo with EF 55-59% and G1DD   on 1/20  Home: Lopressor, Losartan, Lasix  Here: Lopressor  - Holding ARB for orthostasis  Monitor I/O and daily weights  Adjust as appropriate  Outpatient f/u with PCP  Other hyperlipidemia  Assessment & Plan  Home: Simvastatin 20mg nightly  Here: Lipitor 20mg nightly for therapeutic substitution   Outpatient f/u with PCP    Essential hypertension  Assessment & Plan  Home: Lasix 20mg daily, Lopressor 50mg BID, Losartan 50mg daily  Here: Lopressor 50mg BID  1/24 Losartan held for orthostasis  Can try to restart Lasix while here  Monitor BP, adjust as appropriate  Follow-up with PCP  Health Maintenance  #Delirium/Sleep: At risk has had hallucinations on oxycodone 10mg, and was lethargic requiring narcan on fentanyl and dilaudid     #Pain: Tylenol ordered PRN, Oxycodone 2 5-5mg PRN  #Bowel: Last BM 1/25  Continue colace/miralax/senna 2 tabs  Added PRN suppository  #Bladder: Voiding and continent  Monitor  #Skin/Pressure Injury Prevention: Turn Q2hr in bed, with weight shifts [id number] in wheelchair  Float heels in bed  #DVT Prophylaxis: Fully anticoagulated on Eliquis  SCD on RLE only  #GI Prophylaxis: PPI started when full a/c started  Can also do famotidine  #Code Status:  Full Code  #FEN: Reg Diet  #Dispo: ELOS 2 weeks with goals to be modified Ind with mobility, ADLs  Must be able to do stairs ideally  Son may be able to come down closer to DC to assist with her care  Objective:    Functional Update:  PT: min-modA, 4 steps on the parallel bars   OT: mod-maxA with ADLs    Allergies per EMR    Physical Exam:  Temp:  [97 8 °F (36 6 °C)-99 6 °F (37 6 °C)] 97 8 °F (36 6 °C)  HR:  [73-85] 81  Resp:  [18] 18  BP: ()/(50-63) 138/63  Oxygen Therapy  SpO2: 90 %  O2 Flow Rate (L/min): 2 L/min    Gen: No acute distress, Well-nourished, well-appearing  HEENT: Moist mucus membranes, Normocephalic/Atraumatic  Cardiovascular: Regular rate, rhythm, S1/S2  Distal pulses palpable  Heme/Extr: LE edema stable  Pulmonary: Non-labored breathing  Lungs CTAB  : No garcia  GI: Soft, non-tender, non-distended  BS+  Integumentary: Skin is warm, dry  Neuro: AAOx3,  Speech is intact  Appropriate to questioning  Psych: Normal mood and affect  In better spirits today       Diagnostic Studies:   Reviewed, no new imaging    Laboratory:  Reviewed   Results from last 7 days   Lab Units 01/25/23  0612 01/24/23  1434 01/24/23  0521 01/22/23  0441   HEMOGLOBIN g/dL 7 2* 7 4* 7 2* 8 8*   HEMATOCRIT % 21 7* 22 7* 21 8* 25 5*   WBC Thousand/uL 9 94  --  9 80 9 59     Results from last 7 days   Lab Units 01/24/23  0521 01/22/23  0441 01/21/23  0543 01/20/23  0431 01/20/23  0007 01/19/23  0429   BUN mg/dL 16 16 18   < > 18 19   POTASSIUM mmol/L 3 4* 3 2* 3 7   < > 4 7 4 3 CHLORIDE mmol/L 101 100 101   < > 105 100   CREATININE mg/dL 0 54* 0 45* 0 54*   < > 0 57* 0 66   AST U/L  --   --   --   --  18 19   ALT U/L  --   --   --   --  16 17    < > = values in this interval not displayed  Results from last 7 days   Lab Units 01/19/23  0429   PROTIME seconds 13 2   INR  1 00        Patient Active Problem List   Diagnosis   • Closed left hip fracture ([facility] )   • Essential hypertension   • Other hyperlipidemia   • Chronic diastolic congestive heart failure ([facility] )   • Multiple subsegmental pulmonary emboli without acute cor pulmonale (HCC)   • Acute deep vein thrombosis (DVT) of left peroneal vein (HCC)   • Acute postoperative anemia due to expected blood loss   • Osteopenia   • Hypoxemia   • Hypokalemia   • Adjustment disorder with anxiety   • Constipation         Medications  Current Facility-Administered Medications   Medication Dose Route Frequency Provider Last Rate   • acetaminophen  650 mg Oral Q6H PRN ELDA Beaulieu     • apixaban  10 mg Oral BID Gildardo Bentley MD     • atorvastatin  20 mg Oral Daily With Ellen Barreto MD     • bisacodyl  10 mg Rectal Daily PRN Gildardo Bentley MD     • calcium carbonate  1,000 mg Oral Daily PRN Gildardo Bentley MD     • calcium carbonate-vitamin D  2 tablet Oral Daily With Breakfast ELDA Beaulieu     • cholecalciferol  3,000 Units Oral Daily ELDA Beaulieu     • docusate sodium  100 mg Oral BID Gildardo Bentley MD     • loratadine  10 mg Oral Daily Gildardo Bentley MD     • metoprolol tartrate  50 mg Oral BID Gildardo Bentley MD     • oxyCODONE  5 mg Oral Q6H PRN Gildardo Bentley MD     • oxyCODONE  2 5 mg Oral Q6H PRN Gildardo Bentley MD     • pantoprazole  40 mg Oral Daily Gildardo Bentley MD     • polyethylene glycol  17 g Oral Daily Gildardo Bentley MD     • senna  2 tablet Oral Daily Gildardo Bentley MD            ** Please Note: Fluency Direct voice to text software may have been used in the creation of this document   **

## 2023-01-25 NOTE — PLAN OF CARE
Problem: RESPIRATORY - ADULT  Goal: Achieves optimal ventilation and oxygenation  Description: INTERVENTIONS:  - Assess for changes in respiratory status  - Assess for changes in mentation and behavior  - Position to facilitate oxygenation and minimize respiratory effort  - Oxygen administered by appropriate delivery if ordered  - Initiate smoking cessation education as indicated  - Encourage broncho-pulmonary hygiene including cough, deep breathe, Incentive Spirometry  - Assess the need for suctioning and aspirate as needed  - Assess and instruct to report SOB or any respiratory difficulty  - Respiratory Therapy support as indicated  Outcome: Progressing     Problem: SKIN/TISSUE INTEGRITY - ADULT  Goal: Incision(s), wounds(s) or drain site(s) healing without S/S of infection  Description: INTERVENTIONS  - Assess and document dressing, incision, wound bed, drain sites and surrounding tissue  - Provide patient and family education  - Perform skin care/dressing changes every day  Outcome: Progressing     Problem: PAIN - ADULT  Goal: Verbalizes/displays adequate comfort level or baseline comfort level  Description: Interventions:  - Encourage patient to monitor pain and request assistance  - Assess pain using appropriate pain scale  - Administer analgesics based on type and severity of pain and evaluate response  - Implement non-pharmacological measures as appropriate and evaluate response  - Consider cultural and social influences on pain and pain management  - Notify physician/advanced practitioner if interventions unsuccessful or patient reports new pain  Outcome: Progressing     Problem: DISCHARGE PLANNING  Goal: Discharge to home or other facility with appropriate resources  Description: INTERVENTIONS:  - Identify barriers to discharge w/patient and caregiver  - Arrange for needed discharge resources and transportation as appropriate  - Identify discharge learning needs (meds, wound care, etc )  - Arrange for interpretive services to assist at discharge as needed  - Refer to Case Management Department for coordinating discharge planning if the patient needs post-hospital services based on physician/advanced practitioner order or complex needs related to functional status, cognitive ability, or social support system  Outcome: Progressing

## 2023-01-26 PROBLEM — R07.89 CHEST WALL PAIN: Status: ACTIVE | Noted: 2023-01-26

## 2023-01-26 LAB
ANION GAP SERPL CALCULATED.3IONS-SCNC: 5 MMOL/L (ref 5–14)
BASOPHILS # BLD AUTO: 0.04 THOUSANDS/ÂΜL (ref 0–0.1)
BASOPHILS NFR BLD AUTO: 0 % (ref 0–1)
BUN SERPL-MCNC: 16 MG/DL (ref 5–25)
CALCIUM SERPL-MCNC: 8.8 MG/DL (ref 8.4–10.2)
CHLORIDE SERPL-SCNC: 102 MMOL/L (ref 96–108)
CO2 SERPL-SCNC: 29 MMOL/L (ref 21–32)
CREAT SERPL-MCNC: 0.51 MG/DL (ref 0.6–1.2)
EOSINOPHIL # BLD AUTO: 0.12 THOUSAND/ÂΜL (ref 0–0.61)
EOSINOPHIL NFR BLD AUTO: 1 % (ref 0–6)
ERYTHROCYTE [DISTWIDTH] IN BLOOD BY AUTOMATED COUNT: 16 % (ref 11.6–15.1)
GFR SERPL CREATININE-BSD FRML MDRD: 96 ML/MIN/1.73SQ M
GLUCOSE P FAST SERPL-MCNC: 102 MG/DL (ref 70–99)
GLUCOSE SERPL-MCNC: 102 MG/DL (ref 70–99)
HCT VFR BLD AUTO: 21.8 % (ref 34.8–46.1)
HCT VFR BLD AUTO: 25.8 % (ref 34.8–46.1)
HGB BLD-MCNC: 6.9 G/DL (ref 11.5–15.4)
HGB BLD-MCNC: 8.2 G/DL (ref 11.5–15.4)
IMM GRANULOCYTES # BLD AUTO: 0.26 THOUSAND/UL (ref 0–0.2)
IMM GRANULOCYTES NFR BLD AUTO: 2 % (ref 0–2)
LYMPHOCYTES # BLD AUTO: 1.37 THOUSANDS/ÂΜL (ref 0.6–4.47)
LYMPHOCYTES NFR BLD AUTO: 11 % (ref 14–44)
MCH RBC QN AUTO: 29.9 PG (ref 26.8–34.3)
MCHC RBC AUTO-ENTMCNC: 31.7 G/DL (ref 31.4–37.4)
MCV RBC AUTO: 94 FL (ref 82–98)
MONOCYTES # BLD AUTO: 1.21 THOUSAND/ÂΜL (ref 0.17–1.22)
MONOCYTES NFR BLD AUTO: 9 % (ref 4–12)
NEUTROPHILS # BLD AUTO: 9.86 THOUSANDS/ÂΜL (ref 1.85–7.62)
NEUTS SEG NFR BLD AUTO: 77 % (ref 43–75)
NRBC BLD AUTO-RTO: 0 /100 WBCS
PLATELET # BLD AUTO: 240 THOUSANDS/UL (ref 149–390)
PMV BLD AUTO: 10.3 FL (ref 8.9–12.7)
POTASSIUM SERPL-SCNC: 3.6 MMOL/L (ref 3.5–5.3)
RBC # BLD AUTO: 2.31 MILLION/UL (ref 3.81–5.12)
SODIUM SERPL-SCNC: 136 MMOL/L (ref 135–147)
WBC # BLD AUTO: 12.86 THOUSAND/UL (ref 4.31–10.16)

## 2023-01-26 PROCEDURE — 30233N1 TRANSFUSION OF NONAUTOLOGOUS RED BLOOD CELLS INTO PERIPHERAL VEIN, PERCUTANEOUS APPROACH: ICD-10-PCS | Performed by: PHYSICAL MEDICINE & REHABILITATION

## 2023-01-26 RX ORDER — HYDROXYZINE HYDROCHLORIDE 10 MG/1
10 TABLET, FILM COATED ORAL EVERY 6 HOURS PRN
Status: DISCONTINUED | OUTPATIENT
Start: 2023-01-26 | End: 2023-02-10 | Stop reason: HOSPADM

## 2023-01-26 RX ORDER — MUSCLE RUB CREAM 100; 150 MG/G; MG/G
CREAM TOPICAL 4 TIMES DAILY PRN
Status: DISCONTINUED | OUTPATIENT
Start: 2023-01-26 | End: 2023-02-10 | Stop reason: HOSPADM

## 2023-01-26 RX ORDER — POTASSIUM CHLORIDE 20 MEQ/1
20 TABLET, EXTENDED RELEASE ORAL ONCE
Status: COMPLETED | OUTPATIENT
Start: 2023-01-26 | End: 2023-01-26

## 2023-01-26 RX ADMIN — CHOLECALCIFEROL TAB 25 MCG (1000 UNIT) 3000 UNITS: 25 TAB at 09:33

## 2023-01-26 RX ADMIN — ATORVASTATIN CALCIUM 20 MG: 20 TABLET, FILM COATED ORAL at 17:24

## 2023-01-26 RX ADMIN — PANTOPRAZOLE SODIUM 40 MG: 40 TABLET, DELAYED RELEASE ORAL at 06:24

## 2023-01-26 RX ADMIN — APIXABAN 10 MG: 5 TABLET, FILM COATED ORAL at 17:23

## 2023-01-26 RX ADMIN — METOPROLOL TARTRATE 50 MG: 50 TABLET, FILM COATED ORAL at 09:33

## 2023-01-26 RX ADMIN — METOPROLOL TARTRATE 50 MG: 50 TABLET, FILM COATED ORAL at 17:23

## 2023-01-26 RX ADMIN — LORATADINE 10 MG: 10 TABLET ORAL at 09:33

## 2023-01-26 RX ADMIN — POTASSIUM CHLORIDE 20 MEQ: 1500 TABLET, EXTENDED RELEASE ORAL at 09:32

## 2023-01-26 RX ADMIN — APIXABAN 10 MG: 5 TABLET, FILM COATED ORAL at 09:33

## 2023-01-26 RX ADMIN — HYDROXYZINE HYDROCHLORIDE 10 MG: 10 TABLET ORAL at 21:13

## 2023-01-26 RX ADMIN — OXYCODONE HYDROCHLORIDE 5 MG: 5 TABLET ORAL at 06:42

## 2023-01-26 RX ADMIN — Medication 2 TABLET: at 09:37

## 2023-01-26 NOTE — PROGRESS NOTES
01/26/23 1400   Pain Assessment   Pain Assessment Tool 0-10   Pain Score 4   Pain Location/Orientation Orientation: Left; Location: Leg   Pain Onset/Description Onset: Gradual   Effect of Pain on Daily Activities inc in stance   Patient's Stated Pain Goal No pain   Hospital Pain Intervention(s) Rest;Elevated   Restrictions/Precautions   Precautions Fall Risk;Multiple lines;Pain   RLE Weight Bearing Per Order WBAT   LLE Weight Bearing Per Order WBAT   ROM Restrictions No   Lifestyle   Autonomy "I feel a little better"   Lower Body Dressing   Type of Assistance Needed Physical assistance   Physical Assistance Level 51%-75%   Comment req modA for balance, maxA for CM 2* to little unsteady as pt was receiving blood   Lower Body Dressing CARE Score 2   Lying to Sitting on Side of Bed   Type of Assistance Needed Physical assistance   Physical Assistance Level 51%-75%   Comment A for LE and trunk mngmtn with HOB flat   Lying to Sitting on Side of Bed CARE Score 2   Sit to Stand   Type of Assistance Needed Physical assistance   Physical Assistance Level 25% or less   Comment tSacia STS from bed with inc time   Sit to Stand CARE Score 3   Bed-Chair Transfer   Type of Assistance Needed Physical assistance   Physical Assistance Level 26%-50%   Comment modA SPT with RW bed>recliner   Chair/Bed-to-Chair Transfer CARE Score 3   Toilet Transfer   Comment pt on bed pan upon therapist arrival   Cognition   Overall Cognitive Status WFL   Arousal/Participation Alert; Responsive; Cooperative   Attention Attends with cues to redirect   Orientation Level Oriented X4   Memory Decreased recall of recent events   Following Commands Follows multistep commands without difficulty   Activity Tolerance   Activity Tolerance Patient limited by fatigue   Medical Staff Made Aware Pt cont to receive blood during session but agreeable to therapy  Pt had TEDS donned during ortho BP  Supine 132/61 HR74, sit 134/69 HR84, stand 129/62 HR87   Pt asymptomatic Assessment   Treatment Assessment Engaged pt in brief 30mins of skilled OT services with foucs on bed mobility and SPT  Therapist discussed with NP Corey Cervantes who reported pt can cont to engage in therapy if able to tolerate  Pt agreeable to get OOB and transfer to relciner this afternoon  Upon therapist arrival pt on bed pan  Req maxA for bed mobility  Completed SPT with RW modA  Pt cont fatigued but does report improvemetn from this AM  Cont OT POC with focus on actiity toelrance, weight shifting, transfers, fx reach to inc fx performance and independence  Prognosis Fair   Problem List Decreased strength;Decreased endurance; Impaired balance;Decreased mobility;Obesity;Orthopedic restrictions;Pain   Barriers to Discharge Inaccessible home environment;Decreased caregiver support   Plan   Treatment/Interventions ADL retraining;Functional transfer training; Therapeutic exercise; Endurance training;Patient/family training;Bed mobility; Compensatory technique education   Progress Progressing toward goals   Recommendation   OT Discharge Recommendation Home with home health rehabilitation   Discharge Summary anticipated DC 2/10 pending medical status   OT Therapy Minutes   OT Time In 1400   OT Time Out 1430   OT Total Time (minutes) 30   OT Mode of treatment - Individual (minutes) 30   OT Mode of treatment - Concurrent (minutes) 0   OT Mode of treatment - Group (minutes) 0   OT Mode of treatment - Co-treat (minutes) 0   OT Mode of Treatment - Total time(minutes) 30 minutes   OT Cumulative Minutes 300   Therapy Time missed   Time missed?  No

## 2023-01-26 NOTE — ASSESSMENT & PLAN NOTE
Wt Readings from Last 3 Encounters:   01/26/23 92 9 kg (204 lb 14 4 oz)   01/23/23 95 2 kg (209 lb 14 1 oz)     Stable without exacerbation   on 1/20  Last ECHO on 12/2/22 showed EF 55-59%, G1DD  Takes Lasix 20mg daily at home - currently on hold  Restart as able

## 2023-01-26 NOTE — PCC CARE MANAGEMENT
2/2- Pt lives in [address] [place] home w/ her brother, 6 ABY  Bedroom and bathroom on 2nd floor  Pt reports having shower chair and raised toilet seat  Pt reports she was independent prior to admission  Pt works FT at [employer] in [employer]  Pt reports no hx of STR, OP PT OR HHC  CM also reviewed team update and dc date of 2/10, pt verbalized appreciation of setting dc date so she" had something to work towards"  2/9- Pt to dc Friday 2/10 w/ ESTEE for PT OT, cm ordered DME and sent to pt's [facility] comp to be delivered to hospital prior to dc

## 2023-01-26 NOTE — ASSESSMENT & PLAN NOTE
1/30 8 88 and resolved  Afebrile, but otherwise stable  Drainage from L thigh doesn't appear infectious  Monitor incision site closely  Monitor off antibiotics  Monitor Temp and WBC  Initiate work-up if she spikes a fever or develops localizing symptoms

## 2023-01-26 NOTE — ASSESSMENT & PLAN NOTE
WBC count currently 12 86  Recent diagnosis of PE - asymptomatic, on Eliquis  Likely reactive due to blood loss/hematoma to LLE  Afebrile  Asymptomatic  Continue to trend routine CBC

## 2023-01-26 NOTE — PROGRESS NOTES
Physical Medicine and Rehabilitation Progress Note  Sukhdev Hicks 67 y o  female MRN: [id number]  Unit/Bed#: -78 Encounter: [id number]    HPI: Sukhdev Hicks is a 67 y o  female  With medical history of HLD, HTN, Chronic diastolic heart failure (T2FQ), previous R hip fracture who presented to the [address] [place] [address] on 1/18 after slipping on some water while at work at [employer]  No prodromal symptoms  CTH showed no acute intracranial abnormality, and XR L hip showed a subtrochanteric femur fracture  Ortho recommended ORIF with IMN which was performed on 1/19 by Dr Natalee Frank  Made WBAT post-op Post-op lethargi and hypoxic, CXR showed no acute cardiopulmonary disease  Required BiPAP, Narcan ,and Flumazenil  There was some improvement in her lethargy, but remained hypoxic  Pulm was consulted  CTA PE ordered which showed distal subsegmental PEs in the posterior lower lobe with atelectasis  Doppler performed showed a DVT in 1 peroneal vein in LLE  Pulm did not feel that the findings were responsible for the extent of her hypoxia - they felt multifactorial 2/2 atelectasis some volume overload  She got 1 dose of IV Lasix  Primary did not feel there was a volume overload component  Ultimately recommended for a/c for at least 3 months  Eliquis cost $47 a month  Course also c/b ABLA, but that stabilized and she did not require transfusion She was stabilized and admitted to the [facility] on 1/23/2023  Chief Complaint: Chest wall pain  Interval History/Subjective:  No acute events overnight  Today with therapies orthostatic but with minimal symptoms  Abdominal binder and TEDs placed and orthostasis resolved  She reports anterior chest wall pain that happens when she pushes up against the wheelchair arms to stand up  It is reproducible with palpation and feels like muscle soreness  She denies lightheadedness, dizziness, SOB, N/V, diaphoresis, palpitations   The pain does not radiate, and at rest feels fine  It is improving  She denies any new cough  No fevers, chills  The drainage, per therapies is improving  ROS:  A 10 point review of systems was negative except for what is noted in the HPI  Today's Changes:  1  Change out surgical dressings today with DSD  2  Discussed with patient, will proceed with transfusing 1 pRBC  Recheck Hgb after transfusion  - AM med hold for symptomatic anemia requiring transfusion  Can try later this afternoon if tolerating better  3  Chest wall pain likely MSK  Ordered some Bengay  4  Ok to shower, cover draining incisions for now  5  Team conference, see separate note  6  Give one more dose of potassium  7  Adding hydroxyzine PRN for anxiety    Total time spent:  35 minutes, with more than 50% spent counseling/coordinating care  Counseling includes discussion with patient re: progress in therapies, functional issues observed by therapy staff, and discussion with patient his/her current medical state/wellbeing  Coordination of patient's care was performed in conjunction with Internal Medicine service to monitor patient's labs, vitals, and management of their comorbidities  In addition, this patient was discussed by the interdisciplinary team in weekly case conference today  The care of the patient was extensively discussed with all care providers and an appropriate rehabilitation plan was formulated unique for this patient  Barriers were identified preventing progression of therapy and appropriate interventions were discussed with each discipline  Please see the team note for input from all disciplines regarding barriers, intervention, and discharge planning  Assessment/Plan:    * Closed left hip fracture ([address] )  Assessment & Plan  After slipping on water at work  Workers Comp  Subtrochanteric femur fracture s/p long nail (antegrade) on 1/19 by Dr Rojas Factor  Pain control as documented below  Surgical dressings removed 1/26   Monitor drainage  Follow-up with Orthopedics 2 weeks (2/2)    PT/ OT 3-5 hours a day, 5-7 days/week in acute comprehensive inpatient rehab    Chest wall pain  Assessment & Plan  Suspect MSK or costochondritis - mild  Reproducible with palpation and certain push off manuevers in therapies  Improving  No diaphoresis, nausea, radiation, SOB  Reviewed red flag symptoms that would warrant ACS r/o  Added Bengay  Monitor closely  Constipation  Assessment & Plan  Last BM 1/25  Continue current bowel regimen  Adjust as appropriate  Adjustment disorder with anxiety  Assessment & Plan  Will provide non-pharmacologic strategies for now  Consulted rehab psychology for support  Hypokalemia  Assessment & Plan  1/26 3 6 after being given 40mEq x2 after admission  Give one more dose of 20mEq today  Monitor    Hypoxemia  Assessment & Plan  Multifactorial: PE, atelectasis, ?volume overload s/p Lasix IV x1  Was weaned to room air in acute hospital  Outpatient Noc Ox overnight  Weaning oxygen - this is new for her   - Ambulatory sats while here  Monitor closely  Goals > 90%  Outpatient f/u with Pulmonology  Osteopenia  Assessment & Plan  DXA 12/2020 with osteopenia  Continue home calcium and Vitamin D  1/24 Vitamin D is 29 8  Outpatient f/u DXA with PCP  Acute postoperative anemia due to expected blood loss  Assessment & Plan  Hgb 12 6 pre-op  1/26  8 8 > 7 2 > 7 4 > 7 2 > 6 9   - Iron deficiency on labs  - Also post-operative blood loss  - Given IV venofer x2   - Type and screen   - Consent in chart  - 1/26 Transfuse with 1 unit pRBC  Repeat Hgb:   Trend, transfuse as appropriate  Outpatient f/u with PCP    Acute deep vein thrombosis (DVT) of left peroneal vein [facility])  Assessment & Plan  1/20 LE Venous Duplex with L acute DVT in 1 of 2 peroneal veins  Now on Eliquis  See PE for more details  No SCD on that leg  Can do ace wrapping for edema     Outpatient f/u with PCP    Multiple subsegmental pulmonary emboli without acute cor pulmonale (HCC)  Assessment & Plan  Noted on CTA PE from 1/20   RV/LV ratio WNL  Started on Eliquis 10mg BID on 1/22 for 7 days before transitioning to 5mg BID for 3 months as per Pulm  Monitor respiratory status  Leukocytosis  Assessment & Plan  As high as 12 86 today  Afebrile, but otherwise stable  Drainage from L thigh doesn't appear infectious  Monitor incision site closely  Monitor off antibiotics  Monitor Temp and WBC  Initiate work-up if she spikes a fever or develops localizing symptoms  Chronic diastolic congestive heart failure (HCC)  Assessment & Plan  Wt Readings from Last 3 Encounters:   01/26/23 92 9 kg (204 lb 14 4 oz)   01/23/23 95 2 kg (209 lb 14 1 oz)     Does not appear to have acute exacerbation  12/2/2022 Echo with EF 55-59% and G1DD   on 1/20  Home: Lopressor, Losartan, Lasix  Here: Lopressor  - Holding ARB for orthostasis  Monitor I/O and daily weights  Adjust as appropriate  Outpatient f/u with PCP  Other hyperlipidemia  Assessment & Plan  Home: Simvastatin 20mg nightly  Here: Lipitor 20mg nightly for therapeutic substitution   Outpatient f/u with PCP    Essential hypertension  Assessment & Plan  Home: Lasix 20mg daily, Lopressor 50mg BID, Losartan 50mg daily  Here: Lopressor 50mg BID  1/24 Losartan held for orthostasis  Can try to restart Lasix while here  Monitor BP, adjust as appropriate  Follow-up with PCP  Health Maintenance  #Delirium/Sleep: At risk has had hallucinations on oxycodone 10mg, and was lethargic requiring narcan on fentanyl and dilaudid  #Pain: Tylenol ordered PRN, Oxycodone 2 5-5mg PRN  #Bowel: Last BM 1/25  Continue colace/miralax/senna 2 tabs  Added PRN suppository  #Bladder: Voiding and continent  Monitor  #Skin/Pressure Injury Prevention: Turn Q2hr in bed, with weight shifts [id number] in wheelchair  Float heels in bed  #DVT Prophylaxis: Fully anticoagulated on Eliquis  SCD on RLE only     #GI Prophylaxis: PPI started when full a/c started  Can also do famotidine  #Code Status:  Full Code  #FEN: Reg Diet  #Dispo:  Team Conference 1/26: Anticipate she will need all her time here  Her anxiety is a major barrier and her anemia is a major medical barrier right now  She is making some progress  Potential discharge planned for 2/10/2023  May need Home PT/OT +/- RN to start  Objective:    Functional Update:  PT: moda transfers, maxA bed mobility  OT: Ind eating, Sup grooming, maxA bathing, Sup UB dressing, maxA LB dressing, modA toielting, modA toilet transfers  Allergies per EMR    Physical Exam:  Temp:  [98 °F (36 7 °C)-99 6 °F (37 6 °C)] 98 °F (36 7 °C)  HR:  [75-84] 80  Resp:  [18] 18  BP: (100-146)/(55-74) 103/55  Oxygen Therapy  SpO2: 96 %  O2 Flow Rate (L/min): 2 L/min    Gen: No acute distress, Well-nourished, well-appearing  HEENT: Moist mucus membranes, Normocephalic/Atraumatic  Cardiovascular: Regular rate, rhythm, S1/S2  Distal pulses palpable  Reproducible costochondral chest pain with palpation - mild  Heme/Extr: Minimal LE swelling  L thigh surgical site swelling stable  Pulmonary: Non-labored breathing  Lungs CTAB  : No garcia  GI: Soft, non-tender, non-distended  BS+  Integumentary: Skin is warm, dry  Neuro: AAOx3, Speech is intact  Appropriate to questioning  Psych: Normal mood and affect       Diagnostic Studies:   Reviewed, no new imaging    Laboratory:  Reviewed  Results from last 7 days   Lab Units 01/26/23  0451 01/25/23  0612 01/24/23  1434 01/24/23  0521   HEMOGLOBIN g/dL 6 9* 7 2* 7 4* 7 2*   HEMATOCRIT % 21 8* 21 7* 22 7* 21 8*   WBC Thousand/uL 12 86* 9 94  --  9 80     Results from last 7 days   Lab Units 01/26/23  0451 01/24/23  0521 01/22/23  0441 01/20/23  0431 01/20/23  0007   BUN mg/dL 16 16 16   < > 18   POTASSIUM mmol/L 3 6 3 4* 3 2*   < > 4 7   CHLORIDE mmol/L 102 101 100   < > 105   CREATININE mg/dL 0 51* 0 54* 0 45*   < > 0 57*   AST U/L  --   --   -- --  18   ALT U/L  --   --   --   --  16    < > = values in this interval not displayed  Patient Active Problem List   Diagnosis   • Closed left hip fracture ([address] )   • Essential hypertension   • Other hyperlipidemia   • Chronic diastolic congestive heart failure (HCC)   • Leukocytosis   • Multiple subsegmental pulmonary emboli without acute cor pulmonale (HCC)   • Acute deep vein thrombosis (DVT) of left peroneal vein (HCC)   • Acute postoperative anemia due to expected blood loss   • Osteopenia   • Hypoxemia   • Hypokalemia   • Adjustment disorder with anxiety   • Constipation         Medications  Current Facility-Administered Medications   Medication Dose Route Frequency Provider Last Rate   • acetaminophen  650 mg Oral Q6H PRN ELDA Shook     • apixaban  10 mg Oral BID Arely Ervin MD     • atorvastatin  20 mg Oral Daily With Johnny Brown MD     • bisacodyl  10 mg Rectal Daily PRN Arely Ervin MD     • calcium carbonate  1,000 mg Oral Daily PRN Arely Ervin MD     • calcium carbonate-vitamin D  2 tablet Oral Daily With Breakfast ELDA Shook     • cholecalciferol  3,000 Units Oral Daily ELDA Shook     • docusate sodium  100 mg Oral BID Arely Ervin MD     • loratadine  10 mg Oral Daily Arely Ervin MD     • metoprolol tartrate  50 mg Oral BID Arely Ervin MD     • oxyCODONE  5 mg Oral Q6H PRN Arely Ervin MD     • oxyCODONE  2 5 mg Oral Q6H PRN Arely Ervin MD     • pantoprazole  40 mg Oral Daily Arely Ervin MD     • polyethylene glycol  17 g Oral Daily Arely Ervin MD     • senna  2 tablet Oral Daily Arely Ervin MD            ** Please Note: Fluency Direct voice to text software may have been used in the creation of this document   **

## 2023-01-26 NOTE — PROGRESS NOTES
01/26/23 0700   Pain Assessment   Pain Assessment Tool 0-10   Pain Score 6   Pain Location/Orientation Orientation: Left; Location: Hip   Pain Onset/Description Onset: Gradual   Effect of Pain on Daily Activities inc ins tance   Patient's Stated Pain Goal No pain   Restrictions/Precautions   Precautions Fall Risk;Pain   RLE Weight Bearing Per Order WBAT   LLE Weight Bearing Per Order WBAT   ROM Restrictions No   Eating   Type of Assistance Needed Independent   Eating CARE Score 6   Oral Hygiene   Type of Assistance Needed Independent   Comment seated in wc at sink   Oral Hygiene CARE Score 6   Shower/Bathe Self   Type of Assistance Needed Physical assistance   Physical Assistance Level 26%-50%   Comment completed SB this session, no active shower order  pt able to wash UB, upper thighs and midway shin  Pt req to soak feet  Req A for fee tthoroughness  req modA/Stacia in stance when completing unilateral release for jon/buttock hygiene  Shower/Bathe Self CARE Score 3   Bathing   Assessed Bath Style Sponge Bath   Anticipated D/C Bath Style Shower;Sponge Bath  (pending progress)   Able to Gather/Transport No   Able to [employer] Temperature No   Able to Wash/Rinse/Dry (body part) Left Arm;Right Arm;L Upper Leg;R Upper Leg;Chest;Abdomen;Perineal Area; Buttocks   Limitations Noted in Balance; Endurance; Safety;Strength;Timeliness   Positioning Standing;Seated   Adaptive Equipment Longhand Reacher   Tub/Shower Transfer   Reason Not Assessed Safety;Sponge Bath  (no active shower order)   Upper Body Dressing   Type of Assistance Needed Supervision   Comment seated   Upper Body Dressing CARE Score 4   Lower Body Dressing   Type of Assistance Needed Physical assistance   Physical Assistance Level 26%-50%   Comment introduced LHR to thread undergarment/pants  Educ on threading LLE first req inc time   modA for balance while completing CM   Lower Body Dressing CARE Score 3   Putting On/Taking Off Footwear   Type of Assistance Needed Physical assistance   Physical Assistance Level 26%-50%   Comment TA for TEDS, use of [address] for R shoe, min A for L shoe   Putting On/Taking Off Footwear CARE Score 3   Sit to Stand   Type of Assistance Needed Physical assistance   Physical Assistance Level 25% or less   Comment Stacia/CGA with RW   Sit to Stand CARE Score 3   Bed-Chair Transfer   Type of Assistance Needed Physical assistance   Physical Assistance Level 26%-50%   Comment modA with RW, dec weight shifting   Chair/Bed-to-Chair Transfer CARE Score 3   Therapeutic Excerise-Strength   UE Strength Yes   Right Upper Extremity- Strength   R Shoulder Flexion; Extension;Horizontal ABduction   R Elbow Elbow flexion;Elbow extension   R Position Seated   Equipment Dumbbell  (3#)   R Weight/Reps/Sets 2x15   RUE Strength Comment engaged pt in UE TE with 3# 2x15 to cont to inc fx strength for improved STS and SPT  pt toelrated well with rest break in between   Left Upper Extremity-Strength   LUE Strength Comment see above   Cognition   Overall Cognitive Status [facility]   Arousal/Participation Alert; Responsive; Cooperative   Attention Attends with cues to redirect   Orientation Level Oriented X4   Memory Decreased recall of recent events   Following Commands Follows multistep commands without difficulty   Activity Tolerance   Activity Tolerance Patient tolerated treatment well   Medical Staff Made Aware ortho BP no TEDS/binder 126/70 sit; stand 103/55 asymptomatic; post ADL with TEDS/binder sit 134/65, stand 121/65  Hgb 6 9 this AM  Dr Sondra Webb reporting pt canc ont to particiapte in therapy as tolerated  Pt to receive blood later today  Assessment   Prognosis Fair   Problem List Decreased strength;Decreased endurance; Impaired balance;Decreased mobility;Obesity;Orthopedic restrictions;Pain   Barriers to Discharge Inaccessible home environment;Decreased caregiver support   Plan   Treatment/Interventions ADL retraining;Functional transfer training; Therapeutic exercise; Endurance training;Patient/family training; Compensatory technique education; Bed mobility   Progress Progressing toward goals   Recommendation   OT Discharge Recommendation Home with home health rehabilitation   OT Therapy Minutes   OT Time In 0700   OT Time Out 0830   OT Total Time (minutes) 90   OT Mode of treatment - Individual (minutes) 90   OT Mode of treatment - Concurrent (minutes) 0   OT Mode of treatment - Group (minutes) 0   OT Mode of treatment - Co-treat (minutes) 0   OT Mode of Treatment - Total time(minutes) 90 minutes   OT Cumulative Minutes 270   Therapy Time missed   Time missed?  No

## 2023-01-26 NOTE — PLAN OF CARE
Problem: MUSCULOSKELETAL - ADULT  Goal: Maintain or return mobility to safest level of function  Description: INTERVENTIONS:  - Assess patient's ability to carry out ADLs; assess patient's baseline for ADL function and identify physical deficits which impact ability to perform ADLs (bathing, care of mouth/teeth, toileting, grooming, dressing, etc )  - Assess/evaluate cause of self-care deficits   - Assess range of motion  - Assess patient's mobility  - Assess patient's need for assistive devices and provide as appropriate  - Encourage maximum independence but intervene and supervise when necessary  - Involve family in performance of ADLs  - Assess for home care needs following discharge   - Consider OT consult to assist with ADL evaluation and planning for discharge  - Provide patient education as appropriate  Outcome: Progressing     Problem: PAIN - ADULT  Goal: Verbalizes/displays adequate comfort level or baseline comfort level  Description: Interventions:  - Encourage patient to monitor pain and request assistance  - Assess pain using appropriate pain scale  - Administer analgesics based on type and severity of pain and evaluate response  - Implement non-pharmacological measures as appropriate and evaluate response  - Consider cultural and social influences on pain and pain management  - Notify physician/advanced practitioner if interventions unsuccessful or patient reports new pain  Outcome: Progressing

## 2023-01-26 NOTE — TEAM CONFERENCE
Acute RehabilitationTeam Conference Note  Date: 1/26/2023   Time: 11:02 AM       Patient Name:  Raina Silva       Medical Record Number: [id number]   YOB: 1950  Sex: Female          Room/Bed:  /[facility] 263-01  Payor Info:  Payor: John Jo / Plan: [employer] / Product Type: Workers Compensation /      Admitting Diagnosis: Femur fracture, left ([facility] ) [S72 [id number]]   Admit Date/Time:  1/23/2023  1:33 PM  Admission Comments: No comment available     Primary Diagnosis:  Closed left hip fracture (HCC)  Principal Problem: Closed left hip fracture [facility])    Patient Active Problem List    Diagnosis Date Noted   • Chest wall pain 01/26/2023   • Constipation 01/24/2023   • Osteopenia 01/23/2023   • Hypoxemia 01/23/2023   • Hypokalemia 01/23/2023   • Adjustment disorder with anxiety 01/23/2023   • Multiple subsegmental pulmonary emboli without acute cor pulmonale ([facility] ) 01/21/2023   • Acute deep vein thrombosis (DVT) of left peroneal vein ([facility] ) 01/21/2023   • Acute postoperative anemia due to expected blood loss 01/21/2023   • Closed left hip fracture ([facility] ) 01/18/2023   • Essential hypertension 01/18/2023   • Other hyperlipidemia 01/18/2023   • Chronic diastolic congestive heart failure ([facility] ) 01/18/2023   • Leukocytosis 01/18/2023       Physical Therapy:    Weight Bearing Status: Weight Bearing as Tolerated (L LE)  Transfers: Moderate Assistance  Bed Mobility: Maximum Assistance  Amulation Distance (ft): 0 feet  Discharge Recommendations: Home with:  [address] [facility] [place] with[de-identified] Family Support, Home Physical Therapy    Pt is 67 yr old female slipped and fell at work on 1/18/23 sustaining + L hip IT/subtroch hip fx requiring surgical fixation with long TFN done 1/19/23 by Dr Marylen Peacock  Cleared post op for L LE WBAT  Pt with post op complications including hypoxia, L LE DVT + PE, anemia  Other PMH: HTN, inc lipids, R hip fx sp ORIF 2011   At baseline pt fully I without AD, works FT as  at [employer]  +  Pt lives in [address] [facility] home with brother (on disability), 6 ABY B HR, bed and Full bath on second floor, 1/2 bath first level  Pt sleeps in recliner at baseline on first floor  Pt highly limited amb olvin due to poor wt shift to unload and advance R LE due to sig dec L LE wt bearing tolerance  Unable to ambulate at this time   Pt to greatly benefit from ongoing aggressive skilled PT intervention to maximize functional mobility as pts goal is to be mod I for DC home  Pt demonstrates fair to good rehab potential to reach set goals pending progress and activity tolerance in ELOS x2 weeks for safe DC home with family support and likely out pt PT if able  Pt has RW, SPC,and shower chair  Occupational Therapy:  Eating: Independent  Grooming: Supervision  Bathing: Maximum Assistance  Bathing: Maximum Assistance  Upper Body Dressing: Supervision  Lower Body Dressing: Maximum Assistance  Toileting: Moderate Assistance  Tub/Shower Transfer:  (NA safety)  Toilet Transfer: Moderate Assistance  Cognition: Within Defined Limits  Orientation: Person, Place, Time, Situation  Discharge Recommendations: Home with:  [address] [place] with[de-identified] Family Support, Home Occupational Therapy (son to possibly come down from [place] at [facility])       1/25/23  Pt supine in bed upon arrival Pt reporting she lives with brother who is "disabled" but is able to assist physically at DC if needed  Also reports son will be coming down from [place] to assist  Reports that he may be coming Thurs, however, with snow questionable  Pt has therapist speak to son  Therapist communicated pt ELOS about 2 weeks  Son reporting he may change flight closer to time of DC  Pt reports she works FT, +, no AD PTA  Pt at this time completing ADLs at modA/maxA  Therapist completed SB this session 2* to L hip drainage, communicated ot Dr Rosana Holden who ws present to see during session  RN to change dressing later today   Therapist educated pt on rehab process, goal setting and ELOS which pt verbalized understanding  Pt presents with the following performance component deficits impacting ADL/IADL skills: fearful, anxious, weakness, impaired balance, decreased endurance, decreased coordination, increased fall risk, dec fx reach, new onset of impairment of functional mobility, decreased ADLS, decreased IADLS, decreased activity tolerance, decreased safety awareness, and SOB upon exertion, that result in activity limitations and/or participation restrictions  Pt to continue to benefit from continued acute rehab OT services during hospital stay to address defined deficits and to maximize level of functional independence in the following Occupational Performance areas: grooming, bathing/shower, toilet hygiene, dressing, medication management, functional mobility, community mobility, clothing management, cleaning, meal prep  Pt presents with good rehab potential  This evaluation requires clinical decision making of high complexity, because the patient presents with comorbidites that affect occupational performance and required significant modification of tasks or assistance with consideration of multiple treatment options  Pt is unsafe to D/C home at this time, recommending ELOS 2 weeks to achieve Kermit/supervision level goals with least restrictive device to address these areas and resume prior occupational roles to maximize independence to reduce risk of fall and decrease risk of readmission            Speech Therapy:           No notes on file    Nursing Notes:  Appetite: Fair  Diet Type: Regular/House                                                                     Pain Location/Orientation: Orientation: Left, Location: Hip  Pain Score: 7                       Hospital Pain Intervention(s): Rest          Pt is a 67 y o  female with a PMH of osteopenia, hx of R hip fracture, CHF, HTN, and HLD who originally presented to [facility] on 1/18/2023 after a mechanical fall due to a slippery floor with residual L sided hip pain  XR on 1/18 showed acute intertrochanteric L hip fracture  Orthopedics was consulted and recommended surgical intervention  Patient was taken to the OR on 1/19 for IM fixation of L subtrochanteric fracture performed by Dr Pauline Persaud  Post-operatively, patient became lethargic and hypoxic after receiving Dilaudid which originally improved with Narcan administration  Patient required BiPAP and continued to have hypoxia along with hypotension and leukocytosis  PE CTA study was performed on 1/20 and showed few subsegmental pulmonary emboli in the posterior lower lobes with a measured RV/LV ratio within normal limits  Venous duplex showed acute DVT in 1 of 2 L peroneal veins  Patient was started on Eliquis for [facility] on 1/22 and will need to continue for 3 months  Post-operatively, patient also developed acute blood loss anemia but did not require any blood products  Patient is to be WBAT to LLE and will require follow-up with Orthopedics as outpatient  Pulmonary also recommended overnight sleep study as outpatient due to hypoxia  Admitted to [address] [place] [address] on 1/23/2023  Pt on Vit D 3000 u daily for osteopenia  Repeat DXA scan as outpatient along with discussion about Prolia injections  Started on Eliquis 10mg BID on 1/22 x 7 days then decrease to 5mg BID for 3 months for DVT  Hgb currently stable at 7 2  Given IV Venofer x 2, may transfuse for Hgb <7 0 or if becomes symptomatic  On Lipitor 20mg daily for hyperlipidemia  On Metoprolol tartrate 50mg BID for HTN  We will monitor pt's vital signs & lab results  Will continue to monitor incision site for s/s of infection  Pt will have adequate pain control to fully participate in therapies  Pt will have safe transfers with  the call bell & hourly rounding to prevent falls  Pt will be educated on importance of T/R & offloading weight while in bed/chair to prevent pressure injury   Pt will be educated on energy conservation & encouraged independence with ADL's  Case Management:     Discharge Planning  Living Arrangements: Lives w/ Family members  Support Systems: Other (Comment) (brother)  Type of Current Residence: Private residence  Current Home Care Services: No  1/26- Cm to assess  Is the patient actively participating in therapies? yes  List any modifications to the treatment plan: None    Barriers Interventions   Blood loss anemia IV Iron, tranfusion, monitoring   Pulmonary emboli Monitoring   DVT Monitoring   Anxiety Med management, breathing, encouragement   Incisional care Monitoring   Decreased functional reach LHE                 Is the patient making expected progress toward goals? yes  List any update or changes to goals: None    Medical Goals: Patient will be medically stable for discharge to [facility] upon completion of rehab program and Patient will be able to manage medical conditions and comorbid conditions with medications and follow up upon completion of rehab program    Weekly Team Goals:   Rehab Team Goals  ADL Team Goal: Patient will be independent with ADLs with least restrictive device upon completion of rehab program  Bowel/Bladder Team Goal: Patient will return to premorbid level for bladder/bowel management upon completion of rehab program  Transfer Team Goal: Patient will be independent with transfers with least restrictive device upon completion of rehab program  Locomotion Team Goal: Patient will be independent with locomotion with least restrictive device upon completion of rehab program    Discussion: Pt is participating in rehab program and participating in therapies  Min mod w/ ADL IADLs  Min assist with sit to stand, mod assist to pivot  Team recommending further workup to focus on functional and mobility goals  Anticipated Discharge Date:  D/c Friday 2/10 w/ MAURICIO RN PT OT  [facility] Team Members Present:   The following team members are supervising care for this patient and were present during this Weekly Team Conference      Physician: Dr Marilu Granados MD  : MARY Navarro  Registered Nurse: Ivon Patel RN  Physical Therapist: Berta Khanna PT  Occupational Therapist: Stefan Mcrae MS, OTR/L  Speech Therapist:

## 2023-01-26 NOTE — PCC NURSING
Pt is a 67 y o  female with a PMH of osteopenia, hx of R hip fracture, CHF, HTN, and HLD who originally presented to [facility] on 1/18/2023 after a mechanical fall due to a slippery floor with residual L sided hip pain  XR on 1/18 showed acute intertrochanteric L hip fracture  Orthopedics was consulted and recommended surgical intervention  Patient was taken to the OR on 1/19 for IM fixation of L subtrochanteric fracture performed by Dr Yunior Abraham  Post-operatively, patient became lethargic and hypoxic after receiving Dilaudid which originally improved with Narcan administration  Patient required BiPAP and continued to have hypoxia along with hypotension and leukocytosis  PE CTA study was performed on 1/20 and showed few subsegmental pulmonary emboli in the posterior lower lobes with a measured RV/LV ratio within normal limits  Venous duplex showed acute DVT in 1 of 2 L peroneal veins  Patient was started on Eliquis for [facility] on 1/22 and will need to continue for 3 months  Post-operatively, patient also developed acute blood loss anemia but did not require any blood products  Patient is to be WBAT to [facility] and will require follow-up with Orthopedics as outpatient  Pulmonary also recommended overnight sleep study as outpatient due to hypoxia  Admitted to [address] [place] [address]  1/23/23  Pt on Vit D 3000 u daily for osteopenia  Repeat DXA scan as outpatient along with discussion about Prolia injections  Started on Eliquis 10mg BID on 1/22 x 7 days then decrease to 5mg BID for 3 months for DVT  On Lipitor 20mg daily for hyperlipidemia  HTN managed with Cozaar 25mg daily & lopressor 50mg BID  Voltaren gel for R knee pain  We will monitor pt's vital signs & lab results  Will continue to monitor incision site for s/s of infection  Pt will have adequate pain control to fully participate in therapies  Pt will have safe transfers with the use of call bell & hourly rounding to prevent falls   Pt will be educated on importance of T/R & offloading weight while in bed/chair to prevent pressure injury  Pt will be educated on energy conservation & encouraged independence with ADL's

## 2023-01-26 NOTE — PROGRESS NOTES
NEUROPSYCHOLOGY INITIAL CONSULT  NOTE  Kimberly Crew 67 y o  :1950 female MRN: [id number]  DOS:23  Unit/Bed#: -01 Encounter: [id number]      Requested by (Physician/Service): Baylee Ruiz MD  Reason for Consultation: Evaluate and treat impact of mood and coping on progress in rehabilitation  Bennet Goodpasture is a 67 y o  female with medical history of HLD, HTN, Chronic diastolic heart failure (K6OP), previous R hip fracture who presented to the [address] [place] [address] on 23 after slipping on some water while at work at [employer]  No prodromal symptoms  CTH showed no acute intracranial abnormality, and XR L hip showed a subtrochanteric femur fracture  Ortho recommended ORIF with IMN which was performed on  by Dr Michele Moraes  Made WBAT post-op Post-op lethargi and hypoxic, CXR showed no acute cardiopulmonary disease  Required BiPAP, Narcan ,and Flumazenil  There was some improvement in her lethargy, but remained hypoxic  Pulm was consulted  CTA PE ordered which showed distal subsegmental PEs in the posterior lower lobe with atelectasis  Doppler performed showed a DVT in 1 peroneal vein in LLE  Pulm did not feel that the findings were responsible for the extent of her hypoxia - they felt multifactorial 2/2 atelectasis some volume overload  She got 1 dose of IV Lasix  Primary did not feel there was a volume overload component  Ultimately recommended for a/c for at least 3 months  Eliquis cost $47 a month   Course also c/b ABLA, but that stabilized and she did not require transfusion She was stabilized and admitted to the [facility] on 2023 with Rehab Diagnosis: Impairment of mobility, safety and Activities of Daily Living (ADLs) due to Orthopedic Disorders:    Unilateral Hip Fracture Subtrochanteric femur fracture      HISTORY:     Patient Active Problem List    Diagnosis Date Noted   • Chest wall pain 2023   • Constipation 2023   • Osteopenia 01/23/2023   • Hypoxemia 01/23/2023   • Hypokalemia 01/23/2023   • Adjustment disorder with anxiety 01/23/2023   • Multiple subsegmental pulmonary emboli without acute cor pulmonale (HCC) 01/21/2023   • Acute deep vein thrombosis (DVT) of left peroneal vein ([facility] ) 01/21/2023   • Acute postoperative anemia due to expected blood loss 01/21/2023   • Closed left hip fracture ([facility] ) 01/18/2023   • Essential hypertension 01/18/2023   • Other hyperlipidemia 01/18/2023   • Chronic diastolic congestive heart failure ([facility] ) 01/18/2023   • Leukocytosis 01/18/2023       Body mass index is 36 3 kg/m²  Past Medical History:     Past Medical History:   Diagnosis Date   • Hypertension         Past Surgical History:     Past Surgical History:   Procedure Laterality Date   • HIP FRACTURE SURGERY     • VT OPTX FEM SHFT FX W/INSJ IMED IMPLT W/WO SCREW Left 1/19/2023    Procedure: INSERTION NAIL IM FEMUR ANTEGRADE (TROCHANTERIC); Surgeon: Lacey Amaro MD;  Location: [facility];  Service: Orthopedics   • REDUCTION MAMMAPLASTY           Allergies: Allergies   Allergen Reactions   • Amoxicillin Angioedema   • Sulfa Antibiotics Angioedema and Anaphylaxis     throat "closes "     • Lisinopril Swelling   • Medical Tape Other (See Comments)     rash, blisters         Social History:    Social History     Socioeconomic History   • Marital status:       Spouse name: None   • Number of children: None   • Years of education: None   • Highest education level: None   Occupational History   • None   Tobacco Use   • Smoking status: Never   • Smokeless tobacco: Never   Vaping Use   • Vaping Use: Never used   Substance and Sexual Activity   • Alcohol use: Never   • Drug use: Never   • Sexual activity: None   Other Topics Concern   • None   Social History Narrative   • None     Social Determinants of Health     Financial Resource Strain: Not on file   Food Insecurity: No Food Insecurity   • Worried About Running Out of Food in the Last Year: Never true   • Ran Out of Food in the Last Year: Never true   Transportation Needs: No Transportation Needs   • Lack of Transportation (Medical): No   • Lack of Transportation (Non-Medical): No   Physical Activity: Not on file   Stress: Not on file   Social Connections: Not on file   Intimate Partner Violence: Not on file   Housing Stability: Low Risk    • Unable to Pay for Housing in the Last Year: No   • Number of Places Lived in the Last Year: 1   • Unstable Housing in the Last Year: No        Family History:    History reviewed  No pertinent family history      Medications:     Current Facility-Administered Medications:   •  acetaminophen (TYLENOL) tablet 650 mg, 650 mg, Oral, Q6H PRN, ELDA Dey, 650 mg at 01/25/23 1135  •  apixaban (ELIQUIS) tablet 10 mg, 10 mg, Oral, BID, Luis Trimble MD, 10 mg at 01/26/23 0471  •  atorvastatin (LIPITOR) tablet 20 mg, 20 mg, Oral, Daily With Kade Winkler MD, 20 mg at 01/25/23 1722  •  bisacodyl (DULCOLAX) rectal suppository 10 mg, 10 mg, Rectal, Daily PRN, Luis Trimble MD  •  calcium carbonate (TUMS) chewable tablet 1,000 mg, 1,000 mg, Oral, Daily PRN, Luis Trimble MD  •  calcium carbonate-vitamin D 500 mg-5 mcg tablet 2 tablet, 2 tablet, Oral, Daily With Breakfast, ELDA Dey, 2 tablet at 01/26/23 4092  •  cholecalciferol (VITAMIN D3) tablet 3,000 Units, 3,000 Units, Oral, Daily, ELDA Dey, 3,000 Units at 01/26/23 0184  •  docusate sodium (COLACE) capsule 100 mg, 100 mg, Oral, BID, Luis Trimble MD, 100 mg at 01/25/23 0825  •  hydrOXYzine HCL (ATARAX) tablet 10 mg, 10 mg, Oral, Q6H PRN, Luis Trimble MD  •  loratadine (CLARITIN) tablet 10 mg, 10 mg, Oral, Daily, Luis Trimble MD, 10 mg at 01/26/23 0933  •  menthol-methyl salicylate (BENGAY) 61-42 % cream, , Apply externally, 4x Daily PRN, Luis Trimble MD  •  metoprolol tartrate (LOPRESSOR) tablet 50 mg, 50 mg, Oral, BID, Luis Trimble MD, 50 mg at 01/26/23 0802  •  oxyCODONE (ROXICODONE) IR tablet 5 mg, 5 mg, Oral, Q6H PRN, Tisha Muller MD, 5 mg at 01/26/23 6004  •  oxyCODONE (ROXICODONE) split tablet 2 5 mg, 2 5 mg, Oral, Q6H PRN, Tisha Muller MD, 2 5 mg at 01/25/23 1527  •  pantoprazole (PROTONIX) EC tablet 40 mg, 40 mg, Oral, Daily, Tisha Muller MD, 40 mg at 01/26/23 2992  •  polyethylene glycol (MIRALAX) packet 17 g, 17 g, Oral, Daily, Tisha Muller MD  •  senna (SENOKOT) tablet 17 2 mg, 2 tablet, Oral, Daily, Tisha Muller MD, 17 2 mg at 01/24/23 0820    ASSESSMENT:   Mary Simms is a pleasant and engaging 67year old  female who was admitted to [facility] to regain strength and functioning s/p closed left hip fracture and ORIF  Ms Daniel Albert reports she fell and 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 [employer]  She communicated a good understanding of her surgery and rehabilitation  Prior to the hip fracture, Ms Martin provided care to her brother who has DM and previously lived in a skilled nursing facility  While she is recovering in the hospital, friends and family are taking care of her brother  Her plan is to fully recover and return to work  Pt  maintained good eye contact and engaged appropriately throughout the interview  Pt was seen in her room in a recliner, presented as pleasant,  cooperative and established rapport without difficulty  Mood ranged from neutral to euthymic and was appropriate to context  No evidence of overt depression, euphoria or emotional lability is present  No suicidal/homicidal ideation, intent, plan  Pt's thinking is logical and coherent and there is no evidence of psychosis  Pt denies premorbid psychiatric history  She denies disturbances in appetite  No evidence of poor boundaries was present  Pt  denies incidents of losing time or flash backs  No pressured speech, repetitive or perseverative behavior is present    No obsessive-compulsive or associated rituals  Pt's conversational speech was fluent and articulate with no evidence of word finding difficulty  Other than the current hip fracture and surgery, pt's exhibited good coping and she effectively managed her emotions  She is struggling to adjusting to the changes in her functioning and routine and she may benefit by supportive psychotherapy during rehab to help her maintain motivation, coping and mood and prevent decline  CBT and DBT techniques to help manage emotions, tolerate distress and employ effective coping will be presented  DIAGNOSIS:  Adjustment Disorder with Mixed Emotional Features    RECOMMENDATIONS:   I will follow Ms Martin during her stay to provide the following interventions:    · Supportive psychotherapy, utilizing CBT and mindfulness strategies  · DBT distress tolerance techniques  to improve coping and mood  · Meditation and relaxation training          Thank you for the opportunity to participate in Ms Martin's care  Lynn Serrato, Ph D   Licensed Psychologist

## 2023-01-26 NOTE — PROGRESS NOTES
[facility]  Progress Note Kamlesh Hugo 1950, 67 y o  female MRN: [id number]  Unit/Bed#: [facility] 161-58 Encounter: [id number]  Primary Care Provider: No primary care provider on file  Date and time admitted to hospital: 1/23/2023  1:33 PM    Constipation  Assessment & Plan  KUB obtained on 1/23 due to no BM for 6 days  Showed nonobstructive bowel gas pattern moderate to large amount of stool throughout the colon, suggesting constipation  Had a BM on 1/24  Bowel regimen increased  Encourage PO hydration and mobilization  Primary team following  Monitor for s/s of obstruction  Hypokalemia  Assessment & Plan  Improved, K+ 3 6 today  Given 40mEq potassium chloride once on 1/24  Continue to trend BMP  Hypoxemia  Assessment & Plan  Hypoxia in acute setting post-operatively  Developed PEs and was started on Eliquis  Per Pulmonary - recommending overnight noc ox as outpatient  Consider obtaining while in [facility]  Osteopenia  Assessment & Plan  DXA scan in 12/2020 showed osteopenia  Continue home calcium carbonate and Vitamin D supplementation  Vitamin D level 29 8 on 1/24  Increased Vitamin D3 to 3000u daily  Recommend repeat DXA scan as outpatient along with discussion about Prolia injections  Follow-up with PCP as outpatient  Acute postoperative anemia due to expected blood loss  Assessment & Plan  Hgb currently 6 9  Hgb was 12 6 pre-operatively  Iron panel on 1/23: Iron Saturation 9%, TIBC 279, Iron 25, and Ferritin 81   2 doses of IV Venofer from 1/24-1/25  Blood consent signed and type and screen obtained  Give 1u PRBCs today  No active s/s of bleeding  Transfuse for Hgb <7 0 or if becomes symptomatic  Continue to trend CBC  Acute deep vein thrombosis (DVT) of left peroneal vein [facility])  Assessment & Plan  Lower extremity venous duplex on 1/20: Evidence of focal acute DVT in 1 of 2 peroneal veins on LLE  Started on Eliquis 10mg BID on 1/22  Continue for 7 days total and then decreased to 5mg BID for 3 months  Follow-up with PCP as outpatient  Neurovascular checks Q shift  Multiple subsegmental pulmonary emboli without acute cor pulmonale (HCC)  Assessment & Plan  CTA PE study on 1/20: Few subsegmental pulmonary emboli in the posterior lower lobes, measured RV/LV ratio within normal limits  Started on Eliquis 10mg BID on 1/22  Continue for 7 days total and then decrease to 5mg BID for 3 months  Follow-up with PCP as outpatient  Currently maintaining on RA  Encourage pulmonary toileting  Spot pulse ox checks  Leukocytosis  Assessment & Plan  WBC count currently 12 86  Recent diagnosis of PE - asymptomatic, on Eliquis  Likely reactive due to blood loss/hematoma to LLE  Afebrile  Asymptomatic  Continue to trend routine CBC  Chronic diastolic congestive heart failure (HCC)  Assessment & Plan  Wt Readings from Last 3 Encounters:   01/26/23 92 9 kg (204 lb 14 4 oz)   01/23/23 95 2 kg (209 lb 14 1 oz)     Stable without exacerbation   on 1/20  Last ECHO on 12/2/22 showed EF 55-59%, G1DD  Takes Lasix 20mg daily at home - currently on hold  Restart as able  Other hyperlipidemia  Assessment & Plan  Continue home Lipitor 20mg daily  Last lipid panel on 11/15/22: Cholesterol 147, Triglycerides 73, HDL 53, and LDL 79  Essential hypertension  Assessment & Plan  Home regimen: Lasix 20mg daily, metoprolol tartrate 50mg BID, losartan 50mg daily  Currently receiving metoprolol tartrate 50mg BID  Discontinued losartan on 1/24 due to orthostasis with therapy  Apply abdominal binder/TEDs when getting OOB  Consider restarting Lasix when able  Monitor BP with routine VS   Follow-up with PCP as outpatient  * Closed left hip fracture [facility])  Assessment & Plan  S/p mechanical fall on 1/18  XR on 1/18 showed acute intertrochanteric L hip fracture    OR on 1/19 for IM fixation of L subtrochanteric hip fracture performed by   Laquita Rivers  WBAT to LLE  Neurovascular checks Q shift  Ensure adequate pain control  Monitor incision for s/s of infection  Eliquis for DVT ppx  Follow-up with Orthopedics in 2 weeks as outpatient ()  Primary team following  PT/OT  VTE Pharmacologic Prophylaxis:   Pharmacologic: Apixaban (Eliquis)  Mechanical VTE Prophylaxis in Place: Yes - sequential compression devices  Current Length of Stay: 3 day(s)    Current Patient Status: Inpatient Rehab     Discharge Plan: As per primary team     Code Status: Level 1 - Full Code    Subjective:   Pt examined while pt lying in bed in pt room  Complaints of feeling extremely fatigued and weak after therapy session  States that she woke up feeling much better  Did experience lightheadedness during therapy but BP was stable at the time  Admits to feeling SOB and having palpitations due to feeling slightly anxious  Heart rhythm occasionally irregular - EKG obtained and showed NSR with LBBB  Plans for pt to receive 1u PRBCs today due to symptoms and Hgb of 6 9  Denies any numbness or tingling to LLE and currently denies any pain  Objective:     Vitals:   Temp (24hrs), Av 9 °F (37 2 °C), Min:98 °F (36 7 °C), Max:99 6 °F (37 6 °C)    Temp:  [98 °F (36 7 °C)-99 6 °F (37 6 °C)] 98 °F (36 7 °C)  HR:  [75-84] 80  Resp:  [18] 18  BP: (100-146)/(55-74) 103/55  SpO2:  [92 %-96 %] 96 %  Body mass index is 36 3 kg/m²  Review of Systems   Constitutional: Positive for fatigue  Negative for appetite change, chills and fever  HENT: Negative for trouble swallowing  Eyes: Negative for visual disturbance  Respiratory: Positive for shortness of breath (experiencing intermittent SOB and palpitations but feels that it could be due to her anxiety)  Negative for cough, wheezing and stridor  Cardiovascular: Positive for palpitations  Negative for chest pain and leg swelling     Gastrointestinal: Negative for abdominal distention, abdominal pain, constipation, diarrhea, nausea and vomiting  LBM 1/25   Genitourinary: Negative for difficulty urinating  Musculoskeletal: Negative for arthralgias, back pain and gait problem  Neurological: Positive for weakness (generalized weakness and fatigue after therapy) and light-headedness (lightheadedness during therapy session, BP stable at the time)  Negative for dizziness and headaches  Psychiatric/Behavioral: Negative for dysphoric mood and sleep disturbance  The patient is nervous/anxious  All other systems reviewed and are negative  Input and Output Summary (last 24 hours): Intake/Output Summary (Last 24 hours) at 1/26/2023 0818  Last data filed at 1/25/2023 1005  Gross per 24 hour   Intake --   Output 200 ml   Net -200 ml       Physical Exam:     Physical Exam  Vitals and nursing note reviewed  Constitutional:       General: She is not in acute distress  Appearance: Normal appearance  She is obese  She is not ill-appearing  HENT:      Head: Normocephalic and atraumatic  Cardiovascular:      Rate and Rhythm: Normal rate and regular rhythm  Pulses: Normal pulses  Heart sounds: Murmur heard  Systolic murmur is present with a grade of 2/6  No friction rub  Pulmonary:      Effort: Pulmonary effort is normal  No respiratory distress  Breath sounds: Normal breath sounds  No wheezing or rhonchi  Abdominal:      General: Abdomen is flat  Bowel sounds are normal  There is no distension  Palpations: Abdomen is soft  There is no mass  Tenderness: There is no abdominal tenderness  There is no guarding or rebound  Hernia: No hernia is present  Musculoskeletal:         General: Swelling (Moderate L thigh edema) present  Cervical back: Normal range of motion and neck supple  No tenderness  Right lower leg: No edema  Left lower leg: Edema (Moderate non-pitting edema) present  Skin:     General: Skin is warm and dry        Capillary Refill: Capillary refill takes less than 2 seconds  Comments: L hip covered with DSD and Tegaderm, moderate serosanguinous drainage  Neurological:      Mental Status: She is alert and oriented to person, place, and time  Psychiatric:         Mood and Affect: Mood normal          Behavior: Behavior normal          Additional Data:     Labs:    Results from last 7 days   Lab Units 01/26/23  0451   WBC Thousand/uL 12 86*   HEMOGLOBIN g/dL 6 9*   HEMATOCRIT % 21 8*   PLATELETS Thousands/uL 240   NEUTROS PCT % 77*   LYMPHS PCT % 11*   MONOS PCT % 9   EOS PCT % 1     Results from last 7 days   Lab Units 01/26/23  0451 01/20/23  0431 01/20/23  0007   SODIUM mmol/L 136   < > 136   POTASSIUM mmol/L 3 6   < > 4 7   CHLORIDE mmol/L 102   < > 105   CO2 mmol/L 29   < > 29   BUN mg/dL 16   < > 18   CREATININE mg/dL 0 51*   < > 0 57*   ANION GAP mmol/L 5   < > 2*   CALCIUM mg/dL 8 8   < > 7 6*   ALBUMIN g/dL  --   --  3 4*   TOTAL BILIRUBIN mg/dL  --   --  0 46   ALK PHOS U/L  --   --  70   ALT U/L  --   --  16   AST U/L  --   --  18   GLUCOSE RANDOM mg/dL 102*   < > 160*    < > = values in this interval not displayed           Results from last 7 days   Lab Units 01/19/23  2126   POC GLUCOSE mg/dl 155*         Results from last 7 days   Lab Units 01/20/23  0431 01/20/23  0300 01/20/23  0007   LACTIC ACID mmol/L  --  1 1 3 2*   PROCALCITONIN ng/ml 0 08  --   --        Labs reviewed    Imaging:    Imaging reviewed    Recent Cultures (last 7 days):           Last 24 Hours Medication List:   Current Facility-Administered Medications   Medication Dose Route Frequency Provider Last Rate   • acetaminophen  650 mg Oral Q6H PRN EVETTE SantosNP     • apixaban  10 mg Oral BID Tisha Muller MD     • atorvastatin  20 mg Oral Daily With Gissell Bernal MD     • bisacodyl  10 mg Rectal Daily PRN Tisha Muller MD     • calcium carbonate  1,000 mg Oral Daily PRN Tisha Muller MD     • calcium carbonate-vitamin D  2 tablet Oral Daily With Breakfast ELDA Rodriguez     • cholecalciferol  3,000 Units Oral Daily ELDA Rodriguez     • docusate sodium  100 mg Oral BID Margarita Fortune MD     • loratadine  10 mg Oral Daily Margarita Fortune MD     • menthol-methyl salicylate   Apply externally 4x Daily PRN Margarita Fortune MD     • metoprolol tartrate  50 mg Oral BID Margarita Fortune MD     • oxyCODONE  5 mg Oral Q6H PRN Margarita Fortune MD     • oxyCODONE  2 5 mg Oral Q6H PRN Margarita Fortune MD     • pantoprazole  40 mg Oral Daily Margarita Fortune MD     • polyethylene glycol  17 g Oral Daily Margarita Fortune MD     • senna  2 tablet Oral Daily Margarita Fortune MD          M*Modal software was used to dictate this note  It may contain errors with dictating incorrect words or incorrect spelling  Please contact the provider directly with any questions

## 2023-01-26 NOTE — NURSING NOTE
Received tigertext this am from lab about pt's critical value Hgb-6 9  Dr Fernandez Phlegm made aware through tigertext, endorsed to next shift  Pt no c/o dizziness , slept good last night  Pt asked for a room change last night because she can't take a rest with a room mate  Pt transferred fr room 268 to  263 @ 2006

## 2023-01-26 NOTE — PROGRESS NOTES
01/26/23 1430   Pain Assessment   Pain Assessment Tool 0-10   Pain Score 5   Pain Location/Orientation Orientation: Left; Location: Hip   Restrictions/Precautions   Precautions Fall Risk;Multiple lines;Pain  (on current IV for blood)   LLE Weight Bearing Per Order WBAT   Sit to Stand   Type of Assistance Needed Physical assistance   Physical Assistance Level 25% or less   Comment slight lift assist needed   Sit to Stand CARE Score 3   Walk 10 Feet   Reason if not Attempted Safety concerns   Walk 10 Feet CARE Score 88   Walk 50 Feet with Two Turns   Reason if not Attempted Safety concerns   Walk 50 Feet with Two Turns CARE Score 88   Walk 150 Feet   Reason if not Attempted Safety concerns   Walk 150 Feet CARE Score 88   Therapeutic Interventions   Strengthening Seated LAQ AROM on L and MRE min resisted on R,  Hip add isometric, Hip abd MRE min resisted, Hip flex arom on R and AAROM on L,  Performed 3 stands at 30 sec each   Assessment   Treatment Assessment 60 min session pt was agreeable to perform therapy  Opted to perform seated LE strengthening / ROM ex  due to this episode this morning and pt currently recieving blood  Pt is alot more awake and alert than this morning  Pt was on 2L 02 but NSG states can work on spot check with pt being on RA  Pt was in 90s with multiple checks  Pt will benefit from cont skilled PT toward LTGs   Plan   Progress Slow progress, decreased activity tolerance   PT Therapy Minutes   PT Time In 1430   PT Time Out 1530   PT Total Time (minutes) 60   PT Mode of treatment - Individual (minutes) 60   PT Mode of treatment - Concurrent (minutes) 0   PT Mode of treatment - Group (minutes) 0   PT Mode of treatment - Co-treat (minutes) 0   PT Mode of Treatment - Total time(minutes) 60 minutes   PT Cumulative Minutes 240   Therapy Time missed   Time missed?  No

## 2023-01-26 NOTE — ASSESSMENT & PLAN NOTE
S/p mechanical fall on 1/18  XR on 1/18 showed acute intertrochanteric L hip fracture  OR on 1/19 for IM fixation of L subtrochanteric hip fracture performed by Dr Sofi Bradshaw  WBAT to LLE  Neurovascular checks Q shift  Ensure adequate pain control  Monitor incision for s/s of infection  Eliquis for DVT ppx  Follow-up with Orthopedics in 2 weeks as outpatient (2/2)  Primary team following  PT/OT

## 2023-01-26 NOTE — CASE MANAGEMENT
Cm spoke with pt at bedside to introduce role and complete cm and review team update:    Pt lives in 2 story home w/ her brother, 6 ABY  Bedroom and bathroom on 2nd floor  Pt reports having shower chair and raised toilet seat  Pt reports she was independent prior to admission  Pt works FT at [employer] in [employer]  Pt reports no hx of STR, OP PT OR HHC  CM also reviewed team update and dc date of 2/10, pt verbalized appreciation of setting dc date so she" had something to work towards"  Workmans Comp:  Alvin 24  [phone]  Denis Campbell  Claim# [id number]  : Lucina Job, [phone]    The patient was educated on the rehabilitation process including therapy program, the interdisciplinary team, and weekly team meetings  Estimated length of stay was reviewed with the patient as well as expectations of discussions of discharge planning  The role of the  was reviewed including providing care coordination, discharge planning and discharge facilitation  IMM was reviewed with the patient and a copy was provided for their reference  The patient verbalized understanding of the information provided and denied any further questions at this time  CM will continue to follow and assist the patient throughout their rehabilitation stay

## 2023-01-26 NOTE — ASSESSMENT & PLAN NOTE
Hgb currently 6 9  Hgb was 12 6 pre-operatively  Iron panel on 1/23: Iron Saturation 9%, TIBC 279, Iron 25, and Ferritin 81   2 doses of IV Venofer from 1/24-1/25  Blood consent signed and type and screen obtained  Give 1u PRBCs today  No active s/s of bleeding  Transfuse for Hgb <7 0 or if becomes symptomatic  Continue to trend CBC

## 2023-01-26 NOTE — ASSESSMENT & PLAN NOTE
Improved/resolving  Suspect MSK or costochondritis - mild  Reproducible with palpation and certain push off manuevers in therapies  No diaphoresis, nausea, radiation, SOB  Reviewed red flag symptoms that would warrant ACS r/o  Added Bengay  Monitor closely

## 2023-01-27 LAB
ABO GROUP BLD BPU: NORMAL
ATRIAL RATE: 79 BPM
ATRIAL RATE: 82 BPM
BPU ID: NORMAL
CROSSMATCH: NORMAL
P AXIS: 11 DEGREES
P AXIS: 30 DEGREES
PR INTERVAL: 174 MS
PR INTERVAL: 182 MS
QRS AXIS: -39 DEGREES
QRS AXIS: -40 DEGREES
QRSD INTERVAL: 144 MS
QRSD INTERVAL: 148 MS
QT INTERVAL: 408 MS
QT INTERVAL: 414 MS
QTC INTERVAL: 467 MS
QTC INTERVAL: 483 MS
T WAVE AXIS: 101 DEGREES
T WAVE AXIS: 104 DEGREES
UNIT DISPENSE STATUS: NORMAL
UNIT PRODUCT CODE: NORMAL
UNIT PRODUCT VOLUME: 350 ML
UNIT RH: NORMAL
VENTRICULAR RATE: 79 BPM
VENTRICULAR RATE: 82 BPM

## 2023-01-27 RX ORDER — POLYETHYLENE GLYCOL 3350 17 G/17G
17 POWDER, FOR SOLUTION ORAL DAILY PRN
Status: DISCONTINUED | OUTPATIENT
Start: 2023-01-27 | End: 2023-02-10 | Stop reason: HOSPADM

## 2023-01-27 RX ORDER — BISACODYL 10 MG
10 SUPPOSITORY, RECTAL RECTAL DAILY PRN
Status: DISCONTINUED | OUTPATIENT
Start: 2023-01-27 | End: 2023-02-10 | Stop reason: HOSPADM

## 2023-01-27 RX ADMIN — LORATADINE 10 MG: 10 TABLET ORAL at 08:02

## 2023-01-27 RX ADMIN — METOPROLOL TARTRATE 50 MG: 50 TABLET, FILM COATED ORAL at 17:33

## 2023-01-27 RX ADMIN — APIXABAN 10 MG: 5 TABLET, FILM COATED ORAL at 17:33

## 2023-01-27 RX ADMIN — PANTOPRAZOLE SODIUM 40 MG: 40 TABLET, DELAYED RELEASE ORAL at 06:21

## 2023-01-27 RX ADMIN — ATORVASTATIN CALCIUM 20 MG: 20 TABLET, FILM COATED ORAL at 17:34

## 2023-01-27 RX ADMIN — Medication 2 TABLET: at 08:02

## 2023-01-27 RX ADMIN — DOCUSATE SODIUM 100 MG: 100 CAPSULE, LIQUID FILLED ORAL at 17:34

## 2023-01-27 RX ADMIN — MENTHOL, METHYL SALICYLATE 1 APPLICATION: 10; 15 CREAM TOPICAL at 10:43

## 2023-01-27 RX ADMIN — APIXABAN 10 MG: 5 TABLET, FILM COATED ORAL at 08:02

## 2023-01-27 RX ADMIN — DOCUSATE SODIUM 100 MG: 100 CAPSULE, LIQUID FILLED ORAL at 08:02

## 2023-01-27 RX ADMIN — CHOLECALCIFEROL TAB 25 MCG (1000 UNIT) 3000 UNITS: 25 TAB at 08:02

## 2023-01-27 RX ADMIN — OXYCODONE HYDROCHLORIDE 5 MG: 5 TABLET ORAL at 08:03

## 2023-01-27 RX ADMIN — SENNOSIDES 17.2 MG: 8.6 TABLET, FILM COATED ORAL at 08:02

## 2023-01-27 RX ADMIN — OXYCODONE HYDROCHLORIDE 5 MG: 5 TABLET ORAL at 14:11

## 2023-01-27 RX ADMIN — METOPROLOL TARTRATE 50 MG: 50 TABLET, FILM COATED ORAL at 08:02

## 2023-01-27 NOTE — ASSESSMENT & PLAN NOTE
S/p mechanical fall on 1/18  XR on 1/18 showed acute intertrochanteric L hip fracture  OR on 1/19 for IM fixation of L subtrochanteric hip fracture performed by Dr Francy Arnold  WBAT to LLE  Neurovascular checks Q shift  Ensure adequate pain control  Monitor incision for s/s of infection  Eliquis for DVT ppx  Follow-up with Orthopedics in 2 weeks as outpatient (2/2)  Primary team following  PT/OT

## 2023-01-27 NOTE — ASSESSMENT & PLAN NOTE
Wt Readings from Last 3 Encounters:   01/27/23 92 5 kg (203 lb 14 8 oz)   01/23/23 95 2 kg (209 lb 14 1 oz)     Stable without exacerbation   on 1/20  Last ECHO on 12/2/22 showed EF 55-59%, G1DD  Takes Lasix 20mg daily at home - currently on hold  Restart as able

## 2023-01-27 NOTE — PROGRESS NOTES
01/27/23 0830   Pain Assessment   Pain Assessment Tool 0-10   Pain Score 5   Pain Location/Orientation Orientation: Left; Location: Hip  (inc to 7 with walking,  dec back to 5 with CP)   Restrictions/Precautions   Precautions Fall Risk;Pain   LLE Weight Bearing Per Order WBAT   Sit to Stand   Type of Assistance Needed Incidental touching   Comment CG-  pt did not need lift assist today   Sit to Stand CARE Score 4   Bed-Chair Transfer   Type of Assistance Needed Physical assistance   Physical Assistance Level 25% or less   Comment stand pivot with RW   Chair/Bed-to-Chair Transfer CARE Score 3   Walk 10 Feet   Type of Assistance Needed Physical assistance   Physical Assistance Level 25% or less   Comment Min A with RW-  therapist brought chair along for first 2 walks, then 3rd walk pt walked across room to another chair   Walk 10 Feet CARE Score 3   Walk 50 Feet with Two Turns   Reason if not Attempted Safety concerns   Walk 50 Feet with Two Turns CARE Score 88   Walk 150 Feet   Reason if not Attempted Safety concerns   Walk 150 Feet CARE Score 88   Walking 10 Feet on Uneven Surfaces   Reason if not Attempted Safety concerns   Walking 10 Feet on Uneven Surfaces CARE Score 88   Ambulation   Primary Mode of Locomotion Prior to Admission Walk   Distance Walked (feet) 15 ft  (12x1  8x1  15x1)   Assist Device Roller Walker   Gait Pattern Slow Sienna; Antalgic;Decreased foot clearance;R foot drag;L foot drag;Step to;Decreased L stance   Walk Assist Level Minimum Assist   Findings Pt very fearful, antalgic, needs encouragement,  provided tactile gaurding at L knee although no buckle noted,  pt tends to slide R foot vs ,   Does the patient walk? 2   Yes   Wheel 50 Feet with Two Turns   Reason if not Attempted Activity not applicable   Wheel 50 Feet with Two Turns CARE Score 9   Wheel 150 Feet   Reason if not Attempted Activity not applicable   Wheel 878 Feet CARE Score 9   Curb or Single Stair   Reason if not Attempted Safety concerns   1 Step (Curb) CARE Score 88   4 Steps   Reason if not Attempted Safety concerns   4 Steps CARE Score 88   12 Steps   Reason if not Attempted Safety concerns   12 Steps CARE Score 88   Stairs   Findings (S)  For pre stair training,  Had pt step up/down 2inch box in llbars 3 times - in next day or 2 perform 4inch box in preperation for real steps   Therapeutic Interventions   Strengthening LAQ 2# on R  and AROM on L,  Hip abd MRE min resisted,  Hip add isometric   Equipment Use   NuStep would benefit in near future   Assessment   Treatment Assessment 90 min session focused on repeated stand pivots which pt continues to slide and shimmy R foot (needs to  foot),  poor WB on LLE  Pt did perform first amb bouts today with RW - will encourage slightly longer distances over the weekend  Also continue step training in llbars with 2inch box and progress to 4inch box  Steps are a barrier for home  Cont strengthening and mobility with RW    Barriers to Discharge Inaccessible home environment  (possibly limited help)   Plan   Progress Progressing toward goals   Recommendation   PT Discharge Recommendation Home with home health rehabilitation   PT Therapy Minutes   PT Time In 0830   PT Time Out 1000   PT Total Time (minutes) 90   PT Mode of treatment - Individual (minutes) 90   PT Mode of treatment - Concurrent (minutes) 0   PT Mode of treatment - Group (minutes) 0   PT Mode of treatment - Co-treat (minutes) 0   PT Mode of Treatment - Total time(minutes) 90 minutes   PT Cumulative Minutes 330   Therapy Time missed   Time missed?  No

## 2023-01-27 NOTE — PROGRESS NOTES
01/27/23 1330   Pain Assessment   Pain Assessment Tool 0-10   Pain Score 10 - Worst Possible Pain   Pain Location/Orientation Orientation: Left; Location: Hip;Location: Chest   Pain Onset/Description Onset: Ongoing  (increases in stance, chest soreness increases when pushing up to stand)   Hospital Pain Intervention(s) Repositioned; Emotional support;Rest;Cold applied   Restrictions/Precautions   Precautions Fall Risk;Multiple lines;Pain   Weight Bearing Restrictions Yes   RLE Weight Bearing Per Order WBAT   LLE Weight Bearing Per Order WBAT   ROM Restrictions No   Braces or Orthoses   (ABD binder OOB, B/L TEDs)   Sit to Stand   Type of Assistance Needed Incidental touching; Adaptive equipment   Physical Assistance Level No physical assistance   Comment CGA w/RW and at raised tabletop, required increased time 2* L hip pain   Sit to Stand CARE Score 4   Therapeutic Excerise-Strength   UE Strength Yes   Right Upper Extremity- Strength   R Shoulder Flexion; Extension;Horizontal ABduction   R Elbow Elbow flexion;Elbow extension   R Position Seated   Equipment Dumbbell  (2#, 3#)   R Weight/Reps/Sets 3x10   RUE Strength Comment Seated w/Sup, [id number] each of alternating UE chest press, OH press, bicep curls, horiz ABD, and prograde rowing, using 3# DB (decreased to 2# for rowing and horiz ABD 2* chest muscle soreness today that pt attributes to repetitive sit<>stands pushing up with BUEs)  Focus was on increasing BUE strength for increased independence w/fxl transfers and sit<>stands  Pt tolerated at slow pace with rest breaks between sets to manage chest soreness and fatigue  Min vc's required for technique  Left Upper Extremity-Strength   L Shoulder Flexion; Extension;Horizontal ABduction   L Elbow Elbow flexion;Elbow extension   L Position Seated   Equipment Dumbell   L Weights/Reps/Sets 3x10   LUE Strength Comment See above  Cognition   Overall Cognitive Status WFL   Arousal/Participation Alert; Cooperative Attention Attends with cues to redirect   Orientation Level Oriented X4   Memory Decreased recall of recent events   Following Commands Follows multistep commands without difficulty   Additional Activities   Additional Activities Comments In stance at raised tabletop with CGA, pt engaged in [address] reaching to B/L sides to retrieve cones while weightshifting from R/L to challenge JULIO, standing balance, and LLE weightbearing tolerance  Pt tolerated but required extended seated rest break between trials, 2* c/o max 10/10 L hip pain (pt due for pain med during OT session, received but pt reports med not effective yet)  No LOB  Activity Tolerance   Activity Tolerance Patient limited by pain   Medical Staff Made Aware nsg Rebeca aware of pt pain level in L hip and Rebeca present during OT session to provide pain med   Assessment   Treatment Assessment Pt seen for 60min skilled OT session focused on BUE strengthening, weightshifting through LE's in stance, and fxl standing balance, for increased independence w/ADLs/IADLs and decreased caregiver burden  See detailed descriptions of fxl performance above  Pt tolerated session, but performance limited by 10/10 L hip pain as well as chest muscle soreness, with nsg present to provide scheduled pain med, and wolf providing repositioning assist and emotional support for pain mgmt  Pt continues to be limited by decreased standing balance, standing tolerance, Wbing tolerance through LLE, pain, act olvin, and anxiety regarding fear of falling  Pt would benefit from continued skilled OT focused on weightshifting in stance, kitchen mobility, fxl transfer training, fxl reaching, dynamic standing balance/tolerance, activity tolerance  Prognosis Fair   Problem List Decreased strength;Decreased endurance; Impaired balance;Decreased mobility;Obesity;Orthopedic restrictions;Pain   Barriers to Discharge Inaccessible home environment;Decreased caregiver support   Plan   Treatment/Interventions ADL retraining;Functional transfer training; Therapeutic exercise; Endurance training;Patient/family training;Equipment eval/education; Bed mobility; Compensatory technique education;Spoke to nursing   Progress Slow progress, decreased activity tolerance   Recommendation   OT Discharge Recommendation Home with home health rehabilitation   OT Therapy Minutes   OT Time In 1330   OT Time Out 1430   OT Total Time (minutes) 60   OT Mode of treatment - Individual (minutes) 60   OT Mode of treatment - Concurrent (minutes) 0   OT Mode of treatment - Group (minutes) 0   OT Mode of treatment - Co-treat (minutes) 0   OT Mode of Treatment - Total time(minutes) 60 minutes   OT Cumulative Minutes 390   Therapy Time missed   Time missed?  No

## 2023-01-27 NOTE — PROGRESS NOTES
Physical Medicine and Rehabilitation Progress Note  Juan Carlos Martinez 67 y o  female MRN: [id number]  Unit/Bed#: -10 Encounter: [id number]    HPI: Juan Carlos Martinez is a 67 y o  female  With medical history of HLD, HTN, Chronic diastolic heart failure (P5YI), previous R hip fracture who presented to the [address] [place] [address] on 1/18 after slipping on some water while at work at [employer]  No prodromal symptoms  CTH showed no acute intracranial abnormality, and XR L hip showed a subtrochanteric femur fracture  Ortho recommended ORIF with IMN which was performed on 1/19 by Dr García Cortland  Made WBAT post-op Post-op lethargi and hypoxic, CXR showed no acute cardiopulmonary disease  Required BiPAP, Narcan ,and Flumazenil  There was some improvement in her lethargy, but remained hypoxic  Pulm was consulted  CTA PE ordered which showed distal subsegmental PEs in the posterior lower lobe with atelectasis  Doppler performed showed a DVT in 1 peroneal vein in LLE  Pulm did not feel that the findings were responsible for the extent of her hypoxia - they felt multifactorial 2/2 atelectasis some volume overload  She got 1 dose of IV Lasix  Primary did not feel there was a volume overload component  Ultimately recommended for a/c for at least 3 months  Eliquis cost $47 a month  Course also c/b ABLA, but that stabilized and she did not require transfusion She was stabilized and admitted to the [facility] on 1/23/2023  Chief Complaint: Chest wall pain  Interval History/Subjective:  No acute events overnight  Sleep was fine  Orthostatics negative this morning - no lightheadedness/dizziness  No SOB, fevers, chills, N/V, abdominal pain  She feels much better  She still has chest wall pain, which she describes as feeling muscular, and she is wondering if she can try the bengay this morning  She reports having a BM this AM and yesterday as well   I asked her about her anxiety, and we talked about utilizing medication if necessary  ROS:  A 10 point review of systems was negative except for what is noted in the HPI  Today's Changes:  1  Recheck Hgb on Monday  2  Has refused miralax past few days and has had regular BMs  Will make PRN  3  Abdominal/Binder and teds to continue  4  Continue local incisional care  Total visit time: 25 minutes, with more than 50% spent counseling/coordinating care  Counseling includes discussion with patient re: progress in therapies, functional issues observed by therapy staff, and discussion with patient regarding their current medical state and wellbeing  Coordination of patient's care was performed in conjunction with Internal Medicine service to monitor patient's labs, vitals, and management of their comorbidities  Assessment/Plan:    * Closed left hip fracture ([address] )  Assessment & Plan  After slipping on water at work  Workers Comp  Subtrochanteric femur fracture s/p long nail (antegrade) on 1/19 by Dr Nadia Beck  Pain control as documented below  Surgical dressings removed 1/26  Monitor drainage  Follow-up with Orthopedics 2 weeks (2/2)    PT/ OT 3-5 hours a day, 5-7 days/week in acute comprehensive inpatient rehab    Chest wall pain  Assessment & Plan  Suspect MSK or costochondritis - mild  Reproducible with palpation and certain push off manuevers in therapies  Improving  No diaphoresis, nausea, radiation, SOB  Reviewed red flag symptoms that would warrant ACS r/o  Added Bengay  Monitor closely  Constipation  Assessment & Plan  Last BM 1/27  Continue current bowel regimen  Adjust as appropriate  Adjustment disorder with anxiety  Assessment & Plan  Will provide non-pharmacologic strategies  Added PRN hydroxyzine  Consulted rehab psychology for support  Hypokalemia  Assessment & Plan  1/26 3 6 after being given 40mEq x2 after admission  Give one more dose of 20mEq today     Monitor    Hypoxemia  Assessment & Plan  Multifactorial: PE, atelectasis, ?volume overload s/p Lasix IV x1  Was weaned to room air in acute hospital  Outpatient Noc Ox overnight  Weaning oxygen - this is new for her   - Ambulatory sats while here  Monitor closely  Goals > 90%  Outpatient f/u with Pulmonology  Osteopenia  Assessment & Plan  DXA 12/2020 with osteopenia  Continue home calcium and Vitamin D  1/24 Vitamin D is 29 8  Outpatient f/u DXA with PCP  Acute postoperative anemia due to expected blood loss  Assessment & Plan  Hgb 12 6 pre-op  1/26  8 8 > 7 2 > 7 4 > 7 2 > 6 9 > 8 2 after transfusion   - Iron deficiency on labs  - Also post-operative blood loss  - Given IV venofer x2   - Type and screen   - Consent in chart  - 1/26 Transfuse with 1 unit pRBC  Repeat Hgb:   Trend, transfuse as appropriate  Outpatient f/u with PCP    Acute deep vein thrombosis (DVT) of left peroneal vein [facility])  Assessment & Plan  1/20 LE Venous Duplex with L acute DVT in 1 of 2 peroneal veins  Now on Eliquis  See PE for more details  No SCD on that leg  Can do ace wrapping for edema  Outpatient f/u with PCP    Multiple subsegmental pulmonary emboli without acute cor pulmonale (HCC)  Assessment & Plan  Noted on CTA PE from 1/20   RV/LV ratio WNL  Started on Eliquis 10mg BID on 1/22 for 7 days before transitioning to 5mg BID for 3 months as per Pulm  Monitor respiratory status  Leukocytosis  Assessment & Plan  As high as 12 86 today  Afebrile, but otherwise stable  Drainage from L thigh doesn't appear infectious  Monitor incision site closely  Monitor off antibiotics  Monitor Temp and WBC  Initiate work-up if she spikes a fever or develops localizing symptoms       Chronic diastolic congestive heart failure (HCC)  Assessment & Plan  Wt Readings from Last 3 Encounters:   01/27/23 92 5 kg (203 lb 14 8 oz)   01/23/23 95 2 kg (209 lb 14 1 oz)     Does not appear to have acute exacerbation  12/2/2022 Echo with EF 55-59% and G1DD   on 1/20  Home: Lopressor, Losartan, Lasix  Here: Lopressor  - Holding ARB for orthostasis  Monitor I/O and daily weights  Adjust as appropriate  Outpatient f/u with PCP  Other hyperlipidemia  Assessment & Plan  Home: Simvastatin 20mg nightly  Here: Lipitor 20mg nightly for therapeutic substitution   Outpatient f/u with PCP    Essential hypertension  Assessment & Plan  Home: Lasix 20mg daily, Lopressor 50mg BID, Losartan 50mg daily  Here: Lopressor 50mg BID  1/24 Losartan held for orthostasis  Can try to restart Lasix while here once BP appropriate  Monitor BP, adjust as appropriate  Follow-up with PCP  Health Maintenance  #Delirium/Sleep: At risk has had hallucinations on oxycodone 10mg, and was lethargic requiring narcan on fentanyl and dilaudid  #Pain: Tylenol ordered PRN, Oxycodone 2 5-5mg PRN  #Bowel: Last BM 1/27  Continue colace/senna 2 tabs  Added PRN suppository and miralax made PRN  #Bladder: Voiding and continent  Monitor  #Skin/Pressure Injury Prevention: Turn Q2hr in bed, with weight shifts [id number] in wheelchair  Float heels in bed  #DVT Prophylaxis: Fully anticoagulated on Eliquis  SCD on RLE only  #GI Prophylaxis: PPI started when full a/c started  Can also do famotidine  #Code Status:  Full Code  #FEN: Reg Diet  #Dispo:  Team Conference 1/26: Anticipate she will need all her time here  Her anxiety is a major barrier and her anemia is a major medical barrier right now  She is making some progress  Potential discharge planned for 2/10/2023  May need Home PT/OT +/- RN to start  Objective:    Functional Update:  PT: moda transfers, maxA bed mobility  OT: Ind eating, Sup grooming, maxA bathing, Sup UB dressing, maxA LB dressing, modA toielting, modA toilet transfers       Allergies per EMR    Physical Exam:  Temp:  [98 4 °F (36 9 °C)-100 1 °F (37 8 °C)] 98 4 °F (36 9 °C)  HR:  [60-95] 95  Resp:  [16-18] 18  BP: (114-145)/(55-75) 138/67  Oxygen Therapy  SpO2: 92 %  O2 Flow Rate (L/min): 2 L/min    Gen: No acute distress, Well-nourished, well-appearing  HEENT: Moist mucus membranes, Normocephalic/Atraumatic  Cardiovascular: Regular rate, rhythm, S1/S2  Distal pulses palpable  Heme/Extr: No jaswinder  Pulmonary: Non-labored breathing  Lungs CTAB  : No garcia  GI: Soft, non-tender, non-distended  BS+  Integumentary: Skin is warm, dry  Improved drainage in terms of quality (Serous) and quantity (not saturating dressing)  Neuro: AAOx3,  Speech is intact  Appropriate to questioning  Psych: Normal mood and affect  Diagnostic Studies:   Reviewed, no new imaging    Laboratory:  Reviewed   Results from last 7 days   Lab Units 01/26/23  1752 01/26/23  0451 01/25/23  0612 01/24/23  1434 01/24/23  0521   HEMOGLOBIN g/dL 8 2* 6 9* 7 2*   < > 7 2*   HEMATOCRIT % 25 8* 21 8* 21 7*   < > 21 8*   WBC Thousand/uL  --  12 86* 9 94  --  9 80    < > = values in this interval not displayed       Results from last 7 days   Lab Units 01/26/23  0451 01/24/23  0521 01/22/23  0441   BUN mg/dL 16 16 16   POTASSIUM mmol/L 3 6 3 4* 3 2*   CHLORIDE mmol/L 102 101 100   CREATININE mg/dL 0 51* 0 54* 0 45*            Patient Active Problem List   Diagnosis   • Closed left hip fracture (HCC)   • Essential hypertension   • Other hyperlipidemia   • Chronic diastolic congestive heart failure (HCC)   • Leukocytosis   • Multiple subsegmental pulmonary emboli without acute cor pulmonale (HCC)   • Acute deep vein thrombosis (DVT) of left peroneal vein (HCC)   • Acute postoperative anemia due to expected blood loss   • Osteopenia   • Hypoxemia   • Hypokalemia   • Adjustment disorder with anxiety   • Constipation   • Chest wall pain         Medications  Current Facility-Administered Medications   Medication Dose Route Frequency Provider Last Rate   • acetaminophen  650 mg Oral Q6H PRN ELDA Norwood     • apixaban  10 mg Oral BID Ruba Flood MD     • atorvastatin  20 mg Oral Daily With Martha Bruno MD     • bisacodyl  10 mg Rectal Daily PRN Margarita Fortune MD     • calcium carbonate  1,000 mg Oral Daily PRN Margarita Fortune MD     • calcium carbonate-vitamin D  2 tablet Oral Daily With Breakfast ELDA Rodriguez     • cholecalciferol  3,000 Units Oral Daily ELDA Rodriguez     • docusate sodium  100 mg Oral BID Margarita Fortune MD     • hydrOXYzine HCL  10 mg Oral Q6H PRN Margarita Fortune MD     • loratadine  10 mg Oral Daily Margarita Fortune MD     • menthol-methyl salicylate   Apply externally 4x Daily PRN Margarita Fortune MD     • metoprolol tartrate  50 mg Oral BID Margarita Fortune MD     • oxyCODONE  5 mg Oral Q6H PRN Margarita Fortune MD     • oxyCODONE  2 5 mg Oral Q6H PRN Margarita Fortune MD     • pantoprazole  40 mg Oral Daily Margarita Fortune MD     • polyethylene glycol  17 g Oral Daily Margarita Fortune MD     • senna  2 tablet Oral Daily Margarita Fortune MD            ** Please Note: Fluency Direct voice to text software may have been used in the creation of this document   **

## 2023-01-27 NOTE — PLAN OF CARE
Problem: RESPIRATORY - ADULT  Goal: Achieves optimal ventilation and oxygenation  Description: INTERVENTIONS:  - Assess for changes in respiratory status  - Assess for changes in mentation and behavior  - Position to facilitate oxygenation and minimize respiratory effort  - Oxygen administered by appropriate delivery if ordered  - Initiate smoking cessation education as indicated  - Encourage broncho-pulmonary hygiene including cough, deep breathe, Incentive Spirometry  - Assess the need for suctioning and aspirate as needed  - Assess and instruct to report SOB or any respiratory difficulty  - Respiratory Therapy support as indicated  Outcome: Progressing     Problem: GASTROINTESTINAL - ADULT  Goal: Maintains or returns to baseline bowel function  Description: INTERVENTIONS:  - Assess bowel function  - Encourage oral fluids to ensure adequate hydration  - Administer IV fluids if ordered to ensure adequate hydration  - Administer ordered medications as needed  - Encourage mobilization and activity  - Consider nutritional services referral to assist patient with adequate nutrition and appropriate food choices  Outcome: Progressing  Goal: Maintains adequate nutritional intake  Description: INTERVENTIONS:  - Monitor percentage of each meal consumed  - Identify factors contributing to decreased intake, treat as appropriate  - Assist with meals as needed  - Monitor I&O, weight, and lab values if indicated  - Obtain nutrition services referral as needed  Outcome: Progressing     Problem: GENITOURINARY - ADULT  Goal: Maintains or returns to baseline urinary function  Description: INTERVENTIONS:  - Assess urinary function  - Encourage oral fluids to ensure adequate hydration if ordered  - Administer IV fluids as ordered to ensure adequate hydration  - Administer ordered medications as needed  - Offer frequent toileting  - Follow urinary retention protocol if ordered  Outcome: Progressing     Problem: MUSCULOSKELETAL - ADULT  Goal: Maintain proper alignment of affected body part  Description: INTERVENTIONS:  - Support, maintain and protect limb and body alignment  - Provide patient/ family with appropriate education  Outcome: Progressing

## 2023-01-27 NOTE — ASSESSMENT & PLAN NOTE
Improving  KUB obtained on 1/23 due to no BM for 6 days  Showed nonobstructive bowel gas pattern moderate to large amount of stool throughout the colon, suggesting constipation  Bowel regimen increased  Encourage PO hydration and mobilization  Primary team following  Monitor for s/s of obstruction

## 2023-01-27 NOTE — PROGRESS NOTES
01/27/23 1100   Pain Assessment   Pain Assessment Tool 0-10   Pain Score 2   Pain Location/Orientation Location: Chest   Pain Radiating Towards nonradiating   Pain Onset/Description Onset: Gradual   Patient's Stated Pain Goal No pain   Hospital Pain Intervention(s) Cold applied  (to L hip)   Multiple Pain Sites No   Restrictions/Precautions   Precautions Fall Risk;Multiple lines;Pain   RLE Weight Bearing Per Order WBAT   LLE Weight Bearing Per Order WBAT   ROM Restrictions No   Sit to Stand   Type of Assistance Needed Incidental touching   Comment CGA for STS iwth RW, req inc time   Sit to Stand CARE Score 4   Cognition   Overall Cognitive Status [facility]   Arousal/Participation Alert; Responsive; Cooperative   Attention Attends with cues to redirect   Orientation Level Oriented X4   Memory Decreased recall of recent events   Following Commands Follows multistep commands without difficulty   Additional Activities   Additional Activities Comments engaged pt in weight shifting from R/L to challenge JULIO adn weight shifting  Activity Tolerance   Activity Tolerance Patient tolerated treatment well   Assessment   Treatment Assessment Engaged pt in brief 30mins of skilled OT services with focus on weight shfiting, STS and assessed ortho BP  Assessed BP sit 142/85  stand 117/67 HR75  Pt tolerated tx session well  Cont OT POC with focus on activity tolerance, balance, transfers, to inc fx performance  Prognosis Fair   Problem List Decreased strength;Decreased endurance; Impaired balance;Decreased mobility;Obesity;Orthopedic restrictions;Pain   Barriers to Discharge Inaccessible home environment;Decreased caregiver support   Plan   Treatment/Interventions ADL retraining;Functional transfer training; Therapeutic exercise; Endurance training;Patient/family training;Bed mobility; Compensatory technique education   Progress Slow progress, decreased activity tolerance   Recommendation   OT Discharge Recommendation Home with home health rehabilitation   OT Therapy Minutes   OT Time In 1100   OT Time Out 1130   OT Total Time (minutes) 30   OT Mode of treatment - Individual (minutes) 30   OT Mode of treatment - Concurrent (minutes) 0   OT Mode of treatment - Group (minutes) 0   OT Mode of treatment - Co-treat (minutes) 0   OT Mode of Treatment - Total time(minutes) 30 minutes   OT Cumulative Minutes 330   Therapy Time missed   Time missed?  No

## 2023-01-27 NOTE — ASSESSMENT & PLAN NOTE
Hgb 82 from 6 9 yesterday after receiving 1u PRBCs  Hgb was 12 6 pre-operatively  Iron panel on 1/23: Iron Saturation 9%, TIBC 279, Iron 25, and Ferritin 81   2 doses of IV Venofer from 1/24-1/25  Blood consent signed and type and screen obtained  Given 1u PRBCs on 1/26  No active s/s of bleeding  Transfuse for Hgb <7 0 or if becomes symptomatic  Continue to trend CBC

## 2023-01-27 NOTE — PROGRESS NOTES
[facility]  Progress Note Anusha Luz 1950, 67 y o  female MRN: [id number]  Unit/Bed#: [facility] [address] Encounter: [id number]  Primary Care Provider: No primary care provider on file  Date and time admitted to hospital: 1/23/2023  1:33 PM    Constipation  Assessment & Plan  Improving  KUB obtained on 1/23 due to no BM for 6 days  Showed nonobstructive bowel gas pattern moderate to large amount of stool throughout the colon, suggesting constipation  Bowel regimen increased  Encourage PO hydration and mobilization  Primary team following  Monitor for s/s of obstruction  Hypokalemia  Assessment & Plan  Improved, K+ 3 6 today  Given 40mEq potassium chloride once on 1/24  Continue to trend BMP  Hypoxemia  Assessment & Plan  Hypoxia in acute setting post-operatively  Developed PEs and was started on Eliquis  Per Pulmonary - recommending overnight noc ox as outpatient  Consider obtaining while in [facility]  Osteopenia  Assessment & Plan  DXA scan in 12/2020 showed osteopenia  Continue home calcium carbonate and Vitamin D supplementation  Vitamin D level 29 8 on 1/24  Increased Vitamin D3 to 3000u daily  Recommend repeat DXA scan as outpatient along with discussion about Prolia injections  Follow-up with PCP as outpatient  Acute postoperative anemia due to expected blood loss  Assessment & Plan  Hgb 82 from 6 9 yesterday after receiving 1u PRBCs  Hgb was 12 6 pre-operatively  Iron panel on 1/23: Iron Saturation 9%, TIBC 279, Iron 25, and Ferritin 81   2 doses of IV Venofer from 1/24-1/25  Blood consent signed and type and screen obtained  Given 1u PRBCs on 1/26  No active s/s of bleeding  Transfuse for Hgb <7 0 or if becomes symptomatic  Continue to trend CBC  Acute deep vein thrombosis (DVT) of left peroneal vein [facility])  Assessment & Plan  Lower extremity venous duplex on 1/20: Evidence of focal acute DVT in 1 of 2 peroneal veins on LLE    Started on Eliquis 10mg BID on 1/22  Continue for 7 days total and then decreased to 5mg BID for 3 months  Follow-up with PCP as outpatient  Neurovascular checks Q shift  Multiple subsegmental pulmonary emboli without acute cor pulmonale (HCC)  Assessment & Plan  CTA PE study on 1/20: Few subsegmental pulmonary emboli in the posterior lower lobes, measured RV/LV ratio within normal limits  Started on Eliquis 10mg BID on 1/22  Continue for 7 days total and then decrease to 5mg BID for 3 months  Follow-up with PCP as outpatient  Currently maintaining on RA  Encourage pulmonary toileting  Spot pulse ox checks  Leukocytosis  Assessment & Plan  WBC count currently 12 86  Recent diagnosis of PE - asymptomatic, on Eliquis  Likely reactive due to blood loss/hematoma to LLE  Afebrile  Asymptomatic  Continue to trend routine CBC  Chronic diastolic congestive heart failure (HCC)  Assessment & Plan  Wt Readings from Last 3 Encounters:   01/27/23 92 5 kg (203 lb 14 8 oz)   01/23/23 95 2 kg (209 lb 14 1 oz)     Stable without exacerbation   on 1/20  Last ECHO on 12/2/22 showed EF 55-59%, G1DD  Takes Lasix 20mg daily at home - currently on hold  Restart as able  Other hyperlipidemia  Assessment & Plan  Continue home Lipitor 20mg daily  Last lipid panel on 11/15/22: Cholesterol 147, Triglycerides 73, HDL 53, and LDL 79  Essential hypertension  Assessment & Plan  Home regimen: Lasix 20mg daily, metoprolol tartrate 50mg BID, losartan 50mg daily  Currently receiving metoprolol tartrate 50mg BID  Discontinued losartan on 1/24 due to orthostasis with therapy  Apply abdominal binder/TEDs when getting OOB  Consider restarting Lasix when able  Monitor BP with routine VS   Follow-up with PCP as outpatient  * Closed left hip fracture [facility])  Assessment & Plan  S/p mechanical fall on 1/18  XR on 1/18 showed acute intertrochanteric L hip fracture    OR on 1/19 for IM fixation of L subtrochanteric hip fracture performed by Dr Raphael Parks  WBAT to LLE  Neurovascular checks Q shift  Ensure adequate pain control  Monitor incision for s/s of infection  Eliquis for DVT ppx  Follow-up with Orthopedics in 2 weeks as outpatient ()  Primary team following  PT/OT  VTE Pharmacologic Prophylaxis:   Pharmacologic: Apixaban (Eliquis)  Mechanical VTE Prophylaxis in Place: No - DVT LLE  Current Length of Stay: 4 day(s)    Current Patient Status: Inpatient Rehab     Discharge Plan: As per primary team     Code Status: Level 1 - Full Code    Subjective:   Pt examined while pt sitting in recliner in pt room  Complaints of L thigh pain, aching, 6/10, improves with ice and pain medication  Denies any lightheadedness, dizziness, SOB, or palpitations today  Feels much better after blood transfusion yesterday  Participating well in therapy  Slept well last night  Did not require O2 during the day today  Noticed that she was snoring frequently yesterday during the day and has never been worked up for sleep apnea  Per pt son, this has been occurring for 20+ years  Agreeable to overnight noc ox tonight  Objective:     Vitals:   Temp (24hrs), Av 2 °F (37 3 °C), Min:98 4 °F (36 9 °C), Max:100 1 °F (37 8 °C)    Temp:  [98 4 °F (36 9 °C)-100 1 °F (37 8 °C)] 98 4 °F (36 9 °C)  HR:  [60-95] 95  Resp:  [16-18] 18  BP: (114-145)/(55-75) 138/67  SpO2:  [92 %-98 %] 92 %  Body mass index is 36 12 kg/m²  Review of Systems   Constitutional: Negative for appetite change, chills, fatigue and fever  HENT: Negative for trouble swallowing  Eyes: Negative for visual disturbance  Respiratory: Positive for apnea  Negative for cough, shortness of breath, wheezing and stridor  Cardiovascular: Negative for chest pain, palpitations and leg swelling  Gastrointestinal: Negative for abdominal distention, abdominal pain, constipation, diarrhea, nausea and vomiting          LBM    Genitourinary: Negative for difficulty urinating  Musculoskeletal: Positive for arthralgias (L thigh pain, aching, 6/10, improves with ice and pain medication)  Negative for back pain  Neurological: Negative for dizziness, weakness, light-headedness, numbness and headaches  Psychiatric/Behavioral: Negative for dysphoric mood and sleep disturbance  The patient is not nervous/anxious  All other systems reviewed and are negative  Input and Output Summary (last 24 hours): Intake/Output Summary (Last 24 hours) at 1/27/2023 0905  Last data filed at 1/27/2023 0201  Gross per 24 hour   Intake 709 25 ml   Output 725 ml   Net -15 75 ml       Physical Exam:     Physical Exam  Vitals and nursing note reviewed  Constitutional:       General: She is not in acute distress  Appearance: Normal appearance  She is obese  She is not ill-appearing  HENT:      Head: Normocephalic and atraumatic  Cardiovascular:      Rate and Rhythm: Normal rate and regular rhythm  Pulses: Normal pulses  Heart sounds: Murmur heard  Systolic murmur is present with a grade of 1/6  No friction rub  Pulmonary:      Effort: Pulmonary effort is normal  No respiratory distress  Breath sounds: Normal breath sounds  No wheezing or rhonchi  Abdominal:      General: Abdomen is flat  Bowel sounds are normal  There is no distension  Palpations: Abdomen is soft  There is no mass  Tenderness: There is no abdominal tenderness  There is no guarding or rebound  Hernia: No hernia is present  Musculoskeletal:         General: Swelling (Moderate L thigh edema) present  Cervical back: Normal range of motion and neck supple  No tenderness  Right lower leg: No edema  Left lower leg: Edema (Moderate non-pitting edema) present  Skin:     General: Skin is warm and dry  Capillary Refill: Capillary refill takes less than 2 seconds  Comments: L hip incision covered with DSD     Neurological:      Mental Status: She is alert and oriented to person, place, and time     Psychiatric:         Mood and Affect: Mood normal          Behavior: Behavior normal          Additional Data:     Labs:    Results from last 7 days   Lab Units 01/26/23  1752 01/26/23  0451   WBC Thousand/uL  --  12 86*   HEMOGLOBIN g/dL 8 2* 6 9*   HEMATOCRIT % 25 8* 21 8*   PLATELETS Thousands/uL  --  240   NEUTROS PCT %  --  77*   LYMPHS PCT %  --  11*   MONOS PCT %  --  9   EOS PCT %  --  1     Results from last 7 days   Lab Units 01/26/23  0451   SODIUM mmol/L 136   POTASSIUM mmol/L 3 6   CHLORIDE mmol/L 102   CO2 mmol/L 29   BUN mg/dL 16   CREATININE mg/dL 0 51*   ANION GAP mmol/L 5   CALCIUM mg/dL 8 8   GLUCOSE RANDOM mg/dL 102*                       Labs reviewed    Imaging:    Imaging reviewed    Recent Cultures (last 7 days):           Last 24 Hours Medication List:   Current Facility-Administered Medications   Medication Dose Route Frequency Provider Last Rate   • acetaminophen  650 mg Oral Q6H PRN ELDA Reddy     • apixaban  10 mg Oral BID Ralf Lake MD     • [START ON 1/29/2023] apixaban  5 mg Oral BID ELDA Reddy     • atorvastatin  20 mg Oral Daily With Kayli Catalan MD     • bisacodyl  10 mg Rectal Daily PRN Ralf Lake MD     • calcium carbonate  1,000 mg Oral Daily PRN Ralf Lake MD     • calcium carbonate-vitamin D  2 tablet Oral Daily With Breakfast ELDA Reddy     • cholecalciferol  3,000 Units Oral Daily ELDA Reddy     • docusate sodium  100 mg Oral BID Ralf Lake MD     • hydrOXYzine HCL  10 mg Oral Q6H PRN Ralf Lake MD     • loratadine  10 mg Oral Daily Ralf Lake MD     • menthol-methyl salicylate   Apply externally 4x Daily PRN Ralf Lake MD     • metoprolol tartrate  50 mg Oral BID Ralf Lake MD     • oxyCODONE  5 mg Oral Q6H PRN Ralf Lake MD     • oxyCODONE  2 5 mg Oral Q6H PRN Ralf Lake MD     • pantoprazole  40 mg Oral Daily Ralf Lake MD     • polyethylene glycol  17 g Oral Daily Fabiano Rondon MD     • senna  2 tablet Oral Daily Fabiano Rondon MD          M*Modal software was used to dictate this note  It may contain errors with dictating incorrect words or incorrect spelling  Please contact the provider directly with any questions

## 2023-01-27 NOTE — PLAN OF CARE
Problem: SKIN/TISSUE INTEGRITY - ADULT  Goal: Incision(s), wounds(s) or drain site(s) healing without S/S of infection  Description: INTERVENTIONS  - Assess and document dressing, incision, wound bed, drain sites and surrounding tissue  - Provide patient and family education  - Perform skin care/dressing changes every shift  Outcome: Progressing     Problem: PAIN - ADULT  Goal: Verbalizes/displays adequate comfort level or baseline comfort level  Description: Interventions:  - Encourage patient to monitor pain and request assistance  - Assess pain using appropriate pain scale  - Administer analgesics based on type and severity of pain and evaluate response  - Implement non-pharmacological measures as appropriate and evaluate response  - Consider cultural and social influences on pain and pain management  - Notify physician/advanced practitioner if interventions unsuccessful or patient reports new pain  Outcome: Progressing

## 2023-01-28 RX ADMIN — DOCUSATE SODIUM 100 MG: 100 CAPSULE, LIQUID FILLED ORAL at 17:01

## 2023-01-28 RX ADMIN — Medication 2 TABLET: at 08:15

## 2023-01-28 RX ADMIN — LORATADINE 10 MG: 10 TABLET ORAL at 08:20

## 2023-01-28 RX ADMIN — OXYCODONE HYDROCHLORIDE 5 MG: 5 TABLET ORAL at 08:42

## 2023-01-28 RX ADMIN — ATORVASTATIN CALCIUM 20 MG: 20 TABLET, FILM COATED ORAL at 16:51

## 2023-01-28 RX ADMIN — ACETAMINOPHEN 650 MG: 325 TABLET ORAL at 10:23

## 2023-01-28 RX ADMIN — CHOLECALCIFEROL TAB 25 MCG (1000 UNIT) 3000 UNITS: 25 TAB at 08:16

## 2023-01-28 RX ADMIN — PANTOPRAZOLE SODIUM 40 MG: 40 TABLET, DELAYED RELEASE ORAL at 05:41

## 2023-01-28 RX ADMIN — HYDROXYZINE HYDROCHLORIDE 10 MG: 10 TABLET ORAL at 23:30

## 2023-01-28 RX ADMIN — APIXABAN 10 MG: 5 TABLET, FILM COATED ORAL at 08:17

## 2023-01-28 RX ADMIN — ACETAMINOPHEN 650 MG: 325 TABLET ORAL at 22:38

## 2023-01-28 RX ADMIN — APIXABAN 10 MG: 5 TABLET, FILM COATED ORAL at 17:01

## 2023-01-28 RX ADMIN — MENTHOL, METHYL SALICYLATE 1 APPLICATION: 10; 15 CREAM TOPICAL at 08:41

## 2023-01-28 RX ADMIN — METOPROLOL TARTRATE 50 MG: 50 TABLET, FILM COATED ORAL at 17:01

## 2023-01-28 RX ADMIN — METOPROLOL TARTRATE 50 MG: 50 TABLET, FILM COATED ORAL at 08:15

## 2023-01-28 NOTE — PROGRESS NOTES
01/28/23 0830   Pain Assessment   Pain Assessment Tool 0-10   Pain Score 5  (communicated with RN Kika Oliver to provide tylenol if due)   Pain Location/Orientation Orientation: Left; Location: Hip   Pain Radiating Towards nonradiating   Pain Onset/Description Onset: Ongoing   Effect of Pain on Daily Activities inc in stance and WB   Patient's Stated Pain Goal No pain   Hospital Pain Intervention(s) Cold applied; Rest   Restrictions/Precautions   Precautions Fall Risk;Multiple lines;Pain   RLE Weight Bearing Per Order WBAT   LLE Weight Bearing Per Order WBAT   ROM Restrictions No   Putting On/Taking Off Footwear   Type of Assistance Needed Physical assistance   Physical Assistance Level 25% or less   Comment TA for TEDS, can don slip on sneakers   Putting On/Taking Off Footwear CARE Score 3   Sit to Stand   Type of Assistance Needed Incidental touching; Adaptive equipment   Comment CGA with RW   Sit to Stand CARE Score 4   Bed-Chair Transfer   Type of Assistance Needed Physical assistance   Physical Assistance Level 25% or less   Comment improved WB, but at times cont to drag RLE  Stacia SPT iwth Rw   Chair/Bed-to-Chair Transfer CARE Score 3   Toileting Hygiene   Type of Assistance Needed Physical assistance   Physical Assistance Level 26%-50%   Comment completed seated on BSC over toilet  Seated pt able to perform jon hygiene  modA for CM when completing bimanual release   Toileting Hygiene CARE Score 3   Toileting   Findings Performed fx mobility into [facility] with RW, wheeled out of bathroom   Toilet Transfer   Type of Assistance Needed Physical assistance   Physical Assistance Level 25% or less   Comment Stacia SPT to BSC over toilet with RW  Toilet Transfer CARE Score 3   Therapeutic Excerise-Strength   UE Strength Yes   Right Upper Extremity- Strength   R Shoulder Flexion; Extension;Horizontal ABduction   R Elbow Elbow flexion;Elbow extension   R Position Seated   Equipment Dumbbell  (2#)   R Weight/Reps/Sets 2x15 RUE Strength Comment engaged pt in UE TE 2# 2x15 to cont to inc fx strength, for improved STS and SPT  Pt tolerated well with rest break in between  Left Upper Extremity-Strength   LUE Strength Comment see above   Cognition   Overall Cognitive Status WFL   Arousal/Participation Alert; Cooperative   Attention Attends with cues to redirect   Orientation Level Oriented X4   Memory Decreased recall of recent events   Following Commands Follows multistep commands without difficulty   Activity Tolerance   Activity Tolerance Patient tolerated treatment well   Medical Staff Made Aware ortho BP with TEDS sit 137/69, stand 139/76   Assessment   Treatment Assessment Engaged pt in 90mins of skilled OT services with focus on repetitive transfers and STS, UE TE, fx mobility, toileting  Pt pleasant and cooperative this session  Trialed fx mobility with RW  Pt able to complete 10ft x3 with rest break in between  Pt reporting she typically uses R knee sleeve for support, son to bring in today  KIRK Vivar ace wrapped R knee for support  Pt is making steady gains at this time with transfers and fx mobility  Pt can cont to benefit from further skilled OT services with focus on transfers, fx mobility, activity tolerance, balance, bimanual/unilateral release to inc fx performance and independence  Prognosis Fair   Problem List Decreased strength;Decreased endurance; Impaired balance;Decreased mobility;Obesity;Orthopedic restrictions;Pain   Barriers to Discharge Inaccessible home environment;Decreased caregiver support   Plan   Treatment/Interventions ADL retraining;Functional transfer training; Therapeutic exercise; Endurance training;Patient/family training;Bed mobility; Compensatory technique education   Recommendation   OT Discharge Recommendation Home with home health rehabilitation   Discharge Summary anticipated DC 2/10   OT Therapy Minutes   OT Time In 0830   OT Time Out 1000   OT Total Time (minutes) 90   OT Mode of treatment - Individual (minutes) 90   OT Mode of treatment - Concurrent (minutes) 0   OT Mode of treatment - Group (minutes) 0   OT Mode of treatment - Co-treat (minutes) 0   OT Mode of Treatment - Total time(minutes) 90 minutes   OT Cumulative Minutes 480   Therapy Time missed   Time missed?  No

## 2023-01-28 NOTE — PROGRESS NOTES
01/28/23 1250   Pain Assessment   Pain Assessment Tool 0-10   Pain Score 8   Pain Location/Orientation Orientation: Left; Location: Hip   Pain Onset/Description Onset: Ongoing   Effect of Pain on Daily Activities Can increase in standing and walking   Patient's Stated Pain Goal No pain   Hospital Pain Intervention(s) Repositioned; Ambulation/increased activity; Emotional support   Multiple Pain Sites No   Restrictions/Precautions   Precautions Fall Risk;Pain;Multiple lines   Weight Bearing Restrictions Yes   RLE Weight Bearing Per Order WBAT   LLE Weight Bearing Per Order WBAT   ROM Restrictions No   Cognition   Overall Cognitive Status WFL   Arousal/Participation Alert; Cooperative   Attention Attends with cues to redirect   Orientation Level Oriented X4   Memory Decreased recall of recent events   Following Commands Follows multistep commands without difficulty   Subjective   Subjective "I am having more pain then earlier"   Sit to Stand   Type of Assistance Needed Incidental touching   Physical Assistance Level No physical assistance   Comment CGA with RW   Sit to Stand CARE Score 4   Bed-Chair Transfer   Type of Assistance Needed Physical assistance   Physical Assistance Level 25% or less   Comment Continues to drag/slide R LE, even with VC's   Chair/Bed-to-Chair Transfer CARE Score 3   Walk 10 Feet   Type of Assistance Needed Physical assistance   Physical Assistance Level 25% or less   Comment Praful with small ambulation and brought w/c behind her   Walk 10 Feet CARE Score 3   Walk 50 Feet with Two Turns   Reason if not Attempted Safety concerns   Walk 50 Feet with Two Turns CARE Score 88   Walk 150 Feet   Reason if not Attempted Safety concerns   Walk 150 Feet CARE Score 88   Walking 10 Feet on Uneven Surfaces   Reason if not Attempted Safety concerns   Walking 10 Feet on Uneven Surfaces CARE Score 88   Ambulation   Primary Mode of Locomotion Prior to Admission Walk   Distance Walked (feet) 10 ft   Assist Device Roller Walker   Gait Pattern Slow Sienna; Antalgic;L foot drag;Decreased L stance; Improper weight shift   Walk Assist Level Minimum Assist   Findings Patient is able to complete ambulation, however continues to require verbal encouragement   Does the patient walk? 2  Yes   Wheel 50 Feet with Two Turns   Reason if not Attempted Activity not applicable   Wheel 50 Feet with Two Turns CARE Score 9   Wheel 150 Feet   Reason if not Attempted Activity not applicable   Wheel 625 Feet CARE Score 9   Curb or Single Stair   Reason if not Attempted Safety concerns   1 Step (Curb) CARE Score 88   4 Steps   Reason if not Attempted Safety concerns   4 Steps CARE Score 88   12 Steps   Reason if not Attempted Safety concerns   12 Steps CARE Score 88   Picking Up Object   Reason if not Attempted Safety concerns   Picking Up Object CARE Score 88   Toilet Transfer   Type of Assistance Needed Physical assistance   Physical Assistance Level 25% or less   Comment Praful with SPT to [facility] over toilet with RW   Toilet Transfer CARE Score 3   Therapeutic Interventions   Strengthening LAQ w/ active assist for full ROM, Seated ADD w/ ball 50 reps, Gas pedals- 50 reps   Equipment Use   NuStep 10 Mins- for ROM, both UE/LE, LVL1   Assessment   Treatment Assessment Patient tolerated treatment session well with focus on ambulation with RW and LE strength  Patient was able to perform seated therapeutic exercises with good overall tolerance  Discussed use of ankle pumps to help reduce overall fluid  Patient responded well to use of NuStep and felt her ROM improved, discussed possibly trialing at the start to help "loosen her up" prior to exercises/walking  Patient would continue to benefit from skilled PT to address her functional mobility, decreased strength/ROM and balancd deficits  Problem List Decreased strength;Decreased endurance; Impaired balance;Decreased mobility;Obesity;Orthopedic restrictions;Pain   Barriers to Discharge Inaccessible home environment;Decreased caregiver support   Plan   Treatment/Interventions Functional transfer training;LE strengthening/ROM; Therapeutic exercise; Endurance training;Equipment eval/education;Spoke to nursing;Gait training;Bed mobility   Progress Slow progress, decreased activity tolerance   PT Therapy Minutes   PT Time In 1250   PT Time Out 1420   PT Total Time (minutes) 90   PT Mode of treatment - Individual (minutes) 80   PT Mode of treatment - Concurrent (minutes) 10   PT Mode of treatment - Group (minutes) 0   PT Mode of treatment - Co-treat (minutes) 0   PT Mode of Treatment - Total time(minutes) 90 minutes   PT Cumulative Minutes 420

## 2023-01-28 NOTE — PLAN OF CARE
Problem: RESPIRATORY - ADULT  Goal: Achieves optimal ventilation and oxygenation  Description: INTERVENTIONS:  - Assess for changes in respiratory status  - Assess for changes in mentation and behavior  - Position to facilitate oxygenation and minimize respiratory effort  - Oxygen administered by appropriate delivery if ordered  - Initiate smoking cessation education as indicated  - Encourage broncho-pulmonary hygiene including cough, deep breathe, Incentive Spirometry  - Assess the need for suctioning and aspirate as needed  - Assess and instruct to report SOB or any respiratory difficulty  - Respiratory Therapy support as indicated  Outcome: Progressing     Problem: GASTROINTESTINAL - ADULT  Goal: Maintains or returns to baseline bowel function  Description: INTERVENTIONS:  - Assess bowel function  - Encourage oral fluids to ensure adequate hydration  - Administer IV fluids if ordered to ensure adequate hydration  - Administer ordered medications as needed  - Encourage mobilization and activity  - Consider nutritional services referral to assist patient with adequate nutrition and appropriate food choices  Outcome: Progressing  Goal: Establish and maintain optimal ostomy function  Description: INTERVENTIONS:  - Assess bowel function  - Encourage oral fluids to ensure adequate hydration  - Administer IV fluids if ordered to ensure adequate hydration   - Administer ordered medications as needed  - Encourage mobilization and activity  - Nutrition services referral to assist patient with appropriate food choices  - Assess stoma site  - Consider wound care consult   Outcome: Progressing     Problem: GENITOURINARY - ADULT  Goal: Maintains or returns to baseline urinary function  Description: INTERVENTIONS:  - Assess urinary function  - Encourage oral fluids to ensure adequate hydration if ordered  - Administer IV fluids as ordered to ensure adequate hydration  - Administer ordered medications as needed  - Offer frequent toileting  - Follow urinary retention protocol if ordered  Outcome: Progressing     Problem: PAIN - ADULT  Goal: Verbalizes/displays adequate comfort level or baseline comfort level  Description: Interventions:  - Encourage patient to monitor pain and request assistance  - Assess pain using appropriate pain scale  - Administer analgesics based on type and severity of pain and evaluate response  - Implement non-pharmacological measures as appropriate and evaluate response  - Consider cultural and social influences on pain and pain management  - Notify physician/advanced practitioner if interventions unsuccessful or patient reports new pain  Outcome: Progressing

## 2023-01-29 RX ADMIN — METOPROLOL TARTRATE 50 MG: 50 TABLET, FILM COATED ORAL at 09:27

## 2023-01-29 RX ADMIN — APIXABAN 10 MG: 5 TABLET, FILM COATED ORAL at 09:29

## 2023-01-29 RX ADMIN — METOPROLOL TARTRATE 50 MG: 50 TABLET, FILM COATED ORAL at 17:46

## 2023-01-29 RX ADMIN — ATORVASTATIN CALCIUM 20 MG: 20 TABLET, FILM COATED ORAL at 17:46

## 2023-01-29 RX ADMIN — CHOLECALCIFEROL TAB 25 MCG (1000 UNIT) 3000 UNITS: 25 TAB at 09:27

## 2023-01-29 RX ADMIN — APIXABAN 5 MG: 5 TABLET, FILM COATED ORAL at 17:52

## 2023-01-29 RX ADMIN — MENTHOL, METHYL SALICYLATE 1 APPLICATION: 10; 15 CREAM TOPICAL at 09:30

## 2023-01-29 RX ADMIN — OXYCODONE HYDROCHLORIDE 5 MG: 5 TABLET ORAL at 12:02

## 2023-01-29 RX ADMIN — PANTOPRAZOLE SODIUM 40 MG: 40 TABLET, DELAYED RELEASE ORAL at 06:07

## 2023-01-29 RX ADMIN — LORATADINE 10 MG: 10 TABLET ORAL at 09:27

## 2023-01-29 RX ADMIN — HYDROXYZINE HYDROCHLORIDE 10 MG: 10 TABLET ORAL at 17:46

## 2023-01-29 RX ADMIN — Medication 2 TABLET: at 09:27

## 2023-01-29 RX ADMIN — ACETAMINOPHEN 650 MG: 325 TABLET ORAL at 17:46

## 2023-01-29 NOTE — PLAN OF CARE
Problem: RESPIRATORY - ADULT  Goal: Achieves optimal ventilation and oxygenation  Description: INTERVENTIONS:  - Assess for changes in respiratory status  - Assess for changes in mentation and behavior  - Position to facilitate oxygenation and minimize respiratory effort  - Oxygen administered by appropriate delivery if ordered  - Initiate smoking cessation education as indicated  - Encourage broncho-pulmonary hygiene including cough, deep breathe, Incentive Spirometry  - Assess the need for suctioning and aspirate as needed  - Assess and instruct to report SOB or any respiratory difficulty  - Respiratory Therapy support as indicated  Outcome: Progressing     Problem: GASTROINTESTINAL - ADULT  Goal: Maintains or returns to baseline bowel function  Description: INTERVENTIONS:  - Assess bowel function  - Encourage oral fluids to ensure adequate hydration  - Administer IV fluids if ordered to ensure adequate hydration  - Administer ordered medications as needed  - Encourage mobilization and activity  - Consider nutritional services referral to assist patient with adequate nutrition and appropriate food choices  Outcome: Progressing     Problem: GENITOURINARY - ADULT  Goal: Maintains or returns to baseline urinary function  Description: INTERVENTIONS:  - Assess urinary function  - Encourage oral fluids to ensure adequate hydration if ordered  - Administer IV fluids as ordered to ensure adequate hydration  - Administer ordered medications as needed  - Offer frequent toileting  - Follow urinary retention protocol if ordered  Outcome: Progressing     Problem: METABOLIC, FLUID AND ELECTROLYTES - ADULT  Goal: Fluid balance maintained  Description: INTERVENTIONS:  - Monitor labs   - Monitor I/O and WT  - Instruct patient on fluid and nutrition as appropriate  - Assess for signs & symptoms of volume excess or deficit  Outcome: Progressing

## 2023-01-29 NOTE — PROGRESS NOTES
01/29/23 1230   Pain Assessment   Pain Assessment Tool 0-10   Pain Score 5   Pain Location/Orientation Orientation: Left; Location: Hip   Pain Radiating Towards none   Pain Onset/Description Onset: Ongoing   Effect of Pain on Daily Activities mobility   Patient's Stated Pain Goal No pain   Hospital Pain Intervention(s) Medication (See MAR)   Restrictions/Precautions   Precautions Fall Risk;Pain   Weight Bearing Restrictions Yes   RLE Weight Bearing Per Order WBAT   LLE Weight Bearing Per Order WBAT   ROM Restrictions No   Braces or Orthoses   (abdominal binder, TEDs)   Cognition   Overall Cognitive Status WFL   Arousal/Participation Alert; Cooperative; Other (Comment)  (anxious)   Orientation Level Oriented X4   Following Commands Follows multistep commands with increased time or repetition   Subjective   Subjective Patient seated in chair, report pain is less this afternoon  Anxious with mobility   Roll Left and Right   Type of Assistance Needed Supervision; Adaptive equipment;Verbal cues   Physical Assistance Level No physical assistance   Comment bed rail   Roll Left and Right CARE Score 4   Sit to Lying   Type of Assistance Needed Physical assistance;Verbal cues   Physical Assistance Level 76% or more   Comment max of 1   Sit to Lying CARE Score 2   Lying to Sitting on Side of Bed   Type of Assistance Needed Physical assistance;Verbal cues   Physical Assistance Level 51%-75%   Comment mod A   Lying to Sitting on Side of Bed CARE Score 2   Sit to Stand   Type of Assistance Needed Incidental touching;Verbal cues; Adaptive equipment   Physical Assistance Level No physical assistance   Comment CGA with RW   Sit to Stand CARE Score 4   Bed-Chair Transfer   Type of Assistance Needed Physical assistance; Adaptive equipment;Verbal cues   Physical Assistance Level 25% or less   Comment CGA/min assist, pt tend to swivel her feet to pivot   Chair/Bed-to-Chair Transfer CARE Score 3   Transfer Bed/Chair/Wheelchair   Limitations Noted In Balance;Confidence;LE Strength   Car Transfer   Comment NP this session   Walk 10 Feet   Type of Assistance Needed Physical assistance;Verbal cues; Adaptive equipment   Physical Assistance Level 25% or less   Comment CG/min   Walk 10 Feet CARE Score 3   Walk 50 Feet with Two Turns   Reason if not Attempted Safety concerns   Walk 50 Feet with Two Turns CARE Score 88   Walk 150 Feet   Reason if not Attempted Safety concerns   Walk 150 Feet CARE Score 88   Walking 10 Feet on Uneven Surfaces   Reason if not Attempted Safety concerns   Walking 10 Feet on Uneven Surfaces CARE Score 88   Ambulation   Primary Mode of Locomotion Prior to Admission Walk   Distance Walked (feet) 10 ft  (10ft and 5ft)   Assist Device Roller Walker;Parallel Bars   Gait Pattern Antalgic; Slow Sienna;Decreased foot clearance; Step to; Improper weight shift;Decreased L stance   Walk Assist Level Minimum Assist;Contact Guard   Findings Cueing for tech for WBAT, wt shifting  Present with dec R foot clearance, did better inside // bars   Does the patient walk? 2  Yes   Wheel 50 Feet with Two Turns   Type of Assistance Needed Physical assistance;Verbal cues   Physical Assistance Level 25% or less   Wheel 50 Feet with Two Turns CARE Score 3   Wheel 150 Feet   Type of Assistance Needed Supervision;Verbal cues   Physical Assistance Level No physical assistance   Wheel 150 Feet CARE Score 4   Wheelchair mobility   Does the patient use a wheelchair? 1  Yes   Type of Wheelchair Used 1   Manual   Assistance Provided For Remove Leg Rest;Replace Leg Rest;Locking Brakes   Distance Level Surface (feet) 150 ft   Curb or Single Stair   Reason if not Attempted Safety concerns   1 Step (Curb) CARE Score 88   4 Steps   Reason if not Attempted Safety concerns   4 Steps CARE Score 88   12 Steps   Reason if not Attempted Safety concerns   12 Steps CARE Score 88   Picking Up Object   Reason if not Attempted Safety concerns   Picking Up Object CARE Score 88   Toilet Transfer   Type of Assistance Needed Physical assistance;Verbal cues; Adaptive equipment   Physical Assistance Level 25% or less   Comment CG/min   Toilet Transfer CARE Score 3   Toilet Transfer   Surface Assessed Standard Toilet   Limitations Noted In Balance;LE Strength   Adaptive Equipment Grab Bar   Findings w/c and RW   Therapeutic Interventions   Strengthening Supine: Glut set, QS, ankle pumps, SAQ, L hip flex AA, R SLR x 20 reps   Equipment Use   NuStep B UE/LE x 15mins level 1   Parallel Bars Amb inside // bars x 10 ft, CGA   Assessment   Treatment Assessment Patient was anxious but participated well in PT  Spoke with RN regarding time of taking anxiety medication  PT session focus on mobility, strengthening and gait  She needed CGA of STS with RW, however for SPT she requires min/CGA  Patient tend to swivel or drag B feet  Pre gait training provided this session inside // bars due to difficulty advancing R LE  Amb inside // bars, demonstrate improvement with wt shfiting and advacing R LE  However gait with RW, pt tend to slide R LE to advance it  Distance also limited due to deficit in activity tolerance  Cont PT as per POC and focus in wt shifting to improve LEs advancement during transfer and ambulation  Family/Caregiver Present none   PT Family training done with: none   Problem List Decreased strength;Decreased endurance; Impaired balance;Decreased mobility;Pain;Orthopedic restrictions   Barriers to Discharge Inaccessible home environment;Decreased caregiver support   PT Barriers   Physical Impairment Decreased strength;Decreased endurance; Impaired balance;Decreased mobility;Orthopedic restrictions;Pain   Functional Limitation Car transfers; Walking; Wheelchair management;Transfers   Plan   Treatment/Interventions Elevations;LE strengthening/ROM; Functional transfer training; Therapeutic exercise; Endurance training;Bed mobility;Gait training   Progress Slow progress, decreased activity tolerance Recommendation   PT Discharge Recommendation Home with home health rehabilitation  (pending progress)   PT Therapy Minutes   PT Time In 1230   PT Time Out 1400   PT Total Time (minutes) 90   PT Mode of treatment - Individual (minutes) 90   PT Mode of treatment - Concurrent (minutes) 0   PT Mode of treatment - Group (minutes) 0   PT Mode of treatment - Co-treat (minutes) 0   PT Mode of Treatment - Total time(minutes) 90 minutes   PT Cumulative Minutes 510   Therapy Time missed   Time missed?  No

## 2023-01-30 LAB
ANION GAP SERPL CALCULATED.3IONS-SCNC: 4 MMOL/L (ref 5–14)
BASOPHILS # BLD AUTO: 0.03 THOUSANDS/ÂΜL (ref 0–0.1)
BASOPHILS NFR BLD AUTO: 0 % (ref 0–1)
BUN SERPL-MCNC: 14 MG/DL (ref 5–25)
CALCIUM SERPL-MCNC: 8.6 MG/DL (ref 8.4–10.2)
CHLORIDE SERPL-SCNC: 104 MMOL/L (ref 96–108)
CO2 SERPL-SCNC: 30 MMOL/L (ref 21–32)
CREAT SERPL-MCNC: 0.6 MG/DL (ref 0.6–1.2)
EOSINOPHIL # BLD AUTO: 0.12 THOUSAND/ÂΜL (ref 0–0.61)
EOSINOPHIL NFR BLD AUTO: 1 % (ref 0–6)
ERYTHROCYTE [DISTWIDTH] IN BLOOD BY AUTOMATED COUNT: 18 % (ref 11.6–15.1)
GFR SERPL CREATININE-BSD FRML MDRD: 91 ML/MIN/1.73SQ M
GLUCOSE P FAST SERPL-MCNC: 97 MG/DL (ref 70–99)
GLUCOSE SERPL-MCNC: 97 MG/DL (ref 70–99)
HCT VFR BLD AUTO: 28.1 % (ref 34.8–46.1)
HGB BLD-MCNC: 8.5 G/DL (ref 11.5–15.4)
IMM GRANULOCYTES # BLD AUTO: 0.24 THOUSAND/UL (ref 0–0.2)
IMM GRANULOCYTES NFR BLD AUTO: 3 % (ref 0–2)
LYMPHOCYTES # BLD AUTO: 1.3 THOUSANDS/ÂΜL (ref 0.6–4.47)
LYMPHOCYTES NFR BLD AUTO: 15 % (ref 14–44)
MCH RBC QN AUTO: 30.4 PG (ref 26.8–34.3)
MCHC RBC AUTO-ENTMCNC: 30.2 G/DL (ref 31.4–37.4)
MCV RBC AUTO: 101 FL (ref 82–98)
MONOCYTES # BLD AUTO: 1.01 THOUSAND/ÂΜL (ref 0.17–1.22)
MONOCYTES NFR BLD AUTO: 11 % (ref 4–12)
NEUTROPHILS # BLD AUTO: 6.18 THOUSANDS/ÂΜL (ref 1.85–7.62)
NEUTS SEG NFR BLD AUTO: 70 % (ref 43–75)
NRBC BLD AUTO-RTO: 0 /100 WBCS
PLATELET # BLD AUTO: 303 THOUSANDS/UL (ref 149–390)
PMV BLD AUTO: 10 FL (ref 8.9–12.7)
POTASSIUM SERPL-SCNC: 4.3 MMOL/L (ref 3.5–5.3)
RBC # BLD AUTO: 2.8 MILLION/UL (ref 3.81–5.12)
SODIUM SERPL-SCNC: 138 MMOL/L (ref 135–147)
WBC # BLD AUTO: 8.88 THOUSAND/UL (ref 4.31–10.16)

## 2023-01-30 RX ORDER — ESCITALOPRAM OXALATE 5 MG/1
5 TABLET ORAL DAILY
Status: DISCONTINUED | OUTPATIENT
Start: 2023-01-30 | End: 2023-02-10 | Stop reason: HOSPADM

## 2023-01-30 RX ADMIN — APIXABAN 5 MG: 5 TABLET, FILM COATED ORAL at 17:25

## 2023-01-30 RX ADMIN — ACETAMINOPHEN 650 MG: 325 TABLET ORAL at 21:17

## 2023-01-30 RX ADMIN — APIXABAN 5 MG: 5 TABLET, FILM COATED ORAL at 08:53

## 2023-01-30 RX ADMIN — LORATADINE 10 MG: 10 TABLET ORAL at 08:53

## 2023-01-30 RX ADMIN — MENTHOL, METHYL SALICYLATE: 10; 15 CREAM TOPICAL at 17:25

## 2023-01-30 RX ADMIN — Medication 2 TABLET: at 08:53

## 2023-01-30 RX ADMIN — ACETAMINOPHEN 650 MG: 325 TABLET ORAL at 08:53

## 2023-01-30 RX ADMIN — ESCITALOPRAM OXALATE 5 MG: 5 TABLET, FILM COATED ORAL at 11:57

## 2023-01-30 RX ADMIN — Medication 2.5 MG: at 09:41

## 2023-01-30 RX ADMIN — DOCUSATE SODIUM 100 MG: 100 CAPSULE, LIQUID FILLED ORAL at 08:53

## 2023-01-30 RX ADMIN — ATORVASTATIN CALCIUM 20 MG: 20 TABLET, FILM COATED ORAL at 17:25

## 2023-01-30 RX ADMIN — CHOLECALCIFEROL TAB 25 MCG (1000 UNIT) 3000 UNITS: 25 TAB at 08:53

## 2023-01-30 RX ADMIN — METOPROLOL TARTRATE 50 MG: 50 TABLET, FILM COATED ORAL at 17:25

## 2023-01-30 RX ADMIN — PANTOPRAZOLE SODIUM 40 MG: 40 TABLET, DELAYED RELEASE ORAL at 05:14

## 2023-01-30 RX ADMIN — MENTHOL, METHYL SALICYLATE: 10; 15 CREAM TOPICAL at 09:01

## 2023-01-30 RX ADMIN — HYDROXYZINE HYDROCHLORIDE 10 MG: 10 TABLET ORAL at 08:53

## 2023-01-30 RX ADMIN — DOCUSATE SODIUM 100 MG: 100 CAPSULE, LIQUID FILLED ORAL at 17:25

## 2023-01-30 RX ADMIN — METOPROLOL TARTRATE 50 MG: 50 TABLET, FILM COATED ORAL at 08:53

## 2023-01-30 NOTE — PROGRESS NOTES
[facility]  Progress Note Linette Chavez 1950, 67 y o  female MRN: [id number]  Unit/Bed#: [facility] 494-82 Encounter: [id number]  Primary Care Provider: No primary care provider on file  Date and time admitted to hospital: 1/23/2023  1:33 PM    Constipation  Assessment & Plan  Improving  KUB obtained on 1/23 due to no BM for 6 days  Showed nonobstructive bowel gas pattern moderate to large amount of stool throughout the colon, suggesting constipation  Bowel regimen increased  Encourage PO hydration and mobilization  Primary team following  Monitor for s/s of obstruction  Adjustment disorder with anxiety  Assessment & Plan  Has been receiving Atarax 10mg Q6 PRN since 1/26  Discussed with patient and admits to feelings of anxiety prior to her fall  Agreeable to starting Lexapro 5mg daily today  Supportive counseling as needed  Follow-up with PCP as outpatient  Hypokalemia  Assessment & Plan  Improved, K+ 4 3 today  Continue to trend BMP  Hypoxemia  Assessment & Plan  Hypoxia in acute setting post-operatively  Developed PEs and was started on Eliquis  Per Pulmonary - recommending overnight noc ox as outpatient  Desat while sleeping - noc ox obtained on 1/27  Osteopenia  Assessment & Plan  DXA scan in 12/2020 showed osteopenia  Continue home calcium carbonate and Vitamin D supplementation  Vitamin D level 29 8 on 1/24  Increased Vitamin D3 to 3000u daily  Recommend repeat DXA scan as outpatient along with discussion about Prolia injections  Follow-up with PCP as outpatient  Acute postoperative anemia due to expected blood loss  Assessment & Plan  Hgb currently 8 5  Hgb was 12 6 pre-operatively  Iron panel on 1/23: Iron Saturation 9%, TIBC 279, Iron 25, and Ferritin 81   2 doses of IV Venofer from 1/24-1/25  Given 1u PRBCs on 1/26 for Hgb of 6 9  No active s/s of bleeding  Transfuse for Hgb <7 0 or if becomes symptomatic    Continue to trend CBC     Acute deep vein thrombosis (DVT) of left peroneal vein [facility])  Assessment & Plan  Lower extremity venous duplex on 1/20: Evidence of focal acute DVT in 1 of 2 peroneal veins on LLE  Completed Eliquis 10mg BID for 7 days and now receiving Eliquis 5mg BID  Follow-up with PCP as outpatient  Neurovascular checks Q shift  Multiple subsegmental pulmonary emboli without acute cor pulmonale (HCC)  Assessment & Plan  CTA PE study on 1/20: Few subsegmental pulmonary emboli in the posterior lower lobes, measured RV/LV ratio within normal limits  Completed Eliquis 10mg BID for 7 days and now receiving Eliquis 5mg BID, continue for 3 months  Follow-up with PCP as outpatient  Currently maintaining on RA  Encourage pulmonary toileting  Spot pulse ox checks  Leukocytosis  Assessment & Plan  WBC count improved, currently 8 88 from 12 86  Recent diagnosis of PE - asymptomatic, on Eliquis  Likely reactive due to blood loss/hematoma to LLE  Afebrile  Asymptomatic  Continue to trend routine CBC  Chronic diastolic congestive heart failure [facility])  Assessment & Plan  Wt Readings from Last 3 Encounters:   01/30/23 94 1 kg (207 lb 6 4 oz)   01/23/23 95 2 kg (209 lb 14 1 oz)     Stable without exacerbation   on 1/20  Last ECHO on 12/2/22 showed EF 55-59%, G1DD  Takes Lasix 20mg daily at home - currently on hold  Restart as able  Other hyperlipidemia  Assessment & Plan  Continue home Lipitor 20mg daily  Last lipid panel on 11/15/22: Cholesterol 147, Triglycerides 73, HDL 53, and LDL 79  Essential hypertension  Assessment & Plan  Home regimen: Lasix 20mg daily, metoprolol tartrate 50mg BID, losartan 50mg daily  Currently receiving metoprolol tartrate 50mg BID  Discontinued losartan on 1/24 due to orthostasis with therapy  Apply abdominal binder/TEDs when getting OOB  Consider restarting Lasix when able  Monitor BP with routine VS   Follow-up with PCP as outpatient      * Closed left hip fracture [facility])  Assessment & Plan  S/p mechanical fall on   XR on  showed acute intertrochanteric L hip fracture  OR on  for IM fixation of L subtrochanteric hip fracture performed by Dr Jona Castillo  WBAT to LLE  Neurovascular checks Q shift  Ensure adequate pain control  Monitor incision for s/s of infection  Eliquis for DVT ppx  Follow-up with Orthopedics in 2 weeks as outpatient ()  Primary team following  PT/OT  VTE Pharmacologic Prophylaxis:   Pharmacologic: Apixaban (Eliquis)  Mechanical VTE Prophylaxis in Place: No - hx of DVTs  Current Length of Stay: 7 day(s)    Current Patient Status: Inpatient Rehab     Discharge Plan: As per primary team     Code Status: Level 1 - Full Code    Subjective:   Pt examined while pt sitting in recliner in pt room  L sided hip pain minimal, aching, improves with rest and pain medication  Denies any numbness/tingling/or edema  Feels better each day  Denies any lightheadedness, dizziness, SOB, or palpitations  Discussed starting O2 at HS and pt is agreeable  States that she does not snore as much when she has her head elevated at night  Has been feeling anxious even prior to her fall  Atarax PRN has been helpful but feels that she would benefit from starting a daily medication  Will start her on Lexapro today  Discussed importance of following up with PCP on discharge for further management  Objective:     Vitals:   Temp (24hrs), Av 2 °F (36 8 °C), Min:97 1 °F (36 2 °C), Max:98 9 °F (37 2 °C)    Temp:  [97 1 °F (36 2 °C)-98 9 °F (37 2 °C)] 97 1 °F (36 2 °C)  HR:  [73-93] 93  Resp:  [18] 18  BP: (124-150)/(58-78) 132/63  SpO2:  [90 %-94 %] 94 %  Body mass index is 36 74 kg/m²  Review of Systems   Constitutional: Negative for appetite change, chills, fatigue and fever  HENT: Negative for trouble swallowing  Eyes: Negative for visual disturbance     Respiratory: Negative for cough, chest tightness, shortness of breath, wheezing and stridor  Cardiovascular: Negative for chest pain, palpitations and leg swelling  Gastrointestinal: Negative for abdominal distention, abdominal pain, constipation, diarrhea, nausea and vomiting  LBM 1/29   Genitourinary: Negative for difficulty urinating  Musculoskeletal: Positive for arthralgias (L sided hip pain, minimal, aching, improves with rest and pain medication)  Negative for back pain  Neurological: Negative for dizziness, light-headedness and headaches  Psychiatric/Behavioral: Negative for hallucinations and sleep disturbance  The patient is nervous/anxious  All other systems reviewed and are negative  Input and Output Summary (last 24 hours): Intake/Output Summary (Last 24 hours) at 1/30/2023 1242  Last data filed at 1/30/2023 0830  Gross per 24 hour   Intake 600 ml   Output --   Net 600 ml       Physical Exam:     Physical Exam  Vitals and nursing note reviewed  Constitutional:       General: She is not in acute distress  Appearance: Normal appearance  She is obese  She is not ill-appearing  HENT:      Head: Normocephalic and atraumatic  Cardiovascular:      Rate and Rhythm: Normal rate and regular rhythm  Pulses: Normal pulses  Heart sounds: Murmur heard  Systolic murmur is present with a grade of 1/6  No friction rub  Pulmonary:      Effort: Pulmonary effort is normal  No respiratory distress  Breath sounds: Normal breath sounds  No wheezing or rhonchi  Abdominal:      General: Abdomen is flat  Bowel sounds are normal  There is no distension  Palpations: Abdomen is soft  There is no mass  Tenderness: There is no abdominal tenderness  There is no guarding or rebound  Hernia: No hernia is present  Musculoskeletal:      Cervical back: Normal range of motion and neck supple  No tenderness  Right lower leg: Edema (Minimal non-pitting edema) present        Left lower leg: Edema (Minimal non-pitting edema) present  Skin:     General: Skin is warm and dry  Neurological:      Mental Status: She is alert and oriented to person, place, and time     Psychiatric:         Mood and Affect: Mood normal          Behavior: Behavior normal          Additional Data:     Labs:    Results from last 7 days   Lab Units 01/30/23  0444   WBC Thousand/uL 8 88   HEMOGLOBIN g/dL 8 5*   HEMATOCRIT % 28 1*   PLATELETS Thousands/uL 303   NEUTROS PCT % 70   LYMPHS PCT % 15   MONOS PCT % 11   EOS PCT % 1     Results from last 7 days   Lab Units 01/30/23  0444   SODIUM mmol/L 138   POTASSIUM mmol/L 4 3   CHLORIDE mmol/L 104   CO2 mmol/L 30   BUN mg/dL 14   CREATININE mg/dL 0 60   ANION GAP mmol/L 4*   CALCIUM mg/dL 8 6   GLUCOSE RANDOM mg/dL 97                       Labs reviewed    Imaging:    Imaging reviewed    Recent Cultures (last 7 days):           Last 24 Hours Medication List:   Current Facility-Administered Medications   Medication Dose Route Frequency Provider Last Rate   • acetaminophen  650 mg Oral Q6H PRN ELDA Lowry     • apixaban  5 mg Oral BID ELDA Lowry     • atorvastatin  20 mg Oral Daily With Domenica Fitzgerald MD     • bisacodyl  10 mg Rectal Daily PRN Tasha Grover MD     • calcium carbonate  1,000 mg Oral Daily PRN Tasha Grover MD     • calcium carbonate-vitamin D  2 tablet Oral Daily With Breakfast ELDA Lowry     • cholecalciferol  3,000 Units Oral Daily ELDA Lowry     • docusate sodium  100 mg Oral BID Tasha Grover MD     • escitalopram  5 mg Oral Daily ELDA Lowry     • hydrOXYzine HCL  10 mg Oral Q6H PRN Tasha Grover MD     • loratadine  10 mg Oral Daily Tasha Grover MD     • menthol-methyl salicylate   Apply externally 4x Daily PRN Tasha Grover MD     • metoprolol tartrate  50 mg Oral BID Tasha Grover MD     • oxyCODONE  5 mg Oral Q6H PRN Tasha Grover MD     • oxyCODONE  2 5 mg Oral Q6H PRN Tasha Grover MD     • pantoprazole  40 mg Oral Daily Hamp Maral Cem Araya MD     • polyethylene glycol  17 g Oral Daily PRN Pascal Kawasaki, MD          M*Modal software was used to dictate this note  It may contain errors with dictating incorrect words or incorrect spelling  Please contact the provider directly with any questions

## 2023-01-30 NOTE — PROGRESS NOTES
01/30/23 1000   Pain Assessment   Pain Assessment Tool 0-10   Pain Score 5   Pain Location/Orientation Orientation: Left; Location: Hip   Restrictions/Precautions   Precautions Fall Risk;Pain   LLE Weight Bearing Per Order WBAT   Sit to Stand   Type of Assistance Needed Supervision   Comment CS   Sit to Stand CARE Score 4   Bed-Chair Transfer   Type of Assistance Needed Incidental touching   Comment CG for safety with RW   Chair/Bed-to-Chair Transfer CARE Score 4   Walk 10 Feet   Type of Assistance Needed Incidental touching   Comment CG with RW   Walk 10 Feet CARE Score 4   Walk 50 Feet with Two Turns   Comment max distance was 35-  would like to anticipate 50 feet in next day or 2   Ambulation   Primary Mode of Locomotion Prior to Admission Walk   Distance Walked (feet) 35 ft  (35 max distance,  27 with WC follow,  12feet from chair to chair)   Assist Device Roller Walker   Gait Pattern Antalgic; Slow Sienna;Decreased foot clearance;R foot drag;Decreased L stance   Walk Assist Level Minimum Assist;Chair Follow   Findings Provided and educated with lower walker so arms are straight and stiff to allow better WB through arms vs bent elbows,  Also encourage pt ot  and step with R foot vs slide-  overall pt doing better with distance and seems slightly less anxious with walking- encouraged with a destination   Does the patient walk? 2   Yes   Wheel 50 Feet with Two Turns   Reason if not Attempted Activity not applicable   Wheel 50 Feet with Two Turns CARE Score 9   Wheel 150 Feet   Reason if not Attempted Activity not applicable   Wheel 170 Feet CARE Score 9   Curb or Single Stair   Style negotiated Single stair   Type of Assistance Needed Physical assistance   Physical Assistance Level 25% or less   Comment For first time performed bottom step at steps with bilat HRs,  needs encouragement and extra time   1 Step (Curb) CARE Score 3   4 Steps   Comment Pt performed up/down 4inch box in llbars as pre work for steps ,  would benefit from continued practice   Reason if not Attempted Safety concerns   4 Steps CARE Score 88   Therapeutic Interventions   Strengthening 4 step ups on 2inch box in llbars, then progressed to 4inch box x4   Equipment Use   NuStep 10 mins for warm up and ROM   Assessment   Treatment Assessment 60 min session had discussion with pt about slow progress and concern that pt is still only walking 10 feet over the weekend and not performing steps  Discussed about progress and encouraged pt for further walking today and progress on steps which pt was able to perform  Pt showed much progress today but needs to show consistancy as she continues to progress over next few days  Cont POC and next session pt would beneift from LE strengthening and ROM  Barriers to Discharge Inaccessible home environment;Decreased caregiver support   PT Barriers   Physical Impairment Decreased strength;Decreased endurance; Impaired balance;Decreased mobility;Orthopedic restrictions;Pain   Plan   Progress Progressing toward goals   PT Therapy Minutes   PT Time In 1000   PT Time Out 1100   PT Total Time (minutes) 60   PT Mode of treatment - Individual (minutes) 60   PT Mode of treatment - Concurrent (minutes) 0   PT Mode of treatment - Group (minutes) 0   PT Mode of treatment - Co-treat (minutes) 0   PT Mode of Treatment - Total time(minutes) 60 minutes   PT Cumulative Minutes 570   Therapy Time missed   Time missed?  No

## 2023-01-30 NOTE — PROGRESS NOTES
Physical Medicine and Rehabilitation Progress Note  Marce Terrell 67 y o  female MRN: [id number]  Unit/Bed#: -14 Encounter: [id number]    HPI: Marce Terrell is a 67 y o  female  With medical history of HLD, HTN, Chronic diastolic heart failure (W3SB), previous R hip fracture who presented to the [address] [place] [address] on 1/18 after slipping on some water while at work at [employer]  No prodromal symptoms  CTH showed no acute intracranial abnormality, and XR L hip showed a subtrochanteric femur fracture  Ortho recommended ORIF with IMN which was performed on 1/19 by Dr Roxy Rowley  Made WBAT post-op Post-op lethargi and hypoxic, CXR showed no acute cardiopulmonary disease  Required BiPAP, Narcan ,and Flumazenil  There was some improvement in her lethargy, but remained hypoxic  Pulm was consulted  CTA PE ordered which showed distal subsegmental PEs in the posterior lower lobe with atelectasis  Doppler performed showed a DVT in 1 peroneal vein in LLE  Pulm did not feel that the findings were responsible for the extent of her hypoxia - they felt multifactorial 2/2 atelectasis some volume overload  She got 1 dose of IV Lasix  Primary did not feel there was a volume overload component  Ultimately recommended for a/c for at least 3 months  Eliquis cost $47 a month  Course also c/b ABLA, but that stabilized and she did not require transfusion She was stabilized and admitted to the [facility] on 1/23/2023  Chief Complaint: Feeling better overall, still with some chest wall discomfort  Interval History/Subjective:  No acute events over the weekend  Her brother visited her and her son did, and she was very happy  She denies any new CP, SOB, fevers, chills,  N/V, abdominal pain  Last BM 1/29  Took colace today  She is ok with making it as needed  Orthostatics today are negative  Feels she is making good progress  Very happy   Still with chest wall pain primarily when she pushes up off the wheelchair to stand - the bengay helps a lot  She also reports the hydroxyzine helps significantly with her anxiety without making her too drowsy  ROS:  A 10 point review of systems was negative except for what is noted in the HPI  Today's Changes:  1  Taking only one oxycodone a day  2  Continue bengay for now  3  Hgb stable at 8 5  Reviewed with patient  4  Continue current dose of hydroxyzine  5  Making docusate PRN  6  Leukocytosis resolved  Monitor/trend  7  Nocturnal oximetry performed  - Will need O2 at night and outpatient f/u with Sleep Medicine   - made CM aware  Will plan for repeat noc ox 48 hours prior to discharge for home O2 order  8  Monitor BP and restart home meds as appropriate  Total visit time: 25 minutes, with more than 50% spent counseling/coordinating care  Counseling includes discussion with patient re: progress in therapies, functional issues observed by therapy staff, and discussion with patient regarding their current medical state and wellbeing  Coordination of patient's care was performed in conjunction with Internal Medicine service to monitor patient's labs, vitals, and management of their comorbidities  Assessment/Plan:    * Closed left hip fracture ([address] )  Assessment & Plan  After slipping on water at work  Workers Comp  Subtrochanteric femur fracture s/p long nail (antegrade) on 1/19 by Dr Johnson Gomez  Pain control as documented below  Surgical dressings removed 1/26  Monitor drainage  Follow-up with Orthopedics 2 weeks (2/2)    PT/ OT 3-5 hours a day, 5-7 days/week in acute comprehensive inpatient rehab    Chest wall pain  Assessment & Plan  Suspect MSK or costochondritis - mild  Reproducible with palpation and certain push off manuevers in therapies  Improving  No diaphoresis, nausea, radiation, SOB  Reviewed red flag symptoms that would warrant ACS r/o  Added Bengay  Monitor closely       Constipation  Assessment & Plan  Last BM 1/29  Markedly improved  Weaning off bowel regimen  Monitor on just PRNs  Adjustment disorder with anxiety  Assessment & Plan  Will provide non-pharmacologic strategies  Continued PRN hydroxyzine  Consulted rehab psychology for support  Hypokalemia  Assessment & Plan  1/30 4 3  Resolved after supplementation  Monitor  Hypoxemia  Assessment & Plan  Resolved and on RA  Previously ultifactorial: PE, atelectasis, ?volume overload s/p Lasix IV x1  Does have nocturnal hypoxemia on Friday Noc Ox for 1 hour   - Will start O2 at night, will need noc ox performed 48 hours prior to discharge   - Made case management aware  Monitor closely  Goals > 90%  Outpatient f/u with Pulmonology/Sleep Medicine    Osteopenia  Assessment & Plan  DXA 12/2020 with osteopenia  Continue home calcium and Vitamin D  1/24 Vitamin D is 29 8  Outpatient f/u DXA with PCP  Acute postoperative anemia due to expected blood loss  Assessment & Plan  Hgb 12 6 pre-op  1/30  8 8 > 7 2 > 7 4 > 7 2 > 6 9 > 8 2 > 8 5    - Iron deficiency on labs  - Also post-operative blood loss  - Given IV venofer x2   - Type and screen   - Consent in chart  - 1/26 Transfuse with 1 unit pRBC  Trend, transfuse as appropriate  Outpatient f/u with PCP    Acute deep vein thrombosis (DVT) of left peroneal vein [facility])  Assessment & Plan  1/20 LE Venous Duplex with L acute DVT in 1 of 2 peroneal veins  Now on Eliquis  See PE for more details  No SCD on that leg  Can do ace wrapping for edema  Outpatient f/u with PCP    Multiple subsegmental pulmonary emboli without acute cor pulmonale (HCC)  Assessment & Plan  Noted on CTA PE from 1/20   RV/LV ratio WNL  Started on Eliquis 10mg BID on 1/22 for 7 days before transitioning to 5mg BID for 3 months as per Pulm  Monitor respiratory status  Leukocytosis  Assessment & Plan  1/30 8 88 and resolved  Afebrile, but otherwise stable  Drainage from L thigh doesn't appear infectious    Monitor incision site closely  Monitor off antibiotics  Monitor Temp and WBC  Initiate work-up if she spikes a fever or develops localizing symptoms  Chronic diastolic congestive heart failure (HCC)  Assessment & Plan  Wt Readings from Last 3 Encounters:   01/30/23 94 1 kg (207 lb 6 4 oz)   01/23/23 95 2 kg (209 lb 14 1 oz)     Does not appear to have acute exacerbation  12/2/2022 Echo with EF 55-59% and G1DD   on 1/20  Home: Lopressor, Losartan, Lasix  Here: Lopressor  - Holding ARB for orthostasis  - Orthostasis has improved  Monitor and add back as appropriate  Monitor I/O and daily weights  Adjust as appropriate  Outpatient f/u with PCP  Other hyperlipidemia  Assessment & Plan  Home: Simvastatin 20mg nightly  Here: Lipitor 20mg nightly for therapeutic substitution   Outpatient f/u with PCP    Essential hypertension  Assessment & Plan  Home: Lasix 20mg daily, Lopressor 50mg BID, Losartan 50mg daily  Here: Lopressor 50mg BID  1/24 Losartan held for orthostasis  Can try to restart Lasix while here once BP appropriate  Monitor BP, adjust as appropriate  Follow-up with PCP  Health Maintenance  #Delirium/Sleep: At risk has had hallucinations on oxycodone 10mg, and was lethargic requiring narcan on fentanyl and dilaudid  #Pain: Tylenol ordered PRN, Oxycodone 2 5-5mg PRN  #Bowel: Last BM 1/29  PRN miralax/docusate/suppository  #Bladder: Voiding and continent  Monitor  #Skin/Pressure Injury Prevention: Turn Q2hr in bed, with weight shifts [id number] in wheelchair  Float heels in bed  #DVT Prophylaxis: Fully anticoagulated on Eliquis  SCD on RLE only  #GI Prophylaxis: PPI started when full a/c started  Can also do famotidine  #Code Status:  Full Code  #FEN: Reg Diet  #Dispo:  Team Conference 1/26: Anticipate she will need all her time here  Her anxiety is a major barrier and her anemia is a major medical barrier right now  She is making some progress  Potential discharge planned for 2/10/2023  May need Home PT/OT +/- RN to start  Objective:    Functional Update: marked progress  PT: mod-maxA bed mobility, CGA for sit to stand transfers and bed to chair transfers, CGA-Stacia for 10' ambulation  OT: Ind eating, Sup grooming, maxA bathing, Sup UB dressing, maxA LB dressing, modA toielting, modA toilet transfers  Allergies per EMR    Physical Exam:  Temp:  [97 1 °F (36 2 °C)-98 9 °F (37 2 °C)] 97 1 °F (36 2 °C)  HR:  [73-78] 73  Resp:  [18] 18  BP: (124-150)/(58-78) 150/78  Oxygen Therapy  SpO2: 94 %  O2 Flow Rate (L/min): 2 L/min    Gen: No acute distress, Well-nourished, well-appearing  HEENT: Moist mucus membranes, Normocephalic/Atraumatic  Cardiovascular: Regular rate, rhythm, S1/S2  Distal pulses palpable  Heme/Extr: No edema  Pulmonary: Non-labored breathing  Lungs CTAB  : No garcia  GI: Soft, non-tender, non-distended  BS+  Integumentary: Skin is warm, dry  Neuro: AAOx3 Speech is intact  Appropriate to questioning  Psych: Normal mood and affect       Diagnostic Studies:   Reviewed, no new imaging    Laboratory:  Reviewed   Results from last 7 days   Lab Units 01/30/23 0444 01/26/23  1752 01/26/23  0451 01/25/23  0612   HEMOGLOBIN g/dL 8 5* 8 2* 6 9* 7 2*   HEMATOCRIT % 28 1* 25 8* 21 8* 21 7*   WBC Thousand/uL 8 88  --  12 86* 9 94     Results from last 7 days   Lab Units 01/30/23  0444 01/26/23  0451 01/24/23  0521   BUN mg/dL 14 16 16   POTASSIUM mmol/L 4 3 3 6 3 4*   CHLORIDE mmol/L 104 102 101   CREATININE mg/dL 0 60 0 51* 0 54*            Patient Active Problem List   Diagnosis   • Closed left hip fracture (HCC)   • Essential hypertension   • Other hyperlipidemia   • Chronic diastolic congestive heart failure (HCC)   • Leukocytosis   • Multiple subsegmental pulmonary emboli without acute cor pulmonale (HCC)   • Acute deep vein thrombosis (DVT) of left peroneal vein (HCC)   • Acute postoperative anemia due to expected blood loss   • Osteopenia   • Hypoxemia   • Hypokalemia   • Adjustment disorder with anxiety   • Constipation   • Chest wall pain         Medications  Current Facility-Administered Medications   Medication Dose Route Frequency Provider Last Rate   • acetaminophen  650 mg Oral Q6H PRN ELDA Julien     • apixaban  5 mg Oral BID ELDA Julien     • atorvastatin  20 mg Oral Daily With Alyse Olivas MD     • bisacodyl  10 mg Rectal Daily PRN Maria Antonia Strauss MD     • calcium carbonate  1,000 mg Oral Daily PRN Maria Antonia Strauss MD     • calcium carbonate-vitamin D  2 tablet Oral Daily With Breakfast ELDA Julien     • cholecalciferol  3,000 Units Oral Daily ELDA Julien     • docusate sodium  100 mg Oral BID Maria Antonia Strauss MD     • hydrOXYzine HCL  10 mg Oral Q6H PRN Maria Antonia Strauss MD     • loratadine  10 mg Oral Daily Maria Antonia Strauss MD     • menthol-methyl salicylate   Apply externally 4x Daily PRN Maria Antonia Strauss MD     • metoprolol tartrate  50 mg Oral BID Maria Antonia Strauss MD     • oxyCODONE  5 mg Oral Q6H PRN Maria Antonia Strauss MD     • oxyCODONE  2 5 mg Oral Q6H PRN Maria Antonia Strauss MD     • pantoprazole  40 mg Oral Daily Maria Antonia Strauss MD     • polyethylene glycol  17 g Oral Daily PRN Maria Antonia Strauss MD            ** Please Note: Fluency Direct voice to text software may have been used in the creation of this document   **

## 2023-01-30 NOTE — ASSESSMENT & PLAN NOTE
Has been receiving Atarax 10mg Q6 PRN since 1/26  Discussed with patient and admits to feelings of anxiety prior to her fall  Agreeable to starting Lexapro 5mg daily today  Supportive counseling as needed  Follow-up with PCP as outpatient

## 2023-01-30 NOTE — ASSESSMENT & PLAN NOTE
WBC count improved, currently 8 88 from 12 86  Recent diagnosis of PE - asymptomatic, on Eliquis  Likely reactive due to blood loss/hematoma to LLE  Afebrile  Asymptomatic  Continue to trend routine CBC

## 2023-01-30 NOTE — ASSESSMENT & PLAN NOTE
Wt Readings from Last 3 Encounters:   01/30/23 94 1 kg (207 lb 6 4 oz)   01/23/23 95 2 kg (209 lb 14 1 oz)     Stable without exacerbation   on 1/20  Last ECHO on 12/2/22 showed EF 55-59%, G1DD  Takes Lasix 20mg daily at home - currently on hold  Restart as able

## 2023-01-30 NOTE — PROGRESS NOTES
NEUROPSYCHOLOGY  CLINICAL PROGRESS NOTE   Mary Simms 67 y o  :1950 female MRN: [id number]  DOS:23   Unit/Bed#: -01 Encounter: [id number]      Requested by (Physician/Service): Tisha Muller MD      HISTORY: Rodolfo Marino a 67 y  o  female with medical history of HLD, HTN, Chronic diastolic heart failure (E7KY), previous R hip fracture who presented to the [place] [address] on 23 after slipping on some water while at work at [employer]  No prodromal symptoms  [address] showed no acute intracranial abnormality, and XR L hip showed a subtrochanteric femur fracture  Ortho recommended ORIF with IMN which was performed on  by Dr Jona Castillo  Made WBAT post-op Post-op lethargi and hypoxic, CXR showed no acute cardiopulmonary disease  Required BiPAP, Narcan ,and Flumazenil  There was some improvement in her lethargy, but remained hypoxic  Pulm was consulted  CTA PE ordered which showed distal subsegmental PEs in the posterior lower lobe with atelectasis  Doppler performed showed a DVT in 1 peroneal vein in LLE   Pulm did not feel that the findings were responsible for the extent of her hypoxia - they felt multifactorial 2/2 atelectasis some volume overload  She got 1 dose of IV Lasix   Primary did not feel there was a volume overload component  Ultimately recommended for a/c for at least 3 months  Eliquis cost $47 a month  Course also c/b ABLA, but that stabilized and she did not require transfusion She was stabilized and admitted to the [facility] on 2023 with Rehab Diagnosis: Impairment of mobility, safety and Activities of Daily Living (ADLs) due to Orthopedic Disorders:    Unilateral Hip Fracture Subtrochanteric femur fracture      Past Medical History:   Diagnosis Date   • Hypertension        Patient Active Problem List    Diagnosis Date Noted   • Chest wall pain 2023   • Constipation 2023   • Osteopenia 2023   • Hypoxemia 01/23/2023   • Hypokalemia 01/23/2023   • Adjustment disorder with anxiety 01/23/2023   • Multiple subsegmental pulmonary emboli without acute cor pulmonale (HCC) 01/21/2023   • Acute deep vein thrombosis (DVT) of left peroneal vein ([facility] ) 01/21/2023   • Acute postoperative anemia due to expected blood loss 01/21/2023   • Closed left hip fracture ([facility] ) 01/18/2023   • Essential hypertension 01/18/2023   • Other hyperlipidemia 01/18/2023   • Chronic diastolic congestive heart failure ([facility] ) 01/18/2023   • Leukocytosis 01/18/2023       Body mass index is 36 74 kg/m²  Past Medical History:     Past Medical History:   Diagnosis Date   • Hypertension         Past Surgical History:     Past Surgical History:   Procedure Laterality Date   • HIP FRACTURE SURGERY     • GA OPTX FEM SHFT FX W/INSJ IMED IMPLT W/WO SCREW Left 1/19/2023    Procedure: INSERTION NAIL IM FEMUR ANTEGRADE (TROCHANTERIC); Surgeon: Oralia Jacobs MD;  Location: [facility] OR;  Service: Orthopedics   • REDUCTION MAMMAPLASTY           Allergies: Allergies   Allergen Reactions   • Amoxicillin Angioedema   • Sulfa Antibiotics Angioedema and Anaphylaxis     throat "closes "     • Lisinopril Swelling   • Medical Tape Other (See Comments)     rash, blisters         Family and Social Support:   No data recorded    Social History:    Social History     Socioeconomic History   • Marital status:       Spouse name: None   • Number of children: None   • Years of education: None   • Highest education level: None   Occupational History   • None   Tobacco Use   • Smoking status: Never   • Smokeless tobacco: Never   Vaping Use   • Vaping Use: Never used   Substance and Sexual Activity   • Alcohol use: Never   • Drug use: Never   • Sexual activity: None   Other Topics Concern   • None   Social History Narrative   • None     Social Determinants of Health     Financial Resource Strain: Not on file   Food Insecurity: No Food Insecurity   • Worried About Running Out of Food in the Last Year: Never true   • Ran Out of Food in the Last Year: Never true   Transportation Needs: No Transportation Needs   • Lack of Transportation (Medical): No   • Lack of Transportation (Non-Medical): No   Physical Activity: Not on file   Stress: Not on file   Social Connections: Not on file   Intimate Partner Violence: Not on file   Housing Stability: Low Risk    • Unable to Pay for Housing in the Last Year: No   • Number of Places Lived in the Last Year: 1   • Unstable Housing in the Last Year: No        Family History:    History reviewed  No pertinent family history      Medications:     Current Facility-Administered Medications:   •  acetaminophen (TYLENOL) tablet 650 mg, 650 mg, Oral, Q6H PRN, ELDA Ordonez, 650 mg at 01/30/23 7778  •  apixaban (ELIQUIS) tablet 5 mg, 5 mg, Oral, BID, ELDA Ordonez, 5 mg at 01/30/23 9571  •  atorvastatin (LIPITOR) tablet 20 mg, 20 mg, Oral, Daily With Dylan Fairbanks MD, 20 mg at 01/29/23 1746  •  bisacodyl (DULCOLAX) rectal suppository 10 mg, 10 mg, Rectal, Daily PRN, Lyudmila Lu MD  •  calcium carbonate (TUMS) chewable tablet 1,000 mg, 1,000 mg, Oral, Daily PRN, Lyudmila Lu MD  •  calcium carbonate-vitamin D 500 mg-5 mcg tablet 2 tablet, 2 tablet, Oral, Daily With Breakfast, ELDA Ordonez, 2 tablet at 01/30/23 8088  •  cholecalciferol (VITAMIN D3) tablet 3,000 Units, 3,000 Units, Oral, Daily, ELDA Ordonez, 3,000 Units at 01/30/23 7878  •  docusate sodium (COLACE) capsule 100 mg, 100 mg, Oral, BID, Lyudmila Lu MD, 100 mg at 01/30/23 7406  •  escitalopram (LEXAPRO) tablet 5 mg, 5 mg, Oral, Daily, ELDA Ordonez, 5 mg at 01/30/23 1157  •  hydrOXYzine HCL (ATARAX) tablet 10 mg, 10 mg, Oral, Q6H PRN, Lyudmila Lu MD, 10 mg at 01/30/23 0853  •  loratadine (CLARITIN) tablet 10 mg, 10 mg, Oral, Daily, Lyudmila Lu MD, 10 mg at 01/30/23 0853  •  menthol-methyl salicylate (BENGAY) 25-74 % cream, , Apply externally, 4x Daily PRN, Lyudmila Lu MD, Given at 01/30/23 0901  •  metoprolol tartrate (LOPRESSOR) tablet 50 mg, 50 mg, Oral, BID, Lyudmila Lu MD, 50 mg at 01/30/23 4727  •  oxyCODONE (ROXICODONE) IR tablet 5 mg, 5 mg, Oral, Q6H PRN, Lyudmila Lu MD, 5 mg at 01/29/23 1202  •  oxyCODONE (ROXICODONE) split tablet 2 5 mg, 2 5 mg, Oral, Q6H PRN, Lyudmila Lu MD, 2 5 mg at 01/30/23 0941  •  pantoprazole (PROTONIX) EC tablet 40 mg, 40 mg, Oral, Daily, Lyudmila Lu MD, 40 mg at 01/30/23 4055  •  polyethylene glycol (MIRALAX) packet 17 g, 17 g, Oral, Daily PRN, Lyudmila Lu MD        OBSERVATIONS:     Appearance  __x__Neat ____Disheveled ____Overweight ____Underweight ____Frail    Speech  _x___Fluid, Articulate __x__Spontaneous ____Circumlocutions ____Word Finding difficulties ____Dysarthria ____Circumstantial ____Paucity ____Perseverative ____Pressured ____ Tangential     Thought Process/Content  __x__Coherent  ____Preoccupations____Ruminations____Obsessions____Hallucinations ____Delusions ____Flight of Ideas ____Distortions ____Distracted ____Suicidal Ideation ____Homicidal Ideation ____ Memory Issues ____ Perseveration ____ Tangential    Interaction  __x__Engaged__x__Cooperative____Superficial____Detached____Fearful____Guarded____Suspicious ____Poor Reginia Mires ____Aggressive    Affect  ____Neutral__x__Positive__x__Anxiety__x__Worried____Irritable____Angry____Depressed/Dysphoric ____Euthymic _____ Raymond Ki ____ Fatigued     Behavior  _x__Spontaneous ___x_Purposeful ____Slowed Initiation ____Apathetic ____Impulsive ____Dyscontrolled ____Hypoactive ____Hyperactive ____Repetitive/Perseverative      Overall Progress:    ____Very Declined ____Declined ____Stable _x___Improved ____Very Improved    Motivation and Participation:    ____Very Poor ____Poor ____Fair __x__Good ____Very Good                    ASSESSMENT & RECOMMENDATIONS:   Pt reports she had an episode of anxiety in which she felt overwhelmed by all the unknowns in her life (discharge, progress, return to work) and is not comfortable with not feeling in control  She recalled past incidents in which she experienced similar feelings and ways she coped  This helped her with current situation  She reports the medications have also helped her feel less anxious  Addressed coping and ways she can slowly make changes going forward to take better care of herself and listen to her body  Son and brother visited her at end of session and provided support  Pt feels motivated to continue working towards rehab goals  Provided CBT and mindfulness based interventions, as well as supportive psychotherapy  Addressed coping, adjustment, and motivation  I will continue to evaluate pt's emotional functioning and status and provide supportive and mindfulness interventions during pt's stay  Effective coping strategies, distress tolerance, breathing exercises will be key interventions  Continued Psychological intervention is medically necessary to:  __x__ Maintain current progress  __X_   Achieve Therapy Goals   __X__Prevent relapse       DIAGNOSIS:  Adjustment Disorder with Mixed Emotional Features      Thank you for the opportunity to participate in Ms Martin's care  Erik Mejia, Ph D   Licensed Psychologist

## 2023-01-30 NOTE — ASSESSMENT & PLAN NOTE
Hypoxia in acute setting post-operatively  Developed PEs and was started on Eliquis  Per Pulmonary - recommending overnight noc ox as outpatient  Desat while sleeping - noc ox obtained on 1/27

## 2023-01-30 NOTE — PROGRESS NOTES
01/30/23 0700   Pain Assessment   Pain Assessment Tool 0-10   Pain Score 6   Pain Location/Orientation Orientation: Left; Location: Hip   Pain Onset/Description Onset: Gradual   Effect of Pain on Daily Activities Increased time for fxnl transfer's   Patient's Stated Pain Goal No pain   Hospital Pain Intervention(s) Repositioned; Rest  (Declined pain meds)   Multiple Pain Sites No   Restrictions/Precautions   Precautions Fall Risk;Pain   Weight Bearing Restrictions Yes   RLE Weight Bearing Per Order WBAT   LLE Weight Bearing Per Order WBAT   ROM Restrictions No   Braces or Orthoses   (B/L teds; Did not utilize ABD binder, BP WNL)   Lifestyle   Autonomy Pt agreeable to participate in shower ADL with OT  Eating   Type of Assistance Needed Independent   Physical Assistance Level No physical assistance   Eating CARE Score 6   Oral Hygiene   Type of Assistance Needed Supervision   Physical Assistance Level No physical assistance   Comment CS in stance at sink top to perform oral hygiene routine   Oral Hygiene CARE Score 4   Grooming   Able To Initiate Tasks; Acquire Items;Comb/Brush Hair   Findings CS in stance at sink top to brush hair   Shower/Bathe Self   Type of Assistance Needed Physical assistance   Physical Assistance Level 26%-50%   Comment Pt with active shower orders and agreeable to participate in shower ADL, demonstrating ability to bathe 8/10 parts  Assist to bathe B/L feet 2* to dec dynamic reach  EDU provided on benefit of utilizing LHS or LHR with wash cloth to improve IND with LB bathing  CGA in stance at grab bar for pt to bathe jon/buttocks area with no LOB  LLE hip incision covered with Tegaderm during shower routine with incision remaining dry and intact     Shower/Bathe Self CARE Score 3   Bathing   Assessed Bath Style Shower   Anticipated D/C Bath Style Shower   Able to [place] No   Able to [employer] Temperature No   Able to Wash/Rinse/Dry (body part) Left Arm;Right Arm;L Upper Leg;R Upper Leg;Chest;Perineal Area; Abdomen;Buttocks   Limitations Noted in Balance; Endurance;ROM;Strength   Positioning Seated;Standing   Adaptive Equipment Tub Bench; Shower [employer]   Findings  Pt reports plan to utilize WIS with built in shower seat and grab bars upon D/C  Tub/Shower Transfer   Limitations Noted In Balance; Endurance;LE Strength   Adaptive Equipment Grab Bars;Transfer Bench   Assessed Shower   Findings CGA SPT with use of grab bar W/C <> tub bench   Upper Body Dressing   Type of Assistance Needed Set-up / clean-up   Physical Assistance Level No physical assistance   Comment Seated to don [place] shirt   Upper Body Dressing CARE Score 5   Lower Body Dressing   Type of Assistance Needed Physical assistance   Physical Assistance Level 26%-50%   Comment Vc to initiate threading LLE first with pt req A to fully manage LLE into under garment and pants  CGA in stance at [facility] for CM up over hips  Lower Body Dressing CARE Score 3   Putting On/Taking Off Footwear   Type of Assistance Needed Physical assistance   Physical Assistance Level 51%-75%   Comment TA to don B/L knee high teds  Increased time to manage slip on shoes  Pt would benefit from trialing [address] shoe horn   Putting On/Taking Off Footwear CARE Score 2   Lying to Sitting on Side of Bed   Type of Assistance Needed Physical assistance; Adaptive equipment   Physical Assistance Level 25% or less   Comment HOB slightly elevated with use of BR   Lying to Sitting on Side of Bed CARE Score 3   Sit to Stand   Type of Assistance Needed Incidental touching; Adaptive equipment   Physical Assistance Level No physical assistance   Comment CGA with RW   Sit to Stand CARE Score 4   Bed-Chair Transfer   Type of Assistance Needed Incidental touching; Adaptive equipment   Physical Assistance Level No physical assistance   Comment CGA with RW   Chair/Bed-to-Chair Transfer CARE Score 4   Cognition   Overall Cognitive Status [facility]   Arousal/Participation Alert; Cooperative   Attention Attends with cues to redirect   Orientation Level Oriented X4   Memory Decreased short term memory   Following Commands Follows multistep commands with increased time or repetition   Activity Tolerance   Activity Tolerance Patient tolerated treatment well  BP (supine) 149/67, 130/76 (seated), 150/78 (standing)   Assessment   Treatment Assessment Pt participated in 90 min skilled OT Tx session with focus on ADL performance, fxnl transfer's, and improving overall activity tolerance  See above for further Tx details  Pt is demonstrating progress towards OT goals, completing ADL's of bathing/dressing at an overall [place] level  Pt would benefit from trialing [id number] for LB ADL's, as pt expresses interest in Therapy ordering LHR for D/C  Engaged in repetitive fxnl SPT's with RW with pt req CGA  BP reamined WNL (see above) with no c/o of dizziness or lightheadedness  Pt would continue to benefit from skilled OT services to improve fxnl standing balance, trialing a dry shower xfer with simulated lip, improve ADL transfer's, and increase IND with ADL/IADL performance in order to progress towards OT goals and dec caregiver burden upon D/C  Prognosis Fair   Problem List Decreased strength;Decreased range of motion;Decreased endurance; Impaired balance;Decreased mobility;Pain   Barriers to Discharge Inaccessible home environment;Decreased caregiver support   Plan   Treatment/Interventions ADL retraining;Functional transfer training; Therapeutic exercise; Endurance training;Patient/family training   Progress Progressing toward goals   Recommendation   OT Discharge Recommendation Home with home health rehabilitation   Equipment Recommended   (pt would benefit from LHR - TBD)   OT Therapy Minutes   OT Time In 0700   OT Time Out 0830   OT Total Time (minutes) 90   OT Mode of treatment - Individual (minutes) 90   OT Mode of treatment - Concurrent (minutes) 0   OT Mode of treatment - Group (minutes) 0   OT Mode of treatment - Co-treat (minutes) 0   OT Mode of Treatment - Total time(minutes) 90 minutes   OT Cumulative Minutes 570   Therapy Time missed   Time missed?  No

## 2023-01-30 NOTE — PLAN OF CARE
Problem: RESPIRATORY - ADULT  Goal: Achieves optimal ventilation and oxygenation  Description: INTERVENTIONS:  - Assess for changes in respiratory status  - Assess for changes in mentation and behavior  - Position to facilitate oxygenation and minimize respiratory effort  - Oxygen administered by appropriate delivery if ordered  - Initiate smoking cessation education as indicated  - Encourage broncho-pulmonary hygiene including cough, deep breathe, Incentive Spirometry  - Assess the need for suctioning and aspirate as needed  - Assess and instruct to report SOB or any respiratory difficulty  - Respiratory Therapy support as indicated  Outcome: Progressing     Problem: SKIN/TISSUE INTEGRITY - ADULT  Goal: Incision(s), wounds(s) or drain site(s) healing without S/S of infection  Description: INTERVENTIONS  - Assess and document dressing, incision, wound bed, drain sites and surrounding tissue  - Provide patient and family education  - Perform skin care/dressing changes every day  Outcome: Progressing     Problem: MUSCULOSKELETAL - ADULT  Goal: Maintain or return mobility to safest level of function  Description: INTERVENTIONS:  - Assess patient's ability to carry out ADLs; assess patient's baseline for ADL function and identify physical deficits which impact ability to perform ADLs (bathing, care of mouth/teeth, toileting, grooming, dressing, etc )  - Assess/evaluate cause of self-care deficits   - Assess range of motion  - Assess patient's mobility  - Assess patient's need for assistive devices and provide as appropriate  - Encourage maximum independence but intervene and supervise when necessary  - Involve family in performance of ADLs  - Assess for home care needs following discharge   - Consider OT consult to assist with ADL evaluation and planning for discharge  - Provide patient education as appropriate  Outcome: Progressing     Problem: PAIN - ADULT  Goal: Verbalizes/displays adequate comfort level or baseline comfort level  Description: Interventions:  - Encourage patient to monitor pain and request assistance  - Assess pain using appropriate pain scale  - Administer analgesics based on type and severity of pain and evaluate response  - Implement non-pharmacological measures as appropriate and evaluate response  - Consider cultural and social influences on pain and pain management  - Notify physician/advanced practitioner if interventions unsuccessful or patient reports new pain  Outcome: Progressing     Problem: DISCHARGE PLANNING  Goal: Discharge to home or other facility with appropriate resources  Description: INTERVENTIONS:  - Identify barriers to discharge w/patient and caregiver  - Arrange for needed discharge resources and transportation as appropriate  - Identify discharge learning needs (meds, wound care, etc )  - Arrange for interpretive services to assist at discharge as needed  - Refer to Case Management Department for coordinating discharge planning if the patient needs post-hospital services based on physician/advanced practitioner order or complex needs related to functional status, cognitive ability, or social support system  Outcome: Progressing

## 2023-01-30 NOTE — ASSESSMENT & PLAN NOTE
CTA PE study on 1/20: Few subsegmental pulmonary emboli in the posterior lower lobes, measured RV/LV ratio within normal limits  Completed Eliquis 10mg BID for 7 days and now receiving Eliquis 5mg BID, continue for 3 months  Follow-up with PCP as outpatient  Currently maintaining on RA  Encourage pulmonary toileting  Spot pulse ox checks

## 2023-01-30 NOTE — ASSESSMENT & PLAN NOTE
Hgb currently 8 5  Hgb was 12 6 pre-operatively  Iron panel on 1/23: Iron Saturation 9%, TIBC 279, Iron 25, and Ferritin 81   2 doses of IV Venofer from 1/24-1/25  Given 1u PRBCs on 1/26 for Hgb of 6 9  No active s/s of bleeding  Transfuse for Hgb <7 0 or if becomes symptomatic  Continue to trend CBC

## 2023-01-30 NOTE — PROGRESS NOTES
01/30/23 1230   Pain Assessment   Pain Assessment Tool 0-10   Pain Score No Pain   Restrictions/Precautions   Precautions Fall Risk;Pain   LLE Weight Bearing Per Order WBAT   Cognition   Overall Cognitive Status WFL   Arousal/Participation Alert; Cooperative   Attention Attends with cues to redirect   Orientation Level Oriented X4   Memory Decreased short term memory   Following Commands Follows multistep commands with increased time or repetition   Subjective   Subjective denies pain at rest, pt reports feeling better overall  Sit to Lying   Type of Assistance Needed Physical assistance;Verbal cues   Physical Assistance Level 26%-50%   Comment A for L LE lifting and trunk repositioning   Sit to Lying CARE Score 3   Lying to Sitting on Side of Bed   Type of Assistance Needed Physical assistance   Physical Assistance Level 51%-75%   Comment modA for trunk lifting to long sit and A for L LE to EOB  Lying to Sitting on Side of Bed CARE Score 2   Sit to Stand   Type of Assistance Needed Supervision   Physical Assistance Level No physical assistance   Sit to Stand CARE Score 4   Bed-Chair Transfer   Type of Assistance Needed Incidental touching   Physical Assistance Level No physical assistance   Comment CG with Rw, inc time due to dec L LE wt bearing  Chair/Bed-to-Chair Transfer CARE Score 4   Car Transfer   Comment (S)  suggest trial next session  Walk 10 Feet   Type of Assistance Needed Incidental touching   Physical Assistance Level No physical assistance   Walk 10 Feet CARE Score 4   Ambulation   Primary Mode of Locomotion Prior to Admission Walk   Distance Walked (feet) 10 ft  (2)   Assist Device Roller Walker   Gait Pattern Antalgic; Slow [place];Poor UE WB;Step to;Decreased L stance; Improper weight shift   Limitations Noted In Endurance;Speed;Strength;Swing   Walk Assist Level Contact Guard   Does the patient walk? 2   Yes   Therapeutic Interventions   Strengthening supine L LE TE: APs, quad sets, SAQ over bolster, mod bridges, Abd and heel slides with slide board x20 each  Assessment   Treatment Assessment Pt engaged in brief 30 min skilled PT intervention for strength and ROM training  Pt demonstrating improved mobility and confidence with gait and transfers  Remains limited due to dec L LE wt bearing in stance, improved with lowered RW and UE support  Continue to progress as olvin, trial simulated car transfer next session if able  Recommendation   PT Discharge Recommendation Home with home health rehabilitation   PT Therapy Minutes   PT Time In 1230   PT Time Out 1300   PT Total Time (minutes) 30   PT Mode of treatment - Individual (minutes) 30   PT Mode of treatment - Concurrent (minutes) 0   PT Mode of treatment - Group (minutes) 0   PT Mode of treatment - Co-treat (minutes) 0   PT Mode of Treatment - Total time(minutes) 30 minutes   PT Cumulative Minutes 600   Therapy Time missed   Time missed?  No

## 2023-01-30 NOTE — PLAN OF CARE
Problem: Prexisting or High Potential for Compromised Skin Integrity  Goal: Skin integrity is maintained or improved  Description: INTERVENTIONS:  - Identify patients at risk for skin breakdown  - Assess and monitor skin integrity  - Assess and monitor nutrition and hydration status  - Monitor labs   - Assess for incontinence   - Turn and reposition patient  - Assist with mobility/ambulation  - Relieve pressure over bony prominences  - Avoid friction and shearing  - Provide appropriate hygiene as needed including keeping skin clean and dry  - Evaluate need for skin moisturizer/barrier cream  - Collaborate with interdisciplinary team   - Patient/family teaching  - Consider wound care consult   Outcome: Progressing     Problem: CARDIOVASCULAR - ADULT  Goal: Maintains optimal cardiac output and hemodynamic stability  Description: INTERVENTIONS:  - Monitor I/O, vital signs and rhythm  - Monitor for S/S and trends of decreased cardiac output  - Administer and titrate ordered vasoactive medications to optimize hemodynamic stability  - Assess quality of pulses, skin color and temperature  - Assess for signs of decreased coronary artery perfusion  - Instruct patient to report change in severity of symptoms  Outcome: Progressing

## 2023-01-30 NOTE — ASSESSMENT & PLAN NOTE
Lower extremity venous duplex on 1/20: Evidence of focal acute DVT in 1 of 2 peroneal veins on LLE  Completed Eliquis 10mg BID for 7 days and now receiving Eliquis 5mg BID  Follow-up with PCP as outpatient  Neurovascular checks Q shift

## 2023-01-31 PROBLEM — E87.6 HYPOKALEMIA: Status: RESOLVED | Noted: 2023-01-23 | Resolved: 2023-01-31

## 2023-01-31 PROBLEM — D72.829 LEUKOCYTOSIS: Status: RESOLVED | Noted: 2023-01-18 | Resolved: 2023-01-31

## 2023-01-31 RX ORDER — LOSARTAN POTASSIUM 25 MG/1
25 TABLET ORAL DAILY
Status: DISCONTINUED | OUTPATIENT
Start: 2023-01-31 | End: 2023-02-07

## 2023-01-31 RX ADMIN — LORATADINE 10 MG: 10 TABLET ORAL at 08:30

## 2023-01-31 RX ADMIN — Medication 2 TABLET: at 08:30

## 2023-01-31 RX ADMIN — MENTHOL, METHYL SALICYLATE: 10; 15 CREAM TOPICAL at 13:38

## 2023-01-31 RX ADMIN — METOPROLOL TARTRATE 50 MG: 50 TABLET, FILM COATED ORAL at 08:30

## 2023-01-31 RX ADMIN — DOCUSATE SODIUM 100 MG: 100 CAPSULE, LIQUID FILLED ORAL at 08:30

## 2023-01-31 RX ADMIN — HYDROXYZINE HYDROCHLORIDE 10 MG: 10 TABLET ORAL at 08:30

## 2023-01-31 RX ADMIN — ACETAMINOPHEN 650 MG: 325 TABLET ORAL at 13:38

## 2023-01-31 RX ADMIN — APIXABAN 5 MG: 5 TABLET, FILM COATED ORAL at 08:31

## 2023-01-31 RX ADMIN — CHOLECALCIFEROL TAB 25 MCG (1000 UNIT) 3000 UNITS: 25 TAB at 08:30

## 2023-01-31 RX ADMIN — PANTOPRAZOLE SODIUM 40 MG: 40 TABLET, DELAYED RELEASE ORAL at 05:40

## 2023-01-31 RX ADMIN — METOPROLOL TARTRATE 50 MG: 50 TABLET, FILM COATED ORAL at 17:34

## 2023-01-31 RX ADMIN — APIXABAN 5 MG: 5 TABLET, FILM COATED ORAL at 17:34

## 2023-01-31 RX ADMIN — ATORVASTATIN CALCIUM 20 MG: 20 TABLET, FILM COATED ORAL at 17:34

## 2023-01-31 RX ADMIN — MENTHOL, METHYL SALICYLATE: 10; 15 CREAM TOPICAL at 08:32

## 2023-01-31 RX ADMIN — ESCITALOPRAM OXALATE 5 MG: 5 TABLET, FILM COATED ORAL at 08:30

## 2023-01-31 RX ADMIN — DOCUSATE SODIUM 100 MG: 100 CAPSULE, LIQUID FILLED ORAL at 17:34

## 2023-01-31 RX ADMIN — LOSARTAN POTASSIUM 25 MG: 25 TABLET, FILM COATED ORAL at 11:44

## 2023-01-31 NOTE — ASSESSMENT & PLAN NOTE
Hypoxia in acute setting post-operatively  Developed PEs and was started on Eliquis  Per Pulmonary - recommending overnight noc ox as outpatient  Desat while sleeping - noc ox obtained on 1/27  Desat for 1 hour and 40 minutes, lowest saturation 80%  Apply 2L O2 at night or while sleeping  Repeat overnight noc ox prior to discharge for DME evaluation

## 2023-01-31 NOTE — PROGRESS NOTES
01/31/23 1030   Pain Assessment   Pain Assessment Tool 0-10   Pain Score 5   Pain Location/Orientation Orientation: Left; Location: Hip;Location: Leg   Hospital Pain Intervention(s) Cold applied;Elevated; Rest   Restrictions/Precautions   Precautions Pain; Fall Risk   LLE Weight Bearing Per Order WBAT   Subjective   Subjective cooperative, concerned about amount of edema left leg still, educated on importance of elevation, therex and use of ice packs throughout the day, Reports she has primarily been sleeping in recliner chair at home for years   Sit to Lying   Type of Assistance Needed Physical assistance;Verbal cues   Physical Assistance Level 25% or less   Comment A L LE, cueing for body mechanics   Sit to Lying CARE Score 3   Lying to Sitting on Side of Bed   Type of Assistance Needed Incidental touching;Verbal cues   Comment Pt performing from flat bed, given extra time, difficulty initiating push up with BUE, but able to achieve,   Lying to Sitting on Side of Bed CARE Score 4   Sit to Stand   Type of Assistance Needed Verbal cues; Supervision   Comment CS with cueing to maintain fwd wt shft   Sit to Stand CARE Score 4   Bed-Chair Transfer   Type of Assistance Needed Incidental touching; Adaptive equipment   Comment SPT with RW   Chair/Bed-to-Chair Transfer CARE Score 4   Transfer Bed/Chair/Wheelchair   Findings Continues to have decreased wt shift onto left LE  Cueing to use B UE more for offloading on RW   Car Transfer   Type of Assistance Needed Physical assistance;Verbal cues   Physical Assistance Level 26%-50%   Comment simulated steup   Car Transfer CARE Score 3   Walk 10 Feet   Type of Assistance Needed Incidental touching; Adaptive equipment   Comment RW   Walk 10 Feet CARE Score 4   Walk 50 Feet with Two Turns   Reason if not Attempted Safety concerns   Walk 50 Feet with Two Turns CARE Score 88   Walk 150 Feet   Reason if not Attempted Safety concerns   Walk 150 Feet CARE Score 88   Ambulation   Primary Mode of Locomotion Prior to Admission Walk   Distance Walked (feet) 20 ft  (x3)   Does the patient walk? 2  Yes   Curb or Single Stair   Style negotiated Single stair   Type of Assistance Needed Physical assistance;Verbal cues   Physical Assistance Level 26%-50%   Comment extra time needed due to anxiety, Fwd up & down, cueing for proper use of B UE to offload L LE   1 Step (Curb) CARE Score 3   4 Steps   Type of Assistance Needed Physical assistance;Verbal cues   Physical Assistance Level 26%-50%   Comment extra time needed due to anxiety, Fwd up & down, cueing for proper use of B UE to offload L LE   4 Steps CARE Score 3   12 Steps   Reason if not Attempted Safety concerns   12 Steps CARE Score 88   Therapeutic Interventions   Strengthening seated ankle pumos, quad sets and LAQ   Modalities ice pack left thigh x 30 min at end of session for edema with leg elevated in recliner   Equipment Use   NuStep 12 min lvl 1 prior to walking and stairs   Assessment   Treatment Assessment Pt participating well  Left LE remains weak, edematous and painful, limting her mobility  Porgressing with confidence and ability to ambulate on flat surfaces  Able to complete 4 steps today for first time with MOD A and encouragement  Remains anxious, but able to participate in all tasks  Does better with explination of activity first   Plan   Progress Progressing toward goals   PT Therapy Minutes   PT Time In 1030   PT Time Out 1130   PT Total Time (minutes) 60   PT Mode of treatment - Individual (minutes) 60   PT Mode of treatment - Concurrent (minutes) 0   PT Mode of treatment - Group (minutes) 0   PT Mode of treatment - Co-treat (minutes) 0   PT Mode of Treatment - Total time(minutes) 60 minutes   PT Cumulative Minutes 660   Therapy Time missed   Time missed?  No

## 2023-01-31 NOTE — ASSESSMENT & PLAN NOTE
Has been receiving Atarax 10mg Q6 PRN since 1/26  Discussed with patient and admits to feelings of anxiety prior to her fall  Started Lexapro 5mg daily on 1/30  Supportive counseling as needed  Neuropsych consulted  Follow-up with PCP as outpatient

## 2023-01-31 NOTE — PROGRESS NOTES
[facility]  Progress Note Julia Emms 1950, 67 y o  female MRN: [id number]  Unit/Bed#: [facility] 472-06 Encounter: [id number]  Primary Care Provider: No primary care provider on file  Date and time admitted to hospital: 1/23/2023  1:33 PM    Constipation  Assessment & Plan  Improving  KUB obtained on 1/23 due to no BM for 6 days  Showed nonobstructive bowel gas pattern moderate to large amount of stool throughout the colon, suggesting constipation  Bowel regimen increased  Encourage PO hydration and mobilization  Primary team following  Monitor for s/s of obstruction  Adjustment disorder with anxiety  Assessment & Plan  Has been receiving Atarax 10mg Q6 PRN since 1/26  Discussed with patient and admits to feelings of anxiety prior to her fall  Started Lexapro 5mg daily on 1/30  Supportive counseling as needed  Neuropsych consulted  Follow-up with PCP as outpatient  Hypoxemia  Assessment & Plan  Hypoxia in acute setting post-operatively  Developed PEs and was started on Eliquis  Per Pulmonary - recommending overnight noc ox as outpatient  Desat while sleeping - noc ox obtained on 1/27  Desat for 1 hour and 40 minutes, lowest saturation 80%  Apply 2L O2 at night or while sleeping  Repeat overnight noc ox prior to discharge for DME evaluation  Osteopenia  Assessment & Plan  DXA scan in 12/2020 showed osteopenia  Continue home calcium carbonate and Vitamin D supplementation  Vitamin D level 29 8 on 1/24  Increased Vitamin D3 to 3000u daily  Recommend repeat DXA scan as outpatient along with discussion about Prolia injections  Follow-up with PCP as outpatient  Acute postoperative anemia due to expected blood loss  Assessment & Plan  Hgb currently 8 5  Hgb was 12 6 pre-operatively  Iron panel on 1/23: Iron Saturation 9%, TIBC 279, Iron 25, and Ferritin 81   2 doses of IV Venofer from 1/24-1/25  Given 1u PRBCs on 1/26 for Hgb of 6 9    No active s/s of bleeding  Transfuse for Hgb <7 0 or if becomes symptomatic  Continue to trend CBC  Acute deep vein thrombosis (DVT) of left peroneal vein [facility])  Assessment & Plan  Lower extremity venous duplex on 1/20: Evidence of focal acute DVT in 1 of 2 peroneal veins on LLE  Completed Eliquis 10mg BID for 7 days and now receiving Eliquis 5mg BID  Follow-up with PCP as outpatient  Neurovascular checks Q shift  Multiple subsegmental pulmonary emboli without acute cor pulmonale (HCC)  Assessment & Plan  CTA PE study on 1/20: Few subsegmental pulmonary emboli in the posterior lower lobes, measured RV/LV ratio within normal limits  Completed Eliquis 10mg BID for 7 days and now receiving Eliquis 5mg BID, continue for 3 months  Follow-up with PCP as outpatient  Currently maintaining on RA  Encourage pulmonary toileting  Spot pulse ox checks  Chronic diastolic congestive heart failure (HCC)  Assessment & Plan  Wt Readings from Last 3 Encounters:   01/31/23 94 kg (207 lb 4 8 oz)   01/23/23 95 2 kg (209 lb 14 1 oz)     Stable without exacerbation   on 1/20  Last ECHO on 12/2/22 showed EF 55-59%, G1DD  Takes Lasix 20mg daily at home - currently on hold  Restart as able  Other hyperlipidemia  Assessment & Plan  Continue home Lipitor 20mg daily  Last lipid panel on 11/15/22: Cholesterol 147, Triglycerides 73, HDL 53, and LDL 79  Essential hypertension  Assessment & Plan  Home regimen: Lasix 20mg daily, metoprolol tartrate 50mg BID, losartan 50mg daily  Currently receiving metoprolol tartrate 50mg BID  Restart losartan 25mg daily today  Apply abdominal binder/TEDs when getting OOB  Consider restarting Lasix when able  Monitor BP with routine VS   Follow-up with PCP as outpatient  * Closed left hip fracture [facility])  Assessment & Plan  S/p mechanical fall on 1/18  XR on 1/18 showed acute intertrochanteric L hip fracture    OR on 1/19 for IM fixation of L subtrochanteric hip fracture performed by Dr Adriana Browning  WBAT to LLE  Neurovascular checks Q shift  Ensure adequate pain control  Monitor incision for s/s of infection  Eliquis for DVT ppx  Follow-up with Orthopedics in 2 weeks as outpatient ()  Primary team following  PT/OT  VTE Pharmacologic Prophylaxis:   Pharmacologic: Apixaban (Eliquis)  Mechanical VTE Prophylaxis in Place: No - DVT  Current Length of Stay: 8 day(s)    Current Patient Status: Inpatient Rehab     Discharge Plan: As per primary team     Code Status: Level 1 - Full Code    Subjective:   Pt examined while pt sitting in recliner in pt room  Complaints of feeling "tightness" or "squishiness" to her L ankle this morning  States that it is uncomfortable and started this morning  Had her legs in the dependent position most of the day yesterday  Encouraged to elevate her legs more frequently today  Denies any L hip pain and numbness is improving to the L thigh  Slept well last night  Did experience brief moment of lightheadedness while walking this morning, encouraged to let staff know if this occurs again  Had a BM yesterday without any issues  Has no other concerns or complaints at this time  Objective:     Vitals:   Temp (24hrs), Av 8 °F (36 6 °C), Min:97 °F (36 1 °C), Max:98 6 °F (37 °C)    Temp:  [97 °F (36 1 °C)-98 6 °F (37 °C)] 97 °F (36 1 °C)  HR:  [72-86] 86  Resp:  [18] 18  BP: (118-147)/(58-76) 145/65  SpO2:  [92 %-96 %] 96 %  Body mass index is 36 72 kg/m²  Review of Systems   Constitutional: Negative for appetite change, chills, fatigue and fever  HENT: Negative for trouble swallowing  Eyes: Negative for visual disturbance  Respiratory: Negative for cough, chest tightness, shortness of breath, wheezing and stridor  Cardiovascular: Negative for chest pain, palpitations and leg swelling  Gastrointestinal: Negative for abdominal distention, abdominal pain, constipation, diarrhea, nausea and vomiting          LBM  Genitourinary: Negative for difficulty urinating  Musculoskeletal: Negative for arthralgias and back pain  Feels that her L ankle is "tight/squishy" and started this morning, uncomfortable   Neurological: Positive for light-headedness (brief moment of lightheadedness while walking this morning, improved with rest)  Negative for dizziness, weakness, numbness and headaches  Psychiatric/Behavioral: Negative for dysphoric mood and sleep disturbance  The patient is not nervous/anxious  All other systems reviewed and are negative  Input and Output Summary (last 24 hours): Intake/Output Summary (Last 24 hours) at 1/31/2023 0907  Last data filed at 1/31/2023 0801  Gross per 24 hour   Intake 460 ml   Output 900 ml   Net -440 ml       Physical Exam:     Physical Exam  Vitals and nursing note reviewed  Constitutional:       General: She is not in acute distress  Appearance: Normal appearance  She is obese  She is not ill-appearing  HENT:      Head: Normocephalic and atraumatic  Cardiovascular:      Rate and Rhythm: Normal rate and regular rhythm  Pulses: Normal pulses  Heart sounds: Murmur heard  Systolic murmur is present with a grade of 2/6  No friction rub  Pulmonary:      Effort: Pulmonary effort is normal  No respiratory distress  Breath sounds: Normal breath sounds  No wheezing or rhonchi  Abdominal:      General: Abdomen is flat  Bowel sounds are normal  There is no distension  Palpations: Abdomen is soft  There is no mass  Tenderness: There is no abdominal tenderness  There is no guarding or rebound  Hernia: No hernia is present  Musculoskeletal:      Cervical back: Normal range of motion and neck supple  No tenderness  Right lower leg: No edema  Left lower leg: Edema (Moderate non-pitting edema) present  Skin:     General: Skin is warm and dry  Comments: L hip incision with staples  Well-approximated    No drainage, erythema, or edema present  Neurological:      Mental Status: She is alert and oriented to person, place, and time     Psychiatric:         Mood and Affect: Mood normal          Behavior: Behavior normal          Additional Data:     Labs:    Results from last 7 days   Lab Units 01/30/23  0444   WBC Thousand/uL 8 88   HEMOGLOBIN g/dL 8 5*   HEMATOCRIT % 28 1*   PLATELETS Thousands/uL 303   NEUTROS PCT % 70   LYMPHS PCT % 15   MONOS PCT % 11   EOS PCT % 1     Results from last 7 days   Lab Units 01/30/23  0444   SODIUM mmol/L 138   POTASSIUM mmol/L 4 3   CHLORIDE mmol/L 104   CO2 mmol/L 30   BUN mg/dL 14   CREATININE mg/dL 0 60   ANION GAP mmol/L 4*   CALCIUM mg/dL 8 6   GLUCOSE RANDOM mg/dL 97                       Labs reviewed    Imaging:    Imaging reviewed    Recent Cultures (last 7 days):           Last 24 Hours Medication List:   Current Facility-Administered Medications   Medication Dose Route Frequency Provider Last Rate   • acetaminophen  650 mg Oral Q6H PRN ELDA Rueda     • apixaban  5 mg Oral BID ELDA Rueda     • atorvastatin  20 mg Oral Daily With Yefri Caruso MD     • bisacodyl  10 mg Rectal Daily PRN Niurka Amaay MD     • calcium carbonate  1,000 mg Oral Daily PRN Niurka Amaya MD     • calcium carbonate-vitamin D  2 tablet Oral Daily With Breakfast ELDA Rueda     • cholecalciferol  3,000 Units Oral Daily ELDA Rueda     • docusate sodium  100 mg Oral BID Niurka Amaya MD     • escitalopram  5 mg Oral Daily ELDA Rueda     • hydrOXYzine HCL  10 mg Oral Q6H PRN Niurka Amaya MD     • loratadine  10 mg Oral Daily Niurka Amaya MD     • losartan  25 mg Oral Daily ELDA Rueda     • menthol-methyl salicylate   Apply externally 4x Daily PRN Niurka Amaya MD     • metoprolol tartrate  50 mg Oral BID Niurka Amaya MD     • oxyCODONE  5 mg Oral Q6H PRN Niurka Amaya MD     • oxyCODONE  2 5 mg Oral Q6H PRN Niurka Amaya MD     • pantoprazole 40 mg Oral Daily Miriam Chao MD     • polyethylene glycol  17 g Oral Daily PRN Miriam Chao MD          M*Modal software was used to dictate this note  It may contain errors with dictating incorrect words or incorrect spelling  Please contact the provider directly with any questions

## 2023-01-31 NOTE — PROGRESS NOTES
Physical Medicine and Rehabilitation Progress Note  Raina Silva 67 y o  female MRN: [id number]  Unit/Bed#: -03 Encounter: [id number]    HPI: Raina Silva is a 67 y o  female  With medical history of HLD, HTN, Chronic diastolic heart failure (S1EY), previous R hip fracture who presented to the [address] [place], [place] [address] on 1/18 after slipping on some water while at work at [employer]  No prodromal symptoms  CTH showed no acute intracranial abnormality, and XR L hip showed a subtrochanteric femur fracture  Ortho recommended ORIF with IMN which was performed on 1/19 by Dr Marylen Peacock  Made WBAT post-op Post-op lethargi and hypoxic, CXR showed no acute cardiopulmonary disease  Required BiPAP, Narcan ,and Flumazenil  There was some improvement in her lethargy, but remained hypoxic  Pulm was consulted  CTA PE ordered which showed distal subsegmental PEs in the posterior lower lobe with atelectasis  Doppler performed showed a DVT in 1 peroneal vein in LLE  Pulm did not feel that the findings were responsible for the extent of her hypoxia - they felt multifactorial 2/2 atelectasis some volume overload  She got 1 dose of IV Lasix  Primary did not feel there was a volume overload component  Ultimately recommended for a/c for at least 3 months  Eliquis cost $47 a month  Course also c/b ABLA, but that stabilized and she did not require transfusion She was stabilized and admitted to the [facility] on 1/23/2023  Chief Complaint: L leg pain  Interval History/Subjective:  No acute events overnight  Sleep was fine  Denies any new CP, SOB, fevers, chills, N/V, Last BM 1/29  She is having some L leg pain, but it is mild and controlled with low dose oxycodone  Drainage has improved  ROS:  A 10 point review of systems was negative except for what is noted in the HPI  Today's Changes: 1  No changes for now  Likely dependent edema  Already on eliquis for PE and L DVT  Continue TEDs    2  Started Lexapro yesterday for anxiety  Tolerating well  Reviewed risks/benefits with patient  She is amenable to continuing med  Total visit time: 25 minutes, with more than 50% spent counseling/coordinating care  Counseling includes discussion with patient re: progress in therapies, functional issues observed by therapy staff, and discussion with patient regarding their current medical state and wellbeing  Coordination of patient's care was performed in conjunction with Internal Medicine service to monitor patient's labs, vitals, and management of their comorbidities  Assessment/Plan:    * Closed left hip fracture ([address] )  Assessment & Plan  After slipping on water at work  Workers Comp  Subtrochanteric femur fracture s/p long nail (antegrade) on 1/19 by Dr Ho Joiner  Pain control as documented below  Surgical dressings removed 1/26  Monitor drainage  Follow-up with Orthopedics 2 weeks (2/2)    PT/ OT 3-5 hours a day, 5-7 days/week in acute comprehensive inpatient rehab    Chest wall pain  Assessment & Plan  Suspect MSK or costochondritis - mild  Reproducible with palpation and certain push off manuevers in therapies  Improving  No diaphoresis, nausea, radiation, SOB  Reviewed red flag symptoms that would warrant ACS r/o  Added Bengay  Monitor closely  Constipation  Assessment & Plan  Last BM 1/29  Markedly improved  Weaning off bowel regimen  Monitor on just PRNs  Adjustment disorder with anxiety  Assessment & Plan  Will provide non-pharmacologic strategies  Continued PRN hydroxyzine  1/30 started Lexapro  Consulted rehab psychology for support  Hypoxemia  Assessment & Plan  Resolved and on RA  Previously multifactorial: PE, atelectasis, ?volume overload s/p Lasix IV x1  Does have nocturnal hypoxemia on Friday Noc Ox for 1 hour   - Will start O2 at night, will need noc ox performed 48 hours prior to discharge   - Made case management aware  Monitor closely  Goals > 90%  Outpatient f/u with Pulmonology/Sleep Medicine    Osteopenia  Assessment & Plan  DXA 12/2020 with osteopenia  Continue home calcium and Vitamin D  1/24 Vitamin D is 29 8  Outpatient f/u DXA with PCP  Acute postoperative anemia due to expected blood loss  Assessment & Plan  Hgb 12 6 pre-op  1/30  8 8 > 7 2 > 7 4 > 7 2 > 6 9 > 8 2 > 8 5    - Iron deficiency on labs  - Also post-operative blood loss  - Given IV venofer x2   - Type and screen   - Consent in chart  - 1/26 Transfuse with 1 unit pRBC  Trend, transfuse as appropriate  Outpatient f/u with PCP    Acute deep vein thrombosis (DVT) of left peroneal vein [facility])  Assessment & Plan  1/20 LE Venous Duplex with L acute DVT in 1 of 2 peroneal veins  Now on Eliquis  See PE for more details  No SCD on that leg  Can do ace wrapping for edema  Outpatient f/u with PCP    Multiple subsegmental pulmonary emboli without acute cor pulmonale (HCC)  Assessment & Plan  Noted on CTA PE from 1/20   RV/LV ratio WNL  Started on Eliquis 10mg BID on 1/22 for 7 days before transitioning to 5mg BID for 3 months as per Pulm  Monitor respiratory status  Chronic diastolic congestive heart failure (HCC)  Assessment & Plan  Wt Readings from Last 3 Encounters:   01/31/23 94 kg (207 lb 4 8 oz)   01/23/23 95 2 kg (209 lb 14 1 oz)     Does not appear to have acute exacerbation  12/2/2022 Echo with EF 55-59% and G1DD   on 1/20  Home: Lopressor, Losartan, Lasix  Here: Lopressor  - Holding ARB for orthostasis  - Orthostasis has improved  Monitor and add back as appropriate  Monitor I/O and daily weights  Adjust as appropriate  Outpatient f/u with PCP        Other hyperlipidemia  Assessment & Plan  Home: Simvastatin 20mg nightly  Here: Lipitor 20mg nightly for therapeutic substitution   Outpatient f/u with PCP    Essential hypertension  Assessment & Plan  Home: Lasix 20mg daily, Lopressor 50mg BID, Losartan 50mg daily  Here: Lopressor 50mg BID  1/24 Losartan held for orthostasis  Can try to restart Lasix while here once BP appropriate  Monitor BP, adjust as appropriate  Follow-up with PCP  Hypokalemia-resolved as of 1/31/2023  Assessment & Plan  1/30 4 3  Resolved after supplementation  Monitor  Leukocytosis-resolved as of 1/31/2023  Assessment & Plan  1/30 8 88 and resolved  Afebrile, but otherwise stable  Drainage from L thigh doesn't appear infectious  Monitor incision site closely  Monitor off antibiotics  Monitor Temp and WBC  Initiate work-up if she spikes a fever or develops localizing symptoms  Health Maintenance  #Delirium/Sleep: At risk has had hallucinations on oxycodone 10mg, and was lethargic requiring narcan on fentanyl and dilaudid  #Pain: Tylenol ordered PRN, Oxycodone 2 5-5mg PRN  #Bowel: Last BM 1/29  PRN miralax/docusate/suppository  #Bladder: Voiding and continent  Monitor  #Skin/Pressure Injury Prevention: Turn Q2hr in bed, with weight shifts [id number] in wheelchair  Float heels in bed  #DVT Prophylaxis: Fully anticoagulated on Eliquis  SCD on RLE only  #GI Prophylaxis: PPI started when full a/c started  Can also do famotidine  #Code Status:  Full Code  #FEN: Reg Diet  #Dispo:  Team Conference 1/26: Anticipate she will need all her time here  Her anxiety is a major barrier and her anemia is a major medical barrier right now  She is making some progress  Potential discharge planned for 2/10/2023  May need Home PT/OT +/- RN to start  Objective:    Functional Update: marked progress  PT: mod-maxA bed mobility, CGA for sit to stand transfers and bed to chair transfers, CGA-Stacia for 10' ambulation  OT: Ind eating, Sup grooming, maxA bathing, Sup UB dressing, maxA LB dressing, modA toielting, modA toilet transfers       Allergies per EMR    Physical Exam:  Temp:  [97 °F (36 1 °C)-98 6 °F (37 °C)] 97 °F (36 1 °C)  HR:  [72-93] 80  Resp:  [18] 18  BP: (118-147)/(58-76) 147/72  Oxygen Therapy  SpO2: 96 %  O2 Flow Rate (L/min): 2 L/min    Gen: No acute distress, Well-nourished, well-appearing  HEENT: Moist mucus membranes, Normocephalic/Atraumatic  Cardiovascular: Regular rate, rhythm, S1/S2  Distal pulses palpable  Heme/Extr: LLE edema with DIDI stocking in place  Pulmonary: Non-labored breathing  Lungs CTAB  : No garcia  GI: Soft, non-tender, non-distended  BS+  Integumentary: Skin is warm, dry  Incision site dressings C/D/I  Neuro: AAOx3, Speech is intact  Appropriate to questioning  Psych: Normal mood and affect       Diagnostic Studies:   Reviewed, no new imaging    Laboratory:  Reviewed   Results from last 7 days   Lab Units 01/30/23  0444 01/26/23  1752 01/26/23  0451 01/25/23  0612   HEMOGLOBIN g/dL 8 5* 8 2* 6 9* 7 2*   HEMATOCRIT % 28 1* 25 8* 21 8* 21 7*   WBC Thousand/uL 8 88  --  12 86* 9 94     Results from last 7 days   Lab Units 01/30/23 0444 01/26/23  0451   BUN mg/dL 14 16   POTASSIUM mmol/L 4 3 3 6   CHLORIDE mmol/L 104 102   CREATININE mg/dL 0 60 0 51*            Patient Active Problem List   Diagnosis   • Closed left hip fracture (HCC)   • Essential hypertension   • Other hyperlipidemia   • Chronic diastolic congestive heart failure (HCC)   • Leukocytosis   • Multiple subsegmental pulmonary emboli without acute cor pulmonale (HCC)   • Acute deep vein thrombosis (DVT) of left peroneal vein (HCC)   • Acute postoperative anemia due to expected blood loss   • Osteopenia   • Hypoxemia   • Hypokalemia   • Adjustment disorder with anxiety   • Constipation   • Chest wall pain         Medications  Current Facility-Administered Medications   Medication Dose Route Frequency Provider Last Rate   • acetaminophen  650 mg Oral Q6H PRN ELDA Russell     • apixaban  5 mg Oral BID ELDA Russell     • atorvastatin  20 mg Oral Daily With Richard Carmona MD     • bisacodyl  10 mg Rectal Daily PRN Pascal Kawasaki, MD     • calcium carbonate  1,000 mg Oral Daily PRN Pascal Kawasaki, MD     • calcium carbonate-vitamin D  2 tablet Oral Daily With Breakfast ELDA Scherer     • cholecalciferol  3,000 Units Oral Daily ELDA Scherer     • docusate sodium  100 mg Oral BID Phill Bruno MD     • escitalopram  5 mg Oral Daily ELDA Scherer     • hydrOXYzine HCL  10 mg Oral Q6H PRN Phill Bruno MD     • loratadine  10 mg Oral Daily Phill Bruno MD     • menthol-methyl salicylate   Apply externally 4x Daily PRN Phill Bruno MD     • metoprolol tartrate  50 mg Oral BID Phill Bruno MD     • oxyCODONE  5 mg Oral Q6H PRN Phill Bruno MD     • oxyCODONE  2 5 mg Oral Q6H PRN Phill Bruno MD     • pantoprazole  40 mg Oral Daily Phill Bruno MD     • polyethylene glycol  17 g Oral Daily PRN Phill Bruno MD            ** Please Note: Fluency Direct voice to text software may have been used in the creation of this document   **

## 2023-01-31 NOTE — PROGRESS NOTES
01/31/23 1230   Pain Assessment   Pain Assessment Tool 0-10   Pain Score 10 - Worst Possible Pain   Pain Location/Orientation Orientation: Left; Location: Hip   Pain Radiating Towards none   Pain Onset/Description Onset: Ongoing   Effect of Pain on Daily Activities inc when WB and walking   Patient's Stated Pain Goal No pain   Hospital Pain Intervention(s) Cold applied  (communicated with KIRK Monet to provide meds)   Restrictions/Precautions   Precautions Fall Risk;Pain   Weight Bearing Restrictions Yes   RLE Weight Bearing Per Order WBAT   LLE Weight Bearing Per Order WBAT   ROM Restrictions No   Sit to Stand   Type of Assistance Needed Supervision; Adaptive equipment   Comment CS with RW   Sit to Stand CARE Score 4   Bed-Chair Transfer   Type of Assistance Needed Incidental touching; Adaptive equipment   Comment CGA with RW 2* to inc L hip pain   Chair/Bed-to-Chair Transfer CARE Score 4   Toileting Hygiene   Type of Assistance Needed Incidental touching; Adaptive equipment   Comment pt able to perform jon hygiene while seated  CGA in stance for CM   Toileting Hygiene CARE Score 4   Toileting   Able to [address] down yes, up yes  Able to Manage Clothing Closures Yes   Manage Hygiene Bladder   Limitations Noted In Balance;ROM;UE Strength;LE Strength   Toilet Transfer   Type of Assistance Needed Physical assistance   Physical Assistance Level 25% or less   Comment Stacia iwth RW   Toilet Transfer CARE Score 3   Toilet Transfer   Findings Engaged pt in fx mobility to/from bathroom pt able to complete at [facility] with RW  Progressed to wlak into bathroom with staff, communicated with KIRK Monet and updated whiteboard   Therapeutic Excerise-Strength   UE Strength Yes   Right Upper Extremity- Strength   R Shoulder Flexion; Extension;Horizontal ABduction   R Elbow Elbow flexion;Elbow extension   R Position Seated   Equipment   (2#, upgrade to 3# next session)   R Weight/Reps/Sets 2x15   RUE Strength Comment engaged pt in UE TE with 3# 2x15 to cont to inc fx strengthf or STS and transfers  tolerated well with rest break   Left Upper Extremity-Strength   LUE Strength Comment see above   Cognition   Overall Cognitive Status WFL   Arousal/Participation Alert; Cooperative   Attention Attends with cues to redirect   Orientation Level Oriented X4   Memory Decreased short term memory   Following Commands Follows multistep commands with increased time or repetition   Additional Activities   Additional Activities Comments Discussed with pt DME needs  Therapist recommend pt to have BSC, shower chair and hip kit to inc fx independence in ADL tasks Pt verbalized understanding, DME sheet ot be handed in when appropriate  Per PT order youth walker   Activity Tolerance   Activity Tolerance Patient tolerated treatment well   Assessment   Treatment Assessment Engaged pt in 60mins of skilled OT services [facility] focus on toileting, discussing DME needs, setup FT, fx mobility to bathroom  Beginnig of session pt progressed to walk into [facility] with staff due to inc mobility  Completed at [facility] level with RW, communicated with KIRK Monet  Discussed DME needs including BSC, RW, hip kit and shower chair  Will sumbit DME form when necessary  Cont OT POC with focus on activity tolerance, weight bearing/shifting, activity tolerance to inc fx performance and dec CG burden  OT Family training done with: (S)  Pt in agreement for FT with Sylvain Young Sat 2/4 1230   Prognosis Fair   Problem List Decreased strength;Decreased range of motion;Decreased endurance;Decreased mobility; Impaired balance   Barriers to Discharge Inaccessible home environment;Decreased caregiver support   Plan   Treatment/Interventions ADL retraining;Functional transfer training; Therapeutic exercise; Endurance training;Patient/family training;Bed mobility; Compensatory technique education   Progress Progressing toward goals   Recommendation   OT Discharge Recommendation Home with home health rehabilitation   OT Therapy Minutes   OT Time In 1230   OT Time Out 1330   OT Total Time (minutes) 60   OT Mode of treatment - Individual (minutes) 60   OT Mode of treatment - Concurrent (minutes) 0   OT Mode of treatment - Group (minutes) 0   OT Mode of treatment - Co-treat (minutes) 0   OT Mode of Treatment - Total time(minutes) 60 minutes   OT Cumulative Minutes 690   Therapy Time missed   Time missed?  No

## 2023-01-31 NOTE — ASSESSMENT & PLAN NOTE
S/p mechanical fall on 1/18  XR on 1/18 showed acute intertrochanteric L hip fracture  OR on 1/19 for IM fixation of L subtrochanteric hip fracture performed by Dr Rubio Fernandez  WBAT to LLE  Neurovascular checks Q shift  Ensure adequate pain control  Monitor incision for s/s of infection  Eliquis for DVT ppx  Follow-up with Orthopedics in 2 weeks as outpatient (2/2)  Primary team following  PT/OT

## 2023-01-31 NOTE — ASSESSMENT & PLAN NOTE
Wt Readings from Last 3 Encounters:   01/31/23 94 kg (207 lb 4 8 oz)   01/23/23 95 2 kg (209 lb 14 1 oz)     Stable without exacerbation   on 1/20  Last ECHO on 12/2/22 showed EF 55-59%, G1DD  Takes Lasix 20mg daily at home - currently on hold  Restart as able

## 2023-01-31 NOTE — PLAN OF CARE
Problem: CARDIOVASCULAR - ADULT  Goal: Maintains optimal cardiac output and hemodynamic stability  Description: INTERVENTIONS:  - Monitor I/O, vital signs and rhythm  - Monitor for S/S and trends of decreased cardiac output  - Administer and titrate ordered vasoactive medications to optimize hemodynamic stability  - Assess quality of pulses, skin color and temperature  - Assess for signs of decreased coronary artery perfusion  - Instruct patient to report change in severity of symptoms  Outcome: Progressing     Problem: GENITOURINARY - ADULT  Goal: Maintains or returns to baseline urinary function  Description: INTERVENTIONS:  - Assess urinary function  - Encourage oral fluids to ensure adequate hydration if ordered  - Administer IV fluids as ordered to ensure adequate hydration  - Administer ordered medications as needed  - Offer frequent toileting  - Follow urinary retention protocol if ordered  Outcome: Progressing

## 2023-01-31 NOTE — ASSESSMENT & PLAN NOTE
Home regimen: Lasix 20mg daily, metoprolol tartrate 50mg BID, losartan 50mg daily  Currently receiving metoprolol tartrate 50mg BID  Restart losartan 25mg daily today  Apply abdominal binder/TEDs when getting OOB  Consider restarting Lasix when able  Monitor BP with routine VS   Follow-up with PCP as outpatient

## 2023-01-31 NOTE — PROGRESS NOTES
01/31/23 0830   Pain Assessment   Pain Assessment Tool 0-10   Pain Score 5   Pain Location/Orientation Orientation: Left; Location: Hip   Pain Onset/Description Onset: Ongoing   Hospital Pain Intervention(s) Repositioned; Ambulation/increased activity  (WBing)   Restrictions/Precautions   Precautions Fall Risk;Pain   Weight Bearing Restrictions Yes   RLE Weight Bearing Per Order WBAT   LLE Weight Bearing Per Order WBAT   ROM Restrictions No   Braces or Orthoses Other (Comment)  (BL TEDs)   Lifestyle   Autonomy Initially pt appeared anxious and apprehensive; as session progress, pt appeared to relax and was receptive to emotional support and distraction techniques   Lower Body Dressing   Type of Assistance Needed Physical assistance   Physical Assistance Level 26%-50%   Comment Educated on use of LHAE w/ focus on trialing compensatory technique  Pt unfamilar w/ use despite previous hip surgery  Pt mostly attentive; appeared anxious and apprehensive to move  Pt w/ complaints of increased LLE swelling (RN aware)  Pt able to carryover provided demonstration to doff / don socks  Discussed appropriate sequencing of dressing techniques - verbalized understanding  Demonstrated use of LHR to thread underwear/pants  Pt required overall Mod A to recall and carryover education; requiring increased time due to approaching task w/ caution  Pt receptive to continue training w/ LHAE to maximize ease and independence pending pain level w/ dynamic reach   Lower Body Dressing CARE Score 3   Putting On/Taking Off Footwear   Type of Assistance Needed Physical assistance   Physical Assistance Level 26%-50%   Comment Mod A for sock management using LHAE (see comment section above for details)  TEDs donned upon arrival   Putting On/Taking Off Footwear CARE Score 3   Sit to Stand   Type of Assistance Needed Verbal cues; Supervision   Physical Assistance Level No physical assistance   Comment CS to RW; noted to engage in breath work due to anxiety related to anticipation of pain  G tolerance overall   Sit to Stand CARE Score 4   Bed-Chair Transfer   Type of Assistance Needed Incidental touching;Verbal cues; Adaptive equipment   Physical Assistance Level No physical assistance   Comment SPT using RW; increased time required to advance RLE due to apprehensive to WB through LLE   Chair/Bed-to-Chair Transfer CARE Score 4   Therapeutic Excerise-Strength   UE Strength Yes   Right Upper Extremity- Strength   R Shoulder Flexion; Extension;Horizontal ABduction   R Elbow Elbow flexion;Elbow extension   R Position Seated   Equipment Dumbbell  (2#)   R Weight/Reps/Sets 2x15   RUE Strength Comment Pt engaged in UE TE w/ focus on improved strength and endurance to maximize functional independence w/ I/ADLs and functional transfers/mob  Pt also expressed feeling "tight and sore" in her chest from "tensing" using the RW  Pt engaged in lite prolonged stretch for comfort; pt receptive and appreciative to same  Pt tolerated UE TE w/o difficulty; completed w/ 2# dumbells 2x15 reps  Min cues to achieve full range and to target appropriate muscle groups  Required brief rest breaks between sets   Left Upper Extremity-Strength   L Shoulder Flexion; Extension;Horizontal ABduction   L Elbow Elbow flexion;Elbow extension   L Position Seated   Equipment Dumbell   L Weights/Reps/Sets 2x15   LUE Strength Comment see above   Cognition   Overall Cognitive Status WFL   Arousal/Participation Alert; Cooperative   Attention Attends with cues to redirect   Orientation Level Oriented X4   Memory Decreased short term memory   Following Commands Follows multistep commands with increased time or repetition   Comments Anxious and easily distracted due to same; anticipates pain w/ activity  Engages in breathing strategies prior to functional tasks as a coping mechanism    Pt can be easily distracted at times by anticipation of pain/anxiousness which impacts overall attention Additional Activities   Additional Activities Comments Pt engaged in repetitive functional transfer and functional standing training, w/ heavy focus on lateral weight shifting, WBing through LLE, and standing balance  Pt continues to express diffiuclty advancing RLE due to poor tolerance to WB though LLE 2* anticiption of pain  Pt noted to shuffle feet to adjust standing position  Completed x3 sets of lateral weight shifts 10x to maximize ease and independence w/ I/ADL tasks and functional transfers/mob  Pt reported improved tolerance to pain w/ WBing  Stated "this feels good actually"  Pt required prompting and manual cues to fully WB through LLE  Pt able to progress to partial march in place  Noted w/ increased energy expenditure w/ UB when using RW, as well as breath holding  Required redirection for proper body mechanics and breath work due to previous complaint of increased "stiffness" in chest and shoulders  Pt tolerated functional task well, required rest breaks between sets  Pt encouraged to continue to progress w/ functional mob  to <> from bathroom as pt was apprehensive on this date   Activity Tolerance   Activity Tolerance Patient tolerated treatment well   Other Comments   Assessment (S)  Continue w/ unilateral release training in future tx sessions; pt demonstrated improved static standing balance on this date w/ RW; completed unilateral release briefly 2* anxiousness   Assessment   Treatment Assessment Pt participated in 60 minute skilled OT session w/ focus on [facility] training, functional transfer training, functional standing tolerance/balance, endurance training, UE TE, and emotional support  Details regarding functional performance noted above  Pt continues to present w/ anxiousness and verbalized feeling "apprehensive" to engage in OT tx  As session progressed, pt becamse less anxious; receptive to breathing techniques and distraction strategies when engaged in functional tasks    Pt remains limited by pain at times and anticipation of same  Will benefit from further training w/ LHAE to perform LB ADLs pending tolerance to pain w/ dynamic reaching  Pt encouraged to perform functional mob  to bathroom on this date for transfer training and lite grooming; pt declined  Pt did express increased complaints regarding LLE swelling on this date; RN aware - TEDs donned throughout session  BP stable; no complaints regarding dizziness  Pt positioned OOB in recliner chair w/ LE's elevated to assist w/ noted edema; encouraged to elevate LE's when able; pt receptive  Pt remains limited by decreased activity tolerance, pain, decreased standing tolerance/balance, and anxiety which intermittently impacts overall attention and comprehension  Pt will continue to benefit from skilled OT services in this setting w/ focus on above mentioned deficits to maximize functional independence w/ I/ADL tasks and ease burden of care at d/c  Prognosis Fair   Problem List Decreased strength;Decreased range of motion;Decreased endurance;Decreased mobility; Impaired balance   Plan   Treatment/Interventions ADL retraining;Functional transfer training; Therapeutic exercise; Endurance training;Patient/family training;Equipment eval/education; Bed mobility; Compensatory technique education   Progress Progressing toward goals   Recommendation   OT Discharge Recommendation Home with home health rehabilitation   Equipment Recommended   (TBD; may benefit from [facility])   Discharge Summary anticipate D/C 2/10   OT Therapy Minutes   OT Time In 0830   OT Time Out 0930   OT Total Time (minutes) 60   OT Mode of treatment - Individual (minutes) 60   OT Mode of treatment - Concurrent (minutes) 0   OT Mode of treatment - Group (minutes) 0   OT Mode of treatment - Co-treat (minutes) 0   OT Mode of Treatment - Total time(minutes) 60 minutes   OT Cumulative Minutes 630   Therapy Time missed   Time missed?  No

## 2023-02-01 RX ADMIN — PANTOPRAZOLE SODIUM 40 MG: 40 TABLET, DELAYED RELEASE ORAL at 05:49

## 2023-02-01 RX ADMIN — ACETAMINOPHEN 650 MG: 325 TABLET ORAL at 08:57

## 2023-02-01 RX ADMIN — METOPROLOL TARTRATE 50 MG: 50 TABLET, FILM COATED ORAL at 08:52

## 2023-02-01 RX ADMIN — LORATADINE 10 MG: 10 TABLET ORAL at 08:51

## 2023-02-01 RX ADMIN — CHOLECALCIFEROL TAB 25 MCG (1000 UNIT) 3000 UNITS: 25 TAB at 08:51

## 2023-02-01 RX ADMIN — ACETAMINOPHEN 650 MG: 325 TABLET ORAL at 17:04

## 2023-02-01 RX ADMIN — APIXABAN 5 MG: 5 TABLET, FILM COATED ORAL at 17:04

## 2023-02-01 RX ADMIN — METOPROLOL TARTRATE 50 MG: 50 TABLET, FILM COATED ORAL at 17:04

## 2023-02-01 RX ADMIN — LOSARTAN POTASSIUM 25 MG: 25 TABLET, FILM COATED ORAL at 08:52

## 2023-02-01 RX ADMIN — DOCUSATE SODIUM 100 MG: 100 CAPSULE, LIQUID FILLED ORAL at 17:04

## 2023-02-01 RX ADMIN — ESCITALOPRAM OXALATE 5 MG: 5 TABLET, FILM COATED ORAL at 08:51

## 2023-02-01 RX ADMIN — DOCUSATE SODIUM 100 MG: 100 CAPSULE, LIQUID FILLED ORAL at 08:51

## 2023-02-01 RX ADMIN — APIXABAN 5 MG: 5 TABLET, FILM COATED ORAL at 08:51

## 2023-02-01 RX ADMIN — HYDROXYZINE HYDROCHLORIDE 10 MG: 10 TABLET ORAL at 08:50

## 2023-02-01 RX ADMIN — ATORVASTATIN CALCIUM 20 MG: 20 TABLET, FILM COATED ORAL at 17:04

## 2023-02-01 RX ADMIN — Medication 2 TABLET: at 08:50

## 2023-02-01 NOTE — PROGRESS NOTES
02/01/23 1000   Pain Assessment   Pain Assessment Tool 0-10   Pain Score 4   Pain Location/Orientation Orientation: Left; Location: Leg;Location: Knee   Effect of Pain on Daily Activities limits staning tolerance and activity  (increased to 6/10 after walking)   Hospital Pain Intervention(s) Cold applied; Rest;Elevated   Restrictions/Precautions   Precautions Pain; Fall Risk   LLE Weight Bearing Per Order WBAT   Braces or Orthoses   (pt using her personal knee sleeve on Right knee)   Cognition   Overall Cognitive Status WFL   Subjective   Subjective fatigued with increased pain at end of session   Roll Left and Right   Type of Assistance Needed Supervision; Adaptive equipment   Comment using bedrails   Roll Left and Right CARE Score 4   Sit to Lying   Type of Assistance Needed Physical assistance   Physical Assistance Level 25% or less   Comment Practiced getting into bed on bith sides, requires A with L LE   Sit to Lying CARE Score 3   Lying to Sitting on Side of Bed   Type of Assistance Needed Physical assistance   Physical Assistance Level 25% or less   Comment varies with fatigue, but requires at least MiN A   Lying to Sitting on Side of Bed CARE Score 3   Sit to Stand   Type of Assistance Needed Supervision; Adaptive equipment   Comment RW for balance   Sit to Stand CARE Score 4   Bed-Chair Transfer   Type of Assistance Needed Supervision; Adaptive equipment   Comment CS with RW, SPT   Chair/Bed-to-Chair Transfer CARE Score 4   Walk 10 Feet   Type of Assistance Needed Adaptive equipment; Incidental touching   Comment RW   Walk 10 Feet CARE Score 4   Walk 50 Feet with Two Turns   Type of Assistance Needed Incidental touching; Adaptive equipment   Comment RW   Walk 50 Feet with Two Turns CARE Score 4   Walk 150 Feet   Comment fatigued at 75ft   Reason if not Attempted Safety concerns   Walk 150 Feet CARE Score 88   Walking 10 Feet on Uneven Surfaces   Comment pt remains anxious on firm level surface   Reason if not Attempted Safety concerns   Walking 10 Feet on Uneven Surfaces CARE Score 88   Ambulation   Primary Mode of Locomotion Prior to Admission Walk   Distance Walked (feet) 75 ft   Assist Device Roller Walker   Gait Pattern Slow [place]; Antalgic; Improper weight shift;Decreased L stance; Step to; Step through;R foot drag   Findings improving step through and wt bearing L LE   Curb or Single Stair   Style negotiated Single stair   Type of Assistance Needed Physical assistance   Physical Assistance Level 26%-50%   Comment B HR   1 Step (Curb) CARE Score 3   4 Steps   Comment too fatigued to attempt today   Therapeutic Interventions   Strengthening Supine AA SLR, SAQ, LAQ, AA heelslides   Modalities Ice pack left hip/thigh & Knee x 30 min with leg elevate at end of session   Equipment Use   NuStep 12 min lvl 2   Assessment   Treatment Assessment Pt cntinues to partcipate well and make functional progress  Pain, weakness and fatigue limiting her standing, walking and stairs  Gait slowly improving with distance and quality  Stairs remain greatest barrier to access home and 2nd floor  PT Therapy Minutes   PT Time In 1000   PT Time Out 1130   PT Total Time (minutes) 90   PT Mode of treatment - Individual (minutes) 90   PT Mode of treatment - Concurrent (minutes) 0   PT Mode of treatment - Group (minutes) 0   PT Mode of treatment - Co-treat (minutes) 0   PT Mode of Treatment - Total time(minutes) 90 minutes   PT Cumulative Minutes 750   Therapy Time missed   Time missed?  No

## 2023-02-01 NOTE — PROGRESS NOTES
Physical Medicine and Rehabilitation Progress Note  Marce Terrell 67 y o  female MRN: [id number]  Unit/Bed#: -00 Encounter: [id number]    HPI: Marce Terrell is a 67 y o  female  With medical history of HLD, HTN, Chronic diastolic heart failure (G4CH), previous R hip fracture who presented to the [address] [place] [address] on 1/18 after slipping on some water while at work at [employer]  No prodromal symptoms  CTH showed no acute intracranial abnormality, and XR L hip showed a subtrochanteric femur fracture  Ortho recommended ORIF with IMN which was performed on 1/19 by Dr Roxy Rowley  Made WBAT post-op Post-op lethargi and hypoxic, CXR showed no acute cardiopulmonary disease  Required BiPAP, Narcan ,and Flumazenil  There was some improvement in her lethargy, but remained hypoxic  Pulm was consulted  CTA PE ordered which showed distal subsegmental PEs in the posterior lower lobe with atelectasis  Doppler performed showed a DVT in 1 peroneal vein in LLE  Pulm did not feel that the findings were responsible for the extent of her hypoxia - they felt multifactorial 2/2 atelectasis some volume overload  She got 1 dose of IV Lasix  Primary did not feel there was a volume overload component  Ultimately recommended for a/c for at least 3 months  Eliquis cost $47 a month  Course also c/b ABLA, but that stabilized and she did not require transfusion She was stabilized and admitted to the [facility] on 1/23/2023  Chief Complaint: L leg swelling better since she has been elevating  Interval History/Subjective:  No acute events overnight  Sleep is fine  Pain is well-controlled but staples are itchy and feel tight  She denies any new CP, SOB, fevers, chills, N/V, abdominal pain  She has had some post-nasal drip but she doesn't like to use fluticasone  She is fine with zyrtec or claritin  Drainage from incision markedly improved  Last BM 1/31 and continent       ROS:  A 10 point review of systems was negative except for what is noted in the HPI  Today's Changes: 1  Started back on Losartan yesterday at a lower dose than her home dose  Monitor BP  2  Consult Ortho tomorrow for 2 week f/u  Total visit time: 25 minutes, with more than 50% spent counseling/coordinating care  Counseling includes discussion with patient re: progress in therapies, functional issues observed by therapy staff, and discussion with patient regarding their current medical state and wellbeing  Coordination of patient's care was performed in conjunction with Internal Medicine service to monitor patient's labs, vitals, and management of their comorbidities  Assessment/Plan:    * Closed left hip fracture ([address] )  Assessment & Plan  After slipping on water at work  Workers Comp  Subtrochanteric femur fracture s/p long nail (antegrade) on 1/19 by Dr Enriqueta Higuera  Pain control as documented below  Surgical dressings removed 1/26  Monitor drainage  Follow-up with Orthopedics 2 weeks (2/2)    PT/ OT 3-5 hours a day, 5-7 days/week in acute comprehensive inpatient rehab    Chest wall pain  Assessment & Plan  Suspect MSK or costochondritis - mild  Reproducible with palpation and certain push off manuevers in therapies  Improving  No diaphoresis, nausea, radiation, SOB  Reviewed red flag symptoms that would warrant ACS r/o  Added Bengay  Monitor closely  Constipation  Assessment & Plan  Last BM 1/31  Resolved  Weaning off bowel regimen  Monitor on just PRNs  Adjustment disorder with anxiety  Assessment & Plan  Will provide non-pharmacologic strategies  Continued PRN hydroxyzine  1/30 started Lexapro  Consulted rehab psychology for support  Hypoxemia  Assessment & Plan  Resolved and on RA  Previously multifactorial: PE, atelectasis, ?volume overload s/p Lasix IV x1     Does have nocturnal hypoxemia on Friday Noc Ox for 1 hour   - Will start O2 at night, will need noc ox performed 48 hours prior to discharge   - Made case management aware  Monitor closely  Goals > 90%  Outpatient f/u with Pulmonology/Sleep Medicine    Osteopenia  Assessment & Plan  DXA 12/2020 with osteopenia  Continue home calcium and Vitamin D  1/24 Vitamin D is 29 8  Outpatient f/u DXA with PCP  Acute postoperative anemia due to expected blood loss  Assessment & Plan  Hgb 12 6 pre-op  1/30  8 8 > 7 2 > 7 4 > 7 2 > 6 9 > 8 2 > 8 5    - Iron deficiency on labs  - Also post-operative blood loss  - Given IV venofer x2   - Type and screen   - Consent in chart  - 1/26 Transfuse with 1 unit pRBC  Trend, transfuse as appropriate  Outpatient f/u with PCP    Acute deep vein thrombosis (DVT) of left peroneal vein [facility])  Assessment & Plan  1/20 LE Venous Duplex with L acute DVT in 1 of 2 peroneal veins  Now on Eliquis  See PE for more details  No SCD on that leg  Can do ace wrapping for edema  Outpatient f/u with PCP    Multiple subsegmental pulmonary emboli without acute cor pulmonale (HCC)  Assessment & Plan  Noted on CTA PE from 1/20   RV/LV ratio WNL  Started on Eliquis 10mg BID on 1/22 for 7 days before transitioning to 5mg BID for 3 months as per Pulm  Monitor respiratory status  Chronic diastolic congestive heart failure (HCC)  Assessment & Plan  Wt Readings from Last 3 Encounters:   02/01/23 94 kg (207 lb 3 2 oz)   01/23/23 95 2 kg (209 lb 14 1 oz)     Does not appear to have acute exacerbation  12/2/2022 Echo with EF 55-59% and G1DD   on 1/20  Home: Lopressor, Losartan, Lasix  Here: Lopressor, Losartan 25mg daily  - Holding ARB for orthostasis  - Orthostasis has improved  Monitor and add back as appropriate  Monitor I/O and daily weights  Adjust as appropriate  Outpatient f/u with PCP        Other hyperlipidemia  Assessment & Plan  Home: Simvastatin 20mg nightly  Here: Lipitor 20mg nightly for therapeutic substitution   Outpatient f/u with PCP    Essential hypertension  Assessment & Plan  Home: Lasix 20mg daily, Lopressor 50mg BID, Losartan 50mg daily  Here: Lopressor 50mg BID, Losartan 25mg daily  1/24 Losartan held for orthostasis  Can try to restart Lasix while here once BP appropriate  Monitor BP, adjust as appropriate  Follow-up with PCP  Hypokalemia-resolved as of 1/31/2023  Assessment & Plan  1/30 4 3  Resolved after supplementation  Monitor  Leukocytosis-resolved as of 1/31/2023  Assessment & Plan  1/30 8 88 and resolved  Afebrile, but otherwise stable  Drainage from L thigh doesn't appear infectious  Monitor incision site closely  Monitor off antibiotics  Monitor Temp and WBC  Initiate work-up if she spikes a fever or develops localizing symptoms  Health Maintenance  #Delirium/Sleep: At risk has had hallucinations on oxycodone 10mg, and was lethargic requiring narcan on fentanyl and dilaudid  #Pain: Tylenol ordered PRN, Oxycodone 2 5-5mg PRN  #Bowel: Last BM 1/31  PRN miralax/docusate/suppository  #Bladder: Voiding and continent  Monitor  #Skin/Pressure Injury Prevention: Turn Q2hr in bed, with weight shifts [id number] in wheelchair  Float heels in bed  #DVT Prophylaxis: Fully anticoagulated on Eliquis  SCD on RLE only  #GI Prophylaxis: PPI started when full a/c started  Can also do famotidine  #Code Status:  Full Code  #FEN: Reg Diet  #Dispo:  Team Conference 1/26: Making good progress and may be able to move up her discharge date  Potential discharge planned for 2/10/2023  May need Home PT/OT +/- RN to start  Objective:    Functional Update: marked progress  PT: mod-maxA bed mobility, CGA for sit to stand transfers and bed to chair transfers, CGA-Stacia for 10' ambulation  OT: Ind eating, Sup grooming, maxA bathing, Sup UB dressing, maxA LB dressing, modA toielting, modA toilet transfers       Allergies per EMR    Physical Exam:  Temp:  [98 5 °F (36 9 °C)-99 2 °F (37 3 °C)] 98 5 °F (36 9 °C)  HR:  [73-88] 80  Resp:  [18] 18  BP: (124-154)/(61-76) 154/76  Oxygen Therapy  SpO2: 97 %  O2 Flow Rate (L/min): 2 L/min    Gen: No acute distress, Well-nourished, well-appearing  HEENT: Moist mucus membranes, Normocephalic/Atraumatic  Cardiovascular: Regular rate, rhythm, S1/S2  Distal pulses palpable  Heme/Extr: Improved dependent LLE edema  Wearing TEDs and elevating legs  Pulmonary: Non-labored breathing  Lungs CTAB  : No garcia  GI: Soft, non-tender, non-distended  BS+  Integumentary: Skin is warm, dry  Incision (more proximal) with minimal serosanguinous drainage  Neuro: AAOx3, Speech is intact  Appropriate to questioning  Tone is normal    Psych: Normal mood and affect         Diagnostic Studies:   Reviewed, no new imaging    Laboratory:  Reviewed   Results from last 7 days   Lab Units 01/30/23  0444 01/26/23  1752 01/26/23  0451   HEMOGLOBIN g/dL 8 5* 8 2* 6 9*   HEMATOCRIT % 28 1* 25 8* 21 8*   WBC Thousand/uL 8 88  --  12 86*     Results from last 7 days   Lab Units 01/30/23 0444 01/26/23  0451   BUN mg/dL 14 16   POTASSIUM mmol/L 4 3 3 6   CHLORIDE mmol/L 104 102   CREATININE mg/dL 0 60 0 51*            Patient Active Problem List   Diagnosis   • Closed left hip fracture (HCC)   • Essential hypertension   • Other hyperlipidemia   • Chronic diastolic congestive heart failure (HCC)   • Multiple subsegmental pulmonary emboli without acute cor pulmonale (HCC)   • Acute deep vein thrombosis (DVT) of left peroneal vein (HCC)   • Acute postoperative anemia due to expected blood loss   • Osteopenia   • Hypoxemia   • Adjustment disorder with anxiety   • Constipation   • Chest wall pain         Medications  Current Facility-Administered Medications   Medication Dose Route Frequency Provider Last Rate   • acetaminophen  650 mg Oral Q6H PRN ELDA Callahan     • apixaban  5 mg Oral BID ELDA Callahan     • atorvastatin  20 mg Oral Daily With Anna Worthington MD     • bisacodyl  10 mg Rectal Daily PRN Maddie Husain MD     • calcium carbonate  1,000 mg Oral Daily PRN Maddie Husain MD • calcium carbonate-vitamin D  2 tablet Oral Daily With Breakfast ELDA Shook     • cholecalciferol  3,000 Units Oral Daily ELDA Shook     • docusate sodium  100 mg Oral BID Arely Ervin MD     • escitalopram  5 mg Oral Daily ELDA Shook     • hydrOXYzine HCL  10 mg Oral Q6H PRN Arely Ervin MD     • loratadine  10 mg Oral Daily Arely Ervin MD     • losartan  25 mg Oral Daily ELDA Shook     • menthol-methyl salicylate   Apply externally 4x Daily PRN Arely Ervin MD     • metoprolol tartrate  50 mg Oral BID Arely Ervin MD     • oxyCODONE  5 mg Oral Q6H PRN Arely Ervin MD     • oxyCODONE  2 5 mg Oral Q6H PRN Arely Ervin MD     • pantoprazole  40 mg Oral Daily Arely Ervin MD     • polyethylene glycol  17 g Oral Daily PRN Arely Ervin MD            ** Please Note: Fluency Direct voice to text software may have been used in the creation of this document   **

## 2023-02-01 NOTE — ASSESSMENT & PLAN NOTE
Home regimen: Lasix 20mg daily, metoprolol tartrate 50mg BID, losartan 50mg daily  Currently receiving metoprolol tartrate 50mg BID  Restarted losartan 25mg daily on 1/31  Apply abdominal binder/TEDs when getting OOB  Consider restarting Lasix when able  Monitor BP with routine VS   Follow-up with PCP as outpatient

## 2023-02-01 NOTE — PROGRESS NOTES
[facility]  Progress Note Monique Riser 1950, 67 y o  female MRN: [id number]  Unit/Bed#: -01 Encounter: [id number]  Primary Care Provider: No primary care provider on file  Date and time admitted to hospital: 1/23/2023  1:33 PM    Constipation  Assessment & Plan  Improving  KUB obtained on 1/23 due to no BM for 6 days  Showed nonobstructive bowel gas pattern moderate to large amount of stool throughout the colon, suggesting constipation  Bowel regimen increased  Encourage PO hydration and mobilization  Primary team following  Monitor for s/s of obstruction  Adjustment disorder with anxiety  Assessment & Plan  Has been receiving Atarax 10mg Q6 PRN since 1/26  Discussed with patient and admits to feelings of anxiety prior to her fall  Started Lexapro 5mg daily on 1/30  Supportive counseling as needed  Neuropsych consulted  Follow-up with PCP as outpatient  Hypoxemia  Assessment & Plan  Hypoxia in acute setting post-operatively  Developed PEs and was started on Eliquis  Per Pulmonary - recommending overnight noc ox as outpatient  Desat while sleeping - noc ox obtained on 1/27  Desat for 1 hour and 40 minutes, lowest saturation 80%  Apply 2L O2 at night or while sleeping  Repeat overnight noc ox prior to discharge for DME evaluation  Osteopenia  Assessment & Plan  DXA scan in 12/2020 showed osteopenia  Continue home calcium carbonate and Vitamin D supplementation  Vitamin D level 29 8 on 1/24  Increased Vitamin D3 to 3000u daily  Recommend repeat DXA scan as outpatient along with discussion about Prolia injections  Follow-up with PCP as outpatient  Acute postoperative anemia due to expected blood loss  Assessment & Plan  Hgb currently 8 5  Hgb was 12 6 pre-operatively  Iron panel on 1/23: Iron Saturation 9%, TIBC 279, Iron 25, and Ferritin 81   2 doses of IV Venofer from 1/24-1/25  Given 1u PRBCs on 1/26 for Hgb of 6 9    No active s/s of bleeding  Transfuse for Hgb <7 0 or if becomes symptomatic  Continue to trend CBC  Acute deep vein thrombosis (DVT) of left peroneal vein [facility])  Assessment & Plan  Lower extremity venous duplex on 1/20: Evidence of focal acute DVT in 1 of 2 peroneal veins on LLE  Completed Eliquis 10mg BID for 7 days and now receiving Eliquis 5mg BID  Follow-up with PCP as outpatient  Neurovascular checks Q shift  Multiple subsegmental pulmonary emboli without acute cor pulmonale (HCC)  Assessment & Plan  CTA PE study on 1/20: Few subsegmental pulmonary emboli in the posterior lower lobes, measured RV/LV ratio within normal limits  Completed Eliquis 10mg BID for 7 days and now receiving Eliquis 5mg BID, continue for 3 months  Follow-up with PCP as outpatient  Currently maintaining on RA  Encourage pulmonary toileting  Spot pulse ox checks  Chronic diastolic congestive heart failure (HCC)  Assessment & Plan  Wt Readings from Last 3 Encounters:   02/01/23 94 kg (207 lb 3 2 oz)   01/23/23 95 2 kg (209 lb 14 1 oz)     Stable without exacerbation   on 1/20  Last ECHO on 12/2/22 showed EF 55-59%, G1DD  Takes Lasix 20mg daily at home - currently on hold  Restart as able  Other hyperlipidemia  Assessment & Plan  Continue home Lipitor 20mg daily  Last lipid panel on 11/15/22: Cholesterol 147, Triglycerides 73, HDL 53, and LDL 79  Essential hypertension  Assessment & Plan  Home regimen: Lasix 20mg daily, metoprolol tartrate 50mg BID, losartan 50mg daily  Currently receiving metoprolol tartrate 50mg BID  Restarted losartan 25mg daily on 1/31  Apply abdominal binder/TEDs when getting OOB  Consider restarting Lasix when able  Monitor BP with routine VS   Follow-up with PCP as outpatient  * Closed left hip fracture [facility])  Assessment & Plan  S/p mechanical fall on 1/18  XR on 1/18 showed acute intertrochanteric L hip fracture    OR on 1/19 for IM fixation of L subtrochanteric hip fracture performed by Dr Arreola Section  WBAT to LLE  Neurovascular checks Q shift  Ensure adequate pain control  Monitor incision for s/s of infection  Eliquis for DVT ppx  Follow-up with Orthopedics in 2 weeks as outpatient ()  Primary team following  PT/OT  VTE Pharmacologic Prophylaxis:   Pharmacologic: Apixaban (Eliquis)  Mechanical VTE Prophylaxis in Place: No - DVT  Current Length of Stay: 9 day(s)    Current Patient Status: Inpatient Rehab     Discharge Plan: As per primary team     Code Status: Level 1 - Full Code    Subjective:   Pt examined while pt sitting in recliner in pt room  Currently has no complaints of pain  L ankle feels much better and is less swollen today  Denies any lightheadedness or dizziness today  Did experience palpitations this morning about 20 minutes after taking her medications and was wondering if that was from the Lexapro  Discussed that it would be unlikely but we will monitor to see if this occurs again tomorrow  Denies any further palpitations this morning  Has no other concerns or complaints at this time  Objective:     Vitals:   Temp (24hrs), Av 9 °F (37 2 °C), Min:98 5 °F (36 9 °C), Max:99 2 °F (37 3 °C)    Temp:  [98 5 °F (36 9 °C)-99 2 °F (37 3 °C)] 98 5 °F (36 9 °C)  HR:  [73-92] 92  Resp:  [18] 18  BP: (124-162)/(59-76) 134/62  SpO2:  [91 %-97 %] 97 %  Body mass index is 36 7 kg/m²  Review of Systems   Constitutional: Negative for appetite change, chills, fatigue and fever  HENT: Negative for trouble swallowing  Eyes: Negative for visual disturbance  Respiratory: Negative for cough, chest tightness, shortness of breath, wheezing and stridor  Cardiovascular: Positive for palpitations (occurred 20 minutes after receiving medications this morning, improved on its own after a few minutes)  Negative for chest pain and leg swelling     Gastrointestinal: Negative for abdominal distention, abdominal pain, constipation, diarrhea, nausea and vomiting  LBM 1/31   Genitourinary: Negative for difficulty urinating  Musculoskeletal: Negative for arthralgias and back pain  Neurological: Negative for dizziness, weakness, light-headedness, numbness and headaches  Psychiatric/Behavioral: Negative for dysphoric mood and sleep disturbance  The patient is nervous/anxious  All other systems reviewed and are negative  Input and Output Summary (last 24 hours): Intake/Output Summary (Last 24 hours) at 2/1/2023 0850  Last data filed at 1/31/2023 1701  Gross per 24 hour   Intake 360 ml   Output 450 ml   Net -90 ml       Physical Exam:     Physical Exam  Vitals and nursing note reviewed  Constitutional:       General: She is not in acute distress  Appearance: Normal appearance  She is obese  She is not ill-appearing  HENT:      Head: Normocephalic and atraumatic  Cardiovascular:      Rate and Rhythm: Normal rate and regular rhythm  Pulses: Normal pulses  Heart sounds: Murmur heard  Systolic murmur is present with a grade of 1/6  No friction rub  Pulmonary:      Effort: Pulmonary effort is normal  No respiratory distress  Breath sounds: Normal breath sounds  No wheezing or rhonchi  Abdominal:      General: Abdomen is flat  Bowel sounds are normal  There is no distension  Palpations: Abdomen is soft  There is no mass  Tenderness: There is no abdominal tenderness  There is no guarding or rebound  Hernia: No hernia is present  Musculoskeletal:      Cervical back: Normal range of motion and neck supple  No tenderness  Right lower leg: No edema  Left lower leg: Edema (Trace non-pitting edema) present  Skin:     General: Skin is warm and dry  Comments: L hip incision with DSD  Dressings CDI  Neurological:      Mental Status: She is alert and oriented to person, place, and time     Psychiatric:         Mood and Affect: Mood normal          Behavior: Behavior normal  Additional Data:     Labs:    Results from last 7 days   Lab Units 01/30/23  0444   WBC Thousand/uL 8 88   HEMOGLOBIN g/dL 8 5*   HEMATOCRIT % 28 1*   PLATELETS Thousands/uL 303   NEUTROS PCT % 70   LYMPHS PCT % 15   MONOS PCT % 11   EOS PCT % 1     Results from last 7 days   Lab Units 01/30/23  0444   SODIUM mmol/L 138   POTASSIUM mmol/L 4 3   CHLORIDE mmol/L 104   CO2 mmol/L 30   BUN mg/dL 14   CREATININE mg/dL 0 60   ANION GAP mmol/L 4*   CALCIUM mg/dL 8 6   GLUCOSE RANDOM mg/dL 97                       Labs reviewed    Imaging:    Imaging reviewed    Recent Cultures (last 7 days):           Last 24 Hours Medication List:   Current Facility-Administered Medications   Medication Dose Route Frequency Provider Last Rate   • acetaminophen  650 mg Oral Q6H PRN ELDA Carmen     • apixaban  5 mg Oral BID ELDA Carmen     • atorvastatin  20 mg Oral Daily With Olga Enamorado MD     • bisacodyl  10 mg Rectal Daily PRN Darrius Russo MD     • calcium carbonate  1,000 mg Oral Daily PRN Darrius Russo MD     • calcium carbonate-vitamin D  2 tablet Oral Daily With Breakfast ELDA Carmen     • cholecalciferol  3,000 Units Oral Daily ELDA Carmen     • docusate sodium  100 mg Oral BID Darrius Russo MD     • escitalopram  5 mg Oral Daily ELDA Carmen     • hydrOXYzine HCL  10 mg Oral Q6H PRN Darrius Russo MD     • loratadine  10 mg Oral Daily Darrius Russo MD     • losartan  25 mg Oral Daily ELDA Carmen     • menthol-methyl salicylate   Apply externally 4x Daily PRN Darrius Russo MD     • metoprolol tartrate  50 mg Oral BID Darrius Russo MD     • oxyCODONE  5 mg Oral Q6H PRN Darrius Russo MD     • oxyCODONE  2 5 mg Oral Q6H PRN Darrius Russo MD     • pantoprazole  40 mg Oral Daily Darrius Russo MD     • polyethylene glycol  17 g Oral Daily PRN Darrius Russo MD          M*Modal software was used to dictate this note    It may contain errors with dictating incorrect words or incorrect spelling  Please contact the provider directly with any questions

## 2023-02-01 NOTE — ASSESSMENT & PLAN NOTE
S/p mechanical fall on 1/18  XR on 1/18 showed acute intertrochanteric L hip fracture  OR on 1/19 for IM fixation of L subtrochanteric hip fracture performed by Dr Harrell Child  WBAT to LLE  Neurovascular checks Q shift  Ensure adequate pain control  Monitor incision for s/s of infection  Eliquis for DVT ppx  Follow-up with Orthopedics in 2 weeks as outpatient (2/2)  Primary team following  PT/OT

## 2023-02-01 NOTE — ASSESSMENT & PLAN NOTE
Wt Readings from Last 3 Encounters:   02/01/23 94 kg (207 lb 3 2 oz)   01/23/23 95 2 kg (209 lb 14 1 oz)     Stable without exacerbation   on 1/20  Last ECHO on 12/2/22 showed EF 55-59%, G1DD  Takes Lasix 20mg daily at home - currently on hold  Restart as able

## 2023-02-01 NOTE — PLAN OF CARE
Problem: NEUROSENSORY - ADULT  Goal: Achieves maximal functionality and self care  Description: INTERVENTIONS  - Monitor swallowing and airway patency with patient fatigue and changes in neurological status  - Encourage and assist patient to increase activity and self care     - Encourage visually impaired, hearing impaired and aphasic patients to use assistive/communication devices  Outcome: Progressing     Problem: GASTROINTESTINAL - ADULT  Goal: Maintains or returns to baseline bowel function  Description: INTERVENTIONS:  - Assess bowel function  - Encourage oral fluids to ensure adequate hydration  - Administer IV fluids if ordered to ensure adequate hydration  - Administer ordered medications as needed  - Encourage mobilization and activity  - Consider nutritional services referral to assist patient with adequate nutrition and appropriate food choices  Outcome: Progressing  Goal: Maintains adequate nutritional intake  Description: INTERVENTIONS:  - Monitor percentage of each meal consumed  - Identify factors contributing to decreased intake, treat as appropriate  - Assist with meals as needed  - Monitor I&O, weight, and lab values if indicated  - Obtain nutrition services referral as needed  Outcome: Progressing     Problem: GENITOURINARY - ADULT  Goal: Maintains or returns to baseline urinary function  Description: INTERVENTIONS:  - Assess urinary function  - Encourage oral fluids to ensure adequate hydration if ordered  - Administer IV fluids as ordered to ensure adequate hydration  - Administer ordered medications as needed  - Offer frequent toileting  - Follow urinary retention protocol if ordered  Outcome: Progressing

## 2023-02-01 NOTE — PROGRESS NOTES
02/01/23 0700   Pain Assessment   Pain Assessment Tool 0-10   Pain Score 3   Pain Location/Orientation Location: Chest  (musculoskeletal)   Pain Radiating Towards none   Pain Onset/Description Frequency: Intermittent   Patient's Stated Pain Goal No pain   Hospital Pain Intervention(s) Rest   Multiple Pain Sites No   Restrictions/Precautions   Precautions Fall Risk;Pain   Weight Bearing Restrictions Yes   RLE Weight Bearing Per Order WBAT   LLE Weight Bearing Per Order WBAT   ROM Restrictions No   Braces or Orthoses   (R knee sleeve)   Eating   Type of Assistance Needed Independent   Comment provided pt with breakfast   Eating CARE Score 6   Oral Hygiene   Type of Assistance Needed Supervision   Comment Sup in stance at sink   Oral Hygiene CARE Score 4   Shower/Bathe Self   Type of Assistance Needed Incidental touching; Adaptive equipment   Comment completed shower this session seated on shower chair  Pt able to wash 10/10 parts  Utilized LHR with wash cloth to wash feet  CGA in stance with use of GB when washing jon/buttock area  Pt has GB at home   Shower/Bathe Self CARE Score 4   Bathing   Assessed Bath Style Shower   Anticipated D/C Bath Style Shower  (shower vs SB pending progress with shower transfer pt has lip)   Able to Gather/Transport No   Able to [employer] Temperature Yes   Able to Wash/Rinse/Dry (body part) Left Arm;Right Arm;L Upper Leg;R Upper Leg;L Lower Leg/Foot;R Lower Leg/Foot;Chest;Abdomen;Perineal Area; Buttocks   Limitations Noted in Balance; Endurance;ROM;Strength;Timeliness   Positioning Seated;Standing   Adaptive Equipment Shower Bars; Shower Seat;Hand [facility]   Findings (S)  modA for side stepping into shower with simulated lip  Can cont to benefit from further practice   pt has GB at home   Upper Body Dressing   Type of Assistance Needed Set-up / clean-up   Comment seated   Upper Body Dressing CARE Score 5   Lower Body Dressing   Type of Assistance Needed Incidental touching; Adaptive equipment   Comment G carryover of LHR to thread undergarment/pants, CGA in stance for CM   Lower Body Dressing CARE Score 4   Putting On/Taking Off Footwear   Type of Assistance Needed Physical assistance   Physical Assistance Level 25% or less   Comment TA for TEDS, use of [address] for don shoes   Putting On/Taking Off Footwear CARE Score 3   Lying to Sitting on Side of Bed   Type of Assistance Needed Physical assistance   Physical Assistance Level 26%-50%   Comment (S)  modA for trunk mngmnt when HOB flat and no use of bedrail  Trialed exiting from pts L side  Pt reporting she exits on R side   Lying to Sitting on Side of Bed CARE Score 3   Sit to Stand   Type of Assistance Needed Supervision; Adaptive equipment   Comment CS with Rw   Sit to Stand CARE Score 4   Bed-Chair Transfer   Type of Assistance Needed Incidental touching; Adaptive equipment   Comment CGA with RW   Chair/Bed-to-Chair Transfer CARE Score 4   Toileting Hygiene   Type of Assistance Needed Incidental touching; Adaptive equipment   Toileting Hygiene CARE Score 4   Toilet Transfer   Type of Assistance Needed Incidental touching; Adaptive equipment   Comment CGA with RW   Toilet Transfer CARE Score 4   Therapeutic Excerise-Strength   UE Strength Yes   Right Upper Extremity- Strength   R Shoulder Flexion; Extension;Horizontal ABduction   R Elbow Elbow flexion;Elbow extension   R Position Seated   Equipment Dumbbell  (3#)   R Weight/Reps/Sets 2x15   RUE Strength Comment engaged pt in UE TE with 3# 2x15 to cont to inc fx strengthf or STS and transfers  tolerated well with rest break   Left Upper Extremity-Strength   LUE Strength Comment see above   Cognition   Overall Cognitive Status WFL   Arousal/Participation Alert; Cooperative   Attention Attends with cues to redirect   Orientation Level Oriented X4   Memory Decreased short term memory   Following Commands Follows multistep commands with increased time or repetition   Activity Tolerance   Activity Tolerance Patient tolerated treatment well   Assessment   Treatment Assessment Engaged pt in 90mins of skilled OT services with focus on ADL routine and UE TE  Pt is making steady gains and completing ADL at [facility]/CS with use of LHR  Overall mobility at [facility] with RW  Ortho BP assessed prior to shower supine 162/76, sit 145/65, stand 131/59 asymptomatic with no TEDS  Pt can cont to benefit from further skilled OT services with focus on weigth shifting, activity tolerance, balance, HEP, dry shower transfer to inc fx performance and independence  OT Family training done with: Son Sat 2/4 1230   Prognosis Fair   Problem List Decreased strength;Decreased range of motion;Decreased endurance;Decreased mobility; Impaired balance   Barriers to Discharge Inaccessible home environment;Decreased caregiver support   Plan   Treatment/Interventions ADL retraining;Functional transfer training; Therapeutic exercise; Endurance training;Patient/family training;Bed mobility; Compensatory technique education   Progress Progressing toward goals   Recommendation   OT Discharge Recommendation Home with home health rehabilitation   Equipment Recommended Bedside commode; Shower/Tub chair with back ($);Hip Kit ($)  (RW)   Commode Type Standard   OT Equipment ordered DME sheet to be handed in later this week   Discharge Summary anticipated 2/10 pending progress   OT Therapy Minutes   OT Time In 0700   OT Time Out 0830   OT Total Time (minutes) 90   OT Mode of treatment - Individual (minutes) 90   OT Mode of treatment - Concurrent (minutes) 0   OT Mode of treatment - Group (minutes) 0   OT Mode of treatment - Co-treat (minutes) 0   OT Mode of Treatment - Total time(minutes) 90 minutes   OT Cumulative Minutes 780   Therapy Time missed   Time missed?  No

## 2023-02-02 ENCOUNTER — APPOINTMENT (OUTPATIENT)
Dept: RADIOLOGY | Facility: HOSPITAL | Age: 73
End: 2023-02-02

## 2023-02-02 PROBLEM — K59.00 CONSTIPATION: Status: RESOLVED | Noted: 2023-01-24 | Resolved: 2023-02-02

## 2023-02-02 LAB
BASOPHILS # BLD AUTO: 0.03 THOUSANDS/ÂΜL (ref 0–0.1)
BASOPHILS NFR BLD AUTO: 0 % (ref 0–1)
EOSINOPHIL # BLD AUTO: 0.08 THOUSAND/ÂΜL (ref 0–0.61)
EOSINOPHIL NFR BLD AUTO: 1 % (ref 0–6)
ERYTHROCYTE [DISTWIDTH] IN BLOOD BY AUTOMATED COUNT: 18 % (ref 11.6–15.1)
HCT VFR BLD AUTO: 28.1 % (ref 34.8–46.1)
HGB BLD-MCNC: 8.5 G/DL (ref 11.5–15.4)
IMM GRANULOCYTES # BLD AUTO: 0.06 THOUSAND/UL (ref 0–0.2)
IMM GRANULOCYTES NFR BLD AUTO: 1 % (ref 0–2)
LYMPHOCYTES # BLD AUTO: 0.93 THOUSANDS/ÂΜL (ref 0.6–4.47)
LYMPHOCYTES NFR BLD AUTO: 12 % (ref 14–44)
MCH RBC QN AUTO: 30 PG (ref 26.8–34.3)
MCHC RBC AUTO-ENTMCNC: 30.2 G/DL (ref 31.4–37.4)
MCV RBC AUTO: 99 FL (ref 82–98)
MONOCYTES # BLD AUTO: 0.59 THOUSAND/ÂΜL (ref 0.17–1.22)
MONOCYTES NFR BLD AUTO: 8 % (ref 4–12)
NEUTROPHILS # BLD AUTO: 5.81 THOUSANDS/ÂΜL (ref 1.85–7.62)
NEUTS SEG NFR BLD AUTO: 78 % (ref 43–75)
NRBC BLD AUTO-RTO: 0 /100 WBCS
PLATELET # BLD AUTO: 310 THOUSANDS/UL (ref 149–390)
PMV BLD AUTO: 9.1 FL (ref 8.9–12.7)
RBC # BLD AUTO: 2.83 MILLION/UL (ref 3.81–5.12)
WBC # BLD AUTO: 7.5 THOUSAND/UL (ref 4.31–10.16)

## 2023-02-02 RX ADMIN — APIXABAN 5 MG: 5 TABLET, FILM COATED ORAL at 08:12

## 2023-02-02 RX ADMIN — LORATADINE 10 MG: 10 TABLET ORAL at 08:12

## 2023-02-02 RX ADMIN — METOPROLOL TARTRATE 50 MG: 50 TABLET, FILM COATED ORAL at 16:57

## 2023-02-02 RX ADMIN — APIXABAN 5 MG: 5 TABLET, FILM COATED ORAL at 16:57

## 2023-02-02 RX ADMIN — OXYCODONE HYDROCHLORIDE 5 MG: 5 TABLET ORAL at 13:11

## 2023-02-02 RX ADMIN — ESCITALOPRAM OXALATE 5 MG: 5 TABLET, FILM COATED ORAL at 08:12

## 2023-02-02 RX ADMIN — ATORVASTATIN CALCIUM 20 MG: 20 TABLET, FILM COATED ORAL at 16:57

## 2023-02-02 RX ADMIN — ACETAMINOPHEN 650 MG: 325 TABLET ORAL at 20:27

## 2023-02-02 RX ADMIN — PANTOPRAZOLE SODIUM 40 MG: 40 TABLET, DELAYED RELEASE ORAL at 05:48

## 2023-02-02 RX ADMIN — METOPROLOL TARTRATE 50 MG: 50 TABLET, FILM COATED ORAL at 08:11

## 2023-02-02 RX ADMIN — Medication 2 TABLET: at 08:11

## 2023-02-02 RX ADMIN — LOSARTAN POTASSIUM 25 MG: 25 TABLET, FILM COATED ORAL at 08:11

## 2023-02-02 RX ADMIN — ACETAMINOPHEN 650 MG: 325 TABLET ORAL at 08:17

## 2023-02-02 RX ADMIN — CHOLECALCIFEROL TAB 25 MCG (1000 UNIT) 3000 UNITS: 25 TAB at 08:12

## 2023-02-02 NOTE — PLAN OF CARE
Problem: Prexisting or High Potential for Compromised Skin Integrity  Goal: Skin integrity is maintained or improved  Description: INTERVENTIONS:  - Identify patients at risk for skin breakdown  - Assess and monitor skin integrity  - Assess and monitor nutrition and hydration status  - Monitor labs   - Assess for incontinence   - Turn and reposition patient  - Assist with mobility/ambulation  - Relieve pressure over bony prominences  - Avoid friction and shearing  - Provide appropriate hygiene as needed including keeping skin clean and dry  - Evaluate need for skin moisturizer/barrier cream  - Collaborate with interdisciplinary team   - Patient/family teaching  - Consider wound care consult   Outcome: Progressing     Problem: NEUROSENSORY - ADULT  Goal: Achieves stable or improved neurological status  Description: INTERVENTIONS  - Monitor and report changes in neurological status  - Monitor vital signs such as temperature, blood pressure, glucose, and any other labs ordered   - Initiate measures to prevent increased intracranial pressure  - Monitor for seizure activity and implement precautions if appropriate      Outcome: Progressing  Goal: Achieves maximal functionality and self care  Description: INTERVENTIONS  - Monitor swallowing and airway patency with patient fatigue and changes in neurological status  - Encourage and assist patient to increase activity and self care     - Encourage visually impaired, hearing impaired and aphasic patients to use assistive/communication devices  Outcome: Progressing     Problem: RESPIRATORY - ADULT  Goal: Achieves optimal ventilation and oxygenation  Description: INTERVENTIONS:  - Assess for changes in respiratory status  - Assess for changes in mentation and behavior  - Position to facilitate oxygenation and minimize respiratory effort  - Oxygen administered by appropriate delivery if ordered  - Initiate smoking cessation education as indicated  - Encourage broncho-pulmonary hygiene including cough, deep breathe, Incentive Spirometry  - Assess the need for suctioning and aspirate as needed  - Assess and instruct to report SOB or any respiratory difficulty  - Respiratory Therapy support as indicated  Outcome: Progressing

## 2023-02-02 NOTE — TEAM CONFERENCE
Acute RehabilitationTeam Conference Note  Date: 2/2/2023   Time: 10:30 AM       Patient Name:  Chata Miller       Medical Record Number: [id number]   YOB: 1950  Sex: Female          Room/Bed:  /[facility] 263-01  Payor Info:  Payor: John Jo / Plan: [employer] / Product Type: Workers Compensation /      Admitting Diagnosis: Femur fracture, left ([facility] ) [[id number]]   Admit Date/Time:  1/23/2023  1:33 PM  Admission Comments: No comment available     Primary Diagnosis:  Closed left hip fracture (HCC)  Principal Problem: Closed left hip fracture [facility])    Patient Active Problem List    Diagnosis Date Noted   • Chest wall pain 01/26/2023   • Constipation 01/24/2023   • Osteopenia 01/23/2023   • Hypoxemia 01/23/2023   • Adjustment disorder with anxiety 01/23/2023   • Multiple subsegmental pulmonary emboli without acute cor pulmonale ([facility] ) 01/21/2023   • Acute deep vein thrombosis (DVT) of left peroneal vein ([facility] ) 01/21/2023   • Acute postoperative anemia due to expected blood loss 01/21/2023   • Closed left hip fracture ([facility] ) 01/18/2023   • Essential hypertension 01/18/2023   • Other hyperlipidemia 01/18/2023   • Chronic diastolic congestive heart failure ([facility] ) 01/18/2023       Physical Therapy:    Weight Bearing Status: Weight Bearing as Tolerated (L LE)  Transfers: Incidental Touching  Bed Mobility: Minimal Assistance, Moderate Assistance  Amulation Distance (ft): 75 feet (for first time 2/1, limited by Knee pain)  Ambulation: Incidental Touching  Assistive Device for Ambulation: Roller Walker  Number of Stairs: 4 (once only, has not been able to repeat)  Assistive Device for Stairs: Bilateral [employer]  Stair Assistance:  Moderate Assistance  Ramp: Minimal Assistance  Assistive Device for Ramp: Roller Walker  Discharge Recommendations: Home with:  [address] with[de-identified] Family Support, Home Physical Therapy, Outpatient Physical Therapy    Pt is 67 yr old female slipped and fell at work on 1/18/23 sustaining + L hip IT/subtroch hip fx requiring surgical fixation with long TFN done 1/19/23 by Dr García El Campo  Cleared post op for L LE WBAT  Pt with post op complications including hypoxia, L LE DVT + PE, anemia  Other PMH: HTN, inc lipids, R hip fx sp ORIF 2011  At baseline pt fully I without AD, works FT as  at [employer]  +  Pt lives in [address] [facility] home with brother (on disability), 6 ABY B HR, bed and Full bath on second floor, 1/2 bath first level  Pt sleeps in recliner at baseline on first floor  Pt highly limited amb olvin due to poor wt shift to unload and advance R LE due to sig dec L LE wt bearing tolerance  Unable to ambulate at this time   Pt to greatly benefit from ongoing aggressive skilled PT intervention to maximize functional mobility as pts goal is to be mod I for DC home  Pt demonstrates fair to good rehab potential to reach set goals pending progress and activity tolerance in ELOS x2 weeks for safe DC home with family support and likely out pt PT if able  Pt has RW, SPC,and shower chair  2/1/23 Pt has demonstrated improved mobility over the past week  Participating well, however does require frequent rest breaks due to pain and/or fatigue  Pt reports she wishes to sleep upstairs in her bed and will use recliner on 1st floor if needed  Pt requires Mod A for bed mobility at this time, limited by weakness of L LE  Walking is slowly improving, with her now able to walk HHD with RW  Gait remains antalgic, but has initaited step through and improved wt bearing L LE  Stairs remain greatest barrier  Pt has anxiety along with Weakness/Pain L LE limiting her ability to ascend stairs  Ice with elevation being used on L LE for edema & pain  Progressing toward goals        Occupational Therapy:  Eating: Independent  Grooming: Supervision  Bathing: Incidental Touching  Bathing: Incidental Touching  Upper Body Dressing: Supervision  Lower Body Dressing: Incidental Touching  Toileting: Incidental Touching  Tub/Shower Transfer: Moderate Assistance  Toilet Transfer: Incidental Touching  Cognition: Within Defined Limits  Orientation: Person, Place, Time, Situation  Discharge Recommendations: Home with:  [address] with[de-identified] Family Support, Home Occupational Therapy       2/1/23  Pt admitted to [facility] on 1/23  Pt is making great gains from last week modA/maxA to CGA/CS with use of RW and LHAE  Pts son to attend FT 2/4 1230 to discuss DC plan and have him assess fx performance  Pt with anticipated DC 2/10, however, pending fx performance and rehab team assessing may DC earlier with home OT services  Anticipate pt to be [place] for most ADLs at DC  Speech Therapy:           No notes on file    Nursing Notes:  Appetite: Good  Diet Type: Regular/House, Thin Liquids                                                                     Pain Location/Orientation: Orientation: Left, Orientation: Upper, Location: Hip  Pain Score: 3                       Hospital Pain Intervention(s): Medication (See MAR), Repositioned          Pt is a 67 y o  female with a PMH of osteopenia, hx of R hip fracture, CHF, HTN, and HLD who originally presented to [facility] on 1/18/2023 after a mechanical fall due to a slippery floor with residual L sided hip pain  XR on 1/18 showed acute intertrochanteric L hip fracture  Orthopedics was consulted and recommended surgical intervention  Patient was taken to the OR on 1/19 for IM fixation of L subtrochanteric fracture performed by Dr Michela Villa  Post-operatively, patient became lethargic and hypoxic after receiving Dilaudid which originally improved with Narcan administration  Patient required BiPAP and continued to have hypoxia along with hypotension and leukocytosis  PE CTA study was performed on 1/20 and showed few subsegmental pulmonary emboli in the posterior lower lobes with a measured RV/LV ratio within normal limits    Venous duplex showed acute DVT in 1 of 2 L peroneal veins  Patient was started on Eliquis for [facility] on 1/22 and will need to continue for 3 months  Post-operatively, patient also developed acute blood loss anemia but did not require any blood products  Patient is to be WBAT to LLE and will require follow-up with Orthopedics as outpatient  Pulmonary also recommended overnight sleep study as outpatient due to hypoxia  Admitted to [address]  1/23/23  Pt on Vit D 3000 u daily for osteopenia  Repeat DXA scan as outpatient along with discussion about Prolia injections  Started on Eliquis 10mg BID on 1/22 x 7 days then decrease to 5mg BID for 3 months for DVT  On Lipitor 20mg daily for hyperlipidemia  HTN managed with Cozaar 25mg daily & lopressor 50mg BID  We will monitor pt's vital signs & lab results  Will continue to monitor incision site for s/s of infection  Pt will have adequate pain control to fully participate in therapies  Pt will have safe transfers with the use of call bell & hourly rounding to prevent falls  Pt will be educated on importance of T/R & offloading weight while in bed/chair to prevent pressure injury  Pt will be educated on energy conservation & encouraged independence with ADL's  Case Management:     Discharge Planning  Living Arrangements: Lives w/ Family members  Support Systems: Other (Comment) (brother)  Assistance Needed: unableto assess at present  Type of Current Residence: Private residence  [address]: No  2/2- Pt lives in 2 story home w/ her brother, 6 ABY  Bedroom and bathroom on 2nd floor  Pt reports having shower chair and raised toilet seat  Pt reports she was independent prior to admission  Pt works FT at [employer] in [place]  Pt reports no hx of STR, OP PT OR HHC  CM also reviewed team update and dc date of 2/10, pt verbalized appreciation of setting dc date so she" had something to work towards"        Is the patient actively participating in therapies? yes  List any modifications to the treatment plan: None    Barriers Interventions   Blood loss anemia IV Iron   Pulmonary emboli Monitoring   DVT Monitoring   Anxiety Med management, breathing, encouragement   Decreased functional reach LHE   Incisional care Monitoring     Is the patient making expected progress toward goals? yes  List any update or changes to goals: None    Medical Goals: Patient will be medically stable for discharge to [facility] restrictive envrionment upon completion of rehab program and Patient will be able to manage medical conditions and comorbid conditions with medications and follow up upon completion of rehab program    Weekly Team Goals:   Rehab Team Goals  ADL Team Goal: Patient will be independent with ADLs with least restrictive device upon completion of rehab program  Bowel/Bladder Team Goal: Patient will return to premorbid level for bladder/bowel management upon completion of rehab program  Transfer Team Goal: Patient will be independent with transfers with least restrictive device upon completion of rehab program  Locomotion Team Goal: Patient will be independent with locomotion with least restrictive device upon completion of rehab program    Discussion: Pt is participating in rehab program and participating in therapies  Contact guard close supervision w/ ADL IADLs  Min assist with sit to stand, mod assist to pivot, contact guard for transfers w/ walker  Team recommending further workup to focus on functional and mobility goals  Anticipated Discharge Date:  D/c date Friday 2/10 - OP PT  [facility] Team Members Present: The following team members are supervising care for this patient and were present during this Weekly Team Conference      Physician: Dr Kira Odell MD  : Sylvia Ellington MSW  Registered Nurse: Graciela Velasquez RN  Physical Therapist: Quintin Pink PT  Occupational Therapist: Aime Rodriguez MS, OTR/L  Speech Therapist:

## 2023-02-02 NOTE — PROGRESS NOTES
02/02/23 1230   Pain Assessment   Pain Assessment Tool 0-10   Pain Score 9   Pain Location/Orientation Orientation: Right;Location: Hip   Pain Onset/Description Onset: Gradual   Effect of Pain on Daily Activities Tolerated   Patient's Stated Pain Goal No pain   Hospital Pain Intervention(s) Cold applied; Rest  (RN notified for pain medication)   Multiple Pain Sites No   Restrictions/Precautions   Precautions Fall Risk;Pain   Weight Bearing Restrictions Yes   LLE Weight Bearing Per Order WBAT   ROM Restrictions No   Braces or Orthoses   (R knee sleeve; B/L teds)   Lifestyle   Autonomy "I have a fear of falling since all of this happened "   Tub/Shower Transfer   Limitations Noted In Balance; Endurance; Safety;LE Strength   Adaptive Equipment Grab Bars;Seat with Back   Assessed Shower  (DRY)   Findings Trialed dry shower transfer with 2" lip to simulate shower S/U for D/C  Pt reports grab bar, however, grab bar is located on far wall of shower and unable to utilize for support to enter/exit shower  Demo of side stepping over showe rlip utilizing shower wall for support, however, pt unable to complete, dmeonstrating increased fear of falling and difficulty shifting full weight through LLE  Trialed with use of grab bar with focus on promoting WBing through LLE  Pt able to complete x2, however, LOB upon 2nd attempt with pt able to self correct  Sit to Stand   Type of Assistance Needed Supervision; Adaptive equipment   Physical Assistance Level No physical assistance   Comment CS with RW   Sit to Stand CARE Score 4   Bed-Chair Transfer   Type of Assistance Needed Supervision; Adaptive equipment   Physical Assistance Level No physical assistance   Comment CS with RW   Chair/Bed-to-Chair Transfer CARE Score 4   Toileting Hygiene   Type of Assistance Needed Supervision; Adaptive equipment   Physical Assistance Level No physical assistance   Comment CS with RW   Toileting Hygiene CARE Score 4   Toilet Transfer   Type of Assistance Needed Supervision; Adaptive equipment   Physical Assistance Level No physical assistance   Comment CS with RW to over-the-toilet commode   Toilet Transfer CARE Score 4   Therapeutic Excerise-Strength   UE Strength Yes   Right Upper Extremity- Strength   R Shoulder Flexion;ABduction; Extension;Horizontal ABduction; External rotation; Internal rotation   R Elbow Elbow flexion;Elbow extension   R Position Seated   Equipment Theraband  (Yellow)   R Weight/Reps/Sets 2 sets of 15 reps   RUE Strength Comment Completed UE HEP, which was initiated during AM session, with pt demo ability to complete 2 sets of 15 reps of indicated planes utilizing Y theraband  Pt is agreeable to complete daily upon D/C in order to maximize UE strength  Left Upper Extremity-Strength   L Shoulder Flexion;ABduction; Extension;Horizontal ABduction; External rotation; Internal rotation   L Elbow Elbow flexion;Elbow extension   L Position Seated   Equipment Theraband  (Yellow)   L Weights/Reps/Sets 2 sets of 15 reps   LUE Strength Comment See above  Cognition   Overall Cognitive Status WFL   Arousal/Participation Alert; Cooperative   Attention Attends with cues to redirect   Orientation Level Oriented X4   Memory Decreased short term memory   Following Commands Follows multistep commands with increased time or repetition   Comments Observed with increased fear of falling during dry shower transfer; Emotional support provided  Activity Tolerance   Activity Tolerance Patient tolerated treatment well   Assessment   Treatment Assessment Pt participated in 50 min skilled OT Tx session with focus on ADL of toileting, simulating a dry shower transfer, and completing UE HEP  See above for further Tx details  Pt is performing ADL of toileting at CS level utilizing over-the-toilet commode  Pt reports having powder room on 1st flr and BR attached to her bedroom   Pt also reports her brother is physically capable of moving BSC upon D C, with anticipated plan for commode to be placed over the toilet on 1st flr for day time, and brought upstairs to bedroom BR for nighttime use  OT to clarify with family during upcoming FT  Difficulty observed with shifting fully weight through LLE during simulated shower transfer, experiencing LOB, but was able to self correct  Pt did report increased paina nd fear of falling following shower transfer  RN present to provide pain medication and cold pack applied  Discussed continued practice with shower transfer, as well as plan to initially SB upon D/C until seen by Home therapy to practice shower transfer  Pt is agreeable  Pt would continue to benefit from skilled OT services to improve IND and confidence with shower transfer, improve fxnl standing balance, complete OT FT, and improve IND with ADL/IADL performance in order to progress towards OT goals and dec caregiver burden upon D/C  Prognosis Fair   Problem List Decreased strength;Decreased range of motion;Decreased endurance; Impaired balance;Decreased mobility;Pain   Barriers to Discharge Inaccessible home environment;Decreased caregiver support   Plan   Treatment/Interventions ADL retraining;Functional transfer training; Therapeutic exercise; Endurance training;Patient/family training   Progress Progressing toward goals   Recommendation   OT Discharge Recommendation Home with home health rehabilitation   Equipment Recommended Bedside commode; Shower/Tub chair with back ($)   Commode Type Standard   OT Therapy Minutes   OT Time In 1230   OT Time Out 1320   OT Total Time (minutes) 50   OT Mode of treatment - Individual (minutes) 50   OT Mode of treatment - Concurrent (minutes) 0   OT Mode of treatment - Group (minutes) 0   OT Mode of treatment - Co-treat (minutes) 0   OT Mode of Treatment - Total time(minutes) 50 minutes   OT Cumulative Minutes 870   Therapy Time missed   Time missed?  No

## 2023-02-02 NOTE — ASSESSMENT & PLAN NOTE
Wt Readings from Last 3 Encounters:   02/02/23 93 8 kg (206 lb 12 8 oz)   01/23/23 95 2 kg (209 lb 14 1 oz)     Stable without exacerbation   on 1/20  Last ECHO on 12/2/22 showed EF 55-59%, G1DD  Takes Lasix 20mg daily at home - currently on hold  Restart as able

## 2023-02-02 NOTE — PROGRESS NOTES
Physical Medicine and Rehabilitation Progress Note  Brenda Garnica 67 y o  female MRN: [id number]  Unit/Bed#: -47 Encounter: [id number]    HPI: Brenda Garnica is a 67 y o  female  With medical history of HLD, HTN, Chronic diastolic heart failure (T2RY), previous R hip fracture who presented to the [address] [place] [address] on 1/18 after slipping on some water while at work at [employer]  No prodromal symptoms  CTH showed no acute intracranial abnormality, and XR L hip showed a subtrochanteric femur fracture  Ortho recommended ORIF with IMN which was performed on 1/19 by Dr Mitchell [place]  Made WBAT post-op Post-op lethargi and hypoxic, CXR showed no acute cardiopulmonary disease  Required BiPAP, Narcan ,and Flumazenil  There was some improvement in her lethargy, but remained hypoxic  Pulm was consulted  CTA PE ordered which showed distal subsegmental PEs in the posterior lower lobe with atelectasis  Doppler performed showed a DVT in 1 peroneal vein in LLE  Pulm did not feel that the findings were responsible for the extent of her hypoxia - they felt multifactorial 2/2 atelectasis some volume overload  She got 1 dose of IV Lasix  Primary did not feel there was a volume overload component  Ultimately recommended for a/c for at least 3 months  Eliquis cost $47 a month  Course also c/b ABLA, but that stabilized and she did not require transfusion She was stabilized and admitted to the [facility] on 1/23/2023  Chief Complaint: In good spirits today  Interval History/Subjective:  No acute events overnight  Last BM 2/10  No new CP, SOB, fevers, chills, N/V, abdominal pain  Pain is well controlled, making great progress therapies  Would like to see if she can get up a flight of stairs  She hasn't done this in a long time, but wants to try to make it to her bedroom  ROS:  A 10 point review of systems was negative except for what is noted in the HPI  Today's Changes:  1   Ortho consulted today for two week follow-up  2  Team Conference, see separate note and dispo below for additional detail  Total time spent:  35 minutes, with more than 50% spent counseling/coordinating care  Counseling includes discussion with patient re: progress in therapies, functional issues observed by therapy staff, and discussion with patient his/her current medical state/wellbeing  Coordination of patient's care was performed in conjunction with Internal Medicine service to monitor patient's labs, vitals, and management of their comorbidities  In addition, this patient was discussed by the interdisciplinary team in weekly case conference today  The care of the patient was extensively discussed with all care providers and an appropriate rehabilitation plan was formulated unique for this patient  Barriers were identified preventing progression of therapy and appropriate interventions were discussed with each discipline  Please see the team note for input from all disciplines regarding barriers, intervention, and discharge planning  Assessment/Plan:    * Closed left hip fracture ([address] )  Assessment & Plan  After slipping on water at work  Workers Comp  Subtrochanteric femur fracture s/p long nail (antegrade) on 1/19 by Dr Tracie Bowles  Pain control as documented below  Surgical dressings removed 1/26  Monitor drainage  Follow-up with Orthopedics 2 weeks (2/2)   - Consult placed    PT/ OT 3-5 hours a day, 5-7 days/week in acute comprehensive inpatient rehab    Chest wall pain  Assessment & Plan  Suspect MSK or costochondritis - mild  Reproducible with palpation and certain push off manuevers in therapies  Improving  No diaphoresis, nausea, radiation, SOB  Reviewed red flag symptoms that would warrant ACS r/o  Added Bengay  Monitor closely  Adjustment disorder with anxiety  Assessment & Plan  Will provide non-pharmacologic strategies  Continued PRN hydroxyzine    1/30 started Lexapro  Consulted rehab psychology for support  Hypoxemia  Assessment & Plan  Resolved and on RA  Previously multifactorial: PE, atelectasis, ?volume overload s/p Lasix IV x1  Does have nocturnal hypoxemia on Friday Noc Ox for 1 hour   - Will start O2 at night, will need noc ox performed 48 hours prior to discharge   - Made case management aware  Monitor closely  Goals > 90%  Outpatient f/u with Pulmonology/Sleep Medicine    Osteopenia  Assessment & Plan  DXA 12/2020 with osteopenia  Continue home calcium and Vitamin D  1/24 Vitamin D is 29 8  Outpatient f/u DXA with PCP  Acute postoperative anemia due to expected blood loss  Assessment & Plan  Hgb 12 6 pre-op  2/2  8 8 > 7 2 > 7 4 > 7 2 > 6 9 > 8 2 > 8 5 > 8 5   - Iron deficiency on labs  - Also post-operative blood loss  - Given IV venofer x2   - Type and screen   - Consent in chart  - 1/26 Transfuse with 1 unit pRBC  Trend, transfuse as appropriate  Outpatient f/u with PCP    Acute deep vein thrombosis (DVT) of left peroneal vein [facility])  Assessment & Plan  1/20 LE Venous Duplex with L acute DVT in 1 of 2 peroneal veins  Now on Eliquis  See PE for more details  No SCD on that leg  Can do ace wrapping for edema  Outpatient f/u with PCP    Multiple subsegmental pulmonary emboli without acute cor pulmonale (HCC)  Assessment & Plan  Noted on CTA PE from 1/20   RV/LV ratio WNL  Completed Eliquis 10mg BID 7 days  Now on 5mg BID for 3 months as per Pulm  Monitor respiratory status  Chronic diastolic congestive heart failure (HCC)  Assessment & Plan  Wt Readings from Last 3 Encounters:   02/02/23 93 8 kg (206 lb 12 8 oz)   01/23/23 95 2 kg (209 lb 14 1 oz)     Does not appear to have acute exacerbation  12/2/2022 Echo with EF 55-59% and G1DD   on 1/20  Home: Lopressor, Losartan, Lasix  Here: Lopressor, Losartan 25mg daily  - Holding ARB for orthostasis  - Orthostasis has improved  Monitor and add back as appropriate     Monitor I/O and daily weights  Adjust as appropriate  Outpatient f/u with PCP  Other hyperlipidemia  Assessment & Plan  Home: Simvastatin 20mg nightly  Here: Lipitor 20mg nightly for therapeutic substitution   Outpatient f/u with PCP    Essential hypertension  Assessment & Plan  Home: Lasix 20mg daily, Lopressor 50mg BID, Losartan 50mg daily  Here: Lopressor 50mg BID, Losartan 25mg daily  1/24 Losartan held for orthostasis  Can try to restart Lasix while here once BP appropriate  Monitor BP, adjust as appropriate  Follow-up with PCP  Constipation-resolved as of 2/2/2023  Assessment & Plan  Resolved  Weaning off bowel regimen  Monitor on just PRNs  Hypokalemia-resolved as of 1/31/2023  Assessment & Plan  1/30 4 3  Resolved after supplementation  Monitor  Leukocytosis-resolved as of 1/31/2023  Assessment & Plan  1/30 8 88 and resolved  Afebrile, but otherwise stable  Drainage from L thigh doesn't appear infectious  Monitor incision site closely  Monitor off antibiotics  Monitor Temp and WBC  Initiate work-up if she spikes a fever or develops localizing symptoms  Health Maintenance  #Delirium/Sleep: At risk has had hallucinations on oxycodone 10mg, and was lethargic requiring narcan on fentanyl and dilaudid  #Pain: Tylenol ordered PRN, Oxycodone 2 5-5mg PRN  #Bowel: Last BM 2/1  PRN miralax/docusate/suppository  #Bladder: Voiding and continent  Monitor  #Skin/Pressure Injury Prevention: Turn Q2hr in bed, with weight shifts [id number] in wheelchair  Float heels in bed  #DVT Prophylaxis: Fully anticoagulated on Eliquis  SCD on RLE only  #GI Prophylaxis: PPI started when full a/c started  Can also do famotidine  #Code Status:  Full Code  #FEN: Reg Diet  #Dispo:  Team Conference 2/2: ADD 2/10/2023  Goals are to see if we can get independent with full flight of stairs at home, and modified Ind for all mobility and ADLs  She has made marked progress in the past week   Will go home with outpatient PT    - Follow-up Orthopedics  - Follow-up Sleep Medicine  - Follow-up PCP    Objective:    Functional Update: progressing  PT: CGA transfers, min-modA bed mobility, CGA ambulation 75', modA stairs with bilateral hand rails, Stacia ramp with RW  OT: Ind eating, Sup grooming, CGA bathing, Sup UB dressing, CGA LB dressing, CGA toileting, modA tub/shower transfer, CGA toilet transfer    Allergies per EMR    Physical Exam:  Temp:  [98 3 °F (36 8 °C)-98 8 °F (37 1 °C)] 98 3 °F (36 8 °C)  HR:  [73-92] 82  Resp:  [18] 18  BP: (120-150)/(59-82) 134/72  Oxygen Therapy  SpO2: 97 %  O2 Flow Rate (L/min): 2 L/min    Gen: No acute distress, Well-nourished, well-appearing  HEENT: Moist mucus membranes, Normocephalic/Atraumatic  Cardiovascular: Regular rate, rhythm, S1/S2  Distal pulses palpable  Heme/Extr: Improved LE edema  Pulmonary: Non-labored breathing  Lungs CTAB  : No garcia  GI: Soft, non-tender, non-distended  BS+  Integumentary: Skin is warm, dry  Incision site C/D/I  Staples in place  No signs of dehiscence  Minimal drainage  Neuro: AAOx3, Speech is intact  Appropriate to questioning  Tone is norm   Psych: Normal mood and affect         Diagnostic Studies:   Reviewed, no new imaging    Laboratory:  Reviewed  Results from last 7 days   Lab Units 02/02/23  0517 01/30/23  0444 01/26/23  1752   HEMOGLOBIN g/dL 8 5* 8 5* 8 2*   HEMATOCRIT % 28 1* 28 1* 25 8*   WBC Thousand/uL 7 50 8 88  --      Results from last 7 days   Lab Units 01/30/23  0444   BUN mg/dL 14   POTASSIUM mmol/L 4 3   CHLORIDE mmol/L 104   CREATININE mg/dL 0 60            Patient Active Problem List   Diagnosis   • Closed left hip fracture (HCC)   • Essential hypertension   • Other hyperlipidemia   • Chronic diastolic congestive heart failure (HCC)   • Multiple subsegmental pulmonary emboli without acute cor pulmonale (HCC)   • Acute deep vein thrombosis (DVT) of left peroneal vein (HCC)   • Acute postoperative anemia due to expected blood loss   • Osteopenia • Hypoxemia   • Adjustment disorder with anxiety   • Constipation   • Chest wall pain         Medications  Current Facility-Administered Medications   Medication Dose Route Frequency Provider Last Rate   • acetaminophen  650 mg Oral Q6H PRN ELDA Lacy     • apixaban  5 mg Oral BID ELDA Lacy     • atorvastatin  20 mg Oral Daily With Gifty Bill MD     • bisacodyl  10 mg Rectal Daily PRN Sher Romero MD     • calcium carbonate  1,000 mg Oral Daily PRN Sher Romero MD     • calcium carbonate-vitamin D  2 tablet Oral Daily With Breakfast ELDA Lacy     • cholecalciferol  3,000 Units Oral Daily ELDA Lacy     • docusate sodium  100 mg Oral BID Sher Romero MD     • escitalopram  5 mg Oral Daily ELDA Lacy     • hydrOXYzine HCL  10 mg Oral Q6H PRN Sher Romero MD     • loratadine  10 mg Oral Daily Sher Romero MD     • losartan  25 mg Oral Daily ELDA Lacy     • menthol-methyl salicylate   Apply externally 4x Daily PRN Sher Romero MD     • metoprolol tartrate  50 mg Oral BID Sher Romero MD     • oxyCODONE  5 mg Oral Q6H PRN Sher Romero MD     • oxyCODONE  2 5 mg Oral Q6H PRN Sher Romero MD     • pantoprazole  40 mg Oral Daily Sher Romero MD     • polyethylene glycol  17 g Oral Daily PRN Sher Romero MD            ** Please Note: Fluency Direct voice to text software may have been used in the creation of this document   **

## 2023-02-02 NOTE — PROGRESS NOTES
02/02/23 1000   Pain Assessment   Pain Assessment Tool 0-10   Pain Score 4   Pain Location/Orientation Orientation: Mid;Orientation: Left; Location: Leg;Location: Knee   Restrictions/Precautions   Precautions Fall Risk;Pain   LLE Weight Bearing Per Order WBAT   Braces or Orthoses   (R knee sleeve)   Sit to Stand   Type of Assistance Needed Supervision   Comment CS  standing to RW   Sit to Stand CARE Score 4   Bed-Chair Transfer   Type of Assistance Needed Supervision   Comment CS with RW   Chair/Bed-to-Chair Transfer CARE Score 4   Walk 10 Feet   Type of Assistance Needed Incidental touching   Comment RW   Walk 10 Feet CARE Score 4   Walk 150 Feet   Reason if not Attempted Safety concerns   Walk 150 Feet CARE Score 88   Walking 10 Feet on Uneven Surfaces   Type of Assistance Needed Incidental touching   Comment CG up/down indoor ramp   Walking 10 Feet on Uneven Surfaces CARE Score 4   Ambulation   Primary Mode of Locomotion Prior to Admission Walk   Distance Walked (feet) 35 ft  (x2-  this was not pts max distance)   Assist Device Roller Walker   Walk Assist Level Contact Guard;Close Supervision   Findings Pt CG for general safety and balance but no overt LOB noted,  pt still with slightly quick R step but less sliding, Pt showing improvement with partial step through pattern   Does the patient walk? 2  Yes   Curb or Single Stair   Style negotiated Single stair   Type of Assistance Needed Physical assistance   Physical Assistance Level 25% or less   Comment Min A bilat HRs   1 Step (Curb) CARE Score 3   4 Steps   Type of Assistance Needed Physical assistance   Physical Assistance Level 25% or less   Comment Min A with bilat HRs, used retro descent, step to pattern,  needs encouragement   4 Steps CARE Score 3   12 Steps   Reason if not Attempted Safety concerns   12 Steps CARE Score 88   Assessment   Treatment Assessment 30 min session focused on ramp and stair training, and 2 shorter walks    Pt is making progress with needing less support and balance assist   Pt able to perform 4 steps but she will still need to work on these to safely be D/C home  Pt is showing more consistancy with amb household distances  Pt will benefit from cont skilled PT toward LTGs  Barriers to Discharge Inaccessible home environment;Decreased caregiver support   PT Barriers   Physical Impairment Decreased strength;Decreased endurance; Impaired balance;Decreased mobility;Orthopedic restrictions;Pain   Functional Limitation Car transfers; Walking; Wheelchair management;Transfers   Plan   Progress Progressing toward goals   Recommendation   PT Discharge Recommendation Home with home health rehabilitation   PT Therapy Minutes   PT Time In 1000   PT Time Out 1030   PT Total Time (minutes) 30   PT Mode of treatment - Individual (minutes) 30   PT Mode of treatment - Concurrent (minutes) 0   PT Mode of treatment - Group (minutes) 0   PT Mode of treatment - Co-treat (minutes) 0   PT Mode of Treatment - Total time(minutes) 30 minutes   PT Cumulative Minutes 780   Therapy Time missed   Time missed?  No

## 2023-02-02 NOTE — PROGRESS NOTES
[facility]  Progress Note Greg Cornejo 1950, 67 y o  female MRN: [id number]  Unit/Bed#: [facility] 504-95 Encounter: [id number]  Primary Care Provider: No primary care provider on file  Date and time admitted to hospital: 1/23/2023  1:33 PM    Constipation  Assessment & Plan  Improving  KUB obtained on 1/23 due to no BM for 6 days  Showed nonobstructive bowel gas pattern moderate to large amount of stool throughout the colon, suggesting constipation  Bowel regimen increased  Encourage PO hydration and mobilization  Primary team following  Monitor for s/s of obstruction  Adjustment disorder with anxiety  Assessment & Plan  Has been receiving Atarax 10mg Q6 PRN since 1/26  Discussed with patient and admits to feelings of anxiety prior to her fall  Started Lexapro 5mg daily on 1/30  Supportive counseling as needed  Neuropsych consulted  Follow-up with PCP as outpatient  Hypoxemia  Assessment & Plan  Hypoxia in acute setting post-operatively  Developed PEs and was started on Eliquis  Per Pulmonary - recommending overnight noc ox as outpatient  Desat while sleeping - noc ox obtained on 1/27  Desat for 1 hour and 40 minutes, lowest saturation 80%  Apply 2L O2 at night or while sleeping  Repeat overnight noc ox prior to discharge for DME evaluation  Osteopenia  Assessment & Plan  DXA scan in 12/2020 showed osteopenia  Continue home calcium carbonate and Vitamin D supplementation  Vitamin D level 29 8 on 1/24  Increased Vitamin D3 to 3000u daily  Recommend repeat DXA scan as outpatient along with discussion about Prolia injections  Follow-up with PCP as outpatient  Acute postoperative anemia due to expected blood loss  Assessment & Plan  Hgb currently 8 5  Hgb was 12 6 pre-operatively  Iron panel on 1/23: Iron Saturation 9%, TIBC 279, Iron 25, and Ferritin 81   2 doses of IV Venofer from 1/24-1/25  Given 1u PRBCs on 1/26 for Hgb of 6 9    No active s/s of bleeding  Transfuse for Hgb <7 0 or if becomes symptomatic  Continue to trend CBC  Acute deep vein thrombosis (DVT) of left peroneal vein [facility])  Assessment & Plan  Lower extremity venous duplex on 1/20: Evidence of focal acute DVT in 1 of 2 peroneal veins on LLE  Completed Eliquis 10mg BID for 7 days and now receiving Eliquis 5mg BID  Follow-up with PCP as outpatient  Neurovascular checks Q shift  Multiple subsegmental pulmonary emboli without acute cor pulmonale (HCC)  Assessment & Plan  CTA PE study on 1/20: Few subsegmental pulmonary emboli in the posterior lower lobes, measured RV/LV ratio within normal limits  Completed Eliquis 10mg BID for 7 days and now receiving Eliquis 5mg BID, continue for 3 months  Follow-up with PCP as outpatient  Currently maintaining on RA  Encourage pulmonary toileting  Spot pulse ox checks  Chronic diastolic congestive heart failure (HCC)  Assessment & Plan  Wt Readings from Last 3 Encounters:   02/02/23 93 8 kg (206 lb 12 8 oz)   01/23/23 95 2 kg (209 lb 14 1 oz)     Stable without exacerbation   on 1/20  Last ECHO on 12/2/22 showed EF 55-59%, G1DD  Takes Lasix 20mg daily at home - currently on hold  Restart as able  Other hyperlipidemia  Assessment & Plan  Continue home Lipitor 20mg daily  Last lipid panel on 11/15/22: Cholesterol 147, Triglycerides 73, HDL 53, and LDL 79  Essential hypertension  Assessment & Plan  Home regimen: Lasix 20mg daily, metoprolol tartrate 50mg BID, losartan 50mg daily  Currently receiving metoprolol tartrate 50mg BID  Restarted losartan 25mg daily on 1/31  Apply abdominal binder/TEDs when getting OOB  Consider restarting Lasix when able  Monitor BP with routine VS   Follow-up with PCP as outpatient  * Closed left hip fracture [facility])  Assessment & Plan  S/p mechanical fall on 1/18  XR on 1/18 showed acute intertrochanteric L hip fracture    OR on 1/19 for IM fixation of L subtrochanteric hip fracture performed by Dr Yvette Ceballos  WBAT to LLE  Neurovascular checks Q shift  Ensure adequate pain control  Monitor incision for s/s of infection  Eliquis for DVT ppx  Follow-up with Orthopedics in 2 weeks as outpatient ()  XRs ordered  Ortho consulted  Primary team following  PT/OT  VTE Pharmacologic Prophylaxis:   Pharmacologic: Apixaban (Eliquis)  Mechanical VTE Prophylaxis in Place: No - DVT  Current Length of Stay: 10 day(s)    Current Patient Status: Inpatient Rehab     Discharge Plan: As per primary team     Code Status: Level 1 - Full Code    Subjective:   Pt examined while pt sitting in WC in pt room  Just completed therapy session and is experiencing L thigh, knee, and shin pain  Mild, aching  Knee pain is the worst out of the three areas and feels like a burning sensation  Noticed to have mild edema - encouraged to elevate leg as able  Orthopedics consulted for follow-up today and XRs ordered  Denies any lightheadedness, dizziness, SOB, or palpitations  LBM yesterday without any issues  Has no other concerns or complaints at this time  Objective:     Vitals:   Temp (24hrs), Av 5 °F (36 9 °C), Min:98 3 °F (36 8 °C), Max:98 8 °F (37 1 °C)    Temp:  [98 3 °F (36 8 °C)-98 8 °F (37 1 °C)] 98 3 °F (36 8 °C)  HR:  [73-82] 82  Resp:  [18] 18  BP: (120-150)/(59-82) 134/72  SpO2:  [94 %-97 %] 97 %  Body mass index is 36 63 kg/m²  Review of Systems   Constitutional: Negative for appetite change, chills, fatigue and fever  HENT: Negative for trouble swallowing  Eyes: Negative for visual disturbance  Respiratory: Negative for cough, shortness of breath, wheezing and stridor  Cardiovascular: Negative for chest pain, palpitations and leg swelling  Gastrointestinal: Negative for abdominal distention, abdominal pain, constipation, diarrhea, nausea and vomiting  LBM 2/   Genitourinary: Negative for difficulty urinating     Musculoskeletal: Positive for arthralgias (L thigh, knee, and shin pain, mild, worse after therapy session, knee worse than other two areas, mild edema)  Negative for back pain and gait problem  Neurological: Negative for dizziness, weakness, light-headedness, numbness and headaches  Psychiatric/Behavioral: Negative for dysphoric mood and sleep disturbance  The patient is not nervous/anxious  All other systems reviewed and are negative  Input and Output Summary (last 24 hours): Intake/Output Summary (Last 24 hours) at 2/2/2023 0858  Last data filed at 2/1/2023 1801  Gross per 24 hour   Intake 120 ml   Output --   Net 120 ml       Physical Exam:     Physical Exam  Vitals and nursing note reviewed  Constitutional:       General: She is not in acute distress  Appearance: Normal appearance  She is obese  She is not ill-appearing  HENT:      Head: Normocephalic and atraumatic  Cardiovascular:      Rate and Rhythm: Normal rate and regular rhythm  Pulses: Normal pulses  Heart sounds: Murmur heard  Systolic murmur is present with a grade of 1/6  No friction rub  Pulmonary:      Effort: Pulmonary effort is normal  No respiratory distress  Breath sounds: Normal breath sounds  No wheezing or rhonchi  Abdominal:      General: Abdomen is flat  Bowel sounds are normal  There is no distension  Palpations: Abdomen is soft  There is no mass  Tenderness: There is no abdominal tenderness  There is no guarding or rebound  Hernia: No hernia is present  Musculoskeletal:         General: Swelling (Mild edema to L knee) present  Cervical back: Normal range of motion and neck supple  No tenderness  Right lower leg: No edema  Left lower leg: No edema  Skin:     General: Skin is warm and dry  Capillary Refill: Capillary refill takes less than 2 seconds  Findings: Bruising (L thigh ecchymosis) present  Comments: L hip incision with DSD  Dressings CDI     Neurological:      Mental Status: She is alert and oriented to person, place, and time     Psychiatric:         Mood and Affect: Mood normal          Behavior: Behavior normal          Additional Data:     Labs:    Results from last 7 days   Lab Units 02/02/23  0517   WBC Thousand/uL 7 50   HEMOGLOBIN g/dL 8 5*   HEMATOCRIT % 28 1*   PLATELETS Thousands/uL 310   NEUTROS PCT % 78*   LYMPHS PCT % 12*   MONOS PCT % 8   EOS PCT % 1     Results from last 7 days   Lab Units 01/30/23  0444   SODIUM mmol/L 138   POTASSIUM mmol/L 4 3   CHLORIDE mmol/L 104   CO2 mmol/L 30   BUN mg/dL 14   CREATININE mg/dL 0 60   ANION GAP mmol/L 4*   CALCIUM mg/dL 8 6   GLUCOSE RANDOM mg/dL 97                       Labs reviewed    Imaging:    Imaging reviewed    Recent Cultures (last 7 days):           Last 24 Hours Medication List:   Current Facility-Administered Medications   Medication Dose Route Frequency Provider Last Rate   • acetaminophen  650 mg Oral Q6H PRN ELDA Callahan     • apixaban  5 mg Oral BID ELDA Callahan     • atorvastatin  20 mg Oral Daily With Anna Worthington MD     • bisacodyl  10 mg Rectal Daily PRN Maddie Husain MD     • calcium carbonate  1,000 mg Oral Daily PRN Maddie Husain MD     • calcium carbonate-vitamin D  2 tablet Oral Daily With Breakfast ELDA Callahan     • cholecalciferol  3,000 Units Oral Daily ELDA Callahan     • docusate sodium  100 mg Oral BID Maddie Husain MD     • escitalopram  5 mg Oral Daily ELDA Callahan     • hydrOXYzine HCL  10 mg Oral Q6H PRN Maddie Husain MD     • loratadine  10 mg Oral Daily Maddie Husain MD     • losartan  25 mg Oral Daily ELDA Callahan     • menthol-methyl salicylate   Apply externally 4x Daily PRN Maddie Husain MD     • metoprolol tartrate  50 mg Oral BID Maddie Husain MD     • oxyCODONE  5 mg Oral Q6H PRN Maddie Husain MD     • oxyCODONE  2 5 mg Oral Q6H PRN Maddie Husain MD     • pantoprazole  40 mg Oral Daily Maddie Husain MD     • polyethylene glycol  17 g Oral Daily PRN Ezekiel Leonard MD          M*Modal software was used to dictate this note  It may contain errors with dictating incorrect words or incorrect spelling  Please contact the provider directly with any questions

## 2023-02-02 NOTE — PROGRESS NOTES
02/02/23 0900   Pain Assessment   Pain Assessment Tool 0-10   Pain Score 4   Pain Location/Orientation Orientation: Left;Orientation: Upper; Location: Hip   Restrictions/Precautions   Precautions Pain; Fall Risk   LLE Weight Bearing Per Order WBAT   Braces or Orthoses   (pts own R knee sleeve)   Cognition   Overall Cognitive Status WFL   Arousal/Participation Alert; Cooperative   Attention Attends with cues to redirect   Orientation Level Oriented X4   Memory Decreased short term memory   Following Commands Follows multistep commands with increased time or repetition   Subjective   Subjective "Im def doing better, but I have a ways to go"   Sit to Stand   Type of Assistance Needed Supervision   Physical Assistance Level No physical assistance   Comment CS with inc time   Sit to Stand CARE Score 4   Bed-Chair Transfer   Type of Assistance Needed Supervision   Physical Assistance Level No physical assistance   Comment CS with inc time   Chair/Bed-to-Chair Transfer CARE Score 4   Walk 10 Feet   Type of Assistance Needed Incidental touching   Physical Assistance Level No physical assistance   Walk 10 Feet CARE Score 4   Walk 50 Feet with Two Turns   Type of Assistance Needed Incidental touching   Physical Assistance Level No physical assistance   Walk 50 Feet with Two Turns CARE Score 4   Ambulation   Primary Mode of Locomotion Prior to Admission Walk   Distance Walked (feet) 65 ft   Assist Device Roller Walker   Gait Pattern Slow Oksana; Inconsistant Oksana; Step to; Step through; Decreased L stance; Improper weight shift; Antalgic   Limitations Noted In Endurance;Speed;Strength;Swing   Walk Assist Level Contact Guard   Findings progressive step throught oksana however gait remains significantly antalgic  Does the patient walk? 2  Yes   Wheelchair mobility   Does the patient use a wheelchair? 0   No   Therapeutic Interventions   Strengthening Seated L LE TE: heel slides, LAQ, ball squeeze, yell tband hip abd and HS curls x30  Standing with RW R step taps to 4inch block, x10; L hip flexion x8  Equipment Use   NuStep L2 x12 min focus on B LE ROM and conditioning  Assessment   Treatment Assessment Pt engaged in 60 min skilled PT intervention with focus on gait and B LE strength ROM  Pt reports feeling better however overall lacks confidence and does not feel she could manage at home at this time although does recognize the progress she has made since admission  Discussion of being able to do TE in her room and encouraged seated heel slides to inc L LE ROM and reduce L LE stiffness from edema and immobility  Pt asked about coming to therapy gym in late afternoons to do  the Nustep as she feels it really helps her  Dec LLE wt bearing persists with some improvement in step through sequencing  Continue wt shifting tasks and gait training as pt feels she would like to go upstairs to her bed at DC  Pt receptive to DC home as planned 2/10, and in agreement for out pt PT  WIll order RW  Barriers to Discharge Inaccessible home environment;Decreased caregiver support   PT Barriers   Physical Impairment Decreased strength;Decreased endurance; Impaired balance;Decreased mobility;Orthopedic restrictions;Pain   Functional Limitation Car transfers; Walking; Wheelchair management;Transfers   Plan   Progress Progressing toward goals   Recommendation   PT Discharge Recommendation Home with outpatient rehabilitation   Equipment Recommended [facility] Recommended Wheeled walker   PT Therapy Minutes   PT Time In 0900   PT Time Out 1000   PT Total Time (minutes) 60   PT Mode of treatment - Individual (minutes) 60   PT Mode of treatment - Concurrent (minutes) 0   PT Mode of treatment - Group (minutes) 0   PT Mode of treatment - Co-treat (minutes) 0   PT Mode of Treatment - Total time(minutes) 60 minutes   PT Cumulative Minutes 810   Therapy Time missed   Time missed?  No

## 2023-02-02 NOTE — PLAN OF CARE
Problem: Prexisting or High Potential for Compromised Skin Integrity  Goal: Skin integrity is maintained or improved  Description: INTERVENTIONS:  - Identify patients at risk for skin breakdown  - Assess and monitor skin integrity  - Assess and monitor nutrition and hydration status  - Monitor labs   - Assess for incontinence   - Turn and reposition patient  - Assist with mobility/ambulation  - Relieve pressure over bony prominences  - Avoid friction and shearing  - Provide appropriate hygiene as needed including keeping skin clean and dry  - Evaluate need for skin moisturizer/barrier cream  - Collaborate with interdisciplinary team   - Patient/family teaching  - Consider wound care consult   Outcome: Progressing     Problem: SKIN/TISSUE INTEGRITY - ADULT  Goal: Incision(s), wounds(s) or drain site(s) healing without S/S of infection  Description: INTERVENTIONS  - Assess and document dressing, incision, wound bed, drain sites and surrounding tissue  - Provide patient and family education  - Perform skin care/dressing changes every shift  Outcome: Progressing

## 2023-02-02 NOTE — PROGRESS NOTES
02/02/23 0820   Pain Assessment   Pain Assessment Tool 0-10   Pain Score 3   Pain Location/Orientation Orientation: Left; Location: Hip   Pain Onset/Description Onset: Gradual   Effect of Pain on Daily Activities Tolerated   Patient's Stated Pain Goal No pain   Hospital Pain Intervention(s)   (RN administered Tylenol)   Multiple Pain Sites No   Restrictions/Precautions   Precautions Fall Risk;Pain   Weight Bearing Restrictions Yes   RLE Weight Bearing Per Order WBAT   LLE Weight Bearing Per Order WBAT   ROM Restrictions No   Braces or Orthoses   (R knee sleeve)   Lifestyle   Autonomy Pt agreeable to OT Tx session  Lower Body Dressing   Comment S to don RLE knee brace with use of LHR to assist with donning brace over R foot   Putting On/Taking Off Footwear   Type of Assistance Needed Supervision; Adaptive equipment   Physical Assistance Level No physical assistance   Comment Utilized LHR to doff slipper socks  Use of LH shoe horn to doff slip on shoes  B/L teds donned prior to OT arrival   Putting On/Taking Off Footwear CARE Score 4   Sit to Stand   Type of Assistance Needed Supervision; Adaptive equipment   Physical Assistance Level No physical assistance   Comment CS with RW   Sit to Stand CARE Score 4   Bed-Chair Transfer   Type of Assistance Needed Supervision; Adaptive equipment   Physical Assistance Level No physical assistance   Comment CS with RW   Chair/Bed-to-Chair Transfer CARE Score 4   Therapeutic Excerise-Strength   UE Strength Yes   Right Upper Extremity- Strength   R Shoulder Flexion; Extension; External rotation; Internal rotation;ABduction;Horizontal ABduction   R Elbow Elbow flexion;Elbow extension   R Position Seated   Equipment Theraband  (Yellow)   R Weight/Reps/Sets 2 sets of 15 reps   RUE Strength Comment Initiated UE HEP utilizing Y theraband to perform 2 sets of 15 reps of indicated planes with focus on maximizing UE strength upon D/C  Pt tolerated well with rest breaks between each set   OT plan to finish HEP during upcoming Tx session  Left Upper Extremity-Strength   L Shoulder Flexion;ABduction;Horizontal ABduction; Extension; External rotation; Internal rotation   L Elbow Elbow flexion;Elbow extension   L Position Seated   Equipment Theraband  (Yellow)   L Weights/Reps/Sets 2 sets of 15 reps   LUE Strength Comment See above   Cognition   Overall Cognitive Status WFL   Arousal/Participation Alert; Cooperative   Attention Attends with cues to redirect   Orientation Level Oriented X4   Memory Decreased short term memory   Following Commands Follows multistep commands with increased time or repetition   Activity Tolerance   Activity Tolerance Patient tolerated treatment well   Other Comments   Assessment /72 (Seated), 134/58 (Stance)   Assessment   Treatment Assessment Pt participated in brief 40 min skilled OT Tx session with focus on footwear management and initiating UE HEP  See above for further Tx details  Pt continues to benefit from utilizing [facility] for LB dressing/footwear management, donning RLE knee brace, doffing socks, and donning slip on shoes at S level  OT plan to trial donning teds utilizing sock aide during upcoming Tx sessions  Began UE HEP utilizing Y theraband  OT plan to complete HEP during PM OT Tx session  FT scheduled for 2/4 with pt's son Silvano Hernandez)  Continue POC  Prognosis Fair   Problem List Decreased strength;Decreased endurance;Decreased range of motion; Impaired balance;Decreased mobility;Pain   Barriers to Discharge Inaccessible home environment;Decreased caregiver support   Plan   Treatment/Interventions ADL retraining;Functional transfer training; Therapeutic exercise; Endurance training;Patient/family training   Progress Progressing toward goals   Recommendation   OT Discharge Recommendation Home with home health rehabilitation   OT Therapy Minutes   OT Time In 0820   OT Time Out 0900   OT Total Time (minutes) 40   OT Mode of treatment - Individual (minutes) 40   OT Mode of treatment - Concurrent (minutes) 0   OT Mode of treatment - Group (minutes) 0   OT Mode of treatment - Co-treat (minutes) 0   OT Mode of Treatment - Total time(minutes) 40 minutes   OT Cumulative Minutes 820   Therapy Time missed   Time missed?  No

## 2023-02-02 NOTE — ASSESSMENT & PLAN NOTE
S/p mechanical fall on 1/18  XR on 1/18 showed acute intertrochanteric L hip fracture  OR on 1/19 for IM fixation of L subtrochanteric hip fracture performed by Dr Tim Fox  WBAT to LLE  Neurovascular checks Q shift  Ensure adequate pain control  Monitor incision for s/s of infection  Eliquis for DVT ppx  Follow-up with Orthopedics in 2 weeks as outpatient (2/2)  XRs ordered  Ortho consulted  Primary team following  PT/OT

## 2023-02-02 NOTE — CONSULTS
Consultation - Orthopedics   Chata Miller 67 y o  female MRN: [id number]  Unit/Bed#: -03 Encounter: [id number]      Assessment/Plan     Assessment:  66 yo female status post L long TFN on 1/19/23    Plan:  · Patient doing well  · Staples removed and steri strips applied which may stay on for 2-3 days  · Continue WBAT to LLE  · PT/ OT    · Pain control prn  Apply ice  · DVT prophylaxis- on eliquis now due to PE  · Patient will require follow up in the office in 4 weeks with Dr Fleix Montoya  History of Present Illness   Physician Requesting Consult: Marya Miller MD  Reason for Consult / Principal Problem: post op L TFN  HPI: Chata Miller is a 67y o  year old female who presents 2 weeks status post L long TFN  Patient is currently admitted to the [address] [place] [address] acute rehab unit  She will not be able to attend her outpatient post op appointment so orthopedic consult was requested  As of today patient states she is doing well overall  She notes she has pain in the left hip and knee after PT sessions  She states pain is controlled once she has pain medicine and ice  Patient is ambulating with a walker  She was diagnosed with a PE and started on eliquis  Patient denies fevers, chills, distal numbness  Consults    ROS: see HPI, all other systems negative  Historical Information   Past Medical History:   Diagnosis Date   • Hypertension      Past Surgical History:   Procedure Laterality Date   • HIP FRACTURE SURGERY     • ME OPTX FEM SHFT FX W/INSJ IMED IMPLT W/WO SCREW Left 1/19/2023    Procedure: INSERTION NAIL IM FEMUR ANTEGRADE (TROCHANTERIC);   Surgeon: Scott Rivera MD;  [facility]: [facility];  Service: Orthopedics   • REDUCTION MAMMAPLASTY       Social History   Social History     Substance and Sexual Activity   Alcohol Use Never     Social History     Substance and Sexual Activity   Drug Use Never     Social History     Tobacco Use   Smoking Status Never   Smokeless Tobacco Never     Family History: History reviewed  No pertinent family history  Meds/Allergies   Medications Prior to Admission   Medication   • apixaban (ELIQUIS) 5 mg   • docusate sodium (COLACE) 100 mg capsule   • losartan (COZAAR) 50 mg tablet   • metoprolol tartrate (LOPRESSOR) 50 mg tablet   • oxyCODONE (ROXICODONE) 5 immediate release tablet   • senna (SENOKOT) 8 6 mg   • simvastatin (ZOCOR) 20 mg tablet   • acetaminophen (TYLENOL) 325 mg tablet   • cetirizine (ZyrTEC) 10 mg tablet     Allergies   Allergen Reactions   • Amoxicillin Angioedema   • Sulfa Antibiotics Angioedema and Anaphylaxis     throat "closes "     • Lisinopril Swelling   • Medical Tape Other (See Comments)     rash, blisters       Objective   Vitals: Blood pressure 134/58, pulse 82, temperature 98 3 °F (36 8 °C), temperature source Tympanic, resp  rate 18, height 5' 3" (1 6 m), weight 93 8 kg (206 lb 12 8 oz), SpO2 97 %  ,Body mass index is 36 63 kg/m²  Intake/Output Summary (Last 24 hours) at 2/2/2023 1402  Last data filed at 2/2/2023 1200  Gross per 24 hour   Intake 480 ml   Output --   Net 480 ml     I/O last 24 hours: In: 5 [P O :540]  Out: -     Invasive Devices     None                 PE:  Gen:  Awake and alert  HEENT:  Hearing intact  Heart:  Regular rate  Lungs: no audible wheezing  GI: no abdominal distension    Ortho Exam   Left lower extremity:  Inspection- incisions CDI with staples which were removed  Small amount of ecchymosis laterally  Moderate swelling as expected  No drainage  No erythema  Palpation- no TTP  +swelling  Thigh and calf soft and compressible  ROM- no pain with mid passive hip ROM  Able to go sit to stand  Neurovascular- sensation intact L4- S1  Palpable posterior tibial pulse  AT/ GS/ EHL intact       Lab Results:   CBC:   Lab Results   Component Value Date    WBC 7 50 02/02/2023    HGB 8 5 (L) 02/02/2023    HCT 28 1 (L) 02/02/2023    MCV 99 (H) 02/02/2023     02/02/2023    MCH 30 0 02/02/2023    MCHC 30 2 (L) 02/02/2023    RDW 18 0 (H) 02/02/2023    MPV 9 1 02/02/2023    NRBC 0 02/02/2023     CMP: No results found for: SODIUM, CL, CO2, ANIONGAP, BUN, CREATININE, GLUCOSE, CALCIUM, AST, ALT, ALKPHOS, PROT, BILITOT, EGFR       Imaging Studies: I have personally reviewed pertinent films in PACS   X-ray left femur- status post L long TFN with hardware intact and fracture alignment maintained       Code Status: Level 1 - Full Code  Advance Directive and Living Will:      Power of :    POLST:

## 2023-02-03 RX ADMIN — ACETAMINOPHEN 650 MG: 325 TABLET ORAL at 18:10

## 2023-02-03 RX ADMIN — METOPROLOL TARTRATE 50 MG: 50 TABLET, FILM COATED ORAL at 18:10

## 2023-02-03 RX ADMIN — CHOLECALCIFEROL TAB 25 MCG (1000 UNIT) 3000 UNITS: 25 TAB at 09:11

## 2023-02-03 RX ADMIN — LOSARTAN POTASSIUM 25 MG: 25 TABLET, FILM COATED ORAL at 09:12

## 2023-02-03 RX ADMIN — APIXABAN 5 MG: 5 TABLET, FILM COATED ORAL at 09:11

## 2023-02-03 RX ADMIN — METOPROLOL TARTRATE 50 MG: 50 TABLET, FILM COATED ORAL at 09:12

## 2023-02-03 RX ADMIN — PANTOPRAZOLE SODIUM 40 MG: 40 TABLET, DELAYED RELEASE ORAL at 06:06

## 2023-02-03 RX ADMIN — ACETAMINOPHEN 650 MG: 325 TABLET ORAL at 12:12

## 2023-02-03 RX ADMIN — APIXABAN 5 MG: 5 TABLET, FILM COATED ORAL at 18:10

## 2023-02-03 RX ADMIN — Medication 2 TABLET: at 09:11

## 2023-02-03 RX ADMIN — ESCITALOPRAM OXALATE 5 MG: 5 TABLET, FILM COATED ORAL at 09:12

## 2023-02-03 RX ADMIN — LORATADINE 10 MG: 10 TABLET ORAL at 09:11

## 2023-02-03 RX ADMIN — ATORVASTATIN CALCIUM 20 MG: 20 TABLET, FILM COATED ORAL at 18:10

## 2023-02-03 NOTE — PROGRESS NOTES
02/03/23 1230   Pain Assessment   Pain Assessment Tool 0-10   Pain Score 5   Pain Location/Orientation Orientation: Left;Orientation: Upper; Location: Leg;Location: Hip;Location: Knee   Pain Onset/Description Onset: Ongoing; Descriptor: Discomfort; Descriptor: Aching   Restrictions/Precautions   Precautions Fall Risk;Pain   LLE Weight Bearing Per Order WBAT   Braces or Orthoses Knee brace  (use of pts own R knee elastic sleeve )   Cognition   Overall Cognitive Status WFL   Arousal/Participation Alert; Cooperative   Attention Attends with cues to redirect   Orientation Level Oriented X4   Memory Decreased short term memory   Following Commands Follows multistep commands with increased time or repetition   Subjective   Subjective "i just have to get out of my head about the steps"   Sit to Stand   Type of Assistance Needed Supervision   Physical Assistance Level No physical assistance   Sit to Stand CARE Score 4   Bed-Chair Transfer   Type of Assistance Needed Supervision   Physical Assistance Level No physical assistance   Chair/Bed-to-Chair Transfer CARE Score 4   Walk 10 Feet   Type of Assistance Needed Supervision   Physical Assistance Level No physical assistance   Comment CS with RW   Walk 10 Feet CARE Score 4   Walk 50 Feet with Two Turns   Type of Assistance Needed Supervision   Physical Assistance Level No physical assistance   Comment CS with RW   Walk 50 Feet with Two Turns CARE Score 4   Walk 150 Feet   Comment ftgd at 60ft  Ambulation   Primary Mode of Locomotion Prior to Admission Walk   Distance Walked (feet) 60 ft  (x2)   Assist Device Roller Walker   Gait Pattern Antalgic; Slow [place]; Step to;Decreased L stance; Improper weight shift   Limitations Noted In Endurance;Speed;Strength;Swing   Walk Assist Level Close Supervision   Findings sig dec L LE stance persists  Does the patient walk? 2  Yes   Wheelchair mobility   Does the patient use a wheelchair? 0   No   Stairs   Findings (S)  attempted steps with B HR, pt unable to complete x1 steps, reports she is just fearful  Picking Up Object   Comment suggest trial next session with reacher  Unsupported balance improving  Therapeutic Interventions   Strengthening Seated B LE TE: APs on 4inch block, x30; L LE # 1 5 LAQ, hip flexion x30; red tband hip abd and HS curls x30  Balance unsupported ball toss x30  Equipment Use   NuStep L2 x18 min for LE ROM and conditioning, seat 8  Assessment   Treatment Assessment Pt engaged in 90 min skilled PT intervention with ongoing focus on gait, B LE strength and ROM  Pt feels L LE edema is reducing, happy to have staples out however, per pt, proximal incision is still draining slightly  Pt continues to demonstrate slow progress with ambulation distance however continues significant dec L LE wt bearing  RW raised for postural correction and to allow for inc LE wt bearing  Pt was unable to complete x1 step today with B HR, reports being too afraid  Suggest re-visiting pre-stair training tasks to improve confidence  Education for ongoing B LE wt bearing in stand  Pt able to stand unsupported for ball toss without issue  Family training with son set for tomorrow however son is only interested in seeing pts progress as son will not be there when pt DC home, he lives in [place], came to stay with pt brother  Continue per POC to maximize functional I, pt can have first floor setup however still has 6 ABY  Problem List Decreased strength;Decreased range of motion;Decreased endurance; Impaired balance;Decreased mobility;Pain   Barriers to Discharge Inaccessible home environment;Decreased caregiver support   Barriers to Discharge Comments ABY, ML home   PT Barriers   Physical Impairment Decreased strength;Decreased endurance; Impaired balance;Decreased mobility;Orthopedic restrictions;Pain   Functional Limitation Car transfers; Walking; Wheelchair management;Transfers   Plan   Treatment/Interventions Functional transfer training;LE strengthening/ROM; Therapeutic exercise; Endurance training;Patient/family training;Gait training   Progress Progressing toward goals   Recommendation   PT Discharge Recommendation Home with outpatient rehabilitation  (pt wanting out pt PT at [facility])   Equipment Recommended [place] walker   Discharge Summary plan remains for [facility] home friday 2/10  PT Therapy Minutes   PT Time In 1230   PT Time Out 1400   PT Total Time (minutes) 90   PT Mode of treatment - Individual (minutes) 80   PT Mode of treatment - Concurrent (minutes) 10   PT Mode of treatment - Group (minutes) 0   PT Mode of treatment - Co-treat (minutes) 0   PT Mode of Treatment - Total time(minutes) 90 minutes   PT Cumulative Minutes 930   Therapy Time missed   Time missed?  No

## 2023-02-03 NOTE — ASSESSMENT & PLAN NOTE
Wt Readings from Last 3 Encounters:   02/03/23 93 5 kg (206 lb 1 6 oz)   01/23/23 95 2 kg (209 lb 14 1 oz)     Stable without exacerbation   on 1/20  Last ECHO on 12/2/22 showed EF 55-59%, G1DD  Takes Lasix 20mg daily at home - currently on hold  Restart as able

## 2023-02-03 NOTE — PROGRESS NOTES
[facility]  Progress Note Jordin Zaragoza 1950, 67 y o  female MRN: [id number]  Unit/Bed#: -93 Encounter: [id number]  Primary Care Provider: No primary care provider on file  Date and time admitted to hospital: 1/23/2023  1:33 PM    Adjustment disorder with anxiety  Assessment & Plan  Has been receiving Atarax 10mg Q6 PRN since 1/26  Discussed with patient and admits to feelings of anxiety prior to her fall  Started Lexapro 5mg daily on 1/30  Supportive counseling as needed  Neuropsych consulted  Follow-up with PCP as outpatient  Hypoxemia  Assessment & Plan  Hypoxia in acute setting post-operatively  Developed PEs and was started on Eliquis  Per Pulmonary - recommending overnight noc ox as outpatient  Desat while sleeping - noc ox obtained on 1/27  Desat for 1 hour and 40 minutes, lowest saturation 80%  Apply 2L O2 at night or while sleeping  Repeat overnight noc ox prior to discharge for DME evaluation  Osteopenia  Assessment & Plan  DXA scan in 12/2020 showed osteopenia  Continue home calcium carbonate and Vitamin D supplementation  Vitamin D level 29 8 on 1/24  Increased Vitamin D3 to 3000u daily  Recommend repeat DXA scan as outpatient along with discussion about Prolia injections  Follow-up with PCP as outpatient  Acute postoperative anemia due to expected blood loss  Assessment & Plan  Hgb currently 8 5  Hgb was 12 6 pre-operatively  Iron panel on 1/23: Iron Saturation 9%, TIBC 279, Iron 25, and Ferritin 81   2 doses of IV Venofer from 1/24-1/25  Given 1u PRBCs on 1/26 for Hgb of 6 9  No active s/s of bleeding  Transfuse for Hgb <7 0 or if becomes symptomatic  Continue to trend CBC  Acute deep vein thrombosis (DVT) of left peroneal vein [facility])  Assessment & Plan  Lower extremity venous duplex on 1/20: Evidence of focal acute DVT in 1 of 2 peroneal veins on LLE    Completed Eliquis 10mg BID for 7 days and now receiving Eliquis 5mg BID  Follow-up with PCP as outpatient  Neurovascular checks Q shift  Multiple subsegmental pulmonary emboli without acute cor pulmonale (HCC)  Assessment & Plan  CTA PE study on 1/20: Few subsegmental pulmonary emboli in the posterior lower lobes, measured RV/LV ratio within normal limits  Completed Eliquis 10mg BID for 7 days and now receiving Eliquis 5mg BID, continue for 3 months  Follow-up with PCP as outpatient  Currently maintaining on RA  Encourage pulmonary toileting  Spot pulse ox checks  Chronic diastolic congestive heart failure (HCC)  Assessment & Plan  Wt Readings from Last 3 Encounters:   02/03/23 93 5 kg (206 lb 1 6 oz)   01/23/23 95 2 kg (209 lb 14 1 oz)     Stable without exacerbation   on 1/20  Last ECHO on 12/2/22 showed EF 55-59%, G1DD  Takes Lasix 20mg daily at home - currently on hold  Restart as able  Other hyperlipidemia  Assessment & Plan  Continue home Lipitor 20mg daily  Last lipid panel on 11/15/22: Cholesterol 147, Triglycerides 73, HDL 53, and LDL 79  Essential hypertension  Assessment & Plan  Home regimen: Lasix 20mg daily, metoprolol tartrate 50mg BID, losartan 50mg daily  Currently receiving metoprolol tartrate 50mg BID  Restarted losartan 25mg daily on 1/31  Apply abdominal binder/TEDs when getting OOB  Consider restarting Lasix when able  Monitor BP with routine VS   Follow-up with PCP as outpatient  * Closed left hip fracture [facility])  Assessment & Plan  S/p mechanical fall on 1/18  XR on 1/18 showed acute intertrochanteric L hip fracture  OR on 1/19 for IM fixation of L subtrochanteric hip fracture performed by Dr Yvette Ceballos  WBAT to LLE  Neurovascular checks Q shift  Ensure adequate pain control  Monitor incision for s/s of infection  Eliquis for DVT ppx  Follow-up with Orthopedics in 2 weeks  XRs on 2/2: stable appearing comminuted intertrochanteric fracture s/p ORIF, no evidence for hardware complication    Ortho consulted - staples removed  Follow-up in additional 4 weeks as outpatient  Primary team following  PT/OT  VTE Pharmacologic Prophylaxis:   Pharmacologic: Apixaban (Eliquis)  Mechanical VTE Prophylaxis in Place: No - DVT  Current Length of Stay: 11 day(s)    Current Patient Status: Inpatient Rehab     Discharge Plan: As per primary team     Code Status: Level 1 - Full Code    Subjective:   Pt examined while pt sitting in recliner in pt room  Currently complaining of discomfort to posterior L knee, feels "tight" but improves slightly with movement  Encouraged to elevate her legs as able  DIDI stockings in place  Denies any thigh or incisional pain  Did have bleeding from her incision yesterday after itching incision but was covered with dressing to avoid further manipulation  Denies any lightheadedness, dizziness, SOB, or palpitations  Denies any further palpitations after taking her Lexapro  Objective:     Vitals:   Temp (24hrs), Av 2 °F (36 8 °C), Min:97 7 °F (36 5 °C), Max:98 4 °F (36 9 °C)    Temp:  [97 7 °F (36 5 °C)-98 4 °F (36 9 °C)] 98 4 °F (36 9 °C)  HR:  [65-81] 81  Resp:  [18] 18  BP: (116-139)/(58-74) 138/74  SpO2:  [93 %-95 %] 95 %  Body mass index is 36 51 kg/m²  Review of Systems   Constitutional: Negative for appetite change, chills, fatigue and fever  HENT: Negative for trouble swallowing  Eyes: Negative for visual disturbance  Respiratory: Negative for cough, chest tightness, shortness of breath, wheezing and stridor  Cardiovascular: Negative for chest pain, palpitations and leg swelling  Gastrointestinal: Negative for abdominal distention, abdominal pain, constipation, diarrhea, nausea and vomiting  LBM 2/2   Genitourinary: Negative for difficulty urinating  Musculoskeletal: Positive for myalgias (discomfort to posterior L knee, improves with stretching, increasing edema)  Negative for arthralgias and back pain     Neurological: Negative for dizziness, weakness, light-headedness and headaches  Psychiatric/Behavioral: Negative for dysphoric mood and sleep disturbance  The patient is nervous/anxious  All other systems reviewed and are negative  Input and Output Summary (last 24 hours): Intake/Output Summary (Last 24 hours) at 2/3/2023 0900  Last data filed at 2/3/2023 0830  Gross per 24 hour   Intake 720 ml   Output 525 ml   Net 195 ml       Physical Exam:     Physical Exam  Vitals and nursing note reviewed  Constitutional:       General: She is not in acute distress  Appearance: Normal appearance  She is obese  She is not ill-appearing  HENT:      Head: Normocephalic and atraumatic  Cardiovascular:      Rate and Rhythm: Normal rate and regular rhythm  Pulses: Normal pulses  Heart sounds: Murmur heard  Systolic murmur is present with a grade of 2/6  No friction rub  Pulmonary:      Effort: Pulmonary effort is normal  No respiratory distress  Breath sounds: Normal breath sounds  No wheezing or rhonchi  Abdominal:      General: Abdomen is flat  Bowel sounds are normal  There is no distension  Palpations: Abdomen is soft  There is no mass  Tenderness: There is no abdominal tenderness  There is no guarding or rebound  Hernia: No hernia is present  Musculoskeletal:         General: Swelling (Moderate posterior knee edema) present  Cervical back: Normal range of motion and neck supple  Right lower leg: No edema  Left lower leg: No edema  Skin:     General: Skin is warm and dry  Capillary Refill: Capillary refill takes less than 2 seconds  Neurological:      Mental Status: She is alert and oriented to person, place, and time     Psychiatric:         Mood and Affect: Mood normal          Behavior: Behavior normal          Additional Data:     Labs:    Results from last 7 days   Lab Units 02/02/23  0517   WBC Thousand/uL 7 50   HEMOGLOBIN g/dL 8 5*   HEMATOCRIT % 28 1*   PLATELETS Thousands/uL 310   NEUTROS PCT % 78*   LYMPHS PCT % 12*   MONOS PCT % 8   EOS PCT % 1     Results from last 7 days   Lab Units 01/30/23  0444   SODIUM mmol/L 138   POTASSIUM mmol/L 4 3   CHLORIDE mmol/L 104   CO2 mmol/L 30   BUN mg/dL 14   CREATININE mg/dL 0 60   ANION GAP mmol/L 4*   CALCIUM mg/dL 8 6   GLUCOSE RANDOM mg/dL 97                       Labs reviewed    Imaging:    Imaging reviewed    Recent Cultures (last 7 days):           Last 24 Hours Medication List:   Current Facility-Administered Medications   Medication Dose Route Frequency Provider Last Rate   • acetaminophen  650 mg Oral Q6H PRN Leotha Reusing, CRNP     • apixaban  5 mg Oral BID Leotha Reusing, CRNP     • atorvastatin  20 mg Oral Daily With Ana Barr MD     • bisacodyl  10 mg Rectal Daily PRN Corina Tobar MD     • calcium carbonate  1,000 mg Oral Daily PRN Corina Tobar MD     • calcium carbonate-vitamin D  2 tablet Oral Daily With Breakfast Leotha Reusing, CRNP     • cholecalciferol  3,000 Units Oral Daily Leotha Reusing, CRNP     • docusate sodium  100 mg Oral BID Corina Tobar MD     • escitalopram  5 mg Oral Daily Leotha Reusing, CRNP     • hydrOXYzine HCL  10 mg Oral Q6H PRN Corina Tobar MD     • loratadine  10 mg Oral Daily Corina Tobar MD     • losartan  25 mg Oral Daily Leotha Reusing, CRNP     • menthol-methyl salicylate   Apply externally 4x Daily PRN Corina Tobra MD     • metoprolol tartrate  50 mg Oral BID Corina Tobar MD     • oxyCODONE  5 mg Oral Q6H PRN Corina Tobar MD     • oxyCODONE  2 5 mg Oral Q6H PRN Corina Tobar MD     • pantoprazole  40 mg Oral Daily Corina Tobar MD     • polyethylene glycol  17 g Oral Daily PRN Corina Tobar MD          M*Modal software was used to dictate this note  It may contain errors with dictating incorrect words or incorrect spelling  Please contact the provider directly with any questions

## 2023-02-03 NOTE — PROGRESS NOTES
Physical Medicine and Rehabilitation Progress Note  Willie Camarena 67 y o  female MRN: [id number]  Unit/Bed#: -11 Encounter: [id number]    HPI: Willie Camarena is a 67 y o  female  With medical history of HLD, HTN, Chronic diastolic heart failure (I6WC), previous R hip fracture who presented to the [address] [place] [address] on 1/18 after slipping on some water while at work at [employer]  No prodromal symptoms  CTH showed no acute intracranial abnormality, and XR L hip showed a subtrochanteric femur fracture  Ortho recommended ORIF with IMN which was performed on 1/19 by Dr Shanelle Garcia  Made WBAT post-op Post-op lethargi and hypoxic, CXR showed no acute cardiopulmonary disease  Required BiPAP, Narcan ,and Flumazenil  There was some improvement in her lethargy, but remained hypoxic  Pulm was consulted  CTA PE ordered which showed distal subsegmental PEs in the posterior lower lobe with atelectasis  Doppler performed showed a DVT in 1 peroneal vein in LLE  Pulm did not feel that the findings were responsible for the extent of her hypoxia - they felt multifactorial 2/2 atelectasis some volume overload  She got 1 dose of IV Lasix  Primary did not feel there was a volume overload component  Ultimately recommended for a/c for at least 3 months  Eliquis cost $47 a month  Course also c/b ABLA, but that stabilized and she did not require transfusion She was stabilized and admitted to the [facility] on 1/23/2023  Chief Complaint: No new issues, L leg pain    Interval History/Subjective:  No acute events overnight  Last BM 2/2  Pain in L leg pain is stable/manageable  Did one stair yesterday - still somewhat difficult  The chest wall pain is improving  No new CP, SOB, N/V, diaphoresis, fevers, chills  ROS:  A 10 point review of systems was negative except for what is noted in the HPI  Today's Changes:  1  Appreciate ortho follow-up  [id number] [address] Hurtado for patient to shower    2  Reviewed XR with patient as well as dispo planning  3  Continue current plan of care  Total visit time: 25 minutes, with more than 50% spent counseling/coordinating care  Counseling includes discussion with patient re: progress in therapies, functional issues observed by therapy staff, and discussion with patient regarding their current medical state and wellbeing  Coordination of patient's care was performed in conjunction with Internal Medicine service to monitor patient's labs, vitals, and management of their comorbidities  Assessment/Plan:    * Closed left hip fracture ([address] )  Assessment & Plan  After slipping on water at work  Workers Comp  Subtrochanteric femur fracture s/p long nail (antegrade) on 1/19 by Dr Juliana Vaughan  Pain control as documented below  Surgical dressings removed 1/26  Monitor drainage  Follow-up with Orthopedics done 2/2   - Consult placed, XR stable  Staples removed  - f/u in 4 weeks for 6 week POV (3/2)    PT/ OT 3-5 hours a day, 5-7 days/week in acute comprehensive inpatient rehab    Chest wall pain  Assessment & Plan  Improved/resolving  Suspect MSK or costochondritis - mild  Reproducible with palpation and certain push off manuevers in therapies  No diaphoresis, nausea, radiation, SOB  Reviewed red flag symptoms that would warrant ACS r/o  Added Beng[place]  Monitor closely  Adjustment disorder with anxiety  Assessment & Plan  Will provide non-pharmacologic strategies  Continued PRN hydroxyzine  1/30 started Lexapro  Consulted rehab psychology for support  Hypoxemia  Assessment & Plan  Resolved and on RA  Previously multifactorial: PE, atelectasis, ?volume overload s/p Lasix IV x1  Does have nocturnal hypoxemia on Friday Noc Ox for 1 hour   - Will start O2 at night, will need noc ox performed 48 hours prior to discharge   - Made case management aware  Monitor closely  Goals > 90%       Outpatient f/u with Pulmonology/Sleep Medicine    Osteopenia  Assessment & Plan  DXA 12/2020 with osteopenia  Continue home calcium and Vitamin D  1/24 Vitamin D is 29 8  Outpatient f/u DXA with PCP  Acute postoperative anemia due to expected blood loss  Assessment & Plan  Hgb 12 6 pre-op  2/2  8 8 > 7 2 > 7 4 > 7 2 > 6 9 > 8 2 > 8 5 > 8 5   - Iron deficiency on labs  - Also post-operative blood loss  - Given IV venofer x2   - Type and screen   - Consent in chart  - 1/26 Transfuse with 1 unit pRBC  Trend, transfuse as appropriate  Outpatient f/u with PCP    Acute deep vein thrombosis (DVT) of left peroneal vein [facility])  Assessment & Plan  1/20 LE Venous Duplex with L acute DVT in 1 of 2 peroneal veins  Now on Eliquis  See PE for more details  No SCD on that leg  Can do ace wrapping for edema  Outpatient f/u with PCP    Multiple subsegmental pulmonary emboli without acute cor pulmonale (HCC)  Assessment & Plan  Noted on CTA PE from 1/20   RV/LV ratio WNL  Completed Eliquis 10mg BID 7 days  Now on 5mg BID for 3 months as per Pulm  Monitor respiratory status  Chronic diastolic congestive heart failure (HCC)  Assessment & Plan  Wt Readings from Last 3 Encounters:   02/03/23 93 5 kg (206 lb 1 6 oz)   01/23/23 95 2 kg (209 lb 14 1 oz)     Does not appear to have acute exacerbation  12/2/2022 Echo with EF 55-59% and G1DD   on 1/20  Home: Lopressor, Losartan, Lasix  Here: Lopressor, Losartan 25mg daily  - Orthostasis has improved  Monitor and add back as appropriate  Monitor I/O and daily weights  Adjust as appropriate  Outpatient f/u with PCP  Other hyperlipidemia  Assessment & Plan  Home: Simvastatin 20mg nightly  Here: Lipitor 20mg nightly for therapeutic substitution   Outpatient f/u with PCP    Essential hypertension  Assessment & Plan  Home: Lasix 20mg daily, Lopressor 50mg BID, Losartan 50mg daily  Here: Lopressor 50mg BID, Losartan 25mg daily  1/24 Losartan held for orthostasis     Can try to restart Lasix while here once BP appropriate  Monitor BP, adjust as appropriate  Follow-up with PCP  Constipation-resolved as of 2/2/2023  Assessment & Plan  Resolved  Weaning off bowel regimen  Monitor on just PRNs  Hypokalemia-resolved as of 1/31/2023  Assessment & Plan  1/30 4 3  Resolved after supplementation  Monitor  Leukocytosis-resolved as of 1/31/2023  Assessment & Plan  1/30 8 88 and resolved  Afebrile, but otherwise stable  Drainage from L thigh doesn't appear infectious  Monitor incision site closely  Monitor off antibiotics  Monitor Temp and WBC  Initiate work-up if she spikes a fever or develops localizing symptoms  Health Maintenance  #Delirium/Sleep: At risk has had hallucinations on oxycodone 10mg, and was lethargic requiring narcan on fentanyl and dilaudid  #Pain: Tylenol ordered PRN, Oxycodone 2 5-5mg PRN  #Bowel: Last BM 2/2  PRN miralax/docusate/suppository  #Bladder: Voiding and continent  Monitor  #Skin/Pressure Injury Prevention: Turn Q2hr in bed, with weight shifts [id number] in wheelchair  Float heels in bed  #DVT Prophylaxis: Fully anticoagulated on Eliquis  SCD on RLE only  #GI Prophylaxis: PPI started when full a/c started  Can also do famotidine  #Code Status:  Full Code  #FEN: Reg Diet  #Dispo:  Team Conference 2/2: ADD 2/10/2023  Goals are to see if we can get independent with full flight of stairs at home, and modified Ind for all mobility and ADLs  She has made marked progress in the past week   Will go home with outpatient PT    - Follow-up Orthopedics  - Follow-up Sleep Medicine  - Follow-up PCP    Objective:    Functional Update: progressing  PT: CGA transfers, min-modA bed mobility, CGA ambulation 75', modA stairs with bilateral hand rails, Stacia ramp with RW  OT: Ind eating, Sup grooming, CGA bathing, Sup UB dressing, CGA LB dressing, CGA toileting, modA tub/shower transfer, CGA toilet transfer    Allergies per EMR    Physical Exam:  Temp:  [97 7 °F (36 5 °C)-98 4 °F (36 9 °C)] 98 4 °F (36 9 °C)  HR:  [65-82] 81  Resp:  [18] 18  BP: (116-150)/(58-74) 138/74  Oxygen Therapy  SpO2: 95 %  O2 Flow Rate (L/min): 2 L/min    Gen: No acute distress, Well-nourished, well-appearing  HEENT: Moist mucus membranes, Normocephalic/Atraumatic  Cardiovascular: Regular rate, rhythm, S1/S2  Distal pulses palpable  Heme/Extr: Improved LLE dependent edema  Pulmonary: Non-labored breathing  Lungs CTAB  : No garcia  GI: Soft, non-tender, non-distended  BS+  Integumentary: Skin is warm, dry  Staples removed, steri-strips in place  No dehiscence/drainage/signs of active infection  Neuro: AAOx3, Speech is intact  Appropriate to questioning  Psych: Normal mood and affect  Diagnostic Studies:   Reviewed  2/3 XR L Femur:  Stable appearing comminuted intertrochanteric fracture, status post ORIF    No evidence for hardware complication        Laboratory:  Reviewed   Results from last 7 days   Lab Units 02/02/23  0517 01/30/23  0444   HEMOGLOBIN g/dL 8 5* 8 5*   HEMATOCRIT % 28 1* 28 1*   WBC Thousand/uL 7 50 8 88     Results from last 7 days   Lab Units 01/30/23  0444   BUN mg/dL 14   POTASSIUM mmol/L 4 3   CHLORIDE mmol/L 104   CREATININE mg/dL 0 60            Patient Active Problem List   Diagnosis   • Closed left hip fracture (HCC)   • Essential hypertension   • Other hyperlipidemia   • Chronic diastolic congestive heart failure ([facility] [address] [facility] )   • Multiple subsegmental pulmonary emboli without acute cor pulmonale (HCC)   • Acute deep vein thrombosis (DVT) of left peroneal vein (HCC)   • Acute postoperative anemia due to expected blood loss   • Osteopenia   • Hypoxemia   • Adjustment disorder with anxiety   • Chest wall pain         Medications  Current Facility-Administered Medications   Medication Dose Route Frequency Provider Last Rate   • acetaminophen  650 mg Oral Q6H PRN ELDA Lowry     • apixaban  5 mg Oral BID ELDA Lowry     • atorvastatin  20 mg Oral Daily With Domenica Fitzgerald MD     • bisacodyl  10 mg Rectal Daily PRN Tasha Grover MD     • calcium carbonate  1,000 mg Oral Daily PRN Tasha Grover MD     • calcium carbonate-vitamin D  2 tablet Oral Daily With Breakfast ELDA Lowry     • cholecalciferol  3,000 Units Oral Daily ELDA Lowry     • docusate sodium  100 mg Oral BID Tasha Grover MD     • escitalopram  5 mg Oral Daily ELDA Lowry     • hydrOXYzine HCL  10 mg Oral Q6H PRN Tasha Grover MD     • loratadine  10 mg Oral Daily Tasha Grover MD     • losartan  25 mg Oral Daily ELDA Lowry     • menthol-methyl salicylate   Apply externally 4x Daily PRN Tasha Grover MD     • metoprolol tartrate  50 mg Oral BID Tasha Grover MD     • oxyCODONE  5 mg Oral Q6H PRN Tasha Grover MD     • oxyCODONE  2 5 mg Oral Q6H PRN Tasha Grover MD     • pantoprazole  40 mg Oral Daily Tasha Grover MD     • polyethylene glycol  17 g Oral Daily PRN Tasha Grover MD            ** Please Note: Fluency Direct voice to text software may have been used in the creation of this document   **

## 2023-02-03 NOTE — ASSESSMENT & PLAN NOTE
S/p mechanical fall on 1/18  XR on 1/18 showed acute intertrochanteric L hip fracture  OR on 1/19 for IM fixation of L subtrochanteric hip fracture performed by Dr Pauline Persaud  WBAT to LLE  Neurovascular checks Q shift  Ensure adequate pain control  Monitor incision for s/s of infection  Eliquis for DVT ppx  Follow-up with Orthopedics in 2 weeks  XRs on 2/2: stable appearing comminuted intertrochanteric fracture s/p ORIF, no evidence for hardware complication  Ortho consulted - staples removed  Follow-up in additional 4 weeks as outpatient  Primary team following  PT/OT

## 2023-02-03 NOTE — PROGRESS NOTES
02/03/23 0700   Pain Assessment   Pain Assessment Tool 0-10   Pain Score 3   Pain Location/Orientation Orientation: Left; Location: Hip   Pain Onset/Description Onset: Gradual   Effect of Pain on Daily Activities Tolerated   Patient's Stated Pain Goal No pain   Hospital Pain Intervention(s) Shower/Bath   Multiple Pain Sites No   Restrictions/Precautions   Precautions Fall Risk;Pain   Weight Bearing Restrictions Yes   LLE Weight Bearing Per Order WBAT   ROM Restrictions No   Braces or Orthoses Other (Comment)  (RLE knee brace; B/L teds)   Lifestyle   Autonomy Pt agreeable to participate in OT Tx session  Eating   Type of Assistance Needed Independent   Physical Assistance Level No physical assistance   Eating CARE Score 6   Oral Hygiene   Type of Assistance Needed Supervision   Physical Assistance Level No physical assistance   Comment S in stance at sink top to perform oral hygiene routine   Oral Hygiene CARE Score 4   Grooming   Able To Initiate Tasks;Comb/Brush Hair;Wash/Dry Hands   Findings S in stance at sink top to perform grooming routine  Shower/Bathe Self   Type of Assistance Needed Incidental touching; Adaptive equipment   Physical Assistance Level No physical assistance   Comment Completed shower ADL with pt demonstrating ability to bathe 10/10 parts with use of LHR with wash cloth to bathe LE's to feet  CGA in stance at grab bar to bathe jon/buttocks with no LOB  EDU to trial lasso technique to dry LE's and feet w/ G carryover  Shower/Bathe Self CARE Score 4   Bathing   Assessed Bath Style Shower   Anticipated D/C Bath Style Shower;Sponge Bath  (Shower vs SB pending progress)   Able to [place] No   Able to [employer] Temperature Yes   Able to Wash/Rinse/Dry (body part) Left Arm;Right Arm;L Upper Leg;R Upper Leg;L Lower Leg/Foot;R Lower Leg/Foot;Chest;Abdomen;Perineal Area; Buttocks   Limitations Noted in Balance; Endurance;ROM;Strength   Positioning Seated;Standing   Adaptive Equipment Longhand Reacher; Shower Seat;Shower [employer]   Limitations Noted In Balance; Endurance;LE Strength   Adaptive Equipment Grab Bars;Seat with Back   Assessed Shower   Findings CGA side stepping in/out of wet shower environment with use of grab bar for support  OT plan to continue practicing side stepping over simulated lip with use of wall for support, as pt's grab bar is located on the further shower wall  Upper Body Dressing   Type of Assistance Needed Set-up / clean-up   Physical Assistance Level No physical assistance   Comment Seated   Upper Body Dressing CARE Score 5   Lower Body Dressing   Type of Assistance Needed Incidental touching; Adaptive equipment   Physical Assistance Level No physical assistance   Comment Seated utilizing LHR to assist with threading LE's into under garment and pants  CGA/CS in stance at [facility] for CM up over hips  Utilized sock aide to assist with donning RLE knee brace  Lower Body Dressing CARE Score 4   Putting On/Taking Off Footwear   Type of Assistance Needed Physical assistance; Adaptive equipment   Physical Assistance Level 25% or less   Comment Use of sock aide to assist with donning B/L Teds req A to adjust LLE anant  Use of shoe horn to don slip on shoes   Putting On/Taking Off Footwear CARE Score 3   Sit to Stand   Type of Assistance Needed Supervision; Adaptive equipment   Physical Assistance Level No physical assistance   Comment CS with RW   Sit to Stand CARE Score 4   Bed-Chair Transfer   Type of Assistance Needed Supervision; Adaptive equipment   Physical Assistance Level No physical assistance   Comment CS with RW   Chair/Bed-to-Chair Transfer CARE Score 4   Cognition   Overall Cognitive Status WFL   Arousal/Participation Alert; Cooperative   Attention Attends with cues to redirect   Orientation Level Oriented X4   Memory Decreased short term memory   Following Commands Follows multistep commands with increased time or repetition   Additional Activities   Additional Activities Other (Comment)   Additional Activities Comments Engaged in fnxl standing balance in stance unsupported to engage in laundry folding task with focus on improving fxnl standing balance and to promote WBing through LLE  Pt tolerated activity in stance x 3-4 mins with no LOB  Activity Tolerance   Activity Tolerance Patient tolerated treatment well   Other Comments   Assessment Ortho BP's (supine in recliner) 144/70, (seated) 126/70, (standing) 109/63  No c/o of dizziness  Teds donned following shower ADL  Assessment   Treatment Assessment Pt engaged in 90 min skilled OT Tx session with focus on ADL performance, fxnl standing balance, and improving overall activity tolerance  See above for further Tx details  Pt is demonstrating improvement with ADL performance, req overall CGA for ADL of bathing and dressing with use of LHAE  Communicated on stand up sheet that pt will benefit from hip kit for D/C  No LOB during laundry folding in stance unsupported which promoted pt to WB through LLE  OT plan to practice shower transfer with simulated lip during ucoming Tx session to determine if pt will be showering or SBing upon D/C  FT scheduled for 2/4/23 with pt's son Haydee Alvarez)  Please confirm if pt's brother will be able to assist with managing BSC between 1st flr bathroom (daytime use) and 2nd flr bedroom (night time use) upon D/C, as pt previously reported brother could assist with task  Continue POC  Prognosis Fair   Problem List Decreased strength;Decreased range of motion;Decreased endurance; Impaired balance;Decreased mobility;Pain   Barriers to Discharge Inaccessible home environment;Decreased caregiver support   Plan   Treatment/Interventions ADL retraining;Functional transfer training; Therapeutic exercise; Endurance training;Patient/family training   Progress Progressing toward goals   Recommendation   OT Discharge Recommendation Home with home health rehabilitation   Equipment Recommended Bedside commode; Hip Kit ($)   Commode Type Standard   OT Therapy Minutes   OT Time In 0700   OT Time Out 0830   OT Total Time (minutes) 90   OT Mode of treatment - Individual (minutes) 90   OT Mode of treatment - Concurrent (minutes) 0   OT Mode of treatment - Group (minutes) 0   OT Mode of treatment - Co-treat (minutes) 0   OT Mode of Treatment - Total time(minutes) 90 minutes   OT Cumulative Minutes 960   Therapy Time missed   Time missed?  No

## 2023-02-04 RX ADMIN — METOPROLOL TARTRATE 50 MG: 50 TABLET, FILM COATED ORAL at 08:11

## 2023-02-04 RX ADMIN — ACETAMINOPHEN 650 MG: 325 TABLET ORAL at 22:02

## 2023-02-04 RX ADMIN — ATORVASTATIN CALCIUM 20 MG: 20 TABLET, FILM COATED ORAL at 15:53

## 2023-02-04 RX ADMIN — LORATADINE 10 MG: 10 TABLET ORAL at 08:11

## 2023-02-04 RX ADMIN — APIXABAN 5 MG: 5 TABLET, FILM COATED ORAL at 08:11

## 2023-02-04 RX ADMIN — LOSARTAN POTASSIUM 25 MG: 25 TABLET, FILM COATED ORAL at 08:11

## 2023-02-04 RX ADMIN — PANTOPRAZOLE SODIUM 40 MG: 40 TABLET, DELAYED RELEASE ORAL at 05:50

## 2023-02-04 RX ADMIN — APIXABAN 5 MG: 5 TABLET, FILM COATED ORAL at 17:48

## 2023-02-04 RX ADMIN — ESCITALOPRAM OXALATE 5 MG: 5 TABLET, FILM COATED ORAL at 08:11

## 2023-02-04 RX ADMIN — ACETAMINOPHEN 650 MG: 325 TABLET ORAL at 15:53

## 2023-02-04 RX ADMIN — ACETAMINOPHEN 650 MG: 325 TABLET ORAL at 00:37

## 2023-02-04 RX ADMIN — CHOLECALCIFEROL TAB 25 MCG (1000 UNIT) 3000 UNITS: 25 TAB at 08:11

## 2023-02-04 RX ADMIN — METOPROLOL TARTRATE 50 MG: 50 TABLET, FILM COATED ORAL at 17:48

## 2023-02-04 RX ADMIN — Medication 2 TABLET: at 08:11

## 2023-02-04 RX ADMIN — ACETAMINOPHEN 650 MG: 325 TABLET ORAL at 09:42

## 2023-02-04 NOTE — PROGRESS NOTES
02/04/23 1225   Pain Assessment   Pain Assessment Tool 0-10   Pain Score 5   Pain Location/Orientation Orientation: Left; Location: Leg   Pain Radiating Towards none   Pain Onset/Description Onset: Ongoing   Effect of Pain on Daily Activities tolerated   Patient's Stated Pain Goal No pain   Hospital Pain Intervention(s) Rest   Multiple Pain Sites No   Restrictions/Precautions   Precautions Fall Risk;Pain   Weight Bearing Restrictions Yes   RLE Weight Bearing Per Order WBAT   LLE Weight Bearing Per Order WBAT   ROM Restrictions No   Braces or Orthoses Knee brace   Sit to Stand   Type of Assistance Needed Supervision; Adaptive equipment   Comment CS with RW   Sit to Stand CARE Score 4   Bed-Chair Transfer   Type of Assistance Needed Supervision; Adaptive equipment   Comment CS with RW   Chair/Bed-to-Chair Transfer CARE Score 4   Therapeutic Excerise-Strength   UE Strength Yes   Right Upper Extremity- Strength   R Shoulder Flexion;ABduction; Extension;Horizontal ABduction; External rotation; Internal rotation   R Elbow Elbow flexion;Elbow extension   R Position Seated   Equipment Dumbbell  (2#, req 2# 2* to chest musculoskeletal pain)   R Weight/Reps/Sets 2x15   RUE Strength Comment engaged pt in UE TE with 2# 2x15 to cont to inc fx strength for improved STS and SPT  pt toelrated well with rest break in between   Left Upper Extremity-Strength   LUE Strength Comment see above   Cognition   Overall Cognitive Status WFL   Arousal/Participation Cooperative   Attention Attends with cues to redirect   Orientation Level Oriented X4   Memory Decreased short term memory   Following Commands Follows multistep commands with increased time or repetition   Additional Activities   Additional Activities Comments Perfomed fx mobility approx 50ft walk as demonstration to fmaily during FT  Completed at  with RW no LOB observed   Req to sit reoprting fatigue   Activity Tolerance   Activity Tolerance Patient tolerated treatment well Assessment   Treatment Assessment Engaged pt in brief 30mins of skilled OT services with focus on FT with son and UE TE  Son and brother present  Son Nima Hyde reporting that he will be staying until 2/13 so will be home iwth pt for a few days upon DC  During session therapist educ son that it is anticipated that pt will be indep with overall all ADLs and toileting  Educ pt will utilized [employer] which was ordered  Did educ that pt will need assistance for TEDS which are used for swelling which brother reported he can assist  Therapist recommned at DC to SB as pt is having difficulty with weight shifitng which son verbalized understanding  Family reporting they will assist with IADLs (meal, meds, laundry)  Therapist recommend to son if steps cont to be a barrier by time of DC recommend first floor setup which son was in agreement  However, pt reporting that she can go down the steps on her bottom and crawl up  Therapist cont to recommend FF setup and discussed safety concerns with such method  Therapist communicated with PT Jamel Shaikh  Overall son and brother with no further questions in agreement with OP PT and DC 2/10  Cont OT POC with focus on fx reach, weight shifting, activity tolerance, balance to inc fx performance and independence  OT Family training done with: Son Nima Hyde and brother Deb Stark   Prognosis Fair   Problem List Decreased strength;Decreased range of motion;Decreased endurance; Impaired balance;Decreased mobility;Pain   Barriers to Discharge Inaccessible home environment   Barriers to Discharge Comments ABY, steps down to basement where recliner is located   Plan   Treatment/Interventions ADL retraining;Functional transfer training; Therapeutic exercise; Endurance training;Patient/family training;Bed mobility; Compensatory technique education   Progress Progressing toward goals   Recommendation   OT Discharge Recommendation Home with home health rehabilitation   Equipment Recommended Bedside commode; Hip Kit ($);Shower/Tub chair with back ($)  (RW)   Commode Type Standard   OT Equipment ordered DME sheet handed already to CM   OT Therapy Minutes   OT Time In 1225   OT Time Out 1255   OT Total Time (minutes) 30   OT Mode of treatment - Individual (minutes) 30   OT Mode of treatment - Concurrent (minutes) 0   OT Mode of treatment - Group (minutes) 0   OT Mode of treatment - Co-treat (minutes) 0   OT Mode of Treatment - Total time(minutes) 30 minutes   OT Cumulative Minutes 990   Therapy Time missed   Time missed?  No

## 2023-02-04 NOTE — PROGRESS NOTES
02/04/23 1005   Pain Assessment   Pain Score 5   Pain Location/Orientation Orientation: Left; Location: Hip;Location: Knee   Pain Onset/Description Onset: Ongoing;Frequency: Intermittent   Hospital Pain Intervention(s) Repositioned; Ambulation/increased activity; Rest;Cold applied  (ice applied 20' end of session to L knee)   Restrictions/Precautions   LLE Weight Bearing Per Order WBAT   Cognition   Arousal/Participation Cooperative   Subjective   Subjective pt reported feeling pretty well overall and ready for therapy   Sit to Lying   Type of Assistance Needed Physical assistance   Physical Assistance Level 25% or less   Comment (S)  Praful, pt reports she will sleep in her bed at home with 2 pillows  Sit to Lying CARE Score 3   Lying to Sitting on Side of Bed   Type of Assistance Needed Physical assistance   Physical Assistance Level 25% or less   Comment (S)  Praful, pt reports she will sleep in her bed at home with 2 pillows  Lying to Sitting on Side of Bed CARE Score 3   Sit to Stand   Type of Assistance Needed Supervision; Adaptive equipment   Sit to Stand CARE Score 4   Bed-Chair Transfer   Type of Assistance Needed Incidental touching;Supervision; Adaptive equipment   Comment CGA initially due to stiffness/pain LLE   Chair/Bed-to-Chair Transfer CARE Score 4   Transfer Bed/Chair/Wheelchair   Limitations Noted In Balance; Endurance;LE Strength;Pain Management   Adaptive Equipment Roller Walker   Findings practiced bed mobility as pt rpeorted she sleeps in her bed at home  Goals initially written for not doing bed mobility, however added goals today as a result of pt's report of plan for home   Walk 10 Feet   Type of Assistance Needed Supervision; Adaptive equipment   Walk 10 Feet CARE Score 4   Walk 50 Feet with Two Turns   Type of Assistance Needed Incidental touching; Adaptive equipment   Walk 50 Feet with Two Turns CARE Score 4   Ambulation   Distance Walked (feet) 70 ft  (60)   Assist Device Prieto Tee Gait Pattern Inconsistant Sienna; Slow Sienna;Decreased foot clearance; Step through; Step to; Improper weight shift   Limitations Noted In Balance; Endurance; Heel Strike;Strength;Speed;Swing   Walk Assist Level Contact Guard;Close Supervision   Findings worked on progressing to step through pattern, pt was able to improve foot clearance as well   Therapeutic Interventions   Flexibility LLE hamstring and calf x60 sec each   Other AAROM for LLE with gait belt for LLE seated marching and LAQ  Provided her a bed sheet with tape on the end ot make a handle for her to perform these exercises in her room to help dec stiffness  Equipment Use   NuStep 12 min, level 1 slow pace, BLE only / no arms , start of session to help dec stiffness LLE   Other Comments   Comments reviewed d/c planning, pt reported that her brother can perform household chores  reviewed plan of RW and outpt therapy to then progress towards PLOF and from RW to SPC/no device  Assessment   Treatment Assessment Pt cont ot be limited by pain, in L hip as well as R hip and R knee  This limits her ability to WB on either leg; resulting in the continued need for use of RW so she can use BUE to offload LLE  She did make improvement iwth gait pattern with step through pattern, with getting each foot infront of the other to progress towards former gait pattern, however each step is not fully past the other  She cont to pivot/slide her feet on the floor iwth turning and backing up a couple of steps prior to sitting down  She consistently demonstrates safety wtih mobility  Reviewed overall POC of progressing 1 ABY, gait pattern and distance and bed mobility before next Friday  Recommending reivew of HEP for her to perform in her room to decrease stiffness that she has   Barriers to Discharge Inaccessible home environment   PT Barriers   Physical Impairment Decreased strength;Decreased range of motion;Decreased mobility; Impaired balance;Decreased endurance;Pain Functional Limitation Car transfers;Stair negotiation;Standing;Transfers; Walking   Plan   Treatment/Interventions Functional transfer training;LE strengthening/ROM; Elevations; Therapeutic exercise; Endurance training;Patient/family training;Bed mobility;Gait training;Equipment eval/education   Progress Progressing toward goals   Recommendation   PT Discharge Recommendation Home with outpatient rehabilitation   PT Therapy Minutes   PT Time In 1005   PT Time Out 1105   PT Total Time (minutes) 60   PT Mode of treatment - Individual (minutes) 60   PT Mode of treatment - Concurrent (minutes) 0   PT Mode of treatment - Group (minutes) 0   PT Mode of treatment - Co-treat (minutes) 0   PT Mode of Treatment - Total time(minutes) 60 minutes   PT Cumulative Minutes 990   Therapy Time missed   Time missed?  No

## 2023-02-04 NOTE — PROGRESS NOTES
02/04/23 1255   Pain Assessment   Pain Score 5   Pain Location/Orientation Orientation: Left; Location: Hip;Location: Knee   Pain Onset/Description Onset: Ongoing   Hospital Pain Intervention(s) Repositioned;Cold applied  (ice applied 20' end of session)   Restrictions/Precautions   Precautions Fall Risk;Pain   RLE Weight Bearing Per Order TTWB   LLE Weight Bearing Per Order WBAT   Braces or Orthoses Knee brace  (RLE)   Cognition   Arousal/Participation Cooperative   Subjective   Subjective Pt reported L knee and hip felt sore at end of session and she was tired; she was happy to haveice on L thigh at end of session   Sit to Lying   Type of Assistance Needed Physical assistance   Physical Assistance Level 25% or less   Sit to Lying CARE Score 3   Lying to Sitting on Side of Bed   Type of Assistance Needed Physical assistance   Physical Assistance Level 25% or less   Comment demonstrated bed mobility with use of bed sheet as leg  to son and brother and reviewed how brother can help if needed   Lying to Sitting on Side of Bed CARE Score 3   Sit to Stand   Type of Assistance Needed Supervision; Adaptive equipment   Sit to Stand CARE Score 4   Bed-Chair Transfer   Type of Assistance Needed Supervision; Adaptive equipment   Chair/Bed-to-Chair Transfer CARE Score 4   Transfer Bed/Chair/Wheelchair   Limitations Noted In Balance; Endurance;Confidence;LE Strength   Adaptive Equipment Roller Walker   Findings reviewed question of bed vs recliner for home; pt and family reported it depends on what floor she is on  Recommended as of today that they plan on 1st floor set up as a plan to get her home, and they may need to move the recliner to the first floor  Son and brother demonstrated understanding  Walk 10 Feet   Type of Assistance Needed Supervision; Adaptive equipment   Walk 10 Feet CARE Score 4   Walk 50 Feet with Two Turns   Type of Assistance Needed Supervision; Adaptive equipment   Walk 50 Feet with Two Turns CARE Score 4   Ambulation   Distance Walked (feet) 50 ft   Assist Device Roller Walker   Gait Pattern Inconsistant Sienna; Slow Sienna;Decreased foot clearance; Improper weight shift; Step through; Step to   Limitations Noted In Endurance; Heel Strike;Speed;Strength;Swing   Walk Assist Level Close Supervision   Findings education to son and brother about gait pattern improvements and goals to progress to step through pattern  reviewed goals of picking up feet as she won't be able to pivot as well on the carpet at home vs tile floor here, again family demonstrated understanding  Curb or Single Stair   Style negotiated Single stair   Type of Assistance Needed Physical assistance; Adaptive equipment   Physical Assistance Level 51%-75%   1 Step (Curb) CARE Score 2   4 Steps   Type of Assistance Needed Physical assistance; Adaptive equipment   Physical Assistance Level 51%-75%   4 Steps CARE Score 2   Stairs   Type Stairs   # of Steps 4  (x2   steps)   Weight Bearing Precautions Fall Risk   Assist Devices Single Rail;Bilateral Rail;Quad Cane   Findings first up and down leading RLE first, LLE down with BHR, blocking L knee front and back and providing support at hips as pt reported her legs feel weak and that somtimes she feels like her knees may buckle on her  no instance of buckling today  reviewed that their home setup has HR on L side ascending, reviewed she could put both hands on rail and go up RLE first, LLE down on an angle, or we could try L HR and quad cane (small base ) on R side  when first attempting both hands on rail, she didn't feel she had enough support, so switched to quad cane R hand and HR STONE simmons, still ModA for a boost/safety/security  When coming down, initially attempted R hand on same rail and L hand with quad cane, but she didn't feel comfortable/secure enough with this so came down with BHR   sitting rest breaks between each trial  reviewed that we will still work on stairs here and will need to give family update on how she is doing on stairs prior to d/c  Reviewed with brother where he should stand behind her, with hands on hips, to help guide ascending, and that we stand downstairs of her when she is coming down  reviewed the sequencing to lead RLE up and LLE down to LLE healing post surgery  Other Comments   Comments family reported that they ahve extra RW and the Rupert Weeks is pretty sure he has a quad cane at home  reviewed suggestions of keeping RW on each floor, so when she is doing stairs neither her nor her brother will need to carry the RW up/down the stairs  Suggested to find the quad cane so if she does use the quad cane on the stiars they won't need to get another one  Reviewed plan of pt being able to mobilize on her own with the RW, but recommendation that family does IADLs and household chores and family in agreement  Assessment   Treatment Assessment Pt demonstrated improvement today compared to yesterday with stair training, however she needs cont physical A and presents w/deficits in BLE strength, pain, balance, and cardiovascular endurance  Pt was very fatigued after the 2x4 steps  She still seeks additional BUE support for stability/to prevent falls on stairs, and was unable today to perofrm stairs iwth exact home setup of 1 rail on the L side  Cont to recommend use of first floor set up during the day, and she may need this at night as well, pending progress with stairs here  Pt and family demonstrated understanding  Pt will cont to benefit from skilled PT to further progress above deficits, with focus on 1 curb step to enter, walking distance, bed mobility, and stairs  Barriers to Discharge Inaccessible home environment;Decreased caregiver support   PT Barriers   Physical Impairment Decreased strength;Decreased range of motion;Decreased mobility; Impaired balance;Decreased endurance;Pain   Functional Limitation Car transfers;Stair negotiation;Standing;Transfers; Walking Plan   Treatment/Interventions Functional transfer training;LE strengthening/ROM; Elevations; Therapeutic exercise; Endurance training;Patient/family training;Equipment eval/education; Bed mobility;Gait training   Progress Progressing toward goals   Recommendation   PT Discharge Recommendation Home with outpatient rehabilitation   PT Therapy Minutes   PT Time In 1255   PT Time Out 1330   PT Total Time (minutes) 35   PT Mode of treatment - Individual (minutes) 35   PT Mode of treatment - Concurrent (minutes) 0   PT Mode of treatment - Group (minutes) 0   PT Mode of treatment - Co-treat (minutes) 0   PT Mode of Treatment - Total time(minutes) 35 minutes   PT Cumulative Minutes 1025   Therapy Time missed   Time missed?  No

## 2023-02-05 RX ADMIN — METOPROLOL TARTRATE 50 MG: 50 TABLET, FILM COATED ORAL at 17:13

## 2023-02-05 RX ADMIN — CHOLECALCIFEROL TAB 25 MCG (1000 UNIT) 3000 UNITS: 25 TAB at 08:02

## 2023-02-05 RX ADMIN — LOSARTAN POTASSIUM 25 MG: 25 TABLET, FILM COATED ORAL at 08:02

## 2023-02-05 RX ADMIN — Medication 2 TABLET: at 08:02

## 2023-02-05 RX ADMIN — METOPROLOL TARTRATE 50 MG: 50 TABLET, FILM COATED ORAL at 08:02

## 2023-02-05 RX ADMIN — APIXABAN 5 MG: 5 TABLET, FILM COATED ORAL at 17:13

## 2023-02-05 RX ADMIN — ACETAMINOPHEN 650 MG: 325 TABLET ORAL at 15:15

## 2023-02-05 RX ADMIN — ATORVASTATIN CALCIUM 20 MG: 20 TABLET, FILM COATED ORAL at 15:15

## 2023-02-05 RX ADMIN — ESCITALOPRAM OXALATE 5 MG: 5 TABLET, FILM COATED ORAL at 08:02

## 2023-02-05 RX ADMIN — APIXABAN 5 MG: 5 TABLET, FILM COATED ORAL at 08:02

## 2023-02-05 RX ADMIN — ACETAMINOPHEN 650 MG: 325 TABLET ORAL at 08:02

## 2023-02-05 RX ADMIN — PANTOPRAZOLE SODIUM 40 MG: 40 TABLET, DELAYED RELEASE ORAL at 05:54

## 2023-02-05 RX ADMIN — DOCUSATE SODIUM 100 MG: 100 CAPSULE, LIQUID FILLED ORAL at 17:13

## 2023-02-05 RX ADMIN — LORATADINE 10 MG: 10 TABLET ORAL at 08:02

## 2023-02-05 NOTE — PROGRESS NOTES
02/05/23 0830   Pain Assessment   Pain Assessment Tool 0-10   Pain Score 5   Pain Location/Orientation Orientation: Left; Location: Knee;Orientation: Upper; Location: Leg   Patient's Stated Pain Goal No pain   Hospital Pain Intervention(s) Medication (See MAR); Repositioned; Rest   Restrictions/Precautions   Precautions Fall Risk;Pain   RLE Weight Bearing Per Order WBAT   LLE Weight Bearing Per Order WBAT   Braces or Orthoses Knee brace  (use of pts own B knee sleeves)   Cognition   Overall Cognitive Status WFL   Arousal/Participation Cooperative   Attention Attends with cues to redirect   Orientation Level Oriented X4   Memory Decreased short term memory   Following Commands Follows multistep commands with increased time or repetition   Subjective   Subjective "when do I get rid of the walker?" education provided  Sit to Lying   Type of Assistance Needed Incidental touching   Physical Assistance Level No physical assistance   Comment use of leg cross over method for L LE lifting  Sit to Lying CARE Score 4   Sit to Stand   Type of Assistance Needed Supervision   Physical Assistance Level No physical assistance   Comment S with RW   Sit to Stand CARE Score 4   Bed-Chair Transfer   Type of Assistance Needed Supervision   Physical Assistance Level No physical assistance   Comment Cs with Rw   Chair/Bed-to-Chair Transfer CARE Score 4   Walk 10 Feet   Type of Assistance Needed Supervision   Physical Assistance Level No physical assistance   Comment S With RW   Walk 10 Feet CARE Score 4   Walk 50 Feet with Two Turns   Type of Assistance Needed Supervision   Physical Assistance Level No physical assistance   Comment CS with Rw   Walk 50 Feet with Two Turns CARE Score 4   Walk 150 Feet   Type of Assistance Needed Supervision   Physical Assistance Level No physical assistance   Comment Cs with RW, worsening antalgic gait last 30ft     Walk 150 Feet CARE Score 4   Walking 10 Feet on Uneven Surfaces   Type of Assistance Needed Incidental touching   Physical Assistance Level No physical assistance   Comment with RW over mat on floor  Walking 10 Feet on Uneven Surfaces CARE Score 4   Ambulation   Primary Mode of Locomotion Prior to Admission Walk   Distance Walked (feet) 56 ft  (1x60ft, 1x 150ft)   Assist Device Roller Walker   Gait Pattern Antalgic; Slow Oksana;Decreased foot clearance; Step to; Step through; Decreased L stance; Improper weight shift   Limitations Noted In Endurance; Heel Strike;Speed;Strength;Swing   Walk Assist Level Supervision;Close Supervision   Findings ongoing dec L LE stance, shuffled R step with dec step length, education on need for RW support and only when her gait is normalized with near equal B LE wt bearing, will the RW go away  Cues to reduce UE support and inc R step length, x25ft with mod A for RW advancing to facilitate reciprocal oksana  Last amb trial pt able to walk back to room, evident ftg and dec L LE wt bearing last 30ft  Does the patient walk? 2  Yes   Wheel 50 Feet with Two Turns   Reason if not Attempted Activity not applicable   Wheel 50 Feet with Two Turns CARE Score 9   Wheel 150 Feet   Reason if not Attempted Activity not applicable   Wheel 012 Feet CARE Score 9   Wheelchair mobility   Does the patient use a wheelchair? 0  No   Curb or Single Stair   Style negotiated Curb   Type of Assistance Needed Incidental touching   Physical Assistance Level No physical assistance   Comment with RW on short curb style step  1 Step (Curb) CARE Score 4   Picking Up Object   Type of Assistance Needed Incidental touching; Adaptive equipment   Physical Assistance Level No physical assistance   Comment wtih RW and reacher     Picking Up Object CARE Score 4   Toilet Transfer   Type of Assistance Needed Supervision   Physical Assistance Level No physical assistance   Toilet Transfer CARE Score 4   Toilet Transfer   Surface Assessed Bedside Commode  (over toilet)   Findings able to manage hygiene and pant mgmt with CS  Therapeutic Interventions   Strengthening Supine B LE TE: 2# SAQ and mod bridges over large bolster 2x10 each  2# B LE abd and heel slides with slide board (A for alignment on L LE)  AA SLR 2x10; EOM L LE 1 5# LAQ (slowly), seated heel slides with red tband x30  Standing with RW step taps R LE to 4inch block 2x10  Balance item pickup with reacher and RW support x6  Modalities Cold hip to L hip at end of session in room  Assessment   Treatment Assessment Pt participated in 90 min skilled PT intervention with focus on gait mechanics/quality and B LE strengthening  Pt reports some relief with B knee sleeves  Pt continues to struggle with L LE wt bearing t/o gait cycle, heavy use of UE on RW for support  Prolonged discussion about progression to ease off UE support to inc L LE wt bearing  Trialed short curb step pt reported feeling more comfortable going up with L LE (Postop LE) however doubtful pt would olvin with an inc step height  To revisit stair training next day, Pt will need to demonstrate 5 ABY with R HR, did not confrim yet if has QC  Family/Caregiver Present no   Problem List Decreased strength;Decreased range of motion;Decreased endurance; Impaired balance;Decreased mobility;Pain   Barriers to Discharge Inaccessible home environment;Decreased caregiver support   Barriers to Discharge Comments William BADILLO   PT Barriers   Physical Impairment Decreased strength;Decreased range of motion;Decreased mobility; Impaired balance;Decreased endurance;Pain   Functional Limitation Car transfers;Stair negotiation;Standing;Transfers; Walking   Plan   Treatment/Interventions Functional transfer training;LE strengthening/ROM; Therapeutic exercise; Endurance training;Patient/family training;Bed mobility;Gait training   Progress Progressing toward goals   Recommendation   PT Discharge Recommendation Home with outpatient rehabilitation   Equipment Recommended [facility] Recommended Wheeled walker   PT Therapy Minutes   PT Time In 0830   PT Time Out 1000   PT Total Time (minutes) 90   PT Mode of treatment - Individual (minutes) 90   PT Mode of treatment - Concurrent (minutes) 0   PT Mode of treatment - Group (minutes) 0   PT Mode of treatment - Co-treat (minutes) 0   PT Mode of Treatment - Total time(minutes) 90 minutes   PT Cumulative Minutes 1115   Therapy Time missed   Time missed?  No

## 2023-02-06 LAB
ANION GAP SERPL CALCULATED.3IONS-SCNC: 6 MMOL/L (ref 5–14)
BASOPHILS # BLD AUTO: 0.01 THOUSANDS/ÂΜL (ref 0–0.1)
BASOPHILS NFR BLD AUTO: 0 % (ref 0–1)
BUN SERPL-MCNC: 16 MG/DL (ref 5–25)
CALCIUM SERPL-MCNC: 8.8 MG/DL (ref 8.4–10.2)
CHLORIDE SERPL-SCNC: 103 MMOL/L (ref 96–108)
CO2 SERPL-SCNC: 31 MMOL/L (ref 21–32)
CREAT SERPL-MCNC: 0.54 MG/DL (ref 0.6–1.2)
EOSINOPHIL # BLD AUTO: 0.08 THOUSAND/ÂΜL (ref 0–0.61)
EOSINOPHIL NFR BLD AUTO: 2 % (ref 0–6)
ERYTHROCYTE [DISTWIDTH] IN BLOOD BY AUTOMATED COUNT: 17.7 % (ref 11.6–15.1)
GFR SERPL CREATININE-BSD FRML MDRD: 94 ML/MIN/1.73SQ M
GLUCOSE P FAST SERPL-MCNC: 101 MG/DL (ref 70–99)
GLUCOSE SERPL-MCNC: 101 MG/DL (ref 70–99)
HCT VFR BLD AUTO: 29.9 % (ref 34.8–46.1)
HGB BLD-MCNC: 9.1 G/DL (ref 11.5–15.4)
IMM GRANULOCYTES # BLD AUTO: 0.03 THOUSAND/UL (ref 0–0.2)
IMM GRANULOCYTES NFR BLD AUTO: 1 % (ref 0–2)
LYMPHOCYTES # BLD AUTO: 0.75 THOUSANDS/ÂΜL (ref 0.6–4.47)
LYMPHOCYTES NFR BLD AUTO: 16 % (ref 14–44)
MCH RBC QN AUTO: 29.9 PG (ref 26.8–34.3)
MCHC RBC AUTO-ENTMCNC: 30.4 G/DL (ref 31.4–37.4)
MCV RBC AUTO: 98 FL (ref 82–98)
MONOCYTES # BLD AUTO: 0.51 THOUSAND/ÂΜL (ref 0.17–1.22)
MONOCYTES NFR BLD AUTO: 11 % (ref 4–12)
NEUTROPHILS # BLD AUTO: 3.24 THOUSANDS/ÂΜL (ref 1.85–7.62)
NEUTS SEG NFR BLD AUTO: 70 % (ref 43–75)
NRBC BLD AUTO-RTO: 0 /100 WBCS
PLATELET # BLD AUTO: 305 THOUSANDS/UL (ref 149–390)
PMV BLD AUTO: 9.4 FL (ref 8.9–12.7)
POTASSIUM SERPL-SCNC: 3.9 MMOL/L (ref 3.5–5.3)
RBC # BLD AUTO: 3.04 MILLION/UL (ref 3.81–5.12)
SODIUM SERPL-SCNC: 140 MMOL/L (ref 135–147)
WBC # BLD AUTO: 4.62 THOUSAND/UL (ref 4.31–10.16)

## 2023-02-06 RX ADMIN — DOCUSATE SODIUM 100 MG: 100 CAPSULE, LIQUID FILLED ORAL at 08:41

## 2023-02-06 RX ADMIN — METOPROLOL TARTRATE 50 MG: 50 TABLET, FILM COATED ORAL at 17:27

## 2023-02-06 RX ADMIN — ACETAMINOPHEN 650 MG: 325 TABLET ORAL at 14:40

## 2023-02-06 RX ADMIN — APIXABAN 5 MG: 5 TABLET, FILM COATED ORAL at 08:40

## 2023-02-06 RX ADMIN — ATORVASTATIN CALCIUM 20 MG: 20 TABLET, FILM COATED ORAL at 17:27

## 2023-02-06 RX ADMIN — ESCITALOPRAM OXALATE 5 MG: 5 TABLET, FILM COATED ORAL at 08:40

## 2023-02-06 RX ADMIN — METOPROLOL TARTRATE 50 MG: 50 TABLET, FILM COATED ORAL at 08:41

## 2023-02-06 RX ADMIN — LOSARTAN POTASSIUM 25 MG: 25 TABLET, FILM COATED ORAL at 08:40

## 2023-02-06 RX ADMIN — PANTOPRAZOLE SODIUM 40 MG: 40 TABLET, DELAYED RELEASE ORAL at 05:44

## 2023-02-06 RX ADMIN — LORATADINE 10 MG: 10 TABLET ORAL at 08:41

## 2023-02-06 RX ADMIN — DOCUSATE SODIUM 100 MG: 100 CAPSULE, LIQUID FILLED ORAL at 17:26

## 2023-02-06 RX ADMIN — Medication 2 TABLET: at 08:43

## 2023-02-06 RX ADMIN — APIXABAN 5 MG: 5 TABLET, FILM COATED ORAL at 17:27

## 2023-02-06 RX ADMIN — ACETAMINOPHEN 650 MG: 325 TABLET ORAL at 08:40

## 2023-02-06 RX ADMIN — CHOLECALCIFEROL TAB 25 MCG (1000 UNIT) 3000 UNITS: 25 TAB at 08:40

## 2023-02-06 NOTE — PROGRESS NOTES
02/06/23 1330   Pain Assessment   Pain Assessment Tool 0-10   Pain Score 8   Pain Location/Orientation Orientation: Left; Location: Hip;Location: Knee   Hospital Pain Intervention(s) Medication (See MAR); Cold applied   Restrictions/Precautions   Precautions Pain; Fall Risk   Weight Bearing Restrictions Yes   RLE Weight Bearing Per Order WBAT   LLE Weight Bearing Per Order WBAT   ROM Restrictions No   Braces or Orthoses Other (Comment)  (B knee sleeves)   Cognition   Overall Cognitive Status WFL   Arousal/Participation Alert; Cooperative   Attention Attends with cues to redirect   Orientation Level Oriented X4   Memory Within functional limits   Following Commands Follows multistep commands without difficulty   Subjective   Subjective Pt reports being in a good amount of pain and is due for pain meds at the end of PT session, main concern with going home is being able to do the stairs   Sit to Lying   Type of Assistance Needed Physical assistance   Physical Assistance Level 25% or less   Comment A with L LE onto mat and respoisitoning once in lying   Sit to Lying CARE Score 3   Lying to Sitting on Side of Bed   Type of Assistance Needed Physical assistance   Physical Assistance Level 25% or less   Comment A with trunk raising from bed w/ VC to prop up on L elbow to help push up   Lying to Sitting on Side of Bed CARE Score 3   Sit to Stand   Type of Assistance Needed Supervision   Physical Assistance Level No physical assistance   Comment RW   Sit to Stand CARE Score 4   Walk 10 Feet   Type of Assistance Needed Supervision   Physical Assistance Level No physical assistance   Comment S with RW   Walk 10 Feet CARE Score 4   Walk 150 Feet   Type of Assistance Needed Supervision   Physical Assistance Level No physical assistance   Comment S with RW, standing break needed ~130ft   Walk 150 Feet CARE Score 4   Ambulation   Primary Mode of Locomotion Prior to Admission Walk   Distance Walked (feet) 150 ft  (100)   Assist Device Roller Walker   Gait Pattern Antalgic; Slow Sienna;Decreased L stance; Step through   Limitations Noted In Endurance; Heel Strike;Speed;Strength   Walk Assist Level Supervision   Findings pt amb to PT gym at start of session, needing to stop amb back to room at end of session due to ftg and pain, as pt was due to tylenol at 1430  Does the patient walk? 2  Yes   Wheelchair mobility   Does the patient use a wheelchair? 0  No   Picking Up Object   Type of Assistance Needed Physical assistance   Physical Assistance Level 25% or less   Comment CG picking up cones from floor with hand   Picking Up Object CARE Score 3   Therapeutic Interventions   Strengthening Supine B glute set x20, L LE SAQ x20 w/ 2 lb ankle weight, L LE hip abduction/adduction with towel and sliding board x20 each, L LE TKE w/ towel under knee x20, L LE heel slides with towel and sliding board x20, Seated L LE LAQ x20 w/ 2 lb ankle weight   Balance Step taps to 4 inch block L LE x10, step taps to 4 inch block R LE x10,  cones from 4 inch block and place on table at shoulder height anterior to patient 2x5,  cone from floor and place on table at shoulder height anterior to patient 2x5, standing mini squats with RW x10   Equipment Use   NuStep 15 min on level 3, 8 seat, B LE, no UE at start of sesion   Assessment   Treatment Assessment Pt participated in 60 min of skilled PT with focus on strengthening and balance to prepare pt for D/C home on Friday 2/10  Pt ambulated 150 ft from room to therapy gym at beginning of session with a standing break needed ~130 ft due to decrased endurance  Pt enjoys NuStep as she says it loosens up her leg and completed 15 minutes at beginning of PT session  Hip and knee ther ex was completed on mat with a break needed after 10 reps for SAQ and LAQ due to 2 lb ankle weight   Balance exercises included step taps, reaching for cones, and mini squats during which pt exhibits hesitation with weight shift onto L LE and complains of pain in R knee  Pt completes step taps with L foot easier than with R foot  When picking up cones in functional squat, pt preferred to bend at hip so cues were given to bend at the knee instead  Pt was hesitant so mini squats were done to isolate knee control into flexion during which pt complained of pain the knee  Ice pack was given to pt at end of session and placed on L hip  Pt's largest barrier to being D/C home continues to be stair negotiation  - Suggest trial amb with single wall rail or parallel bar for improved wt bearing/wt shfting  Charting written LUIS Chu- PT present and in agreeement with documentation  PT Barriers   Physical Impairment Decreased strength;Decreased endurance;Pain   Functional Limitation Stair negotiation; Walking;Transfers   Plan   Treatment/Interventions Functional transfer training;LE strengthening/ROM; Therapeutic exercise; Endurance training;Gait training   Progress Progressing toward goals   Recommendation   Walker Package Recommended Wheeled walker   Discharge Summary DC home Friday 2/10   PT Therapy Minutes   PT Time In 1330   PT Time Out 1430   PT Total Time (minutes) 60   PT Mode of treatment - Individual (minutes) 60   PT Mode of treatment - Concurrent (minutes) 0   PT Mode of treatment - Group (minutes) 0   PT Mode of treatment - Co-treat (minutes) 0   PT Mode of Treatment - Total time(minutes) 60 minutes   PT Cumulative Minutes 1205   Therapy Time missed   Time missed?  No

## 2023-02-06 NOTE — PROGRESS NOTES
02/06/23 1000   Pain Assessment   Pain Assessment Tool 0-10   Pain Score 4   Pain Location/Orientation Orientation: Left;Orientation: Upper; Location: Leg;Location: Knee   Restrictions/Precautions   Precautions Fall Risk;Pain   LLE Weight Bearing Per Order WBAT   Braces or Orthoses Other (Comment)  (B personal knee sleeves)   Cognition   Overall Cognitive Status WFL   Arousal/Participation Alert; Cooperative   Attention Attends with cues to redirect   Orientation Level Oriented X4   Memory Within functional limits   Following Commands Follows multistep commands with increased time or repetition   Subjective   Subjective pt reports having 2 ABY back entrance with HRs  Sit to Stand   Type of Assistance Needed Supervision   Sit to Stand CARE Score 4   Bed-Chair Transfer   Type of Assistance Needed Supervision   Chair/Bed-to-Chair Transfer CARE Score 4   Car Transfer   Type of Assistance Needed Supervision   Physical Assistance Level No physical assistance   Comment self A LE up/into simulated car setup  Car Transfer CARE Score 4   Walk 10 Feet   Type of Assistance Needed Supervision   Physical Assistance Level No physical assistance   Walk 10 Feet CARE Score 4   Walk 50 Feet with Two Turns   Type of Assistance Needed Supervision   Physical Assistance Level No physical assistance   Walk 50 Feet with Two Turns CARE Score 4   Ambulation   Primary Mode of Locomotion Prior to Admission Walk   Distance Walked (feet) 60 ft   Assist Device Roller Walker   Gait Pattern Antalgic; Slow [place]; Improper weight shift   Limitations Noted In Heel Strike; Endurance;Speed;Strength;Swing   Walk Assist Level Supervision   Findings remains sig antalgic, cues for inc L step length  Does the patient walk? 2  Yes   Wheelchair mobility   Does the patient use a wheelchair? 0   No   4 Steps   Type of Assistance Needed Physical assistance   Physical Assistance Level 26%-50%   Comment pt prefers to step up with L LE as reports R LE was always her bad leg  +L knee buckle with first trial, cues for knee ext in wt shift  Trialed second set with ascending L LE, both trials descend R LE    4 Steps CARE Score 3   Stairs   Type Stairs   # of Steps 8   Weight Bearing Precautions Fall Risk   Assist Devices Bilateral Rail   Findings (S)  advised pt of MDs concern pt not able to do steps for planned Dc home friday  Discussed if unable may need to go to additional rehab as pt will need to negotiate steps for DC home  Pt completed steps as outlined however concerning pt prefers to ascend with L LE  Needs ongoing daily stair training in prep for Dc home friday  Continues to mentally be fearful of standing on L LE  Therapeutic Interventions   Balance R foot on 4inch block, unsupported ball taps for inc L LE wtbearing x30; cone transfer R<> L with R lean x6, staggered stance R in front x6  Assessment   Treatment Assessment Pt engaged in breif 30 min skilled PT intervention with focus on L LE wt bearing and wt shfting to improve gait oksana and stair tolerance  Education to pt as a NEED to complete steps or potentially going home may not be an option  See stair training notes above  Youth RW ordered  Gait remains antalgic with dec L stance and swing  Efforts to work on L LE wt bearing ans wt shifting with leaning/reaching outside JULIO  need for ongoing stair training and wt shifting tasks to improve gait confidence and L LE wt bearing  Problem List Decreased strength;Decreased range of motion;Decreased endurance; Impaired balance;Decreased mobility;Pain   Barriers to Discharge Inaccessible home environment;Decreased caregiver support   Barriers to Discharge Comments ABY, 2SH, dec L LE wt bearing  PT Barriers   Physical Impairment Decreased strength;Decreased range of motion;Decreased mobility; Impaired balance;Decreased endurance;Pain   Functional Limitation Car transfers;Stair negotiation;Standing;Transfers; Walking   Plan   Treatment/Interventions Functional transfer training;LE strengthening/ROM; Elevations; Therapeutic exercise; Endurance training;Patient/family training;Gait training   Progress Progressing toward goals   Recommendation   PT Discharge Recommendation Home with outpatient rehabilitation  (may need home PT pending abiltiy to get in/out of home)   Equipment Recommended [employer] Package Recommended Wheeled walker   PT Therapy Minutes   PT Time In 1000   PT Time Out 1030   PT Total Time (minutes) 30   PT Mode of treatment - Individual (minutes) 30   PT Mode of treatment - Concurrent (minutes) 0   PT Mode of treatment - Group (minutes) 0   PT Mode of treatment - Co-treat (minutes) 0   PT Mode of Treatment - Total time(minutes) 30 minutes   PT Cumulative Minutes 1145   Therapy Time missed   Time missed?  No English

## 2023-02-06 NOTE — ASSESSMENT & PLAN NOTE
Hgb currently 9 1  Hgb was 12 6 pre-operatively  Iron panel on 1/23: Iron Saturation 9%, TIBC 279, Iron 25, and Ferritin 81   2 doses of IV Venofer from 1/24-1/25  Given 1u PRBCs on 1/26 for Hgb of 6 9  No active s/s of bleeding  Transfuse for Hgb <7 0 or if becomes symptomatic  Continue to trend CBC
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
## 2023-02-06 NOTE — ASSESSMENT & PLAN NOTE
S/p mechanical fall on 1/18  XR on 1/18 showed acute intertrochanteric L hip fracture  OR on 1/19 for IM fixation of L subtrochanteric hip fracture performed by Dr Roxy Rowley  WBAT to LLE  Neurovascular checks Q shift  Ensure adequate pain control  Monitor incision for s/s of infection  Eliquis for DVT ppx  Follow-up with Orthopedics in 2 weeks  XRs on 2/2: stable appearing comminuted intertrochanteric fracture s/p ORIF, no evidence for hardware complication  Ortho consulted - staples removed  Follow-up in additional 4 weeks as outpatient  Primary team following  PT/OT

## 2023-02-06 NOTE — PROGRESS NOTES
02/06/23 0700   Pain Assessment   Pain Assessment Tool 0-10   Pain Score 5   Pain Location/Orientation Orientation: Left; Location: Hip   Pain Onset/Description Onset: Ongoing   Effect of Pain on Daily Activities Tolerated   Patient's Stated Pain Goal No pain   Hospital Pain Intervention(s) Shower/Bath   Multiple Pain Sites No   Restrictions/Precautions   Precautions Fall Risk;Pain   Weight Bearing Restrictions Yes   RLE Weight Bearing Per Order WBAT   LLE Weight Bearing Per Order WBAT   ROM Restrictions No   Braces or Orthoses Other (Comment)  (B/L knee brace; B/L teds)   Lifestyle   Autonomy Pt seated in recliner upon OT arrival    Eating   Type of Assistance Needed Independent   Physical Assistance Level No physical assistance   Comment Pt provided with breakfast tray following OT Tx session  Eating CARE Score 6   Oral Hygiene   Type of Assistance Needed Supervision   Physical Assistance Level No physical assistance   Comment S in stance at sink top to complete oral hygiene routine   Oral Hygiene CARE Score 4   Grooming   Able To Initiate Tasks;Comb/Brush Hair;Wash/Dry Hands   Findings S in stance at sink top to comb hair and wash hands  Shower/Bathe Self   Type of Assistance Needed Supervision   Physical Assistance Level No physical assistance   Comment Pt completed shower ADL at CS level, demonstrating ability to bathe 10/10 parts  Use of LHR with wash cloth to bathe LE's to feet  CS in stance at grab bar to bathe jon/buttocks  Shower/Bathe Self CARE Score 4   Bathing   Assessed Bath Style Shower   Anticipated D/C Bath Style Sponge Bath   Able to [place] No   Able to [employer] Temperature Yes   Able to Wash/Rinse/Dry (body part) Left Arm;Right Arm;L Upper Leg;R Upper Leg;L Lower Leg/Foot;R Lower Leg/Foot;Chest;Abdomen;Perineal Area; Buttocks   Limitations Noted in Balance; Endurance;ROM;Strength   Positioning Seated;Standing   Adaptive Equipment Longhand Reacher; Shower [employer]; Shower Seat;Hand Held Shower   Findings  Plan to SB upon D/C  Tub/Shower Transfer   Limitations Noted In Balance; Endurance;LE Strength   Adaptive Equipment Grab Bars;Seat with Back   Assessed Shower   Findings CG/CS side stepping in/out of wet shower environment with use of grab bar   Upper Body Dressing   Type of Assistance Needed Set-up / clean-up   Physical Assistance Level No physical assistance   Comment Seated to don [place] shirt   Upper Body Dressing CARE Score 5   Lower Body Dressing   Type of Assistance Needed Supervision; Adaptive equipment   Physical Assistance Level No physical assistance   Comment Seated performing dynamic reach to thread LE's into under garment and loose fitting pants  CS in stance at [facility] for CM up over hips  Use of sock aide to A with donning B/L knee braces  Lower Body Dressing CARE Score 4   Putting On/Taking Off Footwear   Type of Assistance Needed Supervision; Adaptive equipment;Verbal cues   Physical Assistance Level No physical assistance   Comment Use of dressing stick to doff slipper socks  Use of sock aide to don B/L knee high teds with increased time and cues for technique  Use of LH shoe horn to don slip on shoes   Putting On/Taking Off Footwear CARE Score 4   Sit to Stand   Type of Assistance Needed Supervision; Adaptive equipment   Physical Assistance Level No physical assistance   Comment RW   Sit to Stand CARE Score 4   Bed-Chair Transfer   Type of Assistance Needed Supervision; Adaptive equipment   Physical Assistance Level No physical assistance   Comment CS with RW   Chair/Bed-to-Chair Transfer CARE Score 4   Therapeutic Excerise-Strength   UE Strength Yes   Right Upper Extremity- Strength   R Shoulder Flexion;ABduction; Extension;Horizontal ABduction; External rotation; Internal rotation   R Elbow Elbow flexion;Elbow extension   R Position Seated;Standing   Equipment Dumbbell  (2#)   R Weight/Reps/Sets 2 sets of 15 reps   RUE Strength Comment Engaged in UE TE utilizing 2# dumbbell to perform 2 sets of 15 reps of indicated planes, 1 set completed seated and 1 set completed in stance  Pt tolerated well with rets breaks between each set with focus of TE to improve UE strength and fnxls tanding balance  Left Upper Extremity-Strength   L Shoulder Flexion;ABduction; Extension;Horizontal ABduction; External rotation; Internal rotation   L Elbow Elbow flexion;Elbow extension   L Position Seated  (and standing)   Equipment Dumbell  (2#)   L Weights/Reps/Sets 2 sets of 15 reps   LUE Strength Comment See above   Cognition   Overall Cognitive Status WFL   Arousal/Participation Alert; Cooperative   Attention Attends with cues to redirect   Orientation Level Oriented X4   Memory Decreased short term memory   Following Commands Follows multistep commands with increased time or repetition   Activity Tolerance   Activity Tolerance Patient tolerated treatment well   Other Comments   Assessment Pt already positioned OOB upon OT arrival; Ortho BP's 130/71 (seated), 138/79 (in stance)  No c/o of dizziness  Assessment   Treatment Assessment Pt participated in 90 min skilled OT Tx session with focus on ADL performance, UE TE, and fxnl standing balance  See above for further Tx details  Pt continues to demonstrate progress towards OT goals, completing ADL of bathing and dressing at  level utilizing [facility]  Pt plans to SB in 1st flr bathroom upon D/C  Tolerated UE TE seated and in stance with no LOB or c/o of increased pain  Anticipated plan for pt to D/C home on 2/10 with family support for IADL's and Outpatient PT services  No OT needs at this time  OT plan to continue addressing OT goals in preparation for upcoming D/C  Prognosis Fair   Problem List Decreased strength;Decreased range of motion;Decreased endurance; Impaired balance;Decreased mobility;Pain   Barriers to Discharge Inaccessible home environment;Decreased caregiver support   Plan   Treatment/Interventions ADL retraining;Functional transfer training; Therapeutic exercise; Endurance training   Progress Progressing toward goals   Recommendation   OT Discharge Recommendation Home with outpatient rehabilitation  (Outpatient PT; No OT needs)   Equipment Recommended Bedside commode; Hip Kit ($)   Commode Type Standard   OT Equipment ordered Yes   OT Therapy Minutes   OT Time In 0700   OT Time Out 0830   OT Total Time (minutes) 90   OT Mode of treatment - Individual (minutes) 90   OT Mode of treatment - Concurrent (minutes) 0   OT Mode of treatment - Group (minutes) 0   OT Mode of treatment - Co-treat (minutes) 0   OT Mode of Treatment - Total time(minutes) 90 minutes   OT Cumulative Minutes 1080   Therapy Time missed   Time missed?  No

## 2023-02-06 NOTE — PROGRESS NOTES
[facility]  Progress Note Jackson Melo 1950, 67 y o  female MRN: [id number]  Unit/Bed#: [facility] 960-21 Encounter: [id number]  Primary Care Provider: No primary care provider on file  Date and time admitted to hospital: 1/23/2023  1:33 PM    Adjustment disorder with anxiety  Assessment & Plan  Has been receiving Atarax 10mg Q6 PRN since 1/26  Discussed with patient and admits to feelings of anxiety prior to her fall  Started Lexapro 5mg daily on 1/30  Supportive counseling as needed  Neuropsych consulted  Follow-up with PCP as outpatient  Hypoxemia  Assessment & Plan  Hypoxia in acute setting post-operatively  Developed PEs and was started on Eliquis  Per Pulmonary - recommending overnight noc ox as outpatient  Desat while sleeping - noc ox obtained on 1/27  Desat for 1 hour and 40 minutes, lowest saturation 80%  Apply 2L O2 at night or while sleeping  Repeat overnight noc ox prior to discharge for DME evaluation  Osteopenia  Assessment & Plan  DXA scan in 12/2020 showed osteopenia  Continue home calcium carbonate and Vitamin D supplementation  Vitamin D level 29 8 on 1/24  Increased Vitamin D3 to 3000u daily  Recommend repeat DXA scan as outpatient along with discussion about Prolia injections  Follow-up with PCP as outpatient  Acute postoperative anemia due to expected blood loss  Assessment & Plan  Hgb currently 9 1  Hgb was 12 6 pre-operatively  Iron panel on 1/23: Iron Saturation 9%, TIBC 279, Iron 25, and Ferritin 81   2 doses of IV Venofer from 1/24-1/25  Given 1u PRBCs on 1/26 for Hgb of 6 9  No active s/s of bleeding  Transfuse for Hgb <7 0 or if becomes symptomatic  Continue to trend CBC  Acute deep vein thrombosis (DVT) of left peroneal vein [facility])  Assessment & Plan  Lower extremity venous duplex on 1/20: Evidence of focal acute DVT in 1 of 2 peroneal veins on LLE    Completed Eliquis 10mg BID for 7 days and now receiving Eliquis 5mg BID  Follow-up with PCP as outpatient  Neurovascular checks Q shift  Multiple subsegmental pulmonary emboli without acute cor pulmonale (HCC)  Assessment & Plan  CTA PE study on 1/20: Few subsegmental pulmonary emboli in the posterior lower lobes, measured RV/LV ratio within normal limits  Completed Eliquis 10mg BID for 7 days and now receiving Eliquis 5mg BID, continue for 3 months  Follow-up with PCP as outpatient  Currently maintaining on RA  Encourage pulmonary toileting  Spot pulse ox checks  Chronic diastolic congestive heart failure (HCC)  Assessment & Plan  Wt Readings from Last 3 Encounters:   02/06/23 90 7 kg (200 lb)   01/23/23 95 2 kg (209 lb 14 1 oz)     Stable without exacerbation   on 1/20  Last ECHO on 12/2/22 showed EF 55-59%, G1DD  Takes Lasix 20mg daily at home - currently on hold  Restart as able  Other hyperlipidemia  Assessment & Plan  Continue home Lipitor 20mg daily  Last lipid panel on 11/15/22: Cholesterol 147, Triglycerides 73, HDL 53, and LDL 79  Essential hypertension  Assessment & Plan  Home regimen: Lasix 20mg daily, metoprolol tartrate 50mg BID, losartan 50mg daily  Currently receiving metoprolol tartrate 50mg BID  Restarted losartan 25mg daily on 1/31  Apply abdominal binder/TEDs when getting OOB  Consider restarting Lasix when able  Monitor BP with routine VS   Follow-up with PCP as outpatient  * Closed left hip fracture [facility])  Assessment & Plan  S/p mechanical fall on 1/18  XR on 1/18 showed acute intertrochanteric L hip fracture  OR on 1/19 for IM fixation of L subtrochanteric hip fracture performed by Dr Lida Tee  WBAT to LLE  Neurovascular checks Q shift  Ensure adequate pain control  Monitor incision for s/s of infection  Eliquis for DVT ppx  Follow-up with Orthopedics in 2 weeks  XRs on 2/2: stable appearing comminuted intertrochanteric fracture s/p ORIF, no evidence for hardware complication    Ortho consulted - staples removed  Follow-up in additional 4 weeks as outpatient  Primary team following  PT/OT  VTE Pharmacologic Prophylaxis:   Pharmacologic: Apixaban (Eliquis)  Mechanical VTE Prophylaxis in Place: No - DVT  Current Length of Stay: 14 day(s)    Current Patient Status: Inpatient Rehab     Discharge Plan: As per primary team     Code Status: Level 1 - Full Code    Subjective:   Pt examined while pt sitting in recliner in pt room  Complaints of minimal L hip pain but just came back from therapy  Denies any lower extremity swelling, numbness, or tingling  Asking if she will need the TEDs at home but discussed that they are just being used for edema and that she does not need to continue at home  Denies any fevers, chills, lightheadedness, dizziness, or SOB  Complaints of constipation  Had a BM yesterday but experienced difficulty  Discussed having her take Miralax today but she would like to hold off  Encouraged to hydrate  Objective:     Vitals:   Temp (24hrs), Av 7 °F (36 5 °C), Min:97 2 °F (36 2 °C), Max:98 2 °F (36 8 °C)    Temp:  [97 2 °F (36 2 °C)-98 2 °F (36 8 °C)] 97 8 °F (36 6 °C)  HR:  [67-82] 82  Resp:  [18] 18  BP: (132-144)/(61-72) 138/71  SpO2:  [94 %-95 %] 95 %  Body mass index is 35 43 kg/m²  Review of Systems   Constitutional: Negative for appetite change, chills, fatigue and fever  HENT: Negative for trouble swallowing  Eyes: Negative for visual disturbance  Respiratory: Negative for cough, chest tightness, shortness of breath, wheezing and stridor  Cardiovascular: Negative for chest pain, palpitations and leg swelling  Gastrointestinal: Positive for constipation  Negative for abdominal distention, abdominal pain, diarrhea, nausea and vomiting  LBM 2/5   Genitourinary: Negative for difficulty urinating  Musculoskeletal: Positive for arthralgias (Minimal L sided hip pain after therapy, aching, improves with rest)   Negative for back pain, gait problem, joint swelling and myalgias  Neurological: Negative for dizziness, weakness, light-headedness, numbness and headaches  Psychiatric/Behavioral: Negative for dysphoric mood and sleep disturbance  The patient is not nervous/anxious  All other systems reviewed and are negative  Input and Output Summary (last 24 hours): Intake/Output Summary (Last 24 hours) at 2/6/2023 0909  Last data filed at 2/6/2023 0830  Gross per 24 hour   Intake 720 ml   Output 350 ml   Net 370 ml       Physical Exam:     Physical Exam  Vitals and nursing note reviewed  Constitutional:       General: She is not in acute distress  Appearance: Normal appearance  She is obese  She is not ill-appearing  HENT:      Head: Normocephalic and atraumatic  Cardiovascular:      Rate and Rhythm: Normal rate and regular rhythm  Pulses: Normal pulses  Heart sounds: Murmur heard  Systolic murmur is present with a grade of 2/6  No friction rub  Pulmonary:      Effort: Pulmonary effort is normal  No respiratory distress  Breath sounds: Normal breath sounds  No wheezing or rhonchi  Abdominal:      General: Abdomen is flat  Bowel sounds are normal  There is no distension  Palpations: Abdomen is soft  There is no mass  Tenderness: There is no abdominal tenderness  There is no guarding or rebound  Hernia: No hernia is present  Musculoskeletal:      Cervical back: Normal range of motion and neck supple  No tenderness  Right lower leg: No edema  Left lower leg: No edema  Skin:     General: Skin is warm and dry  Findings: Bruising (Scattered ecchymosis on L thigh) present  Comments: L hip incisions healing, well-approximated  No drainage, erythema, or edema present  Neurological:      Mental Status: She is alert and oriented to person, place, and time     Psychiatric:         Mood and Affect: Mood normal          Behavior: Behavior normal          Additional Data:     Labs:    Results from last 7 days   Lab Units 02/06/23  0459   WBC Thousand/uL 4 62   HEMOGLOBIN g/dL 9 1*   HEMATOCRIT % 29 9*   PLATELETS Thousands/uL 305   NEUTROS PCT % 70   LYMPHS PCT % 16   MONOS PCT % 11   EOS PCT % 2     Results from last 7 days   Lab Units 02/06/23  0459   SODIUM mmol/L 140   POTASSIUM mmol/L 3 9   CHLORIDE mmol/L 103   CO2 mmol/L 31   BUN mg/dL 16   CREATININE mg/dL 0 54*   ANION GAP mmol/L 6   CALCIUM mg/dL 8 8   GLUCOSE RANDOM mg/dL 101*                       Labs reviewed    Imaging:    Imaging reviewed    Recent Cultures (last 7 days):           Last 24 Hours Medication List:   Current Facility-Administered Medications   Medication Dose Route Frequency Provider Last Rate   • acetaminophen  650 mg Oral Q6H PRN ELDA Dutta     • apixaban  5 mg Oral BID ELDA Dutta     • atorvastatin  20 mg Oral Daily With Rebeca Krishna MD     • bisacodyl  10 mg Rectal Daily PRN Baylee Ruiz MD     • calcium carbonate  1,000 mg Oral Daily PRN Baylee Ruiz MD     • calcium carbonate-vitamin D  2 tablet Oral Daily With Breakfast ELDA Dutta     • cholecalciferol  3,000 Units Oral Daily ELDA Dutta     • docusate sodium  100 mg Oral BID Baylee Ruiz MD     • escitalopram  5 mg Oral Daily ELDA Dutta     • hydrOXYzine HCL  10 mg Oral Q6H PRN Baylee Ruiz MD     • loratadine  10 mg Oral Daily Baylee Ruiz MD     • losartan  25 mg Oral Daily ELDA Dutta     • menthol-methyl salicylate   Apply externally 4x Daily PRN Baylee Ruiz MD     • metoprolol tartrate  50 mg Oral BID Baylee Ruiz MD     • oxyCODONE  5 mg Oral Q6H PRN Baylee Ruiz MD     • oxyCODONE  2 5 mg Oral Q6H PRN Baylee Ruiz MD     • pantoprazole  40 mg Oral Daily Baylee Ruiz MD     • polyethylene glycol  17 g Oral Daily PRN Baylee Ruiz MD          M*Modal software was used to dictate this note  It may contain errors with dictating incorrect words or incorrect spelling   Please contact the provider directly with any questions

## 2023-02-06 NOTE — OCCUPATIONAL THERAPY NOTE
OTR Kimberley Sun) communicated to this WONG/L discussion with pt's son Sakina Aguilera), confirming plan for D/C  Benton Holland is agreeable to pt having a 1st flr S/U upon D/C and communicated request for his mother to recieve home PT services vs Outpatient services  OTR communicating to SUSI Barton)  Plan for pt to D/C home on Friday, 2/10 with Home PT services and a 1st flr S/U        Peggy Necessary, WONG/L

## 2023-02-06 NOTE — ASSESSMENT & PLAN NOTE
Wt Readings from Last 3 Encounters:   02/06/23 90 7 kg (200 lb)   01/23/23 95 2 kg (209 lb 14 1 oz)     Stable without exacerbation   on 1/20  Last ECHO on 12/2/22 showed EF 55-59%, G1DD  Takes Lasix 20mg daily at home - currently on hold  Restart as able

## 2023-02-07 RX ORDER — LOSARTAN POTASSIUM 50 MG/1
50 TABLET ORAL DAILY
Status: DISCONTINUED | OUTPATIENT
Start: 2023-02-08 | End: 2023-02-10 | Stop reason: HOSPADM

## 2023-02-07 RX ORDER — METHOCARBAMOL 500 MG/1
500 TABLET, FILM COATED ORAL EVERY 6 HOURS PRN
Status: DISCONTINUED | OUTPATIENT
Start: 2023-02-07 | End: 2023-02-10 | Stop reason: HOSPADM

## 2023-02-07 RX ADMIN — CHOLECALCIFEROL TAB 25 MCG (1000 UNIT) 3000 UNITS: 25 TAB at 08:19

## 2023-02-07 RX ADMIN — METOPROLOL TARTRATE 50 MG: 50 TABLET, FILM COATED ORAL at 17:35

## 2023-02-07 RX ADMIN — ACETAMINOPHEN 650 MG: 325 TABLET ORAL at 17:35

## 2023-02-07 RX ADMIN — POLYETHYLENE GLYCOL 3350 17 G: 17 POWDER, FOR SOLUTION ORAL at 08:18

## 2023-02-07 RX ADMIN — Medication 2 TABLET: at 08:19

## 2023-02-07 RX ADMIN — ACETAMINOPHEN 650 MG: 325 TABLET ORAL at 08:19

## 2023-02-07 RX ADMIN — ATORVASTATIN CALCIUM 20 MG: 20 TABLET, FILM COATED ORAL at 17:35

## 2023-02-07 RX ADMIN — APIXABAN 5 MG: 5 TABLET, FILM COATED ORAL at 17:35

## 2023-02-07 RX ADMIN — APIXABAN 5 MG: 5 TABLET, FILM COATED ORAL at 08:19

## 2023-02-07 RX ADMIN — LORATADINE 10 MG: 10 TABLET ORAL at 08:20

## 2023-02-07 RX ADMIN — LOSARTAN POTASSIUM 25 MG: 25 TABLET, FILM COATED ORAL at 08:19

## 2023-02-07 RX ADMIN — METOPROLOL TARTRATE 50 MG: 50 TABLET, FILM COATED ORAL at 08:19

## 2023-02-07 RX ADMIN — PANTOPRAZOLE SODIUM 40 MG: 40 TABLET, DELAYED RELEASE ORAL at 05:22

## 2023-02-07 RX ADMIN — DOCUSATE SODIUM 100 MG: 100 CAPSULE, LIQUID FILLED ORAL at 17:35

## 2023-02-07 RX ADMIN — DOCUSATE SODIUM 100 MG: 100 CAPSULE, LIQUID FILLED ORAL at 08:19

## 2023-02-07 RX ADMIN — ESCITALOPRAM OXALATE 5 MG: 5 TABLET, FILM COATED ORAL at 08:19

## 2023-02-07 NOTE — PLAN OF CARE
Problem: NEUROSENSORY - ADULT  Goal: Achieves stable or improved neurological status  Description: INTERVENTIONS  - Monitor and report changes in neurological status  - Monitor vital signs such as temperature, blood pressure, glucose, and any other labs ordered   - Initiate measures to prevent increased intracranial pressure  - Monitor for seizure activity and implement precautions if appropriate      Outcome: Progressing     Problem: GASTROINTESTINAL - ADULT  Goal: Minimal or absence of nausea and/or vomiting  Description: INTERVENTIONS:  - Administer IV fluids if ordered to ensure adequate hydration  - Maintain NPO status until nausea and vomiting are resolved  - Nasogastric tube if ordered  - Administer ordered antiemetic medications as needed  - Provide nonpharmacologic comfort measures as appropriate  - Advance diet as tolerated, if ordered  - Consider nutrition services referral to assist patient with adequate nutrition and appropriate food choices  Outcome: Progressing

## 2023-02-07 NOTE — PROGRESS NOTES
02/07/23 1300   Pain Assessment   Pain Assessment Tool 0-10   Pain Score 6   Pain Location/Orientation Orientation: Left; Location: Hip   Pain Onset/Description Onset: Gradual   Effect of Pain on Daily Activities Tolerated   Patient's Stated Pain Goal No pain   Hospital Pain Intervention(s) Rest  (Declined cold pack)   Multiple Pain Sites No   Restrictions/Precautions   Precautions Fall Risk;Pain   Weight Bearing Restrictions Yes   LLE Weight Bearing Per Order WBAT   ROM Restrictions No   Braces or Orthoses Other (Comment)  (B/L knee sleeves; B/L teds)   Lifestyle   Autonomy Pt agreeable to participate in OT Tx session  Sit to Stand   Type of Assistance Needed Supervision; Adaptive equipment   Physical Assistance Level No physical assistance   Comment RW   Sit to Stand CARE Score 4   Bed-Chair Transfer   Type of Assistance Needed Supervision; Adaptive equipment   Physical Assistance Level No physical assistance   Comment RW   Chair/Bed-to-Chair Transfer CARE Score 4   Toilet Transfer   Comment (S)  Plan to assess for IRP's with RW during upcoming Tx session  Meal Prep   Meal Prep Level Walker   Meal Prep Level of Assistance Distant supervision   Meal Preparation Pt engaged in light meal prep task of coffee making in stance, utilizing counter top for support as pt side steps to fill coffee pot with water and pour into coffee machine  Pt remained seated as coffee brewed, as pt reports having kitchen table and chairs at home for seated rest breaks  Once coffee was complete, pt prepared cup of coffee in stance at counter top with no LOB  Pt reports plan to have brother Juli Apodaca) assist with transporting items upon D/C, as pt is not interested in purchasing RW tray/basket as pt hopes RW use will be "short" upon D/C     Kitchen Mobility   Kitchen-Mobility Level Walker   Kitchen Activity Retrieve items   Kitchen Mobility Comments Participated in fxnl kitchen mobility with RW demonstrating ability to retrieve 6/6 items froma round kitchen environment  G carryover of safe positioning of RW during fxnl reach into fridge  Pt overall req S with no LOB with plan to have brother assist with item transport upon D/C  EDU provided to keep freq used items within reach  Pt agreeable  Health Management   Health Management Level of Assistance Independent   Health Management Review of current list of scheduled medications, as pt previously completed med management upon start of [facility] stay  Pt able to identify 2 new medications since previous review of meds (Lexapro and Losartan)  G knowledge of all scheduled medications purpose and frequency utilizing medication reference sheet  Pt preference not to utilize pill organizer upon D/C and continue routine of taking medications directly from bottle  Pt will be IND for med management upon D/C  Therapeutic Excerise-Strength   UE Strength Yes   Right Upper Extremity- Strength   R Shoulder Flexion;ABduction; Extension;Horizontal ABduction; External rotation; Internal rotation   R Elbow Elbow flexion;Elbow extension   R Position Seated   Equipment Dumbbell  (3#)   R Weight/Reps/Sets 2 sets of 15 reps   RUE Strength Comment Engaged in seated UE TE utilizing 3# dumbbell to perform 2 sets of 15 reps of indicated planes, while waiting for coffee to brew  Pt tolerated with rest breaks between each set  Focus of TE to maximize UE strength for continued engagement in ADL/IADL performance  Left Upper Extremity-Strength   L Shoulder Flexion;ABduction; Extension;Horizontal ABduction; External rotation; Internal rotation   L Elbow Elbow flexion;Elbow extension   L Position Seated   Equipment Dumbell  (3#)   L Weights/Reps/Sets 2 sets of 15 reps   LUE Strength Comment See above   Cognition   Overall Cognitive Status WFL   Arousal/Participation Alert; Cooperative   Attention Attends with cues to redirect   Orientation Level Oriented X4   Memory Within functional limits   Following Commands Follows multistep commands with increased time or repetition   Additional Activities   Additional Activities Other (Comment)   Additional Activities Comments Completed LE cone taps in stance at RW with focus on promoting WBing through LLE and improving overall fxnl standing balance  Pt tolerated 10 reps x 4 sets with rest breaks  Pt continues to report difficulty with relying on LLE, as pt tends to heavily rely on UE for support during activity  Activity Tolerance   Activity Tolerance Patient tolerated treatment well   Assessment   Treatment Assessment Pt engaged in 90 min skilled OT Tx session with focus on IADL management, fxnl standing balance, promoting WBing through LLE, and UE TE  See above for further Tx details  Pt has achieved OT goal of IND for medication management  S required for light meal prep and fxnl kitchen mobility  Pt reports plan for brother Etienne Montgomery) to assist with item transport upon D/C  Pt would benefit from completing light microwave meal prep upon upcoming session in order to achieve OT goal of Kermit  Continues to demonstrate difficulty with LLE WBing and observed heavily relying on UE's for support  OT plan to assess for IRP's with RW during upcomin Tx session and engage in light microwave meal prep with goal of achieving Kermit  Anticipated D/C scheduled for 2/10 with Home Pt services  Prognosis Fair   Problem List Decreased strength;Decreased range of motion;Decreased endurance; Impaired balance;Decreased mobility;Pain   Barriers to Discharge Inaccessible home environment;Decreased caregiver support   Plan   Treatment/Interventions ADL retraining;Functional transfer training; Therapeutic exercise; Endurance training;Patient/family training   Progress Progressing toward goals   Recommendation   OT Discharge Recommendation Home with home health rehabilitation  (PT only;  No OT needs)   OT Therapy Minutes   OT Time In 1300   OT Time Out 1430   OT Total Time (minutes) 90   OT Mode of treatment - Individual (minutes) 90 OT Mode of treatment - Concurrent (minutes) 0   OT Mode of treatment - Group (minutes) 0   OT Mode of treatment - Co-treat (minutes) 0   OT Mode of Treatment - Total time(minutes) 90 minutes   OT Cumulative Minutes 1170   Therapy Time missed   Time missed?  No

## 2023-02-07 NOTE — ASSESSMENT & PLAN NOTE
Wt Readings from Last 3 Encounters:   02/07/23 93 kg (205 lb 0 4 oz)   01/23/23 95 2 kg (209 lb 14 1 oz)     Stable without exacerbation   on 1/20  Last ECHO on 12/2/22 showed EF 55-59%, G1DD  Takes Lasix 20mg daily at home - currently on hold  Restart as able

## 2023-02-07 NOTE — ASSESSMENT & PLAN NOTE
Home regimen: Lasix 20mg daily, metoprolol tartrate 50mg BID, losartan 50mg daily  Currently receiving metoprolol tartrate 50mg BID and losartan 25mg daily  Increase losartan to 50mg daily on 2/7  Apply abdominal binder/TEDs when getting OOB  Consider restarting Lasix when able  Monitor BP with routine VS   Follow-up with PCP as outpatient

## 2023-02-07 NOTE — PROGRESS NOTES
02/07/23 0830   Pain Assessment   Pain Score 4   Pain Location/Orientation Orientation: Left; Location: Leg  (tightness)   Hospital Pain Intervention(s) Ambulation/increased activity  (nu step to loosen up , reported to have pain meds 20 mins ago)   Restrictions/Precautions   Precautions Pain; Fall Risk   LLE Weight Bearing Per Order WBAT   Cognition   Arousal/Participation Alert; Cooperative   Attention Attends with cues to redirect   Memory Within functional limits   Following Commands Follows multistep commands with increased time or repetition   Subjective   Subjective "I know I have to do the steps"   Sit to Lying   Type of Assistance Needed Supervision   Comment at mat  Sit to Lying CARE Score 4   Lying to Sitting on Side of Bed   Type of Assistance Needed Supervision   Comment at mat , verbal cues for technmarisela frye R LE under L LE   Lying to Sitting on Side of Bed CARE Score 4   Sit to Stand   Type of Assistance Needed Supervision; Adaptive equipment   Comment with RW   Sit to Stand CARE Score 4   Bed-Chair Transfer   Type of Assistance Needed Supervision; Adaptive equipment   Comment with RW   Chair/Bed-to-Chair Transfer CARE Score 4   Transfer Bed/Chair/Wheelchair   Adaptive Equipment Roller Walker   Walk 10 Feet   Type of Assistance Needed Supervision; Adaptive equipment   Comment with RW   Walk 10 Feet CARE Score 4   Walk 50 Feet with Two Turns   Type of Assistance Needed Supervision; Adaptive equipment   Comment with RW   Walk 50 Feet with Two Turns CARE Score 4   Walk 150 Feet   Type of Assistance Needed Supervision; Adaptive equipment   Comment with RW   Walk 150 Feet CARE Score 4   Ambulation   Primary Mode of Locomotion Prior to Admission Walk   Distance Walked (feet) 150 ft  (shorter distances for task in gym)   Assist Device Roller Walker   Gait Pattern Antalgic; Improper weight shift; Step to; Step through   Limitations Noted In Endurance   Walk Assist Level Supervision   Does the patient walk?  2  Yes   Wheel 50 Feet with Two Turns   Reason if not Attempted Activity not applicable   Wheel 50 Feet with Two Turns CARE Score 9   Wheel 150 Feet   Reason if not Attempted Activity not applicable   Wheel 656 Feet CARE Score 9   Wheelchair mobility   Does the patient use a wheelchair? 0  No   Curb or Single Stair   Style negotiated Single stair   Type of Assistance Needed Physical assistance; Adaptive equipment   Physical Assistance Level 26%-50%   Comment had a hard time initiating first step but after that much smoother with other steps,  with using B HR min A, using L HR and QC mod A   1 Step (Curb) CARE Score 3   4 Steps   Type of Assistance Needed Incidental touching; Adaptive equipment;Physical assistance   Physical Assistance Level 25% or less   Comment using B HR CGA , using QC + L HR min A ascending, B hands on R HR descending   4 Steps CARE Score 3   12 Steps   Type of Assistance Needed Physical assistance   Physical Assistance Level 25% or less   Comment using B HR CGA , using QC + L HR min A ascending, B hands on R HR descending   12 Steps CARE Score 3   Stairs   Type Stairs   # of Steps 12  (using B HR)   Weight Bearing Precautions WBAT;LLE   Assist Devices Bilateral Rail;Single Rail;Quad Cane   Findings pt  stated that she only has the L HR to go up from the basement  Trialed suing B hands on L railing but was very difficult  Pt  used L HR and NBQC on R LE and descending steps using B hads on R HR descending sideways  Educated pt  to stay on first floor for now and only do steps when home PT starts working with her  Therapeutic Interventions   Strengthening L LAQ in sitting, supine gluteal squeeze , B quads sets, B SAQ with 2 5# wts on R LE  Other side stepping along outside of parallel bars and walking along bars using single UE support  Pt  unable to walk with only L UE support, needed to have cane on R hand but able to walk with R UE on rail only    Pt  needed cueing for stride length and to  feet instead of just dragging them  Equipment Use   NuStep level 2 X 15 mins LE only   Assessment   Treatment Assessment Pt  engaged in 90 mins session and was able to tolerate above activities  Tx initiated with nu step to loosen up and then managing steps  Pt  able to manage 12 steps today with no rest breaks using B HR  However pt  reported that she does not have B HR and only has L HR ascending  Practiced this set up using B Hands on L HR and / using QC but was difficult for patient needing min to mod A  Pt's pain increased at this time and reported throbbing on L lat thigh / hip area  Applied ice with supine thera ex and offered some relief  Educated pt  on staying on first floor for the next few days at d/c if possible and to have home PT work with her on steps to basement   Pt  able to dmeonstrate understanding  Cont with POC and cont to work on stairs management, L LE WB and strengthening and ROM   Problem List Decreased strength;Decreased range of motion;Decreased endurance;Pain   Barriers to Discharge Decreased caregiver support; Inaccessible home environment   PT Barriers   Functional Limitation Car transfers;Stair negotiation;Standing;Transfers; Walking   Plan   Treatment/Interventions Functional transfer training;LE strengthening/ROM; Elevations; Therapeutic exercise; Endurance training;Patient/family training;Equipment eval/education; Bed mobility;Gait training   Recommendation   PT Discharge Recommendation Home with home health rehabilitation   Equipment Recommended Walker   PT Equipment ordered RW   PT Therapy Minutes   PT Time In 0830   PT Time Out 1000   PT Total Time (minutes) 90   PT Mode of treatment - Individual (minutes) 90   PT Mode of treatment - Concurrent (minutes) 0   PT Mode of treatment - Group (minutes) 0   PT Mode of treatment - Co-treat (minutes) 0   PT Mode of Treatment - Total time(minutes) 90 minutes   PT Cumulative Minutes 1295   Therapy Time missed   Time missed?  No

## 2023-02-07 NOTE — ASSESSMENT & PLAN NOTE
S/p mechanical fall on 1/18  XR on 1/18 showed acute intertrochanteric L hip fracture  OR on 1/19 for IM fixation of L subtrochanteric hip fracture performed by Dr Yunior Abraham  WBAT to LLE  Neurovascular checks Q shift  Ensure adequate pain control  Monitor incision for s/s of infection  Eliquis for DVT ppx  Follow-up with Orthopedics in 2 weeks  XRs on 2/2: stable appearing comminuted intertrochanteric fracture s/p ORIF, no evidence for hardware complication  Ortho consulted - staples removed  Follow-up in additional 4 weeks as outpatient  Primary team following  PT/OT

## 2023-02-07 NOTE — PROGRESS NOTES
Physical Medicine and Rehabilitation Progress Note  Andrés Jones 67 y o  female MRN: [id number]  Unit/Bed#: -16 Encounter: [id number]    HPI: Andrés Jones is a 67 y o  female  With medical history of HLD, HTN, Chronic diastolic heart failure (M7AB), previous R hip fracture who presented to the [address] [place] [address] on 1/18 after slipping on some water while at work at [employer]  No prodromal symptoms  CTH showed no acute intracranial abnormality, and XR L hip showed a subtrochanteric femur fracture  Ortho recommended ORIF with IMN which was performed on 1/19 by Dr Tim Fox  Made WBAT post-op Post-op lethargi and hypoxic, CXR showed no acute cardiopulmonary disease  Required BiPAP, Narcan ,and Flumazenil  There was some improvement in her lethargy, but remained hypoxic  Pulm was consulted  CTA PE ordered which showed distal subsegmental PEs in the posterior lower lobe with atelectasis  Doppler performed showed a DVT in 1 peroneal vein in LLE  Pulm did not feel that the findings were responsible for the extent of her hypoxia - they felt multifactorial 2/2 atelectasis some volume overload  She got 1 dose of IV Lasix  Primary did not feel there was a volume overload component  Ultimately recommended for a/c for at least 3 months  Eliquis cost $47 a month  Course also c/b ABLA, but that stabilized and she did not require transfusion She was stabilized and admitted to the [facility] on 1/23/2023  Chief Complaint: L leg pain  Interval History/Subjective:  No acute events overnight  Sleeping well  Denies any new CP, SOB, fevers, chills, N/V, abdominal pain  Last BM 2/6  She did 12 steps today in therapy, and is feeling it  She is very motivated  She does not want to go home with oxycodone - the tylenol does help her pain  ROS:  A 10 point review of systems was negative except for what is noted in the HPI  Today's Changes:  1  Small/hard BM   If no improvement, give additional PRN miralax  2  Discussed with team, plan will be home PT/OT  Total visit time: 25 minutes, with more than 50% spent counseling/coordinating care  Counseling includes discussion with patient re: progress in therapies, functional issues observed by therapy staff, and discussion with patient regarding their current medical state and wellbeing  Coordination of patient's care was performed in conjunction with Internal Medicine service to monitor patient's labs, vitals, and management of their comorbidities  Assessment/Plan:    * Closed left hip fracture ([address] )  Assessment & Plan  After slipping on water at work  Workers Comp  Subtrochanteric femur fracture s/p long nail (antegrade) on 1/19 by Dr Luba Santiago  Pain control as documented below  Surgical dressings removed 1/26  Monitor drainage  Follow-up with Orthopedics done 2/2   - Consult placed, XR stable  Staples removed  - f/u in 4 weeks for 6 week POV (3/2)    PT/ OT 3-5 hours a day, 5-7 days/week in acute comprehensive inpatient rehab    Chest wall pain  Assessment & Plan  Improved/resolving  Suspect MSK or costochondritis - mild  Reproducible with palpation and certain push off manuevers in therapies  No diaphoresis, nausea, radiation, SOB  Reviewed red flag symptoms that would warrant ACS r/o  Added Bengay  Monitor closely  Adjustment disorder with anxiety  Assessment & Plan  Will provide non-pharmacologic strategies  Continued PRN hydroxyzine  1/30 started Lexapro  Consulted rehab psychology for support  Hypoxemia  Assessment & Plan  Resolved and on RA  Previously multifactorial: PE, atelectasis, ?volume overload s/p Lasix IV x1  Does have nocturnal hypoxemia on Friday Noc Ox for 1 hour   - Will start O2 at night, will need noc ox performed 48 hours prior to discharge   - Made case management aware  Monitor closely  Goals > 90%       Outpatient f/u with Pulmonology/Sleep Medicine    Osteopenia  Assessment & Plan  DXA 12/2020 with osteopenia  Continue home calcium and Vitamin D  1/24 Vitamin D is 29 8  Outpatient f/u DXA with PCP  Acute postoperative anemia due to expected blood loss  Assessment & Plan  Hgb 12 6 pre-op  2/6  6 9 > 8 2 > 8 5 > 8 5 > 9 1   - Iron deficiency on labs  - Also post-operative blood loss  - Given IV venofer x2   - Type and screen   - Consent in chart  - 1/26 Transfuse with 1 unit pRBC  Trend, transfuse as appropriate  Outpatient f/u with PCP    Acute deep vein thrombosis (DVT) of left peroneal vein [facility])  Assessment & Plan  1/20 LE Venous Duplex with L acute DVT in 1 of 2 peroneal veins  Now on Eliquis  See PE for more details  No SCD on that leg  Can do ace wrapping for edema  Outpatient f/u with PCP    Multiple subsegmental pulmonary emboli without acute cor pulmonale (HCC)  Assessment & Plan  Noted on CTA PE from 1/20   RV/LV ratio WNL  Completed Eliquis 10mg BID 7 days  Now on 5mg BID for 3 months as per Pulm  Monitor respiratory status  Chronic diastolic congestive heart failure (HCC)  Assessment & Plan  Wt Readings from Last 3 Encounters:   02/07/23 93 kg (205 lb 0 4 oz)   01/23/23 95 2 kg (209 lb 14 1 oz)     Does not appear to have acute exacerbation  12/2/2022 Echo with EF 55-59% and G1DD   on 1/20  Home: Lopressor, Losartan, Lasix  Here: Lopressor, Losartan 25mg daily  - Orthostasis has improved  Monitor and add back as appropriate  Monitor I/O and daily weights  Adjust as appropriate  Outpatient f/u with PCP  Other hyperlipidemia  Assessment & Plan  Home: Simvastatin 20mg nightly  Here: Lipitor 20mg nightly for therapeutic substitution   Outpatient f/u with PCP    Essential hypertension  Assessment & Plan  Home: Lasix 20mg daily, Lopressor 50mg BID, Losartan 50mg daily  Here: Lopressor 50mg BID, Losartan 25mg daily  1/24 Losartan held for orthostasis   Has since improved  Can try to restart Lasix while here once BP appropriate  Monitor BP, adjust as appropriate  Follow-up with PCP  Constipation-resolved as of 2/2/2023  Assessment & Plan  Resolved  Weaning off bowel regimen  Monitor on just PRNs  Hypokalemia-resolved as of 1/31/2023  Assessment & Plan  1/30 4 3  Resolved after supplementation  Monitor  Leukocytosis-resolved as of 1/31/2023  Assessment & Plan  1/30 8 88 and resolved  Afebrile, but otherwise stable  Drainage from L thigh doesn't appear infectious  Monitor incision site closely  Monitor off antibiotics  Monitor Temp and WBC  Initiate work-up if she spikes a fever or develops localizing symptoms  Health Maintenance  #Delirium/Sleep: At risk has had hallucinations on oxycodone 10mg, and was lethargic requiring narcan on fentanyl and dilaudid  #Pain: Tylenol ordered PRN, Oxycodone 2 5-5mg PRN  #Bowel: Last BM 2/6  PRN miralax/docusate/suppository  #Bladder: Voiding and continent  Monitor  #Skin/Pressure Injury Prevention: Turn Q2hr in bed, with weight shifts [id number] in wheelchair  Float heels in bed  #DVT Prophylaxis: Fully anticoagulated on Eliquis  SCD on RLE only  #GI Prophylaxis: PPI started when full a/c started  Can also do famotidine  #Code Status:  Full Code  #FEN: Reg Diet  #Dispo:  Team Conference 2/2: ADD 2/10/2023  Goals are to see if we can get independent with full flight of stairs at home, and modified Ind for all mobility and ADLs  She has made marked progress in the past week  Will go home with home PT/OT  - Follow-up Orthopedics  - Follow-up Sleep Medicine  - Follow-up PCP    Objective:    Functional Update: progressing  PT: CGA transfers, min-modA bed mobility, CGA ambulation 75', modA stairs with bilateral hand rails, Stacia ramp with RW  OT: progressing to set-up Sup with ADLs       Allergies per EMR    Physical Exam:  Temp:  [98 1 °F (36 7 °C)-98 7 °F (37 1 °C)] 98 1 °F (36 7 °C)  HR:  [64-82] 64  Resp:  [18] 18  BP: (128-154)/(60-72) 154/72  Oxygen Therapy  SpO2: 95 %  O2 Flow Rate (L/min): 2 L/min    Gen: No acute distress, Well-nourished, well-appearing  HEENT: Moist mucus membranes, Normocephalic/Atraumatic  Cardiovascular: Regular rate, rhythm, S1/S2  Distal pulses palpable  Heme/Extr: No edema  Pulmonary: Non-labored breathing  Lungs CTAB  : No garcia  GI: Soft, non-tender, non-distended  BS+  MSK: ROM is WFL in all extremities  No effusions or deformities  Bulk is symmetric  See below for MMT scores  l  Integumentary: Skin is warm, dry  Stable ecchymoses L thigh  Neuro: AAOx3, Speech is intact  Appropriate to questioning  Psych: Normal mood and affect          Diagnostic Studies:   Reviewed, no new imaging    Laboratory:  Reviewed   Results from last 7 days   Lab Units 02/06/23 0459 02/02/23  0517   HEMOGLOBIN g/dL 9 1* 8 5*   HEMATOCRIT % 29 9* 28 1*   WBC Thousand/uL 4 62 7 50     Results from last 7 days   Lab Units 02/06/23  0459   BUN mg/dL 16   POTASSIUM mmol/L 3 9   CHLORIDE mmol/L 103   CREATININE mg/dL 0 54*            Patient Active Problem List   Diagnosis   • Closed left hip fracture (HCC)   • Essential hypertension   • Other hyperlipidemia   • Chronic diastolic congestive heart failure (HCC)   • Multiple subsegmental pulmonary emboli without acute cor pulmonale (HCC)   • Acute deep vein thrombosis (DVT) of left peroneal vein (HCC)   • Acute postoperative anemia due to expected blood loss   • Osteopenia   • Hypoxemia   • Adjustment disorder with anxiety   • Chest wall pain         Medications  Current Facility-Administered Medications   Medication Dose Route Frequency Provider Last Rate   • acetaminophen  650 mg Oral Q6H PRN ELDA Lowery     • apixaban  5 mg Oral BID ELDA Lowery     • atorvastatin  20 mg Oral Daily With Sandy Hatch MD     • bisacodyl  10 mg Rectal Daily PRN Faby Drsicoll MD     • calcium carbonate  1,000 mg Oral Daily PRN Faby Driscoll MD     • calcium carbonate-vitamin D  2 tablet Oral Daily With Breakfast ELDA Lowery     • cholecalciferol  3,000 Units Oral Daily ELDA Scherer     • docusate sodium  100 mg Oral BID Phill Bruno MD     • escitalopram  5 mg Oral Daily ELDA Scherer     • hydrOXYzine HCL  10 mg Oral Q6H PRN Phill Bruno MD     • loratadine  10 mg Oral Daily Phill Bruno MD     • losartan  25 mg Oral Daily ELDA Scherer     • menthol-methyl salicylate   Apply externally 4x Daily PRN Phill Bruno MD     • metoprolol tartrate  50 mg Oral BID Phill Bruno MD     • oxyCODONE  5 mg Oral Q6H PRN Phill Bruno MD     • oxyCODONE  2 5 mg Oral Q6H PRN Phill Bruno MD     • pantoprazole  40 mg Oral Daily Phill Bruno MD     • polyethylene glycol  17 g Oral Daily PRN Phill Bruno MD            ** Please Note: Fluency Direct voice to text software may have been used in the creation of this document   **

## 2023-02-08 ENCOUNTER — APPOINTMENT (OUTPATIENT)
Dept: RADIOLOGY | Facility: HOSPITAL | Age: 73
End: 2023-02-08

## 2023-02-08 RX ORDER — ONDANSETRON 4 MG/1
4 TABLET, ORALLY DISINTEGRATING ORAL EVERY 6 HOURS PRN
Status: DISCONTINUED | OUTPATIENT
Start: 2023-02-08 | End: 2023-02-10 | Stop reason: HOSPADM

## 2023-02-08 RX ADMIN — DICLOFENAC SODIUM 2 G: 10 GEL TOPICAL at 20:48

## 2023-02-08 RX ADMIN — METOPROLOL TARTRATE 50 MG: 50 TABLET, FILM COATED ORAL at 17:18

## 2023-02-08 RX ADMIN — MENTHOL, METHYL SALICYLATE: 10; 15 CREAM TOPICAL at 12:20

## 2023-02-08 RX ADMIN — APIXABAN 5 MG: 5 TABLET, FILM COATED ORAL at 17:18

## 2023-02-08 RX ADMIN — LORATADINE 10 MG: 10 TABLET ORAL at 08:55

## 2023-02-08 RX ADMIN — Medication 2 TABLET: at 08:56

## 2023-02-08 RX ADMIN — DOCUSATE SODIUM 100 MG: 100 CAPSULE, LIQUID FILLED ORAL at 17:18

## 2023-02-08 RX ADMIN — ESCITALOPRAM OXALATE 5 MG: 5 TABLET, FILM COATED ORAL at 08:56

## 2023-02-08 RX ADMIN — PANTOPRAZOLE SODIUM 40 MG: 40 TABLET, DELAYED RELEASE ORAL at 06:10

## 2023-02-08 RX ADMIN — LOSARTAN POTASSIUM 50 MG: 50 TABLET, FILM COATED ORAL at 08:56

## 2023-02-08 RX ADMIN — ATORVASTATIN CALCIUM 20 MG: 20 TABLET, FILM COATED ORAL at 16:27

## 2023-02-08 RX ADMIN — CHOLECALCIFEROL TAB 25 MCG (1000 UNIT) 3000 UNITS: 25 TAB at 08:56

## 2023-02-08 RX ADMIN — DOCUSATE SODIUM 100 MG: 100 CAPSULE, LIQUID FILLED ORAL at 08:56

## 2023-02-08 RX ADMIN — APIXABAN 5 MG: 5 TABLET, FILM COATED ORAL at 08:56

## 2023-02-08 RX ADMIN — ACETAMINOPHEN 650 MG: 325 TABLET ORAL at 16:27

## 2023-02-08 RX ADMIN — ACETAMINOPHEN 650 MG: 325 TABLET ORAL at 08:58

## 2023-02-08 RX ADMIN — METOPROLOL TARTRATE 50 MG: 50 TABLET, FILM COATED ORAL at 08:56

## 2023-02-08 NOTE — PROGRESS NOTES
02/08/23 1031   Pain Assessment   Pain Score No Pain   Cognition   Arousal/Participation Cooperative; Alert   Orientation Level Oriented X4   Subjective   Subjective pt agreeable to participate in AM PT tx session; states she doesn't have any pain today   Sit to Stand   Type of Assistance Needed Independent; Adaptive equipment   Comment use of RW from recliner and WC   Sit to Stand CARE Score 6   Car Transfer   Type of Assistance Needed Supervision;Verbal cues; Adaptive equipment   Comment using RW, performed simulated transfer on NuStep seat   Car Transfer CARE Score 4   Walk 10 Feet   Type of Assistance Needed Adaptive equipment; Independent   Comment RW   Walk 10 Feet CARE Score 6   Walk 50 Feet with Two Turns   Type of Assistance Needed Adaptive equipment; Independent   Comment RW   Walk 50 Feet with Two Turns CARE Score 6   Walk 150 Feet   Type of Assistance Needed Supervision; Adaptive equipment;Verbal cues   Comment RW   Walk 150 Feet CARE Score 4   Ambulation   Distance Walked (feet) 150 ft  (150 ft x 1, 200 ft x 1, additional short gait distances within gym)   Assist Device Walker   Gait Pattern Antalgic; Inconsistant [place];Decreased L stance; Improper weight shift   Provided Assistance with: Weight Shift   Findings cues to take standing rest breaks prn due to fatigue and BUE soreness from increased WBing through RW  cues for increased stance time LLE   Does the patient walk? 2  Yes   4 Steps   Type of Assistance Needed Incidental touching;Physical assistance;Verbal cues; Adaptive equipment   Physical Assistance Level 25% or less   Comment intermittent min A when ascending using L handrail and SPC in RUE  supervision/incidental assist when descending  cueing for proper foot placement on step, sequencing   pt has first floor set up available at home  educated her on utilizing first floor set up and continue practice with home PT   4 Steps CARE Score 3   Therapeutic Interventions   Strengthening standing: heel raises, marches 3 sets of 10 BLEs with light UE support on RW; sidestepping in parallel bars 10 ft to left and right x 3 each direction  sit < > stands to RW 3 sets of 10   Equipment Use   NuStep level 2 x 10 minutes, BLEs only   Assessment   Treatment Assessment Pt participated in 60 minute AM PT tx session focusing on BLE strengthening exercises and functional mobility training  Pt requires intermittent hands on assistance when negotiating stairs as well as her reporting fear of falling, discussed her first floor set up [facility] pt is agreeable to at this time  She will continue to benefit from skilled PT interventions to address deficits, reduce fall risk, and maximize functional independence  Problem List Decreased strength;Decreased endurance;Pain;Decreased mobility   Barriers to Discharge Comments   (pt agreeable to first floor set up  son will be available to assist until Monday after DC)   Plan   Treatment/Interventions Functional transfer training;Elevations;LE strengthening/ROM; Therapeutic exercise; Endurance training;Patient/family training;Bed mobility;Gait training   Progress Progressing toward goals   PT Therapy Minutes   PT Time In 1030   PT Time Out 1130   PT Total Time (minutes) 60   PT Mode of treatment - Individual (minutes) 60   PT Mode of treatment - Concurrent (minutes) 0   PT Mode of treatment - Group (minutes) 0   PT Mode of treatment - Co-treat (minutes) 0   PT Mode of Treatment - Total time(minutes) 60 minutes   PT Cumulative Minutes 1355   Therapy Time missed   Time missed?  No

## 2023-02-08 NOTE — PLAN OF CARE
Problem: Prexisting or High Potential for Compromised Skin Integrity  Goal: Skin integrity is maintained or improved  Description: INTERVENTIONS:  - Identify patients at risk for skin breakdown  - Assess and monitor skin integrity  - Assess and monitor nutrition and hydration status  - Monitor labs   - Assess for incontinence   - Turn and reposition patient  - Assist with mobility/ambulation  - Relieve pressure over bony prominences  - Avoid friction and shearing  - Provide appropriate hygiene as needed including keeping skin clean and dry  - Evaluate need for skin moisturizer/barrier cream  - Collaborate with interdisciplinary team   - Patient/family teaching  - Consider wound care consult   Outcome: Progressing     Problem: RESPIRATORY - ADULT  Goal: Achieves optimal ventilation and oxygenation  Description: INTERVENTIONS:  - Assess for changes in respiratory status  - Assess for changes in mentation and behavior  - Position to facilitate oxygenation and minimize respiratory effort  - Oxygen administered by appropriate delivery if ordered  - Initiate smoking cessation education as indicated  - Encourage broncho-pulmonary hygiene including cough, deep breathe, Incentive Spirometry  - Assess the need for suctioning and aspirate as needed  - Assess and instruct to report SOB or any respiratory difficulty  - Respiratory Therapy support as indicated  Outcome: Progressing     Problem: GASTROINTESTINAL - ADULT  Goal: Maintains or returns to baseline bowel function  Description: INTERVENTIONS:  - Assess bowel function  - Encourage oral fluids to ensure adequate hydration  - Administer IV fluids if ordered to ensure adequate hydration  - Administer ordered medications as needed  - Encourage mobilization and activity  - Consider nutritional services referral to assist patient with adequate nutrition and appropriate food choices  Outcome: Progressing  Goal: Maintains adequate nutritional intake  Description: INTERVENTIONS:  - Monitor percentage of each meal consumed  - Identify factors contributing to decreased intake, treat as appropriate  - Assist with meals as needed  - Monitor I&O, weight, and lab values if indicated  - Obtain nutrition services referral as needed  Outcome: Progressing     Problem: GENITOURINARY - ADULT  Goal: Maintains or returns to baseline urinary function  Description: INTERVENTIONS:  - Assess urinary function  - Encourage oral fluids to ensure adequate hydration if ordered  - Administer IV fluids as ordered to ensure adequate hydration  - Administer ordered medications as needed  - Offer frequent toileting  - Follow urinary retention protocol if ordered  Outcome: Progressing

## 2023-02-08 NOTE — PLAN OF CARE
Problem: Prexisting or High Potential for Compromised Skin Integrity  Goal: Skin integrity is maintained or improved  Description: INTERVENTIONS:  - Identify patients at risk for skin breakdown  - Assess and monitor skin integrity  - Assess and monitor nutrition and hydration status  - Monitor labs   - Assess for incontinence   - Turn and reposition patient  - Assist with mobility/ambulation  - Relieve pressure over bony prominences  - Avoid friction and shearing  - Provide appropriate hygiene as needed including keeping skin clean and dry  - Evaluate need for skin moisturizer/barrier cream  - Collaborate with interdisciplinary team   - Patient/family teaching  - Consider wound care consult   Outcome: Progressing     Problem: GASTROINTESTINAL - ADULT  Goal: Maintains adequate nutritional intake  Description: INTERVENTIONS:  - Monitor percentage of each meal consumed  - Identify factors contributing to decreased intake, treat as appropriate  - Assist with meals as needed  - Monitor I&O, weight, and lab values if indicated  - Obtain nutrition services referral as needed  Outcome: Progressing

## 2023-02-08 NOTE — ASSESSMENT & PLAN NOTE
Home regimen: Lasix 20mg daily, metoprolol tartrate 50mg BID, losartan 50mg daily  Currently receiving metoprolol tartrate 50mg BID and losartan 50mg daily  Losartan increased yesterday  Apply abdominal binder/TEDs when getting OOB  Consider restarting Lasix when able  Monitor BP with routine VS   Follow-up with PCP as outpatient

## 2023-02-08 NOTE — PROGRESS NOTES
02/08/23 1331   Pain Assessment   Pain Assessment Tool 0-10   Pain Score 2   Pain Location/Orientation Orientation: Left; Location: Hip;Orientation: Right;Location: Knee   Pain Onset/Description Descriptor: Sore;Descriptor: Aching   Effect of Pain on Daily Activities tolerates session   Patient's Stated Pain Goal No pain   Hospital Pain Intervention(s) Ambulation/increased activity;Repositioned   Subjective   Subjective pt agreeable to participate in PT tx in PM  "My only problem is the steps"   Roll Left and Right   Type of Assistance Needed Independent   Roll Left and Right CARE Score 6   Sit to Lying   Type of Assistance Needed Independent   Physical Assistance Level No physical assistance   Sit to Lying CARE Score 6   Lying to Sitting on Side of Bed   Type of Assistance Needed Verbal cues; Incidental touching   Comment attempted independenlty multiple attempts but pt unable; educated her to roll to sidelying and push up through UEs, pt able to complete with CGA and increased time, cues to avoid holding breath during task  showed her Bed Canes on [employer] which may help at home  She states her brother can help her if needed   Lying to Sitting on Side of Bed CARE Score 4   Sit to Stand   Type of Assistance Needed Independent; Adaptive equipment   Comment RW   Sit to Stand CARE Score 6   Bed-Chair Transfer   Type of Assistance Needed Adaptive equipment; Independent   Comment RW   Chair/Bed-to-Chair Transfer CARE Score 6   Walk 150 Feet   Type of Assistance Needed Independent; Adaptive equipment   Comment RW   Walk 150 Feet CARE Score 6   Ambulation   Distance Walked (feet) 200 ft  (x 2, additional short distances in gym and in room)   Assist Device Walker   Gait Pattern Antalgic;Decreased L stance   Does the patient walk? 2  Yes   4 Steps   Type of Assistance Needed Verbal cues; Incidental touching;Physical assistance; Adaptive equipment   Physical Assistance Level 25% or less   Comment L handrail ascending with cane in R hand  educated her to trial leaving cane on bottom step when ascending for increased support  pt improved confidence with this sequence  Pt became fearful when descending, requiring min A for first two steps and then able to perform with CGA  4 Steps CARE Score 3   Therapeutic Interventions   Strengthening seated: hip add ball squeezes x 50reps   Assessment   Treatment Assessment 30 minute PM PT tx session focused on additional stair training, increased ambulation in hallway, and practicing bed mobility on flat bed without rails  Trialed different sequence when ascending stairs and pt demonstrated improved stability (left leg up first, then cane and right leg)  pt able to perform sit>supine independently but needed cueing and assistance for supine > sit  She will benefit from continued practice tomorrow prior to DC Friday  Problem List Decreased strength;Decreased endurance;Decreased mobility;Pain   Plan   Treatment/Interventions Elevations;LE strengthening/ROM; Functional transfer training; Therapeutic exercise; Endurance training;Bed mobility;Gait training   Progress Progressing toward goals   PT Therapy Minutes   PT Time In 1330   PT Time Out 1400   PT Total Time (minutes) 30   PT Mode of treatment - Individual (minutes) 30   PT Mode of treatment - Concurrent (minutes) 0   PT Mode of treatment - Group (minutes) 0   PT Mode of treatment - Co-treat (minutes) 0   PT Mode of Treatment - Total time(minutes) 30 minutes   PT Cumulative Minutes 1385   Therapy Time missed   Time missed?  No

## 2023-02-08 NOTE — PROGRESS NOTES
Physical Medicine and Rehabilitation Progress Note  Cecille Ogden 67 y o  female MRN: [id number]  Unit/Bed#: -07 Encounter: [id number]    HPI: Cecille Ogden is a 67 y o  female  With medical history of HLD, HTN, Chronic diastolic heart failure (M8AF), previous R hip fracture who presented to the [address] [place], [place] [address] on 1/18 after slipping on some water while at work at [employer]  No prodromal symptoms  CTH showed no acute intracranial abnormality, and XR L hip showed a subtrochanteric femur fracture  Ortho recommended ORIF with IMN which was performed on 1/19 by Dr Corin Costello  Made WBAT post-op Post-op lethargi and hypoxic, CXR showed no acute cardiopulmonary disease  Required BiPAP, Narcan ,and Flumazenil  There was some improvement in her lethargy, but remained hypoxic  Pulm was consulted  CTA PE ordered which showed distal subsegmental PEs in the posterior lower lobe with atelectasis  Doppler performed showed a DVT in 1 peroneal vein in LLE  Pulm did not feel that the findings were responsible for the extent of her hypoxia - they felt multifactorial 2/2 atelectasis some volume overload  She got 1 dose of IV Lasix  Primary did not feel there was a volume overload component  Ultimately recommended for a/c for at least 3 months  Eliquis cost $47 a month  Course also c/b ABLA, but that stabilized and she did not require transfusion She was stabilized and admitted to the [facility] on 1/23/2023  Chief Complaint: R knee pain    Interval History/Subjective:  No acute events overnight  Sleep is fine  Pain primarily R knee, weakness in R knee  She has tried diclofenac gel in the past which has helped at home  She denies any new CP, SOB, fevers, chills, N/V, abdominal pain  Last BM 2/7  ROS:  A 10 point review of systems was negative except for what is noted in the HPI  Today's Changes:  1  Losartan increased to home dose yesterday  Tolerating  2   Added diclofenac for R knee arthritis twice daily  3  Continue current plan of care  Total visit time: 25 minutes, with more than 50% spent counseling/coordinating care  Counseling includes discussion with patient re: progress in therapies, functional issues observed by therapy staff, and discussion with patient regarding their current medical state and wellbeing  Coordination of patient's care was performed in conjunction with Internal Medicine service to monitor patient's labs, vitals, and management of their comorbidities  Assessment/Plan:    * Closed left hip fracture ([address] )  Assessment & Plan  After slipping on water at work  Workers Comp  Subtrochanteric femur fracture s/p long nail (antegrade) on 1/19 by Dr Francisco Henley  Pain control as documented below  Surgical dressings removed 1/26  Monitor drainage  Follow-up with Orthopedics done 2/2   - Consult placed, XR stable  Staples removed  - f/u in 4 weeks for 6 week POV (3/2)    PT/ OT 3-5 hours a day, 5-7 days/week in acute comprehensive inpatient rehab    Chest wall pain  Assessment & Plan  Improved/resolving  Suspect MSK or costochondritis - mild  Reproducible with palpation and certain push off manuevers in therapies  No diaphoresis, nausea, radiation, SOB  Reviewed red flag symptoms that would warrant ACS r/o  Added Bengay  Monitor closely  Adjustment disorder with anxiety  Assessment & Plan  Will provide non-pharmacologic strategies  Continued PRN hydroxyzine  1/30 started Lexapro  Consulted rehab psychology for support  Hypoxemia  Assessment & Plan  Resolved and on RA  Previously multifactorial: PE, atelectasis, ?volume overload s/p Lasix IV x1  Does have nocturnal hypoxemia on Friday Noc Ox for 1 hour   - Will start O2 at night, will need noc ox performed 48 hours prior to discharge   - Made case management aware  Monitor closely  Goals > 90%       Outpatient f/u with Pulmonology/Sleep Medicine    Osteopenia  Assessment & Plan  DXA 12/2020 with osteopenia  Continue home calcium and Vitamin D  1/24 Vitamin D is 29 8  Outpatient f/u DXA with PCP  Acute postoperative anemia due to expected blood loss  Assessment & Plan  Hgb 12 6 pre-op  2/6  6 9 > 8 2 > 8 5 > 8 5 > 9 1   - Iron deficiency on labs  - Also post-operative blood loss  - Given IV venofer x2   - Type and screen   - Consent in chart  - 1/26 Transfuse with 1 unit pRBC  Trend, transfuse as appropriate  Outpatient f/u with PCP    Acute deep vein thrombosis (DVT) of left peroneal vein [facility])  Assessment & Plan  1/20 LE Venous Duplex with L acute DVT in 1 of 2 peroneal veins  Now on Eliquis  See PE for more details  No SCD on that leg  Can do ace wrapping for edema  Outpatient f/u with PCP    Multiple subsegmental pulmonary emboli without acute cor pulmonale (HCC)  Assessment & Plan  Noted on CTA PE from 1/20   RV/LV ratio WNL  Completed Eliquis 10mg BID 7 days  Now on 5mg BID for 3 months as per Pulm  Monitor respiratory status  Chronic diastolic congestive heart failure (HCC)  Assessment & Plan  Wt Readings from Last 3 Encounters:   02/08/23 92 kg (202 lb 14 4 oz)   01/23/23 95 2 kg (209 lb 14 1 oz)     Does not appear to have acute exacerbation  12/2/2022 Echo with EF 55-59% and G1DD   on 1/20  Home: Lopressor, Losartan, Lasix  Here: Lopressor, Losartan 50mg daily  - Orthostasis has improved  Monitor and add back as appropriate  Monitor I/O and daily weights  Adjust as appropriate  Outpatient f/u with PCP  Other hyperlipidemia  Assessment & Plan  Home: Simvastatin 20mg nightly  Here: Lipitor 20mg nightly for therapeutic substitution   Outpatient f/u with PCP    Essential hypertension  Assessment & Plan  Home: Lasix 20mg daily, Lopressor 50mg BID, Losartan 50mg daily  Here: Lopressor 50mg BID, Losartan 25mg daily  1/24 Losartan held for orthostasis   Has since improved  Can try to restart Lasix while here once BP appropriate  Monitor BP, adjust as appropriate  Follow-up with PCP  Constipation-resolved as of 2/2/2023  Assessment & Plan  Resolved  Weaning off bowel regimen  Monitor on just PRNs  Hypokalemia-resolved as of 1/31/2023  Assessment & Plan  1/30 4 3  Resolved after supplementation  Monitor  Leukocytosis-resolved as of 1/31/2023  Assessment & Plan  1/30 8 88 and resolved  Afebrile, but otherwise stable  Drainage from L thigh doesn't appear infectious  Monitor incision site closely  Monitor off antibiotics  Monitor Temp and WBC  Initiate work-up if she spikes a fever or develops localizing symptoms  Health Maintenance  #Delirium/Sleep: At risk has had hallucinations on oxycodone 10mg, and was lethargic requiring narcan on fentanyl and dilaudid  #Pain: Tylenol ordered PRN, Oxycodone 2 5-5mg PRN  #Bowel: Last BM 2/7  PRN miralax/docusate/suppository  #Bladder: Voiding and continent  Monitor  #Skin/Pressure Injury Prevention: Turn Q2hr in bed, with weight shifts [id number] in wheelchair  Float heels in bed  #DVT Prophylaxis: Fully anticoagulated on Eliquis  SCD on RLE only  #GI Prophylaxis: PPI started when full a/c started  Can also do famotidine  #Code Status:  Full Code  #FEN: Reg Diet  #Dispo:  Team Conference 2/2: ADD 2/10/2023  Goals are to see if we can get independent with full flight of stairs at home, and modified Ind for all mobility and ADLs  She has made marked progress in the past week  Will go home with home PT/OT  - Follow-up Orthopedics  - Follow-up Sleep Medicine  - Follow-up PCP    Objective:    Functional Update: progressing  PT: CGA transfers, min-modA bed mobility, CGA ambulation 75', modA stairs with bilateral hand rails, Stacia ramp with RW  OT: progressing to set-up Sup with ADLs       Allergies per EMR    Physical Exam:  Temp:  [98 5 °F (36 9 °C)-98 7 °F (37 1 °C)] 98 5 °F (36 9 °C)  HR:  [67-78] 76  Resp:  [18] 18  BP: (147-154)/(70-81) 154/81  Oxygen Therapy  SpO2: 93 %  O2 Flow Rate (L/min): 2 L/min      Gen: No acute distress, Well-nourished, well-appearing  HEENT: Moist mucus membranes, Normocephalic/Atraumatic  Cardiovascular: Regular rate, rhythm, S1/S2  Distal pulses palpable  Heme/Extr: No edema  Pulmonary: Non-labored breathing  Lungs CTAB  : No garcia  GI: Soft, non-tender, non-distended  BS+  MSK: Has varus deformity R  Side stepping with therapies  Integumentary: Skin is warm, dry  Neuro: AAOx3, Speech is intact  Appropriate to questioning  Psych: Normal mood and affect       Diagnostic Studies:   Reviewed, no new imaging    Laboratory:  Reviewed   Results from last 7 days   Lab Units 02/06/23 0459 02/02/23  0517   HEMOGLOBIN g/dL 9 1* 8 5*   HEMATOCRIT % 29 9* 28 1*   WBC Thousand/uL 4 62 7 50     Results from last 7 days   Lab Units 02/06/23  0459   BUN mg/dL 16   POTASSIUM mmol/L 3 9   CHLORIDE mmol/L 103   CREATININE mg/dL 0 54*            Patient Active Problem List   Diagnosis   • Closed left hip fracture (HCC)   • Essential hypertension   • Other hyperlipidemia   • Chronic diastolic congestive heart failure (HCC)   • Multiple subsegmental pulmonary emboli without acute cor pulmonale (HCC)   • Acute deep vein thrombosis (DVT) of left peroneal vein (HCC)   • Acute postoperative anemia due to expected blood loss   • Osteopenia   • Hypoxemia   • Adjustment disorder with anxiety   • Chest wall pain         Medications  Current Facility-Administered Medications   Medication Dose Route Frequency Provider Last Rate   • acetaminophen  650 mg Oral Q6H PRN ELDA Beaulieu     • apixaban  5 mg Oral BID ELDA Beaulieu     • atorvastatin  20 mg Oral Daily With Ellen Barreto MD     • bisacodyl  10 mg Rectal Daily PRN Gildardo Bentley MD     • calcium carbonate  1,000 mg Oral Daily PRN Gildardo Bentley MD     • calcium carbonate-vitamin D  2 tablet Oral Daily With Breakfast ELDA Beaulieu     • cholecalciferol  3,000 Units Oral Daily ELDA Beaulieu     • docusate sodium  100 mg Oral BID Fabiano Rondon MD     • escitalopram  5 mg Oral Daily ELDA Montes     • hydrOXYzine HCL  10 mg Oral Q6H PRN Fabiano Rondon MD     • loratadine  10 mg Oral Daily Fabiano Rondon MD     • losartan  50 mg Oral Daily ELDA Montes     • menthol-methyl salicylate   Apply externally 4x Daily PRN Fabiano Rondon MD     • methocarbamol  500 mg Oral Q6H PRN ELDA Montes     • metoprolol tartrate  50 mg Oral BID Fabiano Rondon MD     • oxyCODONE  5 mg Oral Q6H PRN Fabiano Rondon MD     • oxyCODONE  2 5 mg Oral Q6H PRN Fabiano Rondon MD     • pantoprazole  40 mg Oral Daily Fabiano Rondon MD     • polyethylene glycol  17 g Oral Daily PRN Fabiano Rondon MD            ** Please Note: Fluency Direct voice to text software may have been used in the creation of this document   **

## 2023-02-08 NOTE — ASSESSMENT & PLAN NOTE
Wt Readings from Last 3 Encounters:   02/08/23 92 kg (202 lb 14 4 oz)   01/23/23 95 2 kg (209 lb 14 1 oz)     Stable without exacerbation   on 1/20  Last ECHO on 12/2/22 showed EF 55-59%, G1DD  Takes Lasix 20mg daily at home - currently on hold  Restart as able

## 2023-02-08 NOTE — PROGRESS NOTES
[facility]  Progress Note Ching Dyson 1950, 67 y o  female MRN: [id number]  Unit/Bed#: -72 Encounter: [id number]  Primary Care Provider: No primary care provider on file  Date and time admitted to hospital: 1/23/2023  1:33 PM    Adjustment disorder with anxiety  Assessment & Plan  Has been receiving Atarax 10mg Q6 PRN since 1/26  Discussed with patient and admits to feelings of anxiety prior to her fall  Started Lexapro 5mg daily on 1/30  Supportive counseling as needed  Neuropsych consulted  Follow-up with PCP as outpatient  Hypoxemia  Assessment & Plan  Hypoxia in acute setting post-operatively  Developed PEs and was started on Eliquis  Per Pulmonary - recommending overnight noc ox as outpatient  Desat while sleeping - noc ox obtained on 1/27  Desat for 1 hour and 40 minutes, lowest saturation 80%  Apply 2L O2 at night or while sleeping  Repeat overnight noc ox tonight for DME evaluation  Osteopenia  Assessment & Plan  DXA scan in 12/2020 showed osteopenia  Continue home calcium carbonate and Vitamin D supplementation  Vitamin D level 29 8 on 1/24  Increased Vitamin D3 to 3000u daily  Recommend repeat DXA scan as outpatient along with discussion about Prolia injections  Follow-up with PCP as outpatient  Acute postoperative anemia due to expected blood loss  Assessment & Plan  Hgb currently 9 1  Hgb was 12 6 pre-operatively  Iron panel on 1/23: Iron Saturation 9%, TIBC 279, Iron 25, and Ferritin 81   2 doses of IV Venofer from 1/24-1/25  Given 1u PRBCs on 1/26 for Hgb of 6 9  No active s/s of bleeding  Transfuse for Hgb <7 0 or if becomes symptomatic  Continue to trend CBC  Acute deep vein thrombosis (DVT) of left peroneal vein [facility])  Assessment & Plan  Lower extremity venous duplex on 1/20: Evidence of focal acute DVT in 1 of 2 peroneal veins on LLE    Completed Eliquis 10mg BID for 7 days and now receiving Eliquis 5mg BID   Follow-up with PCP as outpatient  Neurovascular checks Q shift  Multiple subsegmental pulmonary emboli without acute cor pulmonale (HCC)  Assessment & Plan  CTA PE study on 1/20: Few subsegmental pulmonary emboli in the posterior lower lobes, measured RV/LV ratio within normal limits  Completed Eliquis 10mg BID for 7 days and now receiving Eliquis 5mg BID, continue for 3 months  Follow-up with PCP as outpatient  Currently maintaining on RA  Encourage pulmonary toileting  Spot pulse ox checks  Chronic diastolic congestive heart failure (HCC)  Assessment & Plan  Wt Readings from Last 3 Encounters:   02/08/23 92 kg (202 lb 14 4 oz)   01/23/23 95 2 kg (209 lb 14 1 oz)     Stable without exacerbation   on 1/20  Last ECHO on 12/2/22 showed EF 55-59%, G1DD  Takes Lasix 20mg daily at home - currently on hold  Restart as able  Other hyperlipidemia  Assessment & Plan  Continue home Lipitor 20mg daily  Last lipid panel on 11/15/22: Cholesterol 147, Triglycerides 73, HDL 53, and LDL 79  Essential hypertension  Assessment & Plan  Home regimen: Lasix 20mg daily, metoprolol tartrate 50mg BID, losartan 50mg daily  Currently receiving metoprolol tartrate 50mg BID and losartan 50mg daily  Losartan increased yesterday  Apply abdominal binder/TEDs when getting OOB  Consider restarting Lasix when able  Monitor BP with routine VS   Follow-up with PCP as outpatient  * Closed left hip fracture [facility])  Assessment & Plan  S/p mechanical fall on 1/18  XR on 1/18 showed acute intertrochanteric L hip fracture  OR on 1/19 for IM fixation of L subtrochanteric hip fracture performed by Dr Sofi Bradshaw  WBAT to LLE  Neurovascular checks Q shift  Ensure adequate pain control  Monitor incision for s/s of infection  Eliquis for DVT ppx  Follow-up with Orthopedics in 2 weeks  XRs on 2/2: stable appearing comminuted intertrochanteric fracture s/p ORIF, no evidence for hardware complication    Ortho consulted - staples removed  Follow-up in additional 4 weeks as outpatient  Primary team following  PT/OT  VTE Pharmacologic Prophylaxis:   Pharmacologic: Apixaban (Eliquis)  Mechanical VTE Prophylaxis in Place: No - DVT  Current Length of Stay: 16 day(s)    Current Patient Status: Inpatient Rehab     Discharge Plan: As per primary team     Code Status: Level 1 - Full Code    Subjective:   Pt examined while pt sitting in recliner in pt room  Currently denies any L hip pain  Occasionally has R knee pain during therapy sessions - starting PRN Voltaren gel  Denies any lightheadedness, dizziness, SOB, or palpitations  Had a BM last night and feels much better  Plans for overnight study for DME qualification tonight  Has no other concerns or complaints at this time  Objective:     Vitals:   Temp (24hrs), Av 6 °F (37 °C), Min:98 5 °F (36 9 °C), Max:98 7 °F (37 1 °C)    Temp:  [98 5 °F (36 9 °C)-98 7 °F (37 1 °C)] 98 5 °F (36 9 °C)  HR:  [67-84] 84  Resp:  [18] 18  BP: (143-154)/(64-81) 143/64  SpO2:  [93 %-94 %] 93 %  Body mass index is 35 94 kg/m²  Review of Systems   Constitutional: Negative for appetite change, chills, fatigue and fever  HENT: Negative for trouble swallowing  Eyes: Negative for visual disturbance  Respiratory: Negative for cough, shortness of breath, wheezing and stridor  Cardiovascular: Negative for chest pain, palpitations and leg swelling  Gastrointestinal: Negative for abdominal distention, abdominal pain, constipation, diarrhea, nausea and vomiting  LBM 2/7   Genitourinary: Negative for difficulty urinating  Musculoskeletal: Positive for arthralgias (Intermittent R knee pain while doing stairs in therapy) and gait problem (R knee occasionally buckling during therapy, wearing support band on R knee)  Negative for back pain  Neurological: Negative for dizziness, weakness, light-headedness, numbness and headaches     Psychiatric/Behavioral: Negative for dysphoric mood and sleep disturbance  The patient is not nervous/anxious  All other systems reviewed and are negative  Input and Output Summary (last 24 hours): Intake/Output Summary (Last 24 hours) at 2/8/2023 0920  Last data filed at 2/8/2023 0003  Gross per 24 hour   Intake 900 ml   Output 450 ml   Net 450 ml       Physical Exam:     Physical Exam  Vitals and nursing note reviewed  Constitutional:       General: She is not in acute distress  Appearance: Normal appearance  She is obese  She is not ill-appearing  HENT:      Head: Normocephalic and atraumatic  Cardiovascular:      Rate and Rhythm: Normal rate and regular rhythm  Pulses: Normal pulses  Heart sounds: Murmur heard  Systolic murmur is present with a grade of 2/6  No friction rub  Pulmonary:      Effort: Pulmonary effort is normal  No respiratory distress  Breath sounds: Normal breath sounds  No wheezing or rhonchi  Abdominal:      General: Abdomen is flat  Bowel sounds are normal  There is no distension  Palpations: Abdomen is soft  There is no mass  Tenderness: There is no abdominal tenderness  There is no guarding or rebound  Hernia: No hernia is present  Musculoskeletal:      Cervical back: Normal range of motion and neck supple  No tenderness  Right lower leg: No edema  Left lower leg: No edema  Skin:     General: Skin is warm and dry  Capillary Refill: Capillary refill takes less than 2 seconds  Comments: L hip incisions, healing, well-approximated  No drainage, erythema, or edema present  Neurological:      Mental Status: She is alert and oriented to person, place, and time     Psychiatric:         Mood and Affect: Mood normal          Behavior: Behavior normal          Additional Data:     Labs:    Results from last 7 days   Lab Units 02/06/23  0459   WBC Thousand/uL 4 62   HEMOGLOBIN g/dL 9 1*   HEMATOCRIT % 29 9*   PLATELETS Thousands/uL 305   NEUTROS PCT % 70   LYMPHS PCT % 16   MONOS PCT % 11   EOS PCT % 2     Results from last 7 days   Lab Units 02/06/23  0459   SODIUM mmol/L 140   POTASSIUM mmol/L 3 9   CHLORIDE mmol/L 103   CO2 mmol/L 31   BUN mg/dL 16   CREATININE mg/dL 0 54*   ANION GAP mmol/L 6   CALCIUM mg/dL 8 8   GLUCOSE RANDOM mg/dL 101*                       Labs reviewed    Imaging:    Imaging reviewed    Recent Cultures (last 7 days):           Last 24 Hours Medication List:   Current Facility-Administered Medications   Medication Dose Route Frequency Provider Last Rate   • acetaminophen  650 mg Oral Q6H PRN EVETTE GarciaNP     • apixaban  5 mg Oral BID ELDA Garcia     • atorvastatin  20 mg Oral Daily With Isma Hill MD     • bisacodyl  10 mg Rectal Daily PRN Neda Kayser, MD     • calcium carbonate  1,000 mg Oral Daily PRN Neda Kayser, MD     • calcium carbonate-vitamin D  2 tablet Oral Daily With Breakfast ELDA Garcia     • cholecalciferol  3,000 Units Oral Daily ELDA Garcia     • docusate sodium  100 mg Oral BID Neda Kayser, MD     • escitalopram  5 mg Oral Daily ELDA Garcia     • hydrOXYzine HCL  10 mg Oral Q6H PRN Neda Kayser, MD     • loratadine  10 mg Oral Daily Neda Kayser, MD     • losartan  50 mg Oral Daily ELDA Garcia     • menthol-methyl salicylate   Apply externally 4x Daily PRN Neda Kayser, MD     • methocarbamol  500 mg Oral Q6H PRN ELDA Garcia     • metoprolol tartrate  50 mg Oral BID Neda Kayser, MD     • oxyCODONE  5 mg Oral Q6H PRN Neda Kayser, MD     • oxyCODONE  2 5 mg Oral Q6H PRN Neda Kayser, MD     • pantoprazole  40 mg Oral Daily Neda Kayser, MD     • polyethylene glycol  17 g Oral Daily PRN Neda Kayser, MD          M*Modal software was used to dictate this note  It may contain errors with dictating incorrect words or incorrect spelling  Please contact the provider directly with any questions

## 2023-02-08 NOTE — ASSESSMENT & PLAN NOTE
Hypoxia in acute setting post-operatively  Developed PEs and was started on Eliquis  Per Pulmonary - recommending overnight noc ox as outpatient  Desat while sleeping - noc ox obtained on 1/27  Desat for 1 hour and 40 minutes, lowest saturation 80%  Apply 2L O2 at night or while sleeping  Repeat overnight noc ox tonight for DME evaluation

## 2023-02-08 NOTE — ASSESSMENT & PLAN NOTE
S/p mechanical fall on 1/18  XR on 1/18 showed acute intertrochanteric L hip fracture  OR on 1/19 for IM fixation of L subtrochanteric hip fracture performed by Dr Tim Fox  WBAT to LLE  Neurovascular checks Q shift  Ensure adequate pain control  Monitor incision for s/s of infection  Eliquis for DVT ppx  Follow-up with Orthopedics in 2 weeks  XRs on 2/2: stable appearing comminuted intertrochanteric fracture s/p ORIF, no evidence for hardware complication  Ortho consulted - staples removed  Follow-up in additional 4 weeks as outpatient  Primary team following  PT/OT

## 2023-02-08 NOTE — PROGRESS NOTES
02/08/23 0700   Pain Assessment   Pain Assessment Tool 0-10   Pain Score 2   Pain Location/Orientation Orientation: Left; Location: Hip   Pain Radiating Towards none   Pain Onset/Description Onset: Gradual   Effect of Pain on Daily Activities tolerated   Patient's Stated Pain Goal No pain   Hospital Pain Intervention(s) Rest   Multiple Pain Sites No   Restrictions/Precautions   Precautions Fall Risk;Pain   Weight Bearing Restrictions Yes   RLE Weight Bearing Per Order WBAT   LLE Weight Bearing Per Order WBAT   ROM Restrictions No   Braces or Orthoses   (knee sleeve)   Lifestyle   Autonomy "I am ready to go home but hesitant"   Eating   Type of Assistance Needed Independent   Comment provided breakfast   Eating CARE Score 6   Oral Hygiene   Type of Assistance Needed Independent   Comment in stance at sink   Oral Hygiene CARE Score 6   Shower/Bathe Self   Type of Assistance Needed Independent; Adaptive equipment   Comment completed shower this session,however, pt to [place] at [place]  pt able to wash 10/10 parts with use of LHR for feet  Pt to get hip kit and will have LHS   Shower/Bathe Self CARE Score 6   Bathing   Assessed Bath Style Shower   Anticipated D/C Bath Style Sponge Bath   Able to [place] No   Able to [employer] Temperature Yes   Able to Wash/Rinse/Dry (body part) Left Arm;L Upper Leg;Right Arm;R Upper Leg;L Lower Leg/Foot;R Lower Leg/Foot; Abdomen; Chest;Perineal Area; Buttocks   Limitations Noted in Balance; Endurance;ROM; Timeliness   Positioning Seated;Standing   Adaptive Equipment Longhand Reacher   Tub/Shower Transfer   Limitations Noted In Balance; Endurance;ROM;LE Strength   Adaptive Equipment Grab Bars;Seat with Back   Assessed Shower   Findings modA for side stepping into shohwer with simulated lip  pts shower is on 2nd flr, at this time pt unsfe to compelte steps per pT   Pt to Sb at [place]   Upper Body Dressing   Type of Assistance Needed Independent   Comment seated to don bra and shirt   Upper Body Dressing CARE Score 6   Lower Body Dressing   Type of Assistance Needed Independent; Adaptive equipment   Comment seated use of LHR to thread undergarment/pants and knee sleeves, Kermit for CM in tamara   Lower Body Dressing CARE Score 6   Putting On/Taking Off Footwear   Type of Assistance Needed Independent; Adaptive equipment   Comment does not need to wear TEDS  Able to don slip on sneakers with [facility]   Putting On/Taking Off Footwear CARE Score 6   Sit to Stand   Type of Assistance Needed Independent; Adaptive equipment   Comment progressed to IRp with Rw   Sit to Stand CARE Score 6   Bed-Chair Transfer   Type of Assistance Needed Independent; Adaptive equipment   Comment progressed to IRP with Rw   Chair/Bed-to-Chair Transfer CARE Score 6   Toileting Hygiene   Type of Assistance Needed Adaptive equipment; Independent   Comment progressed to IRP with [address] CARE Score 6   Toilet Transfer   Type of Assistance Needed Independent; Adaptive equipment   Comment progressed to IRP with Rw   Toilet Transfer CARE Score 6   Functional Standing Tolerance   Comments Engaged pt in fx standing tolerance with focus on weight shifting to l side with 2in block placed on RLE to encouraged WB on LLE  Pt tolerated well placing cones form one side to another  Therapeutic Excerise-Strength   UE Strength Yes   Right Upper Extremity- Strength   R Shoulder Flexion;ABduction; Extension;Horizontal ABduction; External rotation; Internal rotation   R Elbow Elbow flexion;Elbow extension   R Position Seated   Equipment Theraband  (yellow)   R Weight/Reps/Sets 2x15   RUE Strength Comment provided pt with UE HPE yellow tband 2x15 to cont ot inc fx strength at time of DC  Pt tolerated well with rest breaks  Assess next session for carryover   Left Upper Extremity-Strength   LUE Strength Comment see above   Cognition   Overall Cognitive Status WFL   Arousal/Participation Alert; Cooperative   Attention Attends with cues to redirect Orientation Level Oriented X4   Memory Within functional limits   Following Commands Follows multistep commands with increased time or repetition   Activity Tolerance   Activity Tolerance Patient tolerated treatment well   Assessment   Treatment Assessment Engaged pt in 90mins of skilled OT services with focus on ADL routine  Pt achieved OT goals at [place] level with use of LHAE  provided pt with UE HEP  Cont OT POC with focus on revieing HEP, stanidng tolerance/weight shifting to LLE to inc fx performance and independence  Prognosis Good   Problem List Decreased strength;Decreased range of motion;Decreased endurance; Impaired balance;Decreased mobility;Pain   Barriers to Discharge None   Barriers to Discharge Comments steps are barriers per PT, but pt to stay on 1st flr   Plan   Treatment/Interventions ADL retraining;Functional transfer training; Therapeutic exercise; Endurance training;Patient/family training;Bed mobility; Compensatory technique education   Progress Progressing toward goals   Recommendation   OT Discharge Recommendation Home with home health rehabilitation  (PT only)   Equipment Recommended Bedside commode; Shower/Tub chair with back ($);Hip Kit ($)  (youth RW)   Commode Type Standard   OT Equipment ordered Yes   Discharge Summary HI 2/10/23   OT Therapy Minutes   OT Time In 0700   OT Time Out 0830   OT Total Time (minutes) 90   OT Mode of treatment - Individual (minutes) 90   OT Mode of treatment - Concurrent (minutes) 0   OT Mode of treatment - Group (minutes) 0   OT Mode of treatment - Co-treat (minutes) 0   OT Mode of Treatment - Total time(minutes) 90 minutes   OT Cumulative Minutes 1260   Therapy Time missed   Time missed?  No

## 2023-02-09 ENCOUNTER — HOME HEALTH ADMISSION (OUTPATIENT)
Dept: HOME HEALTH SERVICES | Facility: HOME HEALTHCARE | Age: 73
End: 2023-02-09

## 2023-02-09 PROBLEM — M25.561 RIGHT KNEE PAIN: Status: ACTIVE | Noted: 2023-02-09

## 2023-02-09 RX ORDER — PANTOPRAZOLE SODIUM 40 MG/1
40 TABLET, DELAYED RELEASE ORAL DAILY
Qty: 30 TABLET | Refills: 0 | Status: SHIPPED | OUTPATIENT
Start: 2023-02-10

## 2023-02-09 RX ORDER — ESCITALOPRAM OXALATE 5 MG/1
5 TABLET ORAL DAILY
Qty: 30 TABLET | Refills: 0 | Status: SHIPPED | OUTPATIENT
Start: 2023-02-10

## 2023-02-09 RX ORDER — LANOLIN ALCOHOL/MO/W.PET/CERES
2 CREAM (GRAM) TOPICAL
Qty: 60 TABLET | Refills: 0 | Status: SHIPPED | OUTPATIENT
Start: 2023-02-10

## 2023-02-09 RX ORDER — MUSCLE RUB CREAM 100; 150 MG/G; MG/G
CREAM TOPICAL 4 TIMES DAILY PRN
Refills: 0
Start: 2023-02-09

## 2023-02-09 RX ORDER — MELATONIN
3000 DAILY
Qty: 90 TABLET | Refills: 0 | Status: SHIPPED | OUTPATIENT
Start: 2023-02-10

## 2023-02-09 RX ORDER — METHOCARBAMOL 500 MG/1
500 TABLET, FILM COATED ORAL 3 TIMES DAILY PRN
Qty: 20 TABLET | Refills: 0 | Status: SHIPPED | OUTPATIENT
Start: 2023-02-09

## 2023-02-09 RX ADMIN — METOPROLOL TARTRATE 50 MG: 50 TABLET, FILM COATED ORAL at 08:50

## 2023-02-09 RX ADMIN — ACETAMINOPHEN 650 MG: 325 TABLET ORAL at 08:52

## 2023-02-09 RX ADMIN — ATORVASTATIN CALCIUM 20 MG: 20 TABLET, FILM COATED ORAL at 17:30

## 2023-02-09 RX ADMIN — APIXABAN 5 MG: 5 TABLET, FILM COATED ORAL at 17:30

## 2023-02-09 RX ADMIN — APIXABAN 5 MG: 5 TABLET, FILM COATED ORAL at 08:49

## 2023-02-09 RX ADMIN — METHOCARBAMOL 500 MG: 500 TABLET, FILM COATED ORAL at 14:23

## 2023-02-09 RX ADMIN — METOPROLOL TARTRATE 50 MG: 50 TABLET, FILM COATED ORAL at 17:30

## 2023-02-09 RX ADMIN — DICLOFENAC SODIUM 2 G: 10 GEL TOPICAL at 09:07

## 2023-02-09 RX ADMIN — CHOLECALCIFEROL TAB 25 MCG (1000 UNIT) 3000 UNITS: 25 TAB at 08:49

## 2023-02-09 RX ADMIN — PANTOPRAZOLE SODIUM 40 MG: 40 TABLET, DELAYED RELEASE ORAL at 05:42

## 2023-02-09 RX ADMIN — DICLOFENAC SODIUM 2 G: 10 GEL TOPICAL at 21:00

## 2023-02-09 RX ADMIN — LOSARTAN POTASSIUM 50 MG: 50 TABLET, FILM COATED ORAL at 08:52

## 2023-02-09 RX ADMIN — ESCITALOPRAM OXALATE 5 MG: 5 TABLET, FILM COATED ORAL at 08:49

## 2023-02-09 RX ADMIN — Medication 2 TABLET: at 08:49

## 2023-02-09 RX ADMIN — LORATADINE 10 MG: 10 TABLET ORAL at 08:50

## 2023-02-09 RX ADMIN — DOCUSATE SODIUM 100 MG: 100 CAPSULE, LIQUID FILLED ORAL at 17:30

## 2023-02-09 RX ADMIN — DOCUSATE SODIUM 100 MG: 100 CAPSULE, LIQUID FILLED ORAL at 08:49

## 2023-02-09 NOTE — DISCHARGE SUMMARY
Discharge Summary - PMR   Sukhdev Hicks 67 y o  female MRN: [id number]  Unit/Bed#: [facility] 200-49 Encounter: [id number]    Admission Date: 1/23/2023     Discharge Date: 02/10/2023    Etiologic/Rehabilitation Diagnosis: Impairment of mobility, safety and Activities of Daily Living (ADLs) due to Orthopedic Disorders:  08 11  Unilateral Hip Fracture Subtrochanteric femur fracture    HPI: Sukhdev Hicks is a 67 y o  female  With medical history of HLD, HTN, Chronic diastolic heart failure (G8TQ), previous R hip fracture who presented to the [address] [place] [address] on 1/18 after slipping on some water while at work at [employer]  No prodromal symptoms  CTH showed no acute intracranial abnormality, and XR L hip showed a subtrochanteric femur fracture  Ortho recommended ORIF with IMN which was performed on 1/19 by Dr Natalee Frank  Made WBAT post-op Post-op lethargi and hypoxic, CXR showed no acute cardiopulmonary disease  Required BiPAP, Narcan ,and Flumazenil  There was some improvement in her lethargy, but remained hypoxic  Pulm was consulted  CTA PE ordered which showed distal subsegmental PEs in the posterior lower lobe with atelectasis  Doppler performed showed a DVT in 1 peroneal vein in LLE  Pulm did not feel that the findings were responsible for the extent of her hypoxia - they felt multifactorial 2/2 atelectasis some volume overload  She got 1 dose of IV Lasix  Primary did not feel there was a volume overload component  Ultimately recommended for a/c for at least 3 months  Eliquis cost $47 a month  Course also c/b ABLA, but that stabilized and she did not require transfusion She was stabilized and admitted to the [facility] on 1/23/2023       Procedures Performed During [facility] Admission: None    Acute Rehabilitation Center Course: Patient participated in a comprehensive interdisciplinary inpatient rehabilitation program which included involvment of MD, therapies (PT, OT, and/or SLP), RN, CM, SW, dietary, and psychology services  She was able to be advanced to a modified independent level of assist and considered safe for discharge home  Please see below for patient's day to day management of rehabilitation needs  Please refer to Internal Medicine notes during [facility] stay for day to day management of patient's medical co-morbidities  * Closed left hip fracture ([address] )  Assessment & Plan  After slipping on water at work  Workers Comp  Subtrochanteric femur fracture s/p long nail (antegrade) on 1/19 by Dr Lily Avelar  Pain control as documented below  Surgical dressings removed 1/26  Monitor drainage  Follow-up with Orthopedics done 2/2   - Consult placed, XR stable  Staples removed  - f/u in 4 weeks for 6 week POV (3/2)    PT/ OT 3-5 hours a day, 5-7 days/week in acute comprehensive inpatient rehab    Right knee pain  Assessment & Plan  12/23/13 MRI R Knee:  Apparent chronic degeneration and tear of the medial   meniscus with medial meniscal extrusion, extensive grade 4 medial   tibiofemoral chondromalacia and osteoarthritis  Suspicion of radial   tear of the central right aspect of the posterior horn lateral   meniscus  Nonvisualization of the anterior cruciate ligament   suggesting chronic full-thickness tear  Joint effusion  2/9 XR R Knee:  No acute osseous abnormality      Severe right knee osteoarthritis  Getting her a soft hinged knee brace to use with her compression sleeve  Should f/u with Orthopedics for further evaluation  Chest wall pain  Assessment & Plan  Improved/resolving  Suspect MSK or costochondritis - mild  Reproducible with palpation and certain push off manuevers in therapies  No diaphoresis, nausea, radiation, SOB  Reviewed red flag symptoms that would warrant ACS r/o  Added Bengay  Monitor closely  Adjustment disorder with anxiety  Assessment & Plan  Will provide non-pharmacologic strategies  Continued PRN hydroxyzine    1/30 started Lexapro  Consulted rehab psychology for support  Hypoxemia  Assessment & Plan  Resolved and on RA  Previously multifactorial: PE, atelectasis, ?volume overload s/p Lasix IV x1  Does have nocturnal hypoxemia on Friday Noc Ox for 1 hour   - O2 at night for now   - Noc Ox performed on 2/8 overnight  Equipment ordered for discharge  - Will need formal evaluation with Sleep Medicine at discharge  Monitor closely  Goals > 90%  Outpatient f/u with Pulmonology/Sleep Medicine    Osteopenia  Assessment & Plan  DXA 12/2020 with osteopenia  Continue home calcium and Vitamin D  1/24 Vitamin D is 29 8  Outpatient f/u DXA with PCP  Acute postoperative anemia due to expected blood loss  Assessment & Plan  Hgb 12 6 pre-op  2/6  6 9 > 8 2 > 8 5 > 8 5 > 9 1   - Iron deficiency on labs  - Also post-operative blood loss  - Given IV venofer x2   - Type and screen   - Consent in chart  - 1/26 Transfuse with 1 unit pRBC  Trend, transfuse as appropriate  Outpatient f/u with PCP    Acute deep vein thrombosis (DVT) of left peroneal vein [facility])  Assessment & Plan  1/20 LE Venous Duplex with L acute DVT in 1 of 2 peroneal veins  Now on Eliquis  See PE for more details  No SCD on that leg  Can do ace wrapping for edema  Outpatient f/u with PCP    Multiple subsegmental pulmonary emboli without acute cor pulmonale (HCC)  Assessment & Plan  Noted on CTA PE from 1/20   RV/LV ratio WNL  Completed Eliquis 10mg BID 7 days  Now on 5mg BID for 3 months as per Pulm  Monitor respiratory status  Chronic diastolic congestive heart failure (HCC)  Assessment & Plan  Wt Readings from Last 3 Encounters:   02/09/23 92 kg (202 lb 13 2 oz)   01/23/23 95 2 kg (209 lb 14 1 oz)     Does not appear to have acute exacerbation  12/2/2022 Echo with EF 55-59% and G1DD   on 1/20  Home: Lopressor, Losartan, Lasix  Here: Lopressor, Losartan 50mg daily  - Orthostasis has improved  Monitor and add back as appropriate     Monitor I/O and daily weights  Adjust as appropriate  Outpatient f/u with PCP  Other hyperlipidemia  Assessment & Plan  Home: Simvastatin 20mg nightly  Here: Lipitor 20mg nightly for therapeutic substitution   Outpatient f/u with PCP    Essential hypertension  Assessment & Plan  Home: Lasix 20mg daily PRN, Lopressor 50mg BID, Losartan 50mg daily  Here: Lopressor 50mg BID, Losartan 50mg daily  1/24 Losartan held for orthostasis  Has since improved  Can try to restart Lasix while here once BP appropriate  Monitor BP, adjust as appropriate  Follow-up with PCP  Constipation-resolved as of 2/2/2023  Assessment & Plan  Resolved  Weaning off bowel regimen  Monitor on just PRNs  Hypokalemia-resolved as of 1/31/2023  Assessment & Plan  1/30 4 3  Resolved after supplementation  Monitor  Leukocytosis-resolved as of 1/31/2023  Assessment & Plan  1/30 8 88 and resolved  Afebrile, but otherwise stable  Drainage from L thigh doesn't appear infectious  Monitor incision site closely  Monitor off antibiotics  Monitor Temp and WBC  Initiate work-up if she spikes a fever or develops localizing symptoms  Discharge Physical Examination:  /70 (BP Location: Left arm)   Pulse 70   Temp 97 8 °F (36 6 °C) (Tympanic)   Resp 18   Ht 5' 3" (1 6 m)   Wt 92 kg (202 lb 13 2 oz)   SpO2 95%   BMI 35 93 kg/m²     Gen: No acute distress, Well-nourished, well-appearing  HEENT: Moist mucus membranes, Normocephalic/Atraumatic  Cardiovascular: Regular rate, rhythm, S1/S2  Distal pulses palpable  Heme/Extr: No edema  Pulmonary: Non-labored breathing  Lungs CTAB  : No garcia  GI: Soft, non-tender, non-distended  BS+  MSK: AROM is WFL in all extremities  No effusions or deformities  Bulk is symmetric  See below for MMT scores  Integumentary: Skin is warm, dry  Incision sites C/D/I  Surrounding bruising  Well healed without drainage, dehiscence, erythema, signs of active infection  Staples removed     Neuro: AAOx3, Sensation intact to light touch throughout  Speech is intact  Appropriate to questioning  Tone is normal    MMT:   Strength:   Right  Left  Site  Right  Left  Site    5 5  S Ab: Shoulder Abductors  5  4 HF: Hip Flexors    5 5  EF: Elbow Flexors  5  5 KF: Knee Flexors    5  5  EE: Elbow Extensors  5  5  KE: Knee Extensors    5  5  WE: Wrist Extensors  5  5  DR: Dorsi Flexors    5  5  FF: Finger Flexors  5  5  PF: Plantar Flexors    5  5  HI: Hand Intrinsics  5  5  EHL: Extensor Hallucis Longus   Psych: Normal mood and affect  Significant Findings, Care, Treatment and Services Provided: Acute comprehensive interdisciplinary inpatient rehabilitation including PT, OT, SLP, RN, CM, SW, dietary, psychology, etc     Complications: None  Functional Status Upon Admission to [facility]:  Mobility: modA ambulation 10' with RW  Transfers: Stacia  ADLs: Stacia grooming, Sup UB bathing, maxA LB bathing, Sup UB dressing, max A LB dressing    Functional Status Upon Discharge from [facility]:   PT: Ind transfers, CGA bed mobility, Ind 200' ambulation RW, Stacia stairs with LHR  OT: Ind eating, grooming, bathing, UB/LB dressing, toileting, Stacia tub/shower transfer, Ind toilet transfer     Discharge Diagnosis: Impairment of mobility, safety and Activities of Daily Living (ADLs) due to Orthopedic Disorders:  08 11  Unilateral Hip Fracture    Discharge Medications:   See after visit summary for reconciled discharge medications provided to patient and family  Condition at Discharge: good     Discharge instructions/Information to patient and family:   See after visit summary for information provided to patient and family  Provisions for Follow-Up Care:  See after visit summary for information related to follow-up care and any pertinent home health orders  No future appointments  Disposition: Home with Home PT/OT  Planned Readmission: No    Discharge Statement   I spent 45 minutes discharging the patient   This time was spent on the day of discharge  I had direct contact with the patient on the day of discharge  Greater than 50% of the total time was spent examining patient, answering all patient questions, arranging and discussing plan of care with patient as well as directly providing post-discharge instructions  Additional time then spent on discharge activities  Discharge Medications:  See after visit summary for reconciled discharge medications provided to patient and family        Facility Administered Medications Prior to Discharge:    Current Facility-Administered Medications   Medication Dose Route Frequency Provider Last Rate   • acetaminophen  650 mg Oral Q6H PRN ELDA Montes     • apixaban  5 mg Oral BID ELDA Montes     • atorvastatin  20 mg Oral Daily With Charlie Elizabeth MD     • bisacodyl  10 mg Rectal Daily PRN Fabiano Rondon MD     • calcium carbonate  1,000 mg Oral Daily PRN Fabiano Rondon MD     • calcium carbonate-vitamin D  2 tablet Oral Daily With Breakfast ELDA Montes     • cholecalciferol  3,000 Units Oral Daily ELDA Montes     • Diclofenac Sodium  2 g Topical BID Fabiano Rondon MD     • docusate sodium  100 mg Oral BID Fabiano Rondon MD     • escitalopram  5 mg Oral Daily ELDA Montes     • hydrOXYzine HCL  10 mg Oral Q6H PRN Fabiano Rondon MD     • loratadine  10 mg Oral Daily Fabiano Rondon MD     • losartan  50 mg Oral Daily ELDA Montes     • menthol-methyl salicylate   Apply externally 4x Daily PRN Fabiano Rondon MD     • methocarbamol  500 mg Oral Q6H PRN ELDA Montes     • metoprolol tartrate  50 mg Oral BID Fabiano Rondon MD     • ondansetron  4 mg Oral Q6H PRN ELDA Montes     • oxyCODONE  5 mg Oral Q6H PRN Fabiano Rondon MD     • oxyCODONE  2 5 mg Oral Q6H PRN Fabiano Rondon MD     • pantoprazole  40 mg Oral Daily Fabiano Rondon MD     • polyethylene glycol  17 g Oral Daily PRN Fabiano Rondon MD

## 2023-02-09 NOTE — PROGRESS NOTES
Physical Medicine and Rehabilitation Progress Note  Matthew Ferrera 67 y o  female MRN: [id number]  Unit/Bed#: -46 Encounter: [id number]    HPI: Matthew Ferrera is a 67 y o  female  With medical history of HLD, HTN, Chronic diastolic heart failure (R1BW), previous R hip fracture who presented to the [address] [place], [place] [address] on 1/18 after slipping on some water while at work at [employer]  No prodromal symptoms  CTH showed no acute intracranial abnormality, and XR L hip showed a subtrochanteric femur fracture  Ortho recommended ORIF with IMN which was performed on 1/19 by Dr Ismael Birmingham  Made WBAT post-op Post-op lethargi and hypoxic, CXR showed no acute cardiopulmonary disease  Required BiPAP, Narcan ,and Flumazenil  There was some improvement in her lethargy, but remained hypoxic  Pulm was consulted  CTA PE ordered which showed distal subsegmental PEs in the posterior lower lobe with atelectasis  Doppler performed showed a DVT in 1 peroneal vein in LLE  Pulm did not feel that the findings were responsible for the extent of her hypoxia - they felt multifactorial 2/2 atelectasis some volume overload  She got 1 dose of IV Lasix  Primary did not feel there was a volume overload component  Ultimately recommended for a/c for at least 3 months  Eliquis cost $47 a month  Course also c/b ABLA, but that stabilized and she did not require transfusion She was stabilized and admitted to the [facility] on 1/23/2023  Chief Complaint: Feels ready to go home tomorrow  Interval History/Subjective:  No acute events overnight  Sleep is fine, pain is better controlled  Denies any new CP, SOB, fevers, chills, N/V, abdominal pain  Controlled LLE pain  Last BM 2/7  ROS:  A 10 point review of systems was negative except for what is noted in the HPI  Today's Changes:  1  Losartan increased to home dose yesterday  Tolerating  2  Added diclofenac for R knee arthritis twice daily    3  Continue current plan of care  4  Team Conference, see separate note  5  Reviewed knee XR - severe R knee OA  Total time spent:  35 minutes, with more than 50% spent counseling/coordinating care  Counseling includes discussion with patient re: progress in therapies, functional issues observed by therapy staff, and discussion with patient his/her current medical state/wellbeing  Coordination of patient's care was performed in conjunction with Internal Medicine service to monitor patient's labs, vitals, and management of their comorbidities  In addition, this patient was discussed by the interdisciplinary team in weekly case conference today  The care of the patient was extensively discussed with all care providers and an appropriate rehabilitation plan was formulated unique for this patient  Barriers were identified preventing progression of therapy and appropriate interventions were discussed with each discipline  Please see the team note for input from all disciplines regarding barriers, intervention, and discharge planning  Assessment/Plan:    * Closed left hip fracture ([address] )  Assessment & Plan  After slipping on water at work  Workers Comp  Subtrochanteric femur fracture s/p long nail (antegrade) on 1/19 by Dr Ernestine Raphael  Pain control as documented below  Surgical dressings removed 1/26  Monitor drainage  Follow-up with Orthopedics done 2/2   - Consult placed, XR stable  Staples removed  - f/u in 4 weeks for 6 week POV (3/2)    PT/ OT 3-5 hours a day, 5-7 days/week in acute comprehensive inpatient rehab    Right knee pain  Assessment & Plan  12/23/13 MRI R Knee:  Apparent chronic degeneration and tear of the medial   meniscus with medial meniscal extrusion, extensive grade 4 medial   tibiofemoral chondromalacia and osteoarthritis  Suspicion of radial   tear of the central right aspect of the posterior horn lateral   meniscus   Nonvisualization of the anterior cruciate ligament   suggesting chronic full-thickness tear  Joint effusion  2/9 XR R Knee:  No acute osseous abnormality      Severe right knee osteoarthritis  Getting her a soft hinged knee brace to use with her compression sleeve  Should f/u with Orthopedics for further evaluation  Chest wall pain  Assessment & Plan  Improved/resolving  Suspect MSK or costochondritis - mild  Reproducible with palpation and certain push off manuevers in therapies  No diaphoresis, nausea, radiation, SOB  Reviewed red flag symptoms that would warrant ACS r/o  Added Bengay  Monitor closely  Adjustment disorder with anxiety  Assessment & Plan  Will provide non-pharmacologic strategies  Continued PRN hydroxyzine  1/30 started Lexapro  Consulted rehab psychology for support  Hypoxemia  Assessment & Plan  Resolved and on RA  Previously multifactorial: PE, atelectasis, ?volume overload s/p Lasix IV x1  Does have nocturnal hypoxemia on Friday Noc Ox for 1 hour   - O2 at night for now   - Noc Ox performed on 2/8 overnight  Equipment ordered for discharge  - Will need formal evaluation with Sleep Medicine at discharge  Monitor closely  Goals > 90%  Outpatient f/u with Pulmonology/Sleep Medicine    Osteopenia  Assessment & Plan  DXA 12/2020 with osteopenia  Continue home calcium and Vitamin D  1/24 Vitamin D is 29 8  Outpatient f/u DXA with PCP  Acute postoperative anemia due to expected blood loss  Assessment & Plan  Hgb 12 6 pre-op  2/6  6 9 > 8 2 > 8 5 > 8 5 > 9 1   - Iron deficiency on labs  - Also post-operative blood loss  - Given IV venofer x2   - Type and screen   - Consent in chart  - 1/26 Transfuse with 1 unit pRBC  Trend, transfuse as appropriate  Outpatient f/u with PCP    Acute deep vein thrombosis (DVT) of left peroneal vein [facility])  Assessment & Plan  1/20 LE Venous Duplex with L acute DVT in 1 of 2 peroneal veins  Now on Eliquis  See PE for more details  No SCD on that leg  Can do ace wrapping for edema     Outpatient f/u with PCP    Multiple subsegmental pulmonary emboli without acute cor pulmonale (HCC)  Assessment & Plan  Noted on CTA PE from 1/20   RV/LV ratio WNL  Completed Eliquis 10mg BID 7 days  Now on 5mg BID for 3 months as per Pulm  Monitor respiratory status  Chronic diastolic congestive heart failure (HCC)  Assessment & Plan  Wt Readings from Last 3 Encounters:   02/09/23 92 kg (202 lb 13 2 oz)   01/23/23 95 2 kg (209 lb 14 1 oz)     Does not appear to have acute exacerbation  12/2/2022 Echo with EF 55-59% and G1DD   on 1/20  Home: Lopressor, Losartan, Lasix  Here: Lopressor, Losartan 50mg daily  - Orthostasis has improved  Monitor and add back as appropriate  Monitor I/O and daily weights  Adjust as appropriate  Outpatient f/u with PCP  Other hyperlipidemia  Assessment & Plan  Home: Simvastatin 20mg nightly  Here: Lipitor 20mg nightly for therapeutic substitution   Outpatient f/u with PCP    Essential hypertension  Assessment & Plan  Home: Lasix 20mg daily PRN, Lopressor 50mg BID, Losartan 50mg daily  Here: Lopressor 50mg BID, Losartan 50mg daily  1/24 Losartan held for orthostasis  Has since improved  Can try to restart Lasix while here once BP appropriate  Monitor BP, adjust as appropriate  Follow-up with PCP  Constipation-resolved as of 2/2/2023  Assessment & Plan  Resolved  Weaning off bowel regimen  Monitor on just PRNs  Hypokalemia-resolved as of 1/31/2023  Assessment & Plan  1/30 4 3  Resolved after supplementation  Monitor  Leukocytosis-resolved as of 1/31/2023  Assessment & Plan  1/30 8 88 and resolved  Afebrile, but otherwise stable  Drainage from L thigh doesn't appear infectious  Monitor incision site closely  Monitor off antibiotics  Monitor Temp and WBC  Initiate work-up if she spikes a fever or develops localizing symptoms  Health Maintenance  #Delirium/Sleep:  At risk has had hallucinations on oxycodone 10mg, and was lethargic requiring narcan on fentanyl and dilaudid  #Pain: Tylenol ordered PRN, Oxycodone 2 5-5mg PRN  #Bowel: Last BM 2/7  PRN miralax/docusate/suppository  #Bladder: Voiding and continent  Monitor  #Skin/Pressure Injury Prevention: Turn Q2hr in bed, with weight shifts [id number] in wheelchair  Float heels in bed  #DVT Prophylaxis: Fully anticoagulated on Eliquis  SCD on RLE only  #GI Prophylaxis: PPI started when full a/c started  Can also do famotidine  #Code Status:  Full Code  #FEN: Reg Diet  #Dispo:  Team Conference 2/9: ADD 2/10/2023  Goals are to see if we can get independent with full flight of stairs at home, and modified Ind for all mobility and ADLs  She has made marked progress in the past week  Will go home with home PT/OT  - Follow-up Orthopedics - they should also evaluate her R knee, as it may have contributed to her fall in the first place    - Follow-up Sleep Medicine  - Follow-up PCP    Objective:    Functional Update:   PT: Ind transfers, CGA bed mobility, Ind 200' ambulation RW, Stacia stairs with LHR  OT: Ind eating, grooming, bathing, UB/LB dressing, toileting, Stacia tub/shower transfer, Ind toilet transfer     Allergies per EMR    Physical Exam:  Temp:  [97 8 °F (36 6 °C)-98 6 °F (37 °C)] 97 8 °F (36 6 °C)  HR:  [68-84] 68  Resp:  [18] 18  BP: (127-161)/(62-74) 149/74  Oxygen Therapy  SpO2: 94 %  O2 Flow Rate (L/min): 2 L/min    Gen: No acute distress, Well-nourished, well-appearing  HEENT: Moist mucus membranes, Normocephalic/Atraumatic  Cardiovascular: Regular rate, rhythm, S1/S2  Distal pulses palpable  Heme/Extr: Markedly improved edema  Pulmonary: Non-labored breathing  Lungs CTAB  : No garcia  GI: Soft, non-tender, non-distended  BS+  MSK: AROM is WFL in all extremities  No effusions or deformities  Bulk is symmetric  See below for MMT scores  Integumentary: Skin is warm, dry  Neuro: AAOx3, Speech is intact  Appropriate to questioning  Tone is normal    Psych: Normal mood and affect       Diagnostic Studies: Reviewed  12/23/13 MRI R Knee:  Apparent chronic degeneration and tear of the medial   meniscus with medial meniscal extrusion, extensive grade 4 medial   tibiofemoral chondromalacia and osteoarthritis  Suspicion of radial   tear of the central right aspect of the posterior horn lateral   meniscus  Nonvisualization of the anterior cruciate ligament   suggesting chronic full-thickness tear  Joint effusion  2/9 XR R Knee:  No acute osseous abnormality      Severe right knee osteoarthritis  Getting her a soft hinged knee brace to use with her compression sleeve  Should f/u with Orthopedics for further evaluation       Laboratory:  Reviewed   Results from last 7 days   Lab Units 02/06/23  0459   HEMOGLOBIN g/dL 9 1*   HEMATOCRIT % 29 9*   WBC Thousand/uL 4 62     Results from last 7 days   Lab Units 02/06/23  0459   BUN mg/dL 16   POTASSIUM mmol/L 3 9   CHLORIDE mmol/L 103   CREATININE mg/dL 0 54*            Patient Active Problem List   Diagnosis   • Closed left hip fracture (HCC)   • Essential hypertension   • Other hyperlipidemia   • Chronic diastolic congestive heart failure (HCC)   • Multiple subsegmental pulmonary emboli without acute cor pulmonale (HCC)   • Acute deep vein thrombosis (DVT) of left peroneal vein (HCC)   • Acute postoperative anemia due to expected blood loss   • Osteopenia   • Hypoxemia   • Adjustment disorder with anxiety   • Chest wall pain         Medications  Current Facility-Administered Medications   Medication Dose Route Frequency Provider Last Rate   • acetaminophen  650 mg Oral Q6H PRN Shayne End, CRNP     • apixaban  5 mg Oral BID Shayne End, CRNP     • atorvastatin  20 mg Oral Daily With Any Torres MD     • bisacodyl  10 mg Rectal Daily PRN Miriam Chao MD     • calcium carbonate  1,000 mg Oral Daily PRN Miriam Chao MD     • calcium carbonate-vitamin D  2 tablet Oral Daily With Breakfast Shayne End, CRNP     • cholecalciferol  3,000 Units Oral Daily Maximilaino Cast ELDA Chandra     • Diclofenac Sodium  2 g Topical BID Niurka Amaya MD     • docusate sodium  100 mg Oral BID Niurka Amaya MD     • escitalopram  5 mg Oral Daily ELDA Rueda     • hydrOXYzine HCL  10 mg Oral Q6H PRN Niurka Amaya MD     • loratadine  10 mg Oral Daily Niurka Amaya MD     • losartan  50 mg Oral Daily ELDA Rueda     • menthol-methyl salicylate   Apply externally 4x Daily PRN Niurka Amaya MD     • methocarbamol  500 mg Oral Q6H PRN ELDA Rueda     • metoprolol tartrate  50 mg Oral BID Niurka Amaya MD     • ondansetron  4 mg Oral Q6H PRN ELDA Rueda     • oxyCODONE  5 mg Oral Q6H PRN Niurka Amaya MD     • oxyCODONE  2 5 mg Oral Q6H PRN Niurka Amaya MD     • pantoprazole  40 mg Oral Daily Niurka Amaya MD     • polyethylene glycol  17 g Oral Daily PRN Niurka Amyaa MD            ** Please Note: Fluency Direct voice to text software may have been used in the creation of this document   **

## 2023-02-09 NOTE — ASSESSMENT & PLAN NOTE
Home regimen: Lasix 20mg PRN, metoprolol tartrate 50mg BID, losartan 50mg daily  Currently receiving metoprolol tartrate 50mg BID and losartan 50mg daily  Apply abdominal binder/TEDs when getting OOB  Monitor BP with routine VS   Follow-up with PCP as outpatient

## 2023-02-09 NOTE — ASSESSMENT & PLAN NOTE
12/23/13 MRI R Knee:  Apparent chronic degeneration and tear of the medial   meniscus with medial meniscal extrusion, extensive grade 4 medial   tibiofemoral chondromalacia and osteoarthritis  Suspicion of radial   tear of the central right aspect of the posterior horn lateral   meniscus  Nonvisualization of the anterior cruciate ligament   suggesting chronic full-thickness tear  Joint effusion  2/9 XR R Knee:  No acute osseous abnormality      Severe right knee osteoarthritis  Getting her a soft hinged knee brace to use with her compression sleeve  Should f/u with Orthopedics for further evaluation

## 2023-02-09 NOTE — ASSESSMENT & PLAN NOTE
S/p mechanical fall on 1/18  XR on 1/18 showed acute intertrochanteric L hip fracture  OR on 1/19 for IM fixation of L subtrochanteric hip fracture performed by Dr Zella Goodell  WBAT to LLE  Neurovascular checks Q shift  Ensure adequate pain control  Monitor incision for s/s of infection  Eliquis for DVT ppx  Follow-up with Orthopedics in 2 weeks  XRs on 2/2: stable appearing comminuted intertrochanteric fracture s/p ORIF, no evidence for hardware complication  Ortho consulted - staples removed  Follow-up in additional 4 weeks as outpatient  Primary team following  PT/OT

## 2023-02-09 NOTE — PROGRESS NOTES
02/09/23 0943   Pain Assessment   Pain Assessment Tool 0-10   Pain Score 3   Pain Location/Orientation Orientation: Left; Location: Hip   Hospital Pain Intervention(s)   (had pain meds prior)   Restrictions/Precautions   Precautions Fall Risk   Subjective   Subjective Pt reports she does ot trust her "right knee stability", brace for future suggested   Sit to Stand   Type of Assistance Needed Independent; Adaptive equipment   Comment RW   Sit to Stand CARE Score 6   Bed-Chair Transfer   Type of Assistance Needed Independent; Adaptive equipment   Comment RW   Chair/Bed-to-Chair Transfer CARE Score 6   Walk 10 Feet   Type of Assistance Needed Independent; Adaptive equipment   Comment RW   Walk 10 Feet CARE Score 6   Walk 50 Feet with Two Turns   Type of Assistance Needed Independent; Adaptive equipment   Comment RW   Walk 50 Feet with Two Turns CARE Score 6   Walk 150 Feet   Type of Assistance Needed Independent; Adaptive equipment   Comment RW   Walk 150 Feet CARE Score 6   Ambulation   Primary Mode of Locomotion Prior to Admission Walk   Distance Walked (feet) 175 ft  (x2)   Assist Device Roller Walker   Gait Pattern Slow Sienna; Antalgic; Step through   Findings relys heavily on B UE support   Does the patient walk? 2  Yes   Wheel 50 Feet with Two Turns   Type of Assistance Needed Independent   Wheel 50 Feet with Two Turns CARE Score 6   Wheel 150 Feet   Type of Assistance Needed Independent   Wheel 150 Feet CARE Score 6   Wheelchair mobility   Does the patient use a wheelchair? 0  No   Findings no longer needs for d/c home, will be walking   Curb or Single Stair   Style negotiated Single stair   Type of Assistance Needed Physical assistance; Adaptive equipment   Physical Assistance Level 26%-50%   Comment Pt using HR and SPC, leading with LLE despite being weak due to her not trusting the right knee, states " i'm afraid it will buckle"   1 Step (Curb) CARE Score 3   4 Steps   Type of Assistance Needed Physical assistance; Adaptive equipment   Physical Assistance Level 26%-50%   Comment Pt using HR and SPC, leading with LLE despite being weak due to her not trusting the right knee, states " i'm afraid it will buckle"   4 Steps CARE Score 3   12 Steps   Comment weakness B LE   Reason if not Attempted Safety concerns   12 Steps CARE Score 88   Equipment Use   NuStep 10 min lvl 2 B LE only   Assessment   Treatment Assessment Pt seen for 30 min session only  Pt worried about her right knee more than left hip  Fearful of right knee stability  Suggested simple knee brace with medial/lateral stays for support, spoke with MD as well  Pt to be discharegd tomorrow and plans to purchase on her own  Pt ambulating well with RW, stairs require Mod A and pt anxious  First floor at home upon discharge is best at this time  Conrad see in PM for continued therapy  Plan   Progress Progressing toward goals   PT Therapy Minutes   PT Time In 0930   PT Time Out 1000   PT Total Time (minutes) 30   PT Mode of treatment - Individual (minutes) 30   PT Mode of treatment - Concurrent (minutes) 0   PT Mode of treatment - Group (minutes) 0   PT Mode of treatment - Co-treat (minutes) 0   PT Mode of Treatment - Total time(minutes) 30 minutes   PT Cumulative Minutes 1415   Therapy Time missed   Time missed?  No

## 2023-02-09 NOTE — PROGRESS NOTES
02/09/23 1400   Pain Assessment   Pain Assessment Tool 0-10   Pain Score 5   Pain Location/Orientation Orientation: Left; Location: Hip   Pain Onset/Description Onset: Gradual;Descriptor: Discomfort   Effect of Pain on Daily Activities Tolerated   Patient's Stated Pain Goal No pain   Hospital Pain Intervention(s) Rest  (RN notified for pain medication)   Multiple Pain Sites No   Restrictions/Precautions   Precautions Fall Risk;Pain   Weight Bearing Restrictions Yes   LLE Weight Bearing Per Order WBAT   ROM Restrictions No   Braces or Orthoses   (R knee sleeve)   Picking Up Object   Type of Assistance Needed Independent; Adaptive equipment   Physical Assistance Level No physical assistance   Comment Kermit in stance at RW utilizing LHR to retrieve various items from floor level  Safe positioning of RW prior to item retrieval from flr level  Picking Up Object CARE Score 6   Sit to Stand   Type of Assistance Needed Independent; Adaptive equipment   Physical Assistance Level No physical assistance   Comment RW   Sit to Stand CARE Score 6   Bed-Chair Transfer   Type of Assistance Needed Independent; Adaptive equipment   Physical Assistance Level No physical assistance   Comment Kermit with RW   Chair/Bed-to-Chair Transfer CARE Score 6   Toileting Hygiene   Type of Assistance Needed Independent; Adaptive equipment   Physical Assistance Level No physical assistance   Comment Kermit with RW   Toileting Hygiene CARE Score 6   Toilet Transfer   Type of Assistance Needed Independent; Adaptive equipment   Physical Assistance Level No physical assistance   Comment Kermit with RW and over-the-toilet commode   Toilet Transfer CARE Score 6   Cognition   Overall Cognitive Status WFL   Arousal/Participation Alert; Cooperative   Attention Attends with cues to redirect   Orientation Level Oriented X4   Memory Within functional limits   Following Commands Follows all commands and directions without difficulty   Activity Tolerance   Activity Tolerance Patient tolerated treatment well   Assessment   Treatment Assessment Pt participated in brief 30 min skilled OT Tx session with focus on fxnl mobility and item retrieval from flr level  See above for further Tx details  Pt is Kermit for item retrieval from flr level utilizing RW and [address]  Pt with no further questions for OT at this time and feels confident with D/C home tomorrow  DME includes RW, BSC, and hip kit, with plan to SB upon initial D/C  Therapy recommendatio for home PT services, no OT needs at this time  Prognosis Good   Problem List Decreased strength;Decreased range of motion;Decreased mobility;Pain   Barriers to Discharge None   Recommendation   OT Discharge Recommendation Home with home health rehabilitation   Equipment Recommended Bedside commode; Hip Kit ($)   Commode Type Standard   OT Equipment ordered Yes   OT Therapy Minutes   OT Time In 1400   OT Time Out 1430   OT Total Time (minutes) 30   OT Mode of treatment - Individual (minutes) 30   OT Mode of treatment - Concurrent (minutes) 0   OT Mode of treatment - Group (minutes) 0   OT Mode of treatment - Co-treat (minutes) 0   OT Mode of Treatment - Total time(minutes) 30 minutes   OT Cumulative Minutes 1350   Therapy Time missed   Time missed?  No

## 2023-02-09 NOTE — CASE MANAGEMENT
Case Management/Team Update:  Cm met with pt at bedside to review team update  Pt reports she feels she has improved greatly in rehab program  Cm confirmed pt's equipment r/w, bsc, hip kit to be delivered to pt's room prior to dc, DME had to be ordered through [employer], contact is ALTILIA: [phone]  Pt also qualifies for overnight oxygen, cm reached out to [facility] and also sent order through [employer] to expedite process if needed  [facility] is accepting for PT OT RN on dc  Pt had no further questions at time of conversation

## 2023-02-09 NOTE — PROGRESS NOTES
02/09/23 1030   Pain Assessment   Pain Assessment Tool 0-10   Pain Score 5   Pain Location/Orientation Orientation: Left; Location: Hip   Pain Onset/Description Onset: Gradual;Descriptor: Discomfort   Effect of Pain on Daily Activities Tolerated   Patient's Stated Pain Goal No pain   Hospital Pain Intervention(s) Repositioned; Rest   Multiple Pain Sites No   Restrictions/Precautions   Precautions Fall Risk;Pain   Weight Bearing Restrictions Yes   RLE Weight Bearing Per Order WBAT   LLE Weight Bearing Per Order WBAT   ROM Restrictions No   Braces or Orthoses   (R knee sleeve)   Lifestyle   Autonomy Pt agreeable to participate in OT Tx session  Sit to Stand   Type of Assistance Needed Independent; Adaptive equipment   Physical Assistance Level No physical assistance   Comment RW   Sit to Stand CARE Score 6   Bed-Chair Transfer   Type of Assistance Needed Independent; Adaptive equipment   Physical Assistance Level No physical assistance   Comment Kermit with RW   Chair/Bed-to-Chair Transfer CARE Score 6   Meal Prep   Meal Prep Level Walker   Meal Prep Level of Assistance Modified independent   Meal Preparation Engaged in light microwave meal prep task req pt to heat slices of bread to simulate heating ham and cheese sandwich  Pt able to transport small paper plate with slices of bread microwave <> counter top and place/remove item from microwave at Kermit level  No LOB with G safety with RW management and position  Pt will have A form brother Cindy Sieve) for item transportation upon D/C  Kitchen Mobility   Kitchen-Mobility Level Walker   Kitchen Activity Retrieve items   Kitchen Mobility Comments Engaged in fxnl kitchen mobility at Kermit level with RW, demonstrating ability to retrieve 6/6 items form around kitchen environment  G safety and positioning with RW management, no LOB  Pt will have A form  Origrayson Sieve) for item transport upon D/C, as discussed during previous OT FT     Cognition   Overall Cognitive Status [facility] Arousal/Participation Alert; Cooperative   Attention Attends with cues to redirect   Orientation Level Oriented X4   Memory Within functional limits   Following Commands Follows all commands and directions without difficulty   Additional Activities   Additional Activities Other (Comment)   Additional Activities Comments Engaged in dynamic reach activity seated EOM req pt to perform repetitive dynamic reach to R/L side to engage in peg board activity  Utilized 6" block's on R/L sides for peg board activity with pt demo ability to complete 100% of activity with no seated LOB or c/o of increased pain  Focus of task to improve pt's dynamic reach for ADL performance  Activity Tolerance   Activity Tolerance Patient tolerated treatment well   Assessment   Treatment Assessment Pt participated in 60 min skilled OT Tx session with focus on IADL management and seated dynamic reach  See above for further Tx details  Pt has achieved OT goals of Kermit for light meal prep and fxnl kitchen mobility  Plan for pt's brother Tony Swartz) to assist with transporting kitchen items upon D/C  Tolerated seated dynamic reach with no c/o of increased pain/discomfort  Pt plan to D/C home on 2/10 with recommendation for Home PT  No OT needs at this time  Prognosis Good   Problem List Decreased strength;Decreased range of motion;Decreased mobility; Impaired balance;Pain   Barriers to Discharge None   Plan   Treatment/Interventions Functional transfer training; Therapeutic exercise; Endurance training   Progress Progressing toward goals   Recommendation   OT Discharge Recommendation Home with home health rehabilitation   Equipment Recommended Bedside commode; Hip Kit ($)   Commode Type Standard   OT Equipment ordered Yes   OT Therapy Minutes   OT Time In 1030   OT Time Out 1130   OT Total Time (minutes) 60   OT Mode of treatment - Individual (minutes) 60   OT Mode of treatment - Concurrent (minutes) 0   OT Mode of treatment - Group (minutes) 0   OT Mode of treatment - Co-treat (minutes) 0   OT Mode of Treatment - Total time(minutes) 60 minutes   OT Cumulative Minutes 1320   Therapy Time missed   Time missed?  No

## 2023-02-09 NOTE — PROGRESS NOTES
[facility]  Progress Note Deepika Whyte 1950, 67 y o  female MRN: [id number]  Unit/Bed#: [facility] 350-42 Encounter: [id number]  Primary Care Provider: No primary care provider on file  Date and time admitted to hospital: 1/23/2023  1:33 PM    Right knee pain  Assessment & Plan  R knee has been causing more pain with increasing therapy  L side has been dominate for ambulation but has been using RLE more with recent L hip fracture  Hx of meniscus tear in 2013  XR of R knee ordered yesterday - pending  Continue Voltaren gel PRN  Considerations with PT/OT  Adjustment disorder with anxiety  Assessment & Plan  Has been receiving Atarax 10mg Q6 PRN since 1/26  Discussed with patient and admits to feelings of anxiety prior to her fall  Started Lexapro 5mg daily on 1/30  Supportive counseling as needed  Neuropsych consulted  Follow-up with PCP as outpatient  Hypoxemia  Assessment & Plan  Hypoxia in acute setting post-operatively  Developed PEs and was started on Eliquis  Per Pulmonary - recommending overnight noc ox as outpatient  Desat while sleeping - noc ox obtained on 1/27  Desat for 1 hour and 40 minutes, lowest saturation 80%  Apply 2L O2 at night or while sleeping  Repeat overnight noc ox on 2/8 for showed desat <89% for 2hrs and 38min, and sat as low as 70%  Osteopenia  Assessment & Plan  DXA scan in 12/2020 showed osteopenia  Continue home calcium carbonate and Vitamin D supplementation  Vitamin D level 29 8 on 1/24  Increased Vitamin D3 to 3000u daily  Recommend repeat DXA scan as outpatient along with discussion about Prolia injections  Follow-up with PCP as outpatient  Acute postoperative anemia due to expected blood loss  Assessment & Plan  Hgb currently 9 1  Hgb was 12 6 pre-operatively  Iron panel on 1/23: Iron Saturation 9%, TIBC 279, Iron 25, and Ferritin 81   2 doses of IV Venofer from 1/24-1/25      Given 1u PRBCs on 1/26 for Hgb of 6 9   No active s/s of bleeding  Transfuse for Hgb <7 0 or if becomes symptomatic  Continue to trend CBC  Acute deep vein thrombosis (DVT) of left peroneal vein [facility])  Assessment & Plan  Lower extremity venous duplex on 1/20: Evidence of focal acute DVT in 1 of 2 peroneal veins on LLE  Completed Eliquis 10mg BID for 7 days and now receiving Eliquis 5mg BID  Follow-up with PCP as outpatient  Neurovascular checks Q shift  Multiple subsegmental pulmonary emboli without acute cor pulmonale (HCC)  Assessment & Plan  CTA PE study on 1/20: Few subsegmental pulmonary emboli in the posterior lower lobes, measured RV/LV ratio within normal limits  Completed Eliquis 10mg BID for 7 days and now receiving Eliquis 5mg BID, continue for 3 months  Follow-up with PCP as outpatient  Currently maintaining on RA  Encourage pulmonary toileting  Spot pulse ox checks  Chronic diastolic congestive heart failure (HCC)  Assessment & Plan  Wt Readings from Last 3 Encounters:   02/09/23 92 kg (202 lb 13 2 oz)   01/23/23 95 2 kg (209 lb 14 1 oz)     Stable without exacerbation   on 1/20  Last ECHO on 12/2/22 showed EF 55-59%, G1DD  Takes Lasix 20mg daily PRN at home  Has not received while inpatient  Other hyperlipidemia  Assessment & Plan  Continue home Lipitor 20mg daily  Last lipid panel on 11/15/22: Cholesterol 147, Triglycerides 73, HDL 53, and LDL 79  Essential hypertension  Assessment & Plan  Home regimen: Lasix 20mg PRN, metoprolol tartrate 50mg BID, losartan 50mg daily  Currently receiving metoprolol tartrate 50mg BID and losartan 50mg daily  Apply abdominal binder/TEDs when getting OOB  Monitor BP with routine VS   Follow-up with PCP as outpatient  * Closed left hip fracture [facility])  Assessment & Plan  S/p mechanical fall on 1/18  XR on 1/18 showed acute intertrochanteric L hip fracture  OR on 1/19 for IM fixation of L subtrochanteric hip fracture performed by Dr Arreola Section      WBAT to LLE   Neurovascular checks Q shift  Ensure adequate pain control  Monitor incision for s/s of infection  Eliquis for DVT ppx  Follow-up with Orthopedics in 2 weeks  XRs on : stable appearing comminuted intertrochanteric fracture s/p ORIF, no evidence for hardware complication  Ortho consulted - staples removed  Follow-up in additional 4 weeks as outpatient  Primary team following  PT/OT  VTE Pharmacologic Prophylaxis:   Pharmacologic: Apixaban (Eliquis)  Mechanical VTE Prophylaxis in Place: No - DVT  Current Length of Stay: 17 day(s)    Current Patient Status: Inpatient Rehab     Discharge Plan: As per primary team     Code Status: Level 1 - Full Code    Subjective:   Pt examined while pt sitting in recliner in pt room  Currently sitting in recliner in pt room  Complaints of L thigh pain, radiating down to the L knee, 3/10, aching, improves with rest   Denies any R knee pain currently but does have it occasionally with ambulation  Reviewed XR and discussed arthritis  Encouraged to continue Voltaren gel and encouraged to follow-up with Orthopedics as outpatient  Plans to get a firmer brace for while she is ambulating  Hx of meniscus tear which she said eventually healed without surgical intervention  Overnight noc ox last night confirmed O2 use for at home - encouraged to follow-up with Sleep Medicine for official sleep study  Has no other concerns or complaints at this time  Objective:     Vitals:   Temp (24hrs), Av 3 °F (36 8 °C), Min:97 8 °F (36 6 °C), Max:98 6 °F (37 °C)    Temp:  [97 8 °F (36 6 °C)-98 6 °F (37 °C)] 97 8 °F (36 6 °C)  HR:  [68-88] 88  Resp:  [18] 18  BP: (127-161)/(62-80) 144/67  SpO2:  [92 %-96 %] 94 %  Body mass index is 35 93 kg/m²  Review of Systems   Constitutional: Negative for appetite change, chills, fatigue and fever  HENT: Negative for trouble swallowing  Eyes: Negative for visual disturbance  Respiratory: Positive for apnea   Negative for cough, shortness of breath, wheezing and stridor  Cardiovascular: Negative for chest pain, palpitations and leg swelling  Gastrointestinal: Negative for abdominal distention, abdominal pain, constipation, diarrhea, nausea and vomiting  LBM 2/8   Genitourinary: Negative for difficulty urinating  Musculoskeletal: Positive for arthralgias (L hip pain radiating to the L knee, aching, 3/10, improves with rest)  Negative for back pain  Occasional R knee pain during therapy   Neurological: Negative for dizziness, weakness, light-headedness, numbness and headaches  Psychiatric/Behavioral: Negative for dysphoric mood and sleep disturbance  The patient is not nervous/anxious  All other systems reviewed and are negative  Input and Output Summary (last 24 hours): Intake/Output Summary (Last 24 hours) at 2/9/2023 1241  Last data filed at 2/9/2023 1221  Gross per 24 hour   Intake 960 ml   Output 200 ml   Net 760 ml       Physical Exam:     Physical Exam  Vitals and nursing note reviewed  Constitutional:       General: She is not in acute distress  Appearance: Normal appearance  She is obese  She is not ill-appearing  HENT:      Head: Normocephalic and atraumatic  Cardiovascular:      Rate and Rhythm: Normal rate and regular rhythm  Pulses: Normal pulses  Heart sounds: Murmur heard  Systolic murmur is present with a grade of 2/6  No friction rub  Pulmonary:      Effort: Pulmonary effort is normal  No respiratory distress  Breath sounds: Normal breath sounds  No wheezing or rhonchi  Abdominal:      General: Abdomen is flat  Bowel sounds are normal  There is no distension  Palpations: Abdomen is soft  There is no mass  Tenderness: There is no abdominal tenderness  There is no guarding or rebound  Hernia: No hernia is present  Musculoskeletal:      Cervical back: Normal range of motion and neck supple  No tenderness        Right lower leg: Edema (Trace non-pitting edema) present  Left lower leg: Edema (Trace non-pitting edema) present  Skin:     General: Skin is warm and dry  Capillary Refill: Capillary refill takes less than 2 seconds  Neurological:      Mental Status: She is alert and oriented to person, place, and time     Psychiatric:         Mood and Affect: Mood normal          Behavior: Behavior normal          Additional Data:     Labs:    Results from last 7 days   Lab Units 02/06/23  0459   WBC Thousand/uL 4 62   HEMOGLOBIN g/dL 9 1*   HEMATOCRIT % 29 9*   PLATELETS Thousands/uL 305   NEUTROS PCT % 70   LYMPHS PCT % 16   MONOS PCT % 11   EOS PCT % 2     Results from last 7 days   Lab Units 02/06/23  0459   SODIUM mmol/L 140   POTASSIUM mmol/L 3 9   CHLORIDE mmol/L 103   CO2 mmol/L 31   BUN mg/dL 16   CREATININE mg/dL 0 54*   ANION GAP mmol/L 6   CALCIUM mg/dL 8 8   GLUCOSE RANDOM mg/dL 101*                       Labs reviewed    Imaging:    Imaging reviewed    Recent Cultures (last 7 days):           Last 24 Hours Medication List:   Current Facility-Administered Medications   Medication Dose Route Frequency Provider Last Rate   • acetaminophen  650 mg Oral Q6H PRN ELDA Rosas     • apixaban  5 mg Oral BID ELDA Rosas     • atorvastatin  20 mg Oral Daily With Kendell Fontaenz MD     • bisacodyl  10 mg Rectal Daily PRN Eloisa Lindsey MD     • calcium carbonate  1,000 mg Oral Daily PRN Eloisa Lindsey MD     • calcium carbonate-vitamin D  2 tablet Oral Daily With Breakfast ELDA Rosas     • cholecalciferol  3,000 Units Oral Daily ELDA Rosas     • Diclofenac Sodium  2 g Topical BID Eloisa Lindsey MD     • docusate sodium  100 mg Oral BID Eloisa Lindsey MD     • escitalopram  5 mg Oral Daily ELDA Rosas     • hydrOXYzine HCL  10 mg Oral Q6H PRN Eloisa Lindsey MD     • loratadine  10 mg Oral Daily Eloisa Lindsey MD     • losartan  50 mg Oral Daily ELDA Rosas     • menthol-methyl salicylate Apply externally 4x Daily PRN Nick Romo MD     • methocarbamol  500 mg Oral Q6H PRN ELDA Chung     • metoprolol tartrate  50 mg Oral BID Nick Romo MD     • ondansetron  4 mg Oral Q6H PRN ELDA Chung     • oxyCODONE  5 mg Oral Q6H PRN Nick Romo MD     • oxyCODONE  2 5 mg Oral Q6H PRN Nick Romo MD     • pantoprazole  40 mg Oral Daily Nick Romo MD     • polyethylene glycol  17 g Oral Daily PRN Nick Romo MD          M*Modal software was used to dictate this note  It may contain errors with dictating incorrect words or incorrect spelling  Please contact the provider directly with any questions

## 2023-02-09 NOTE — PLAN OF CARE
Problem: NEUROSENSORY - ADULT  Goal: Achieves maximal functionality and self care  Description: INTERVENTIONS  - Monitor swallowing and airway patency with patient fatigue and changes in neurological status  - Encourage and assist patient to increase activity and self care     - Encourage visually impaired, hearing impaired and aphasic patients to use assistive/communication devices  Outcome: Progressing     Problem: RESPIRATORY - ADULT  Goal: Achieves optimal ventilation and oxygenation  Description: INTERVENTIONS:  - Assess for changes in respiratory status  - Assess for changes in mentation and behavior  - Position to facilitate oxygenation and minimize respiratory effort  - Oxygen administered by appropriate delivery if ordered  - Initiate smoking cessation education as indicated  - Encourage broncho-pulmonary hygiene including cough, deep breathe, Incentive Spirometry  - Assess the need for suctioning and aspirate as needed  - Assess and instruct to report SOB or any respiratory difficulty  - Respiratory Therapy support as indicated  Outcome: Progressing     Problem: GASTROINTESTINAL - ADULT  Goal: Maintains or returns to baseline bowel function  Description: INTERVENTIONS:  - Assess bowel function  - Encourage oral fluids to ensure adequate hydration  - Administer IV fluids if ordered to ensure adequate hydration  - Administer ordered medications as needed  - Encourage mobilization and activity  - Consider nutritional services referral to assist patient with adequate nutrition and appropriate food choices  Outcome: Progressing     Problem: GENITOURINARY - ADULT  Goal: Maintains or returns to baseline urinary function  Description: INTERVENTIONS:  - Assess urinary function  - Encourage oral fluids to ensure adequate hydration if ordered  - Administer IV fluids as ordered to ensure adequate hydration  - Administer ordered medications as needed  - Offer frequent toileting  - Follow urinary retention protocol if ordered  Outcome: Progressing

## 2023-02-09 NOTE — PROGRESS NOTES
02/09/23 1230   Pain Assessment   Pain Assessment Tool 0-10   Pain Score 4   Pain Location/Orientation Orientation: Left; Location: Hip   Hospital Pain Intervention(s) Rest;Elevated  (declined Ice pack)   Restrictions/Precautions   Precautions Fall Risk   Cognition   Overall Cognitive Status WFL   Roll Left and Right   Type of Assistance Needed Independent   Roll Left and Right CARE Score 6   Sit to Lying   Type of Assistance Needed Independent   Sit to Lying CARE Score 6   Lying to Sitting on Side of Bed   Type of Assistance Needed Independent   Comment pt using her right leg to assist left or her hands, demo ability to perform SLR with Left   Lying to Sitting on Side of Bed CARE Score 6   Sit to Stand   Type of Assistance Needed Independent; Adaptive equipment   Sit to Stand CARE Score 6   Bed-Chair Transfer   Type of Assistance Needed Independent; Adaptive equipment   Comment RW   Chair/Bed-to-Chair Transfer CARE Score 6   Car Transfer   Type of Assistance Needed Supervision; Adaptive equipment   Car Transfer CARE Score 4   Walk 10 Feet   Type of Assistance Needed Independent; Adaptive equipment   Comment RW   Walk 10 Feet CARE Score 6   Walk 50 Feet with Two Turns   Type of Assistance Needed Independent; Adaptive equipment   Comment RW   Walk 50 Feet with Two Turns CARE Score 6   Walk 150 Feet   Type of Assistance Needed Independent; Adaptive equipment   Comment RW   Walk 150 Feet CARE Score 6   Walking 10 Feet on Uneven Surfaces   Type of Assistance Needed Supervision; Adaptive equipment   Comment across mat and up/down ramp with RW   Walking 10 Feet on Uneven Surfaces CARE Score 4   Ambulation   Primary Mode of Locomotion Prior to Admission Walk   Distance Walked (feet) 200 ft  (x2)   Assist Device Roller Walker   Does the patient walk? 2  Yes   Picking Up Object   Type of Assistance Needed Independent; Adaptive equipment   Comment with reacher and RW   Picking Up Object CARE Score 6   Therapeutic Interventions Strengthening SLR, Supne Hip Abd, Standing hip flex/Abd/Ext, reviewed HEP   Flexibility seated knee flexion stretch using R LE to bend left, standing calf stretch   Other Comments   Comments Handout given of recommended knee brace and measured circumfernece of knee at 17in, thats with her knee sleeve on, which she will continue to wear for compression under brace, simple OTC velcro barce with stays  Assessment   Treatment Assessment Reviewed all functional mobility for discharge  Reviewed and practiced HEP and recommendationn for right knee brace for added support  Pt given HEP handouts from [facility]  Goals met for safe discharge home tomorrow as planned  PT Therapy Minutes   PT Time In 1230   PT Time Out 1330   PT Total Time (minutes) 60   PT Mode of treatment - Individual (minutes) 60   PT Mode of treatment - Concurrent (minutes) 0   PT Mode of treatment - Group (minutes) 0   PT Mode of treatment - Co-treat (minutes) 0   PT Mode of Treatment - Total time(minutes) 60 minutes   PT Cumulative Minutes 1475   Therapy Time missed   Time missed?  No

## 2023-02-09 NOTE — ASSESSMENT & PLAN NOTE
R knee has been causing more pain with increasing therapy  L side has been dominate for ambulation but has been using RLE more with recent L hip fracture  Hx of meniscus tear in 2013  XR of R knee ordered yesterday - pending  Continue Voltaren gel PRN  Considerations with PT/OT

## 2023-02-09 NOTE — TEAM CONFERENCE
Acute RehabilitationTeam Conference Note  Date: 2/9/2023   Time: 11:13 AM       Patient Name:  Roland Snell       Medical Record Number: [id number]   YOB: 1950  Sex: Female          Room/Bed:  /[facility] 263-01  Payor Info:  Payor: John Jo / Plan: [employer] / Product Type:  Workers Compensation /      Admitting Diagnosis: Femur fracture, left ([facility] ) [S72 [id number]]   Admit Date/Time:  1/23/2023  1:33 PM  Admission Comments: No comment available     Primary Diagnosis:  Closed left hip fracture (HCC)  Principal Problem: Closed left hip fracture [facility])    Patient Active Problem List    Diagnosis Date Noted   • Right knee pain 02/09/2023   • Chest wall pain 01/26/2023   • Osteopenia 01/23/2023   • Hypoxemia 01/23/2023   • Adjustment disorder with anxiety 01/23/2023   • Multiple subsegmental pulmonary emboli without acute cor pulmonale ([facility] ) 01/21/2023   • Acute deep vein thrombosis (DVT) of left peroneal vein ([facility] ) 01/21/2023   • Acute postoperative anemia due to expected blood loss 01/21/2023   • Closed left hip fracture ([facility] ) 01/18/2023   • Essential hypertension 01/18/2023   • Other hyperlipidemia 01/18/2023   • Chronic diastolic congestive heart failure ([facility] ) 01/18/2023       Physical Therapy:    Weight Bearing Status: Weight Bearing as Tolerated  Transfers: Independent  Bed Mobility: Incidental Touching (indpendent for sit > supine, CGA for supine > sit without bedrails)  Amulation Distance (ft): 200 feet  Ambulation: Independent  Assistive Device for Ambulation: Roller Walker  Number of Stairs: 8  Assistive Device for Stairs: Lehft Hand Rail (with cane in RUE)  Stair Assistance: Minimal Assistance (fluctuates CGA to min A depending on pain, sequencing, and fear during activity)  Ramp: Minimal Assistance  Assistive Device for Ramp: Roller Walker  Discharge Recommendations: [facility]  [address] [facility] with[de-identified] Family Support, First Floor Setup, Home Physical Therapy    Pt is 67 yr old female slipped and fell at work on 1/18/23 sustaining + L hip IT/subtroch hip fx requiring surgical fixation with long TFN done 1/19/23 by Dr Muhammad Backbone  Cleared post op for L LE WBAT  Pt with post op complications including hypoxia, L LE DVT + PE, anemia  Other PMH: HTN, inc lipids, R hip fx sp ORIF 2011  At baseline pt fully I without AD, works FT as  at [employer]  +  Pt lives in [address] [facility] home with brother (on disability), 6 ABY B HR, bed and Full bath on second floor, 1/2 bath first level  Pt sleeps in recliner at baseline on first floor  Pt highly limited amb olvin due to poor wt shift to unload and advance R LE due to sig dec L LE wt bearing tolerance  Unable to ambulate at this time   Pt to greatly benefit from ongoing aggressive skilled PT intervention to maximize functional mobility as pts goal is to be mod I for DC home  Pt demonstrates fair to good rehab potential to reach set goals pending progress and activity tolerance in ELOS x2 weeks for safe DC home with family support and likely out pt PT if able  Pt has RW, SPC,and shower chair  2/1/23 Pt has demonstrated improved mobility over the past week  Participating well, however does require frequent rest breaks due to pain and/or fatigue  Pt reports she wishes to sleep upstairs in her bed and will use recliner on 1st floor if needed  Pt requires Mod A for bed mobility at this time, limited by weakness of L LE  Walking is slowly improving, with her now able to walk HHD with RW  Gait remains antalgic, but has initaited step through and improved wt bearing L LE  Stairs remain greatest barrier  Pt has anxiety along with Weakness/Pain L LE limiting her ability to ascend stairs  Ice with elevation being used on L LE for edema & pain  Progressing toward goals  2/8: Pt has progressed to mod I for in room mobility using RW and also improving gait distances in hallway with reduced LLE pain reported   Pt has difficulty on stairs when only utilizing one handrail; trialed sidestepping with BUE support on rail but too difficult so then trialed using cane in RUE  Pt requires incidental touching to min A with this technique  She progressed to independent for sit > supine but does still require CGA and cueing for supine > sit from flat bed without railings  Due to difficulty with stairs and bed mobility, discussed recommendation of first floor set up with recliner at this time and continue working towards goals of going upstairs to bedroom with home PT  DME ordered for pt and being delivered to facility prior to DC  Occupational Therapy:  Eating: Independent  Grooming: Independent  Bathing: Independent (recommend SB at DC)  Bathing: Independent (recommend SB at DC)  Upper Body Dressing: Independent  Lower Body Dressing: Independent  Toileting: Independent  Tub/Shower Transfer: Minimal Assistance (recommend SB at DC)  Toilet Transfer: Independent  Cognition: Within Defined Limits  Orientation: Person, Place, Time, Situation  Discharge Recommendations: Home with:  [address] with[de-identified] Family Support, First Floor Setup (home PT only)       2/8/23  Pt has met all OT goals and fx at [facility] level with use of LHAE  Therapist completed DME sheet for RW, hip kit, shower chair and BSC  Pt with anticipated dc 2/10/23 home PT and brother support  Pt cont to demonstrate deficits in steps up to 2nd flr  However, recommending first floor setup (see PT notes)         Barriers Intervention   Steps to 2nd flr Recommend 1st flr setup                         Speech Therapy:           No notes on file    Nursing Notes:  Appetite: Good  Diet Type: Regular/House                                                                     Pain Location/Orientation: Orientation: Bilateral, Location: Knee  Pain Score: 2                       Hospital Pain Intervention(s): Medication (See MAR), Repositioned          Pt is a 67 y o  female with a PMH of osteopenia, hx of R hip fracture, CHF, HTN, and HLD who originally presented to [facility] on 1/18/2023 after a mechanical fall due to a slippery floor with residual L sided hip pain  XR on 1/18 showed acute intertrochanteric L hip fracture  Orthopedics was consulted and recommended surgical intervention  Patient was taken to the OR on 1/19 for IM fixation of L subtrochanteric fracture performed by Dr Arreola Section  Post-operatively, patient became lethargic and hypoxic after receiving Dilaudid which originally improved with Narcan administration  Patient required BiPAP and continued to have hypoxia along with hypotension and leukocytosis  PE CTA study was performed on 1/20 and showed few subsegmental pulmonary emboli in the posterior lower lobes with a measured RV/LV ratio within normal limits  Venous duplex showed acute DVT in 1 of 2 L peroneal veins  Patient was started on Eliquis for [facility] [place] on 1/22 and will need to continue for 3 months  Post-operatively, patient also developed acute blood loss anemia but did not require any blood products  Patient is to be WBAT to [facility] and will require follow-up with Orthopedics as outpatient  Pulmonary also recommended overnight sleep study as outpatient due to hypoxia  Admitted to [address] [place] [address]  1/23/23  Pt on Vit D 3000 u daily for osteopenia  Repeat DXA scan as outpatient along with discussion about Prolia injections  Started on Eliquis 10mg BID on 1/22 x 7 days then decrease to 5mg BID for 3 months for DVT  On Lipitor 20mg daily for hyperlipidemia  HTN managed with Cozaar 25mg daily & lopressor 50mg BID  Voltaren gel for R knee pain  We will monitor pt's vital signs & lab results  Will continue to monitor incision site for s/s of infection  Pt will have adequate pain control to fully participate in therapies  Pt will have safe transfers with the use of call bell & hourly rounding to prevent falls   Pt will be educated on importance of T/R & offloading weight while in bed/chair to prevent pressure injury  Pt will be educated on energy conservation & encouraged independence with ADL's  Case Management:     Discharge Planning  Living Arrangements: Lives w/ Family members  Support Systems: Other (Comment) (brother)  Assistance Needed: unableto assess at present  Type of Current Residence: Private residence  [address]: No  2/2- Pt lives in 2 story home w/ her brother, 6 ABY  Bedroom and bathroom on 2nd floor  Pt reports having shower chair and raised toilet seat  Pt reports she was independent prior to admission  Pt works FT at [employer]  Pt reports no hx of STR, OP PT OR HHC  CM also reviewed team update and dc date of 2/10, pt verbalized appreciation of setting dc date so she" had something to work towards"  2/9- Pt to dc Friday 2/10 w/ ESTEE for PT OT, cm ordered DME and sent to pt's [employer] comp to be delivered to hospital prior to dc  Is the patient actively participating in therapies? yes  List any modifications to the treatment plan: None    Barriers Interventions   Pain Improved   Steps to 2nd floor Rec 1st floor set up   HG Improving             Is the patient making expected progress toward goals?  yes  List any update or changes to goals: None    Medical Goals: Patient will be medically stable for discharge to [facility] upon completion of rehab program and Patient will be able to manage medical conditions and comorbid conditions with medications and follow up upon completion of rehab program    Weekly Team Goals:   Rehab Team Goals  ADL Team Goal: Patient will be independent with ADLs with least restrictive device upon completion of rehab program  Bowel/Bladder Team Goal: Patient will return to premorbid level for bladder/bowel management upon completion of rehab program  Transfer Team Goal: Patient will be independent with transfers with least restrictive device upon completion of rehab program  Locomotion Team Goal: Patient will be independent with locomotion with least restrictive device upon completion of rehab program    Discussion: Pt participated in therapies and made gains in rehab program  Pt has reached functional and mobility goals and is independent with ADL IADLs and ambulation with r/w  Pt dcing with r/w, bsc, hip kit and [facility]  Pt is now mod I for ADL IADLs  Anticipated Discharge Date:  [place] Friday 2/10 - [facility] Team Members Present: The following team members are supervising care for this patient and were present during this Weekly Team Conference      Physician: Dr Juan Guzman MD  : MARY Obrien  Registered Nurse: Feli Pham, RN  Physical Therapist: Kiara Kearns, PT  Occupational Therapist: Hannah Klein MS, OTR/L  Speech Therapist:

## 2023-02-09 NOTE — DISCHARGE INSTR - AVS FIRST PAGE
DISCHARGE INSTRUCTIONS: [address] 22    Bring these instructions with you to your Outpatient Physician appointments so they can order and follow-up any additional lab work or imaging recommended at time of discharge  Resume follow-up with all your prior providers that you have established care prior to this hospitalization  Discuss with primary care physician (PCP) if you have additional questions  If you (or your health care proxy) have any questions or concerns regarding your acute rehabilitation stay including issues with medications, rehabilitation, and follow-up plan, please call:          [facility] at [phone]    Should you develop fevers, chills, new weakness, changes in sensation, difficulty speaking, facial weakness, confusion, shortness of breath, chest pain, or other concerning symptoms please call 911 and/or obtain transportation to nearest ER immediately  Should you develop worsening pain, swelling, or drainage notify your surgeon right away or obtain transportation to nearest ER for evaluation  PHYSICIANS to see:  Please see your doctors listed in the follow up providers section of your discharge paperwork, and take the discharge paperwork with you to your appointments  Home health has been ordered for you: Your home health agency should reach out to you or your family soon to arrange follow-up  (If [facility] is your provider their phone number is [phone])    If you are unable to get in touch with home health you may contact your  at [phone]  Paul[facility] Phan [address] [facility]    LAB WORK recommended after discharge: Follow-up lab work at discretion of your outpatient physicians to be determined at time of your future appointments      IMAGING to follow-up:  Follow-up imaging at discretion of your outpatient physicians to be determined at time of your appointments  IMAGING, ADDITIONAL FINDINGS and ISSUES to follow-up:  Check under the "DISCHARGE PROVIDER" section of these DISCHARGE INSTRUCTIONS for provider contact information and specific issues as well  Likely sleep apnea: Continue wearing oxygen at night, but you likely need to be evaluated formally for a CPAP machine  Please arrange evaluation with sleep medicine  Anemia: Some blood loss anemia, you required transfusion in the hospital  Follow-up with your PCP  R knee pain: Arthritis and chronic meniscal issue - follow up with Dr Felix Montoya  Anxiety: You were started on Lexapro here to help with your anxiety  Recommended discussing this with your PCP and following up with them  Clots in lungs and left leg: Continue eliquis for total 3 months  Please follow-up with your PCP for this  SKIN CARE INSTRUCTIONS to follow:  Monitor incision(s) for increased redness, swelling, pain/tenderness, discharge/pus and promptly notify your surgeon should these develop  - Should you develop significant pain, swelling, or drainage obtain transportation to nearest emergency room for immediate evaluation if unable to reach your surgeon promptly   - Should you develop uncontrolled pain, fever, chills, sweats, changes in strength, sensation, or color of this area obtain transportation to nearest emergency room for immediate evaluation  WEIGHTBEARING/ACTIVITY PRECAUTIONS to follow:  Weightbearing as tolerated  Driving restrictions:  **You should NOT operate a motor vehicle while under the influence of an opiate medication, muscle relaxant, or other sedative  Doing so may lead to an accident resulting in serious injury or death to yourself or others  You have agreed to avoid driving when under the influence of this medication  Work restrictions: You should NOT return to work (if working) until cleared by an outpatient physician      You should not operate heavy machinery (if applicable) until cleared by an outpatient physician  Alcohol restrictions: You are recommended to not drink alcohol at this time unless cleared by an outpatient physician  Drinking alcohol in your current functional condition can increase your risk of injury which could be severe  Drinking alcohol given your current health problems can lead to increased medical complications which could be severe  Combining alcohol with your current medications can increase your risk of injury which could be severe  Smoking restrictions: You are recommended to not smoke nicotine  Smoking increases your risk of heart attack, stroke, emphysema/COPD, and lung cancer  MEDICATIONS:  Please see a full list of your medications outlined in the After Visit Summary that is attached to these Discharge Instructions  Please note changes may have been made to your medications please refer to your discharge paperwork for your current medications and take this list with you to all your doctors appointments for your doctors to review  Please do not resume a home medication unless the medication reconciliation sheet indicates to do so, please do not assume that a medication that you were given a prescription for is the same as a medication you have at home based on both medications having the same name as dosages and frequency may have changed  Unless specifically noted in your medication list provided to you in your discharge paper work do not resume prior vitamins, minerals, or supplements you may have been taking prior to your hospitalization unless instructed by an outpatient physician in the future  Discuss with your primary care at next visit if applicable  Blood thinners prescribed to optimize long and short term health and decrease risk of complications but with risks related to bleeding and other complications  Eliquis (Apixiban) instructions: You have been prescribed apixiban(Eliquis)    This medication is used to prevent clots which can cause severe disability and even death  This medication will need to be managed by your outpatient physician(s) after discharge  Follow-up with the appropriate provider as soon as possible to ensure appropriate use  This is a strong anticoagulant (blood thinner)  It is being recommended for use by you (or your family) to decrease the risk of clotting which can be severely disabling and even life-threatening  Even when provided as recommended it can cause severe disabling and even life-threatening bleeding  Too much or too little of this blood thinner further increases your risk of this medication causing serious and even life-threatening complications such as severe bleeding, clots, strokes, and death  With that said, at this time based on available evidence and consensus agreement, your physicians recommend you take Eliquis (Khadijah [place]) medication based on your overall risks and benefits in your specific medical situation  If you (or your family/caregiver) notice black stools, bloody stools, vomit blood, develop new weakness, slurred speech, confusion or have any other concerning symptoms call 911 or obtain transportation to nearest emergency room immediately  Sedating Medications with increased risk of complications:    Methocarbamol (Robaxin) pain/muscle spasm medication has been used to help your acute pain and spasms  You tolerated this medication adequately during your recent hospital stay  - Do not take with alcohol (or marijuana/cannabis) while on this medication as this can cause increased confusion, breathing problems, falls, and severe injury  - Follow-up with your primary care physician within 1-2 weeks as well as relevant specialty physician for additional management of your medical conditions, potential refills, or adjustments of this medication            Psychotropic Medications     You were evaluated recently and found to have signs of anxiety that can negatively impact your function and quality of life  You were managed by both medications and non-pharmacologic (non-medication) methods to try to improve your anxiety  Non-pharmacologic methods included supportive counseling, neuropsychology consultation and management during course, discussion of deep breathing/relaxation/behavioral management techniques, and skilled functional therapies  Psychiatric(psychotropic) medications were adjusted during acute rehabilitation course  To attempt to improve anxiety you were started on serotonin modulating medication and anti-anxiety medication Lexapro  Medications were adjusted during your course based on their effectiveness, tolerability, and safety  You tolerated the medication(s) adequately during your course without significant side-effects noted by rehab staff or by yourself (family member)  Prior to beginning of treatment medications risks and benefits and possible side effects including risk of suicidality related to treatment with antidepressants were reviewed with patient (if applicable caregiver)  The patient (if applicable caregiver) verbalized understanding and agreement for treatment  You have functionally improved significantly and appear to be doing better  Treatment team (rehab physician, internal medicine team, skilled therapists, nursing, and social work) feel you are adequately appropriate for discharge  You nevertheless remain at risk for fall, injury, and additional medical and physical complications in the future  Obtain transportation to nearest hospital from family/friends or call 911 for any concerning new symptoms  Please follow-up with recommended discharge plan to optimize your mental and physical health and decrease risk of mental and physical health complications and hospital re-admission      MEDICAL MANAGEMENT AT HOME specific to you:  Hypertension Management:  Only take the medications prescribed for you at time of discharge - overly high or low blood pressure increases your risk for health complications    Follow-up with PCP/family doctor regularly to ensure blood pressure remains adequately controlled  Please check your blood pressure prior to taking your blood pressure medications and keep a log that you will bring with you to your follow-up doctors' appointments  >Please contact your family doctor or cardiologist immediately for a blood pressure below 100/50 and do not take your blood pressure medications until speaking with them  >Please contact your family doctor or cardiologist as soon as possible for blood pressure greater than 160/100  Heart Failure Management:  Follow-up management by your cardiologist   Please set-up follow-up appointment as soon as possible after your discharge  Take your medications as prescribed at time of discharge unless changed by another physician in future  You will need to obtain follow-up lab work as an outpatient to monitor effectiveness and safety of these medications  Obtain follow-up lab orders from your outpatient providers at their discretion  You should maintain a low sodium and low fat diet  You should weigh yourself every day or every other day and keep a log that you bring to your physicians office  Notify your physician if you start to gain more than 2-4 lbs in a few days  These recommendations are meant to prevent build up of excessive fluid inside you which can make it harder to breath  Follow-up with your outpatient physicians who may change these recommendations in the future  Notify your physicians should you develop increased swelling, weight gain, or shortness of breath  If you develop significant shortness of breath, chest pain, confusion, or other concerning signs or symptoms call 911 or obtain transportation to nearest emergency room right away  Anemia management:  Follow-up with primary care physician at next visit    Continue to monitor blood counts intermittently  Follow-up management at discretion of PCP   >If you develop increased lightheadedness, shortness of breath, chest pain, confusion, difficulty staying awake, or other concerning signs or symptoms obtain transport to nearest emergency room as soon as possible  Nighttime (sleeping) low oxygen: You were co-managed by internal medicine in hospital and recommended to follow-up with PCP after discharge  You are recommended to use 2 L of home oxygen when sleeping for now  You will need an outpatient sleep study to be set-up by PCP after discharge  Notify your PCP at next visit within 1 week if possible  Case management has set-up home health vendor to arrange home oxygen  Direct calls to vendor as related to issues obtaining oxygen or with the equipment itself  - If unable to obtain adequate assistance or are unable to reach the vendor contact   - [place]  at [phone]  - If you are still unable to obtain adequate assistance you may call the rehab unit below until your are established back with your outpatient providers  - [facility] at [facility] at [phone]  - If you have increased shortness of breath, chest pain, increased confusion, or unable to keep oxygen adequately elevated call 911 or obtain immediate transport to ED  Acetaminophen (Tylenol) Dosing Warning: You may have up to 3000 mg of acetaminophen (Tylenol) from all sources spread out over a 24 hours period  Do not have more than that, as this can increase your risk of liver injury which can be serious  *    Please note a summary of your hospital stay with relevant information for your doctors will try to be sent to them  Please confirm with your doctors at your follow up visits that they have received this summary and have them contact [address] [place] [address] if they have not received them along with any other medical records they may require  Erlin Cummins [address] Phone Number:  [phone]

## 2023-02-09 NOTE — ASSESSMENT & PLAN NOTE
Wt Readings from Last 3 Encounters:   02/09/23 92 kg (202 lb 13 2 oz)   01/23/23 95 2 kg (209 lb 14 1 oz)     Stable without exacerbation   on 1/20  Last ECHO on 12/2/22 showed EF 55-59%, G1DD  Takes Lasix 20mg daily PRN at home  Has not received while inpatient

## 2023-02-09 NOTE — ASSESSMENT & PLAN NOTE
Hypoxia in acute setting post-operatively  Developed PEs and was started on Eliquis  Per Pulmonary - recommending overnight noc ox as outpatient  Desat while sleeping - noc ox obtained on 1/27  Desat for 1 hour and 40 minutes, lowest saturation 80%  Apply 2L O2 at night or while sleeping  Repeat overnight noc ox on 2/8 for showed desat <89% for 2hrs and 38min, and sat as low as 70%

## 2023-02-10 ENCOUNTER — HOME CARE VISIT (OUTPATIENT)
Dept: HOME HEALTH SERVICES | Facility: HOME HEALTHCARE | Age: 73
End: 2023-02-10

## 2023-02-10 VITALS
SYSTOLIC BLOOD PRESSURE: 155 MMHG | TEMPERATURE: 97.8 F | HEIGHT: 63 IN | OXYGEN SATURATION: 95 % | HEART RATE: 70 BPM | DIASTOLIC BLOOD PRESSURE: 70 MMHG | WEIGHT: 202.82 LBS | BODY MASS INDEX: 35.94 KG/M2 | RESPIRATION RATE: 18 BRPM

## 2023-02-10 LAB

## 2023-02-10 RX ADMIN — ESCITALOPRAM OXALATE 5 MG: 5 TABLET, FILM COATED ORAL at 08:43

## 2023-02-10 RX ADMIN — LORATADINE 10 MG: 10 TABLET ORAL at 08:43

## 2023-02-10 RX ADMIN — METOPROLOL TARTRATE 50 MG: 50 TABLET, FILM COATED ORAL at 08:43

## 2023-02-10 RX ADMIN — LOSARTAN POTASSIUM 50 MG: 50 TABLET, FILM COATED ORAL at 08:43

## 2023-02-10 RX ADMIN — DICLOFENAC SODIUM 2 G: 10 GEL TOPICAL at 08:43

## 2023-02-10 RX ADMIN — PANTOPRAZOLE SODIUM 40 MG: 40 TABLET, DELAYED RELEASE ORAL at 06:01

## 2023-02-10 RX ADMIN — APIXABAN 5 MG: 5 TABLET, FILM COATED ORAL at 08:43

## 2023-02-10 RX ADMIN — CHOLECALCIFEROL TAB 25 MCG (1000 UNIT) 3000 UNITS: 25 TAB at 08:43

## 2023-02-10 RX ADMIN — Medication 2 TABLET: at 08:46

## 2023-02-10 NOTE — ASSESSMENT & PLAN NOTE
S/p mechanical fall on 1/18  XR on 1/18 showed acute intertrochanteric L hip fracture  OR on 1/19 for IM fixation of L subtrochanteric hip fracture performed by Dr Nellie Dugan  WBAT to LLE  Neurovascular checks Q shift  Ensure adequate pain control  Monitor incision for s/s of infection  Eliquis for DVT ppx  Follow-up with Orthopedics in 2 weeks  XRs on 2/2: stable appearing comminuted intertrochanteric fracture s/p ORIF, no evidence for hardware complication  Ortho consulted - staples removed  Follow-up in additional 4 weeks as outpatient  Primary team following  PT/OT

## 2023-02-10 NOTE — PLAN OF CARE
Problem: MUSCULOSKELETAL - ADULT  Goal: Maintain or return mobility to safest level of function  Description: INTERVENTIONS:  - Assess patient's ability to carry out ADLs; assess patient's baseline for ADL function and identify physical deficits which impact ability to perform ADLs (bathing, care of mouth/teeth, toileting, grooming, dressing, etc )  - Assess/evaluate cause of self-care deficits   - Assess range of motion  - Assess patient's mobility  - Assess patient's need for assistive devices and provide as appropriate  - Encourage maximum independence but intervene and supervise when necessary  - Involve family in performance of ADLs  - Assess for home care needs following discharge   - Consider OT consult to assist with ADL evaluation and planning for discharge  - Provide patient education as appropriate  2/10/2023 1140 by Omar Wang RN  Outcome: Progressing  2/10/2023 1139 by Omar Wang RN  Outcome: Progressing  Goal: Maintain proper alignment of affected body part  Description: INTERVENTIONS:  - Support, maintain and protect limb and body alignment  - Provide patient/ family with appropriate education  2/10/2023 1140 by Omar Wang RN  Outcome: Progressing  2/10/2023 1139 by Omar Wang RN  Outcome: Progressing

## 2023-02-10 NOTE — ASSESSMENT & PLAN NOTE
Hypoxia in acute setting post-operatively  Developed PEs and was started on Eliquis  Per Pulmonary - recommending overnight noc ox as outpatient  Desat while sleeping - noc ox obtained on 1/27  Desat for 1 hour and 40 minutes, lowest saturation 80%  Apply 2L O2 at night or while sleeping  Repeat overnight noc ox on 2/8 for showed desat <89% for 2hrs and 38min, and sat as low as 70%  Qualifies for home O2

## 2023-02-10 NOTE — NURSING NOTE
Went over appointments and medications  Morales Earzo gave patient her equipment to go home and 02 to be delivered at home

## 2023-02-10 NOTE — ASSESSMENT & PLAN NOTE
Wt Readings from Last 3 Encounters:   02/10/23 92 kg (202 lb 13 2 oz)   01/23/23 95 2 kg (209 lb 14 1 oz)     Stable without exacerbation   on 1/20  Last ECHO on 12/2/22 showed EF 55-59%, G1DD  Takes Lasix 20mg daily PRN at home  Has not received while inpatient

## 2023-02-10 NOTE — CASE COMMUNICATION
Spoke to the patient regarding home physical and occupational therapy  Patient requested a visit on Monday  Explained that the therapist will call to schedule the visit  Agreeable to [facility] and no other  agencies in home: agreeable and no other agencies    Communication to the physician regarding delay:     Insurance, copays, HB status reviewed: Reviewed  Insurance verified but upon speaking to the patient she reports that [place] is worker's comp  [facility] the [facility]  Patient is ambulating with a walker  Patient lives in a three story home but currently has a first floor set up      Verified address and phone number: verified demographics    Requested that patient secure pets: reviewed-small dog in home    Medication bottles and DC instructions in home: reviewed    Wound care/IV supplies in home:     CG present  to learn:     Other concerns patient/family would like to address at visit:

## 2023-02-10 NOTE — CASE COMMUNICATION
This is for informational purposes only  Spoke to the patient and she requested a home PT [facility] visit on Monday  New SOC date will be 2/13/23   Thank you

## 2023-02-10 NOTE — PROGRESS NOTES
[facility]  Progress Note Delicia Cruz 1950, 67 y o  female MRN: [id number]  Unit/Bed#: [facility] 537-66 Encounter: [id number]  Primary Care Provider: No primary care provider on file  Date and time admitted to hospital: 1/23/2023  1:33 PM    Right knee pain  Assessment & Plan  R knee has been causing more pain with increasing therapy  L side has been dominate for ambulation but has been using RLE more with recent L hip fracture  Hx of meniscus tear in 2013  XR of R knee ordered on 2/8: No acute osseous abnormality, severe R knee osteoarthritis  Continue Voltaren gel PRN  Considerations with PT/OT  Follow-up with Orthopedics as outpatient  Adjustment disorder with anxiety  Assessment & Plan  Has been receiving Atarax 10mg Q6 PRN since 1/26  Discussed with patient and admits to feelings of anxiety prior to her fall  Started Lexapro 5mg daily on 1/30  Supportive counseling as needed  Neuropsych consulted  Follow-up with PCP as outpatient  Hypoxemia  Assessment & Plan  Hypoxia in acute setting post-operatively  Developed PEs and was started on Eliquis  Per Pulmonary - recommending overnight noc ox as outpatient  Desat while sleeping - noc ox obtained on 1/27  Desat for 1 hour and 40 minutes, lowest saturation 80%  Apply 2L O2 at night or while sleeping  Repeat overnight noc ox on 2/8 for showed desat <89% for 2hrs and 38min, and sat as low as 70%  Qualifies for home O2  Osteopenia  Assessment & Plan  DXA scan in 12/2020 showed osteopenia  Continue home calcium carbonate and Vitamin D supplementation  Vitamin D level 29 8 on 1/24  Increased Vitamin D3 to 3000u daily  Recommend repeat DXA scan as outpatient along with discussion about Prolia injections  Follow-up with PCP as outpatient  Acute postoperative anemia due to expected blood loss  Assessment & Plan  Hgb currently 9 1  Hgb was 12 6 pre-operatively    Iron panel on 1/23: Iron Saturation 9%, TIBC 279, Iron 25, and Ferritin 81   2 doses of IV Venofer from 1/24-1/25  Given 1u PRBCs on 1/26 for Hgb of 6 9  No active s/s of bleeding  Transfuse for Hgb <7 0 or if becomes symptomatic  Continue to trend CBC  Acute deep vein thrombosis (DVT) of left peroneal vein [facility])  Assessment & Plan  Lower extremity venous duplex on 1/20: Evidence of focal acute DVT in 1 of 2 peroneal veins on LLE  Completed Eliquis 10mg BID for 7 days and now receiving Eliquis 5mg BID  Follow-up with PCP as outpatient  Neurovascular checks Q shift  Multiple subsegmental pulmonary emboli without acute cor pulmonale (HCC)  Assessment & Plan  CTA PE study on 1/20: Few subsegmental pulmonary emboli in the posterior lower lobes, measured RV/LV ratio within normal limits  Completed Eliquis 10mg BID for 7 days and now receiving Eliquis 5mg BID, continue for 3 months  Follow-up with PCP as outpatient  Currently maintaining on RA  Encourage pulmonary toileting  Spot pulse ox checks  Chronic diastolic congestive heart failure (HCC)  Assessment & Plan  Wt Readings from Last 3 Encounters:   02/10/23 92 kg (202 lb 13 2 oz)   01/23/23 95 2 kg (209 lb 14 1 oz)     Stable without exacerbation   on 1/20  Last ECHO on 12/2/22 showed EF 55-59%, G1DD  Takes Lasix 20mg daily PRN at home  Has not received while inpatient  Other hyperlipidemia  Assessment & Plan  Continue home Lipitor 20mg daily  Last lipid panel on 11/15/22: Cholesterol 147, Triglycerides 73, HDL 53, and LDL 79  Essential hypertension  Assessment & Plan  Home regimen: Lasix 20mg PRN, metoprolol tartrate 50mg BID, losartan 50mg daily  Currently receiving metoprolol tartrate 50mg BID and losartan 50mg daily  Apply abdominal binder/TEDs when getting OOB  Monitor BP with routine VS   Follow-up with PCP as outpatient  * Closed left hip fracture [facility])  Assessment & Plan  S/p mechanical fall on 1/18    XR on 1/18 showed acute intertrochanteric L hip fracture  OR on  for IM fixation of L subtrochanteric hip fracture performed by Dr Paula Coello  WBAT to LLE  Neurovascular checks Q shift  Ensure adequate pain control  Monitor incision for s/s of infection  Eliquis for DVT ppx  Follow-up with Orthopedics in 2 weeks  XRs on : stable appearing comminuted intertrochanteric fracture s/p ORIF, no evidence for hardware complication  Ortho consulted - staples removed  Follow-up in additional 4 weeks as outpatient  Primary team following  PT/OT  VTE Pharmacologic Prophylaxis:   Pharmacologic: Apixaban (Eliquis)  Mechanical VTE Prophylaxis in Place: No - DVT  Current Length of Stay: 18 day(s)    Current Patient Status: Inpatient Rehab     Discharge Plan: As per primary team     Code Status: Level 1 - Full Code    Subjective:   Pt examined while pt sitting in recliner in pt room  Currently denies any lightheadedness, dizziness, SOB, or palpitations  Mild ache to the R knee, improved after having Robaxin last night - asking if she can have this prescribed at discharge  Minimal aching to the L thigh this morning  Denies any numbness/tingling or edema  Plans for discharge later today and pt feels ready for discharge  Asking about home O2 and discussed the importance of following up with Sleep Medicine as outpatient  Objective:     Vitals:   Temp (24hrs), Av 9 °F (36 6 °C), Min:97 7 °F (36 5 °C), Max:98 2 °F (36 8 °C)    Temp:  [97 7 °F (36 5 °C)-98 2 °F (36 8 °C)] 97 8 °F (36 6 °C)  HR:  [62-85] 70  Resp:  [18] 18  BP: (112-155)/(62-70) 155/70  SpO2:  [94 %-97 %] 95 %  Body mass index is 35 93 kg/m²  Review of Systems   Constitutional: Negative for appetite change, chills, fatigue and fever  HENT: Negative for trouble swallowing  Eyes: Negative for visual disturbance  Respiratory: Positive for apnea  Negative for cough, chest tightness, shortness of breath, wheezing and stridor      Cardiovascular: Negative for chest pain, palpitations and leg swelling  Gastrointestinal: Negative for abdominal distention, abdominal pain, constipation, diarrhea, nausea and vomiting  LBM 2/10   Genitourinary: Negative for difficulty urinating  Musculoskeletal: Positive for arthralgias (Mild ache to the R knee this morning, improved last evening with Robaxin  L thigh pain, aching, 2/10, improving with ice)  Negative for back pain and gait problem  Neurological: Negative for dizziness, weakness, light-headedness, numbness and headaches  Psychiatric/Behavioral: Negative for dysphoric mood and sleep disturbance  The patient is not nervous/anxious  All other systems reviewed and are negative  Input and Output Summary (last 24 hours): Intake/Output Summary (Last 24 hours) at 2/10/2023 0900  Last data filed at 2/9/2023 1700  Gross per 24 hour   Intake 540 ml   Output --   Net 540 ml       Physical Exam:     Physical Exam  Vitals and nursing note reviewed  Constitutional:       General: She is not in acute distress  Appearance: Normal appearance  She is obese  She is not ill-appearing  HENT:      Head: Normocephalic and atraumatic  Cardiovascular:      Rate and Rhythm: Normal rate and regular rhythm  Pulses: Normal pulses  Heart sounds: Murmur heard  Systolic murmur is present with a grade of 2/6  No friction rub  Pulmonary:      Effort: Pulmonary effort is normal  No respiratory distress  Breath sounds: Normal breath sounds  No wheezing or rhonchi  Abdominal:      General: Abdomen is flat  Bowel sounds are normal  There is no distension  Palpations: Abdomen is soft  There is no mass  Tenderness: There is no abdominal tenderness  There is no guarding or rebound  Hernia: No hernia is present  Musculoskeletal:      Cervical back: Normal range of motion and neck supple  No tenderness  Right lower leg: No edema  Left lower leg: No edema     Skin:     General: Skin is warm and dry  Capillary Refill: Capillary refill takes less than 2 seconds  Comments: L hip incision healing, well-approximated  No drainage, erythema, or edema present  Neurological:      Mental Status: She is alert and oriented to person, place, and time     Psychiatric:         Mood and Affect: Mood normal          Behavior: Behavior normal          Additional Data:     Labs:    Results from last 7 days   Lab Units 02/06/23  0459   WBC Thousand/uL 4 62   HEMOGLOBIN g/dL 9 1*   HEMATOCRIT % 29 9*   PLATELETS Thousands/uL 305   NEUTROS PCT % 70   LYMPHS PCT % 16   MONOS PCT % 11   EOS PCT % 2     Results from last 7 days   Lab Units 02/06/23  0459   SODIUM mmol/L 140   POTASSIUM mmol/L 3 9   CHLORIDE mmol/L 103   CO2 mmol/L 31   BUN mg/dL 16   CREATININE mg/dL 0 54*   ANION GAP mmol/L 6   CALCIUM mg/dL 8 8   GLUCOSE RANDOM mg/dL 101*                       Labs reviewed    Imaging:    Imaging reviewed    Recent Cultures (last 7 days):           Last 24 Hours Medication List:   Current Facility-Administered Medications   Medication Dose Route Frequency Provider Last Rate   • acetaminophen  650 mg Oral Q6H PRN ELDA Mcbride     • apixaban  5 mg Oral BID ELDA Mcbride     • atorvastatin  20 mg Oral Daily With Sumanth Forman MD     • bisacodyl  10 mg Rectal Daily PRN Herber Sinclair MD     • calcium carbonate  1,000 mg Oral Daily PRN Herber Sinclair MD     • calcium carbonate-vitamin D  2 tablet Oral Daily With Breakfast ELDA Mcbride     • cholecalciferol  3,000 Units Oral Daily ELDA Mcbride     • Diclofenac Sodium  2 g Topical BID Herber Sinclair MD     • docusate sodium  100 mg Oral BID Herber Sinclair MD     • escitalopram  5 mg Oral Daily ELDA Mcbride     • hydrOXYzine HCL  10 mg Oral Q6H PRN Herber Sinclair MD     • loratadine  10 mg Oral Daily Herber Sinclair MD     • losartan  50 mg Oral Daily ELDA Mcbride     • menthol-methyl salicylate   Apply externally 4x Daily PRN Virgen Luke MD     • methocarbamol  500 mg Oral Q6H PRN ELDA Navas     • metoprolol tartrate  50 mg Oral BID Virgen Luke MD     • ondansetron  4 mg Oral Q6H PRN ELDA Navas     • oxyCODONE  5 mg Oral Q6H PRN Virgen Luke MD     • oxyCODONE  2 5 mg Oral Q6H PRN Virgen Luke MD     • pantoprazole  40 mg Oral Daily Virgen Luke MD     • polyethylene glycol  17 g Oral Daily PRN Virgen Luke MD          M*Modal software was used to dictate this note  It may contain errors with dictating incorrect words or incorrect spelling  Please contact the provider directly with any questions

## 2023-02-10 NOTE — ASSESSMENT & PLAN NOTE
DXA scan in 12/2020 showed osteopenia  Continue home calcium carbonate and Vitamin D supplementation  Vitamin D level 29 8 on 1/24  Increased Vitamin D3 to 3000u daily  Recommend repeat DXA scan as outpatient along with discussion about Prolia injections  Follow-up with PCP as outpatient  Patient needs a refill on her Furosemide medication - she states Sofie Quinton will not refill that one - it was prescribed by Dr. Benítez- She also is taking a emergency trip to [place] and is unsure where she would be able to get refills out there

## 2023-02-10 NOTE — ASSESSMENT & PLAN NOTE
R knee has been causing more pain with increasing therapy  L side has been dominate for ambulation but has been using RLE more with recent L hip fracture  Hx of meniscus tear in 2013  XR of R knee ordered on 2/8: No acute osseous abnormality, severe R knee osteoarthritis  Continue Voltaren gel PRN  Considerations with PT/OT  Follow-up with Orthopedics as outpatient

## 2023-02-12 NOTE — PHYSICAL THERAPY NOTE
[facility] PT DC SUMMARY    Pt is 67 yr old female slipped and fell at work on 1/18/23 sustaining + L hip IT/subtroch hip fx requiring surgical fixation with long TFN done 1/19/23 by Dr Poornima Rebollar  Cleared post op for L LE WBAT  Pt with post op complications including hypoxia, L LE DVT + PE, anemia  Other PMH: HTN, inc lipids, R hip fx sp ORIF 2011  At baseline pt fully I without AD, works FT as  at [employer]  +  Pt lives in [address] [place] [address] home with brother (on disability), 6 ABY B HR, 2 steps in back entrance, bed and Full bath on second floor, 1/2 bath first level  Pt sleeps in recliner at baseline on first floor  Use of B knee sleeves t/o recovery due to ongoing L>R knee pain at baseline  Slowed progress due to pt anxiety, and apprehension to wt bear on L LE limiting stair tolerance and confidence  Handout provided for more appropriate/supportive knee brace  HEP handout provided as well  Home PT recommended to follow to ensure functional safety with home environment  Otherwise all mod I goals met with min A for ABY, will have son (to visit for short time from [place]) and brother support  JR RW and hip kit provided  DC home, Home PT to follow

## 2023-02-13 ENCOUNTER — HOME CARE VISIT (OUTPATIENT)
Dept: HOME HEALTH SERVICES | Facility: HOME HEALTHCARE | Age: 73
End: 2023-02-13

## 2023-02-13 VITALS — HEART RATE: 65 BPM | DIASTOLIC BLOOD PRESSURE: 82 MMHG | OXYGEN SATURATION: 94 % | SYSTOLIC BLOOD PRESSURE: 144 MMHG

## 2023-02-13 NOTE — OCCUPATIONAL THERAPY NOTE
OT DISCHARGE SUMMARY    Pt made good progress during stay on the [facility] following admission for closed left hip fracture  Pt presented upon IE with generalized weakness, decreased endurance, activity tolerance, and functional mobility  On evaluation, pt required modA/maxA to complete all ADLs and functional transfers  Pt was discharged to home with brother and son support  Pt currently functioning at Kermit level for ADL, Kermit level for transfers with RW  The following DME was recommended BSC, shower chair, hip kit, RW  Family training occurred with son Bernie Avendaño and brother Sheryll Koyanagi on 2/5/23  Son and brother present  Son Bernie Avendaño reporting that he will be staying until 2/13 so will be home iwth pt for a few days upon DC  During session therapist educ son that it is anticipated that pt will be indep with overall all ADLs and toileting  Educ pt will utilized Tania Santa which was ordered  Did educ that pt will need assistance for TEDS which are used for swelling which brother reported he can assist  Therapist recommned at DC to SB as pt is having difficulty with weight shifitng which son verbalized understanding  Family reporting they will assist with IADLs (meal, meds, laundry)  Therapist recommend to son if steps cont to be a barrier by time of DC recommend first floor setup which son was in agreement  However, pt reporting that she can go down the steps on her bottom and crawl up  Therapist cont to recommend FF setup and discussed safety concerns with such method  Therapist communicated with PT Lola Sourav  Overall son and brother with no further questions in agreement with OP PT and DC 2/10   OT recommended pt continue to receive home OT services       -Aime Rodriguez MS, OTR/L, CSRS

## 2023-02-14 ENCOUNTER — HOME CARE VISIT (OUTPATIENT)
Dept: HOME HEALTH SERVICES | Facility: HOME HEALTHCARE | Age: 73
End: 2023-02-14

## 2023-02-14 VITALS — OXYGEN SATURATION: 92 % | DIASTOLIC BLOOD PRESSURE: 66 MMHG | SYSTOLIC BLOOD PRESSURE: 138 MMHG | HEART RATE: 60 BPM

## 2023-02-14 NOTE — PROGRESS NOTES
02/14/23 0825   Hello, [Guardian’s Name / Patient’s Ravinder Odonnell, this is [Caller Ravinder Edwinas from [facility], and our clinical care team wanted to check on you / your child after your recent visit to the hospital  It will only take 3-5 minutes  Is this a good time? Discharge Call Type/ Specific Diagnosis: General Call   Call Complete   Attempted Number of Calls 1   Discharge phone call complete?  Left Message

## 2023-02-15 ENCOUNTER — HOME CARE VISIT (OUTPATIENT)
Dept: HOME HEALTH SERVICES | Facility: HOME HEALTHCARE | Age: 73
End: 2023-02-15

## 2023-02-15 VITALS — OXYGEN SATURATION: 99 % | HEART RATE: 74 BPM | SYSTOLIC BLOOD PRESSURE: 146 MMHG | DIASTOLIC BLOOD PRESSURE: 85 MMHG

## 2023-02-21 ENCOUNTER — HOME CARE VISIT (OUTPATIENT)
Dept: HOME HEALTH SERVICES | Facility: HOME HEALTHCARE | Age: 73
End: 2023-02-21

## 2023-02-21 VITALS — HEART RATE: 74 BPM | OXYGEN SATURATION: 99 % | DIASTOLIC BLOOD PRESSURE: 84 MMHG | SYSTOLIC BLOOD PRESSURE: 140 MMHG

## 2023-02-23 ENCOUNTER — HOME CARE VISIT (OUTPATIENT)
Dept: HOME HEALTH SERVICES | Facility: HOME HEALTHCARE | Age: 73
End: 2023-02-23

## 2023-02-23 VITALS — SYSTOLIC BLOOD PRESSURE: 148 MMHG | HEART RATE: 80 BPM | DIASTOLIC BLOOD PRESSURE: 82 MMHG | OXYGEN SATURATION: 96 %

## 2023-02-28 ENCOUNTER — HOME CARE VISIT (OUTPATIENT)
Dept: HOME HEALTH SERVICES | Facility: HOME HEALTHCARE | Age: 73
End: 2023-02-28

## 2023-02-28 VITALS — OXYGEN SATURATION: 94 % | SYSTOLIC BLOOD PRESSURE: 118 MMHG | HEART RATE: 74 BPM | DIASTOLIC BLOOD PRESSURE: 70 MMHG

## 2023-03-02 ENCOUNTER — HOME CARE VISIT (OUTPATIENT)
Dept: HOME HEALTH SERVICES | Facility: HOME HEALTHCARE | Age: 73
End: 2023-03-02

## 2023-03-02 VITALS — DIASTOLIC BLOOD PRESSURE: 70 MMHG | HEART RATE: 74 BPM | OXYGEN SATURATION: 95 % | SYSTOLIC BLOOD PRESSURE: 124 MMHG

## 2023-03-07 ENCOUNTER — HOME CARE VISIT (OUTPATIENT)
Dept: HOME HEALTH SERVICES | Facility: HOME HEALTHCARE | Age: 73
End: 2023-03-07

## 2023-03-07 VITALS — OXYGEN SATURATION: 94 % | DIASTOLIC BLOOD PRESSURE: 68 MMHG | HEART RATE: 71 BPM | SYSTOLIC BLOOD PRESSURE: 106 MMHG

## 2023-03-09 ENCOUNTER — HOME CARE VISIT (OUTPATIENT)
Dept: HOME HEALTH SERVICES | Facility: HOME HEALTHCARE | Age: 73
End: 2023-03-09

## 2023-03-09 VITALS — SYSTOLIC BLOOD PRESSURE: 134 MMHG | DIASTOLIC BLOOD PRESSURE: 88 MMHG | HEART RATE: 73 BPM | OXYGEN SATURATION: 94 %

## 2023-03-13 ENCOUNTER — APPOINTMENT (OUTPATIENT)
Dept: RADIOLOGY | Facility: MEDICAL CENTER | Age: 73
End: 2023-03-13

## 2023-03-13 ENCOUNTER — OFFICE VISIT (OUTPATIENT)
Dept: OBGYN CLINIC | Facility: MEDICAL CENTER | Age: 73
End: 2023-03-13

## 2023-03-13 VITALS
DIASTOLIC BLOOD PRESSURE: 83 MMHG | WEIGHT: 202 LBS | HEIGHT: 63 IN | SYSTOLIC BLOOD PRESSURE: 160 MMHG | HEART RATE: 60 BPM | BODY MASS INDEX: 35.79 KG/M2

## 2023-03-13 DIAGNOSIS — S72.002D CLOSED FRACTURE OF LEFT HIP WITH ROUTINE HEALING, SUBSEQUENT ENCOUNTER: ICD-10-CM

## 2023-03-13 NOTE — PROGRESS NOTES
Orthopaedic Surgery - Office Note  Juan Carlos Martinez (47 y o  female)   : 1950   MRN: [id number]  Encounter Date: 3/13/2023    Chief Complaint   Patient presents with   • Left Hip - Post-op       Assessment / Plan  S/p L TFN, with good progression    · Being outpatient PT  · Referral given today  · Continue with ice, heat and oral anti-inflammatories prn   Return in about 6 weeks (around 2023) for follow up with Dr Ricky Drake  History of Present Illness  Juan Carlos Martinez is a 67 y o  female who presents for follow up s/p L TFN on 2023  She just completed in-home PT and is getting ready to transition into outpatient PT  She is ambulating with a walker, prior to surgery she was ambulating unassisted  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  She does have increase hip discomfort with prolonged WB activity  She does notice a lack of balance  She does have some discomfort around her proximal incision  This is a workers compensation claim     Review of Systems  Pertinent items are noted in HPI  All other systems were reviewed and are negative  Physical Exam  /83   Pulse 60   Ht 5' 3" (1 6 m)   Wt 91 6 kg (202 lb)   BMI 35 78 kg/m²   Cons: Appears well  No apparent distress  Psych: Alert  Oriented x3  Mood and affect normal   Eyes: PERRLA, EOMI  Resp: Normal effort  No audible wheezing or stridor  CV: Palpable pulse  No discernable arrhythmia  No LE edema  Lymph:  No palpable cervical, axillary, or inguinal lymphadenopathy  Skin: Warm  No palpable masses  No visible lesions  Neuro: Normal muscle tone  Normal and symmetric DTR's  Left Hip Exam  Alignment / Posture:  Normal resting hip posture  Inspection:  No swelling  No ecchymosis  Palpation:  mild proximal  tenderness  No effusion  ROM:  Hip Flexion 100  Hip ER 40  Hip IR 20  Strength:  Able to actively extend knee against gravity  Stability:  No objective hip instability    Tests:  No pertinent positive or negative tests  Neurovascular:  Sensation intact in DP/SP/Umaña/Sa/T nerve distributions  2+ DP & PT pulses  Gait:  Steady with walker  Studies Reviewed  I have personally reviewed pertinent films in PACS  XR of left hip - images from 03/13/2023 showing stable alignment of hardware, early signs of healing over fracture site    Procedures  No procedures today  Medical, Surgical, Family, and Social History  The patient's medical history, family history, and social history, were reviewed and updated as appropriate  Past Medical History:   Diagnosis Date   • Hypertension        Past Surgical History:   Procedure Laterality Date   • HIP FRACTURE SURGERY     • OR OPTX FEM SHFT FX W/INSJ IMED IMPLT W/WO SCREW Left 1/19/2023    Procedure: INSERTION NAIL IM FEMUR ANTEGRADE (TROCHANTERIC); Surgeon: Eleuterio Flores MD;  Location: [facility] OR;  Service: Orthopedics   • REDUCTION MAMMAPLASTY         History reviewed  No pertinent family history      Social History     Occupational History   • Not on file   Tobacco Use   • Smoking status: Never   • Smokeless tobacco: Never   Vaping Use   • Vaping Use: Never used   Substance and Sexual Activity   • Alcohol use: Never   • Drug use: Never   • Sexual activity: Not on file       Allergies   Allergen Reactions   • Amoxicillin Angioedema   • Sulfa Antibiotics Angioedema and Anaphylaxis     throat "closes "     • Lisinopril Swelling   • Medical Tape Other (See Comments)     rash, blisters         Current Outpatient Medications:   •  acetaminophen (TYLENOL) 325 mg tablet, Take 2 tablets (650 mg total) by mouth every 6 (six) hours as needed (for headache), Disp: , Rfl: 0  •  apixaban (ELIQUIS) 5 mg, Take 1 tablet (5 mg total) by mouth 2 (two) times a day Do not start before February 10, 2023 , Disp: 126 tablet, Rfl: 0  •  Calcium Carb-Cholecalciferol (calcium carbonate-vitamin D) 500 mg-5 mcg tablet, Take 2 tablets by mouth daily with breakfast Do not start before February 10, 2023 , Disp: 60 tablet, Rfl: 0  •  cetirizine (ZyrTEC) 10 mg tablet, Take 10 mg by mouth daily, Disp: , Rfl:   •  cholecalciferol (VITAMIN D3) 1,000 units tablet, Take 3 tablets (3,000 Units total) by mouth daily Do not start before February 10, 2023 , Disp: 90 tablet, Rfl: 0  •  Diclofenac Sodium (VOLTAREN) 1 %, Apply 2 g topically 2 (two) times a day R knee, Disp: , Rfl: 0  •  escitalopram (LEXAPRO) 5 mg tablet, Take 1 tablet (5 mg total) by mouth daily Do not start before February 10, 2023 , Disp: 30 tablet, Rfl: 0  •  losartan (COZAAR) 50 mg tablet, Take 1 tablet (50 mg total) by mouth daily Do not start before January 24, 2023 , Disp: , Rfl: 0  •  menthol-methyl salicylate (BENGAY) 14-12 % cream, Apply topically 4 (four) times a day as needed (chest wall pain), Disp: , Rfl: 0  •  methocarbamol (ROBAXIN) 500 mg tablet, Take 1 tablet (500 mg total) by mouth 3 (three) times a day as needed for muscle spasms, Disp: 20 tablet, Rfl: 0  •  metoprolol tartrate (LOPRESSOR) 50 mg tablet, Take 50 mg by mouth 2 (two) times a day, Disp: , Rfl:   •  pantoprazole (PROTONIX) 40 mg tablet, Take 1 tablet (40 mg total) by mouth daily Do not start before February 10, 2023 , Disp: 30 tablet, Rfl: 0  •  simvastatin (ZOCOR) 20 mg tablet, Take 20 mg by mouth every evening, Disp: , Rfl:   •  docusate sodium (COLACE) 100 mg capsule, Take 1 capsule (100 mg total) by mouth 2 (two) times a day (Patient not taking: Reported on 2/15/2023), Disp: , Rfl: [address]    Scribe Attestation    I,:  Elizabeth Hart am acting as a scribe while in the presence of the attending physician :       I,:  Paty Echevarria MD personally performed the services described in this documentation    as scribed in my presence :

## 2023-03-14 ENCOUNTER — HOME CARE VISIT (OUTPATIENT)
Dept: HOME HEALTH SERVICES | Facility: HOME HEALTHCARE | Age: 73
End: 2023-03-14

## 2023-03-14 VITALS — HEART RATE: 71 BPM | SYSTOLIC BLOOD PRESSURE: 118 MMHG | DIASTOLIC BLOOD PRESSURE: 85 MMHG | OXYGEN SATURATION: 94 %

## 2023-03-16 ENCOUNTER — HOME CARE VISIT (OUTPATIENT)
Dept: HOME HEALTH SERVICES | Facility: HOME HEALTHCARE | Age: 73
End: 2023-03-16

## 2023-03-16 VITALS — OXYGEN SATURATION: 93 % | SYSTOLIC BLOOD PRESSURE: 148 MMHG | HEART RATE: 78 BPM | DIASTOLIC BLOOD PRESSURE: 86 MMHG

## 2023-03-27 ENCOUNTER — TELEPHONE (OUTPATIENT)
Dept: OBGYN CLINIC | Facility: HOSPITAL | Age: 73
End: 2023-03-27

## 2023-03-27 DIAGNOSIS — S72.002D CLOSED FRACTURE OF LEFT HIP WITH ROUTINE HEALING, SUBSEQUENT ENCOUNTER: Primary | ICD-10-CM

## 2023-03-27 NOTE — TELEPHONE ENCOUNTER
Caller: Stephan Newman, PT Deisy Trinidad    Doctor/Office: Korey Nunez    CB#: [phone] opt 2      What needs to be faxed: Revised PT script with frequency and duration     ATTN to: Stephan Newman     Fax#: [phone]

## 2023-04-24 ENCOUNTER — OFFICE VISIT (OUTPATIENT)
Dept: OBGYN CLINIC | Facility: MEDICAL CENTER | Age: 73
End: 2023-04-24

## 2023-04-24 VITALS
HEIGHT: 63 IN | DIASTOLIC BLOOD PRESSURE: 95 MMHG | WEIGHT: 203 LBS | HEART RATE: 66 BPM | BODY MASS INDEX: 35.97 KG/M2 | SYSTOLIC BLOOD PRESSURE: 147 MMHG

## 2023-04-24 DIAGNOSIS — S72.002D CLOSED FRACTURE OF LEFT HIP WITH ROUTINE HEALING, SUBSEQUENT ENCOUNTER: Primary | ICD-10-CM

## 2023-04-24 RX ORDER — PANTOPRAZOLE SODIUM 20 MG/1
TABLET, DELAYED RELEASE ORAL
COMMUNITY
Start: 2023-03-06

## 2023-04-24 NOTE — LETTER
April 24, 2023     Patient: Fredy Mckinnon  YOB: 1950  Date of Visit: 4/24/2023      To Whom it May Concern:    Fredy Mckinnon is under my professional care  Patt Cordova was seen in my office on 4/24/2023  Pattjosé manuel Cordova is out of work for post operative recovery  We will re-evaluate at her next follow up in 6 weeks  If you have any questions or concerns, please don't hesitate to call           Sincerely,          Martha Gonsales MD        CC: Fredy Mckinnon

## 2023-04-24 NOTE — PROGRESS NOTES
"Orthopaedic Surgery - Office Note  Gudelia Boston (44 y o  female)   : 1950   MRN: [id number]  Encounter Date: 2023    Chief Complaint   Patient presents with   • Left Hip - Follow-up       Assessment / Plan  S/p L TFN, with good progression    · Continue with outpatient PT and progress as tolerated  · This should help relief some of her numbness and tingling  If this fails to improve, then it may be coming from her lower back, if this is the case we will referred her to spine and pain   · Continue with scar massage  · Continue work on normalizing gait  · Cleared to drive when she feels ready   · Ice, heat and anti-inflammatories prn   Return in about 6 weeks (around 2023) for follow up with Dr Solis Necessary  we will re-evaluate her returning to work at this time    History of Present Illness  Gudelia Boston is a 67 y o  female who presents for follow up s/p L TFN on 2023  She has transitioned to outpatient PT which she feels is going well  She has progressed to walking with a cane, prior to surgery she was ambulating unassisted  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  She does have increase hip discomfort with prolonged WB activity  She feels her discomfort is manageable  She has recently noticed a numbness and tingling down her leg into her foot most noticably after exercise  She does have a \"golf ball\" feeling in the bottom of her left foot  This is a workers compensation claim    Review of Systems  Pertinent items are noted in HPI  All other systems were reviewed and are negative  Physical Exam  /95   Pulse 66   Ht 5' 3\" (1 6 m)   Wt 92 1 kg (203 lb)   BMI 35 96 kg/m²   Cons: Appears well  No apparent distress  Psych: Alert  Oriented x3  Mood and affect normal   Eyes: PERRLA, EOMI  Resp: Normal effort  No audible wheezing or stridor  CV: Palpable pulse  No discernable arrhythmia  No LE edema    Lymph:  No palpable cervical, axillary, or " "inguinal lymphadenopathy  Skin: Warm  No palpable masses  No visible lesions  Neuro: Normal muscle tone  Normal and symmetric DTR's  Left Hip Exam  Alignment / Posture:  Normal resting hip posture  Inspection:  No swelling  No ecchymosis  Palpation:  mild incsion site tenderness  No effusion  ROM:  Hip Flexion 110  Hip ER 20  Hip IR 60  Strength:  5/5 hip flexors and abductors  Stability:  No objective hip instability  Tests:  No pertinent positive or negative tests  Neurovascular:  Sensation intact in DP/SP/Umaña/Sa/T nerve distributions  2+ DP & PT pulses  Gait:  Steady with cane  Studies Reviewed  I have personally reviewed pertinent films in PACS  XR of left hip - images from 03/13/2023 showing stable alignment of hardware, early signs of healing over fracture site    Procedures  No procedures today  Medical, Surgical, Family, and Social History  The patient's medical history, family history, and social history, were reviewed and updated as appropriate  Past Medical History:   Diagnosis Date   • Hypertension        Past Surgical History:   Procedure Laterality Date   • HIP FRACTURE SURGERY     • [facility] OPT[facility] FEM SHFT FX W/INSJ IMED IMPLT W/WO SCREW Left 1/19/2023    Procedure: INSERTION NAIL IM FEMUR ANTEGRADE (TROCHANTERIC); Surgeon: Jose L Villafana MD;  Location: [facility];  Service: Orthopedics   • REDUCTION MAMMAPLASTY         History reviewed  No pertinent family history      Social History     Occupational History   • Not on file   Tobacco Use   • Smoking status: Never   • Smokeless tobacco: Never   Vaping Use   • Vaping Use: Never used   Substance and Sexual Activity   • Alcohol use: Never   • Drug use: Never   • Sexual activity: Not on file       Allergies   Allergen Reactions   • Amoxicillin Angioedema   • Sulfa Antibiotics Angioedema and Anaphylaxis     throat \"closes \"     • Lisinopril Swelling   • Medical Tape Other (See Comments)     rash, blisters         Current " Outpatient Medications:   •  acetaminophen (TYLENOL) 325 mg tablet, Take 2 tablets (650 mg total) by mouth every 6 (six) hours as needed (for headache), Disp: , Rfl: 0  •  Calcium Carb-Cholecalciferol (calcium carbonate-vitamin D) 500 mg-5 mcg tablet, Take 2 tablets by mouth daily with breakfast Do not start before February 10, 2023 , Disp: 60 tablet, Rfl: 0  •  cetirizine (ZyrTEC) 10 mg tablet, Take 10 mg by mouth daily, Disp: , Rfl:   •  cholecalciferol (VITAMIN D3) 1,000 units tablet, Take 3 tablets (3,000 Units total) by mouth daily Do not start before February 10, 2023 , Disp: 90 tablet, Rfl: 0  •  Diclofenac Sodium (VOLTAREN) 1 %, Apply 2 g topically 2 (two) times a day R knee, Disp: , Rfl: 0  •  docusate sodium (COLACE) 100 mg capsule, Take 1 capsule (100 mg total) by mouth 2 (two) times a day, Disp: , Rfl: 0  •  escitalopram (LEXAPRO) 5 mg tablet, Take 1 tablet (5 mg total) by mouth daily Do not start before February 10, 2023 , Disp: 30 tablet, Rfl: 0  •  losartan (COZAAR) 50 mg tablet, Take 1 tablet (50 mg total) by mouth daily Do not start before January 24, 2023 , Disp: , Rfl: 0  •  menthol-methyl salicylate (BENGAY) 02-36 % cream, Apply topically 4 (four) times a day as needed (chest wall pain), Disp: , Rfl: 0  •  methocarbamol (ROBAXIN) 500 mg tablet, Take 1 tablet (500 mg total) by mouth 3 (three) times a day as needed for muscle spasms, Disp: 20 tablet, Rfl: 0  •  metoprolol tartrate (LOPRESSOR) 50 mg tablet, Take 50 mg by mouth 2 (two) times a day, Disp: , Rfl:   •  pantoprazole (PROTONIX) 20 mg tablet, , Disp: , Rfl:   •  pantoprazole (PROTONIX) 40 mg tablet, Take 1 tablet (40 mg total) by mouth daily Do not start before February 10, 2023 , Disp: 30 tablet, Rfl: 0  •  simvastatin (ZOCOR) 20 mg tablet, Take 20 mg by mouth every evening, Disp: , Rfl:   •  apixaban (ELIQUIS) 5 mg, Take 1 tablet (5 mg total) by mouth 2 (two) times a day Do not start before February 10, 2023 , Disp: 126 tablet, Rfl: Stoney 170    Scribe Attestation    I,:  Melody Bal am acting as a scribe while in the presence of the attending physician :       I,:  Pradeep Cole MD personally performed the services described in this documentation    as scribed in my presence :

## 2023-06-05 ENCOUNTER — OFFICE VISIT (OUTPATIENT)
Dept: OBGYN CLINIC | Facility: MEDICAL CENTER | Age: 73
End: 2023-06-05
Payer: OTHER MISCELLANEOUS

## 2023-06-05 VITALS
WEIGHT: 203 LBS | HEART RATE: 71 BPM | HEIGHT: 63 IN | DIASTOLIC BLOOD PRESSURE: 84 MMHG | BODY MASS INDEX: 35.97 KG/M2 | SYSTOLIC BLOOD PRESSURE: 136 MMHG

## 2023-06-05 DIAGNOSIS — S72.002D CLOSED FRACTURE OF LEFT HIP WITH ROUTINE HEALING, SUBSEQUENT ENCOUNTER: Primary | ICD-10-CM

## 2023-06-05 PROCEDURE — 99212 OFFICE O/P EST SF 10 MIN: CPT | Performed by: ORTHOPAEDIC SURGERY

## 2023-06-05 NOTE — LETTER
June 5, 2023     Patient: Severa Bustle  YOB: 1950  Date of Visit: 6/5/2023      To Whom it May Concern:    Severa Bustle is under my professional care  Bryonelsy Martínez was seen in my office on 6/5/2023  Rosario Martínez is cleared to go to the dentist, no oral anti-biotics is necessary     If you have any questions or concerns, please don't hesitate to call           Sincerely,          Kanwal Nagy MD        CC: No Recipients

## 2023-06-05 NOTE — PROGRESS NOTES
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  She can return on 2023  · Cleared to visit the dentist, no oral anti-biotics needed   · Ice, heat and anti-inflammatories prn   Return in about 4 weeks (around 7/3/2023) for follow up with Dr Marline Meyer  History of Present Illness  Sterling Alejo is a 67 y o  female who presents for follow up s/p L TFN on 2023  She is continuing to progress well in PT  She is walking with a cane for longer distances, prior to surgery she was ambulating unassisted  She does have some continued discomfort over the most proximal incision  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  She continues to have numbness and tingling down in her foot most noticably after exercise  She does have a \"golf ball\" or \"walking on sand\" feeling in the bottom of her left foot  She does have a history of stenosis  This is a workers compensations claim  Review of Systems  Pertinent items are noted in HPI  All other systems were reviewed and are negative  Physical Exam  /84   Pulse 71   Ht 5' 3\" (1 6 m)   Wt 92 1 kg (203 lb)   BMI 35 96 kg/m²   Cons: Appears well  No apparent distress  Psych: Alert  Oriented x3  Mood and affect normal   Eyes: PERRLA, EOMI  Resp: Normal effort  No audible wheezing or stridor  CV: Palpable pulse  No discernable arrhythmia  No LE edema  Lymph:  No palpable cervical, axillary, or inguinal lymphadenopathy  Skin: Warm    No palpable " "masses  No visible lesions  Neuro: Normal muscle tone  Normal and symmetric DTR's  Left Hip Exam  Alignment / Posture:  Normal resting hip posture  Inspection:  No swelling  Incision healed  Palpation:  Mild troch near top end of screw tenderness  No effusion  ROM:  Hip Flexion 110  Hip ER 70   Hip IR 20  Strength:  Hip Flexors 4+/5  Hip Abductors 5/5  Stability:  No objective hip instability  Tests:  No pertinent positive or negative tests  Neurovascular:  Sensation intact in DP/SP/Umaña/Sa/T nerve distributions  2+ DP & PT pulses  Gait:  Steady with cane  Studies Reviewed  I have personally reviewed pertinent films in PACS  XR of left hip - images from 03/13/2023 showing stable alignment of hardware, early signs of healing over fracture site    Procedures  No procedures today  Medical, Surgical, Family, and Social History  The patient's medical history, family history, and social history, were reviewed and updated as appropriate  Past Medical History:   Diagnosis Date   • Hypertension        Past Surgical History:   Procedure Laterality Date   • HIP FRACTURE SURGERY     • [place] OPTX FEM SHFT FX W/INSJ IMED IMPLT W/WO SCREW Left 1/19/2023    Procedure: INSERTION NAIL IM FEMUR ANTEGRADE (TROCHANTERIC); Surgeon: Rik Downey MD;  Location: [facility];  Service: Orthopedics   • REDUCTION MAMMAPLASTY         History reviewed  No pertinent family history      Social History     Occupational History   • Not on file   Tobacco Use   • Smoking status: Never   • Smokeless tobacco: Never   Vaping Use   • Vaping Use: Never used   Substance and Sexual Activity   • Alcohol use: Never   • Drug use: Never   • Sexual activity: Not on file       Allergies   Allergen Reactions   • Amoxicillin Angioedema   • Sulfa Antibiotics Angioedema and Anaphylaxis     throat \"closes \"     • Lisinopril Swelling   • Medical Tape Other (See Comments)     rash, blisters         Current Outpatient Medications:   •  " acetaminophen (TYLENOL) 325 mg tablet, Take 2 tablets (650 mg total) by mouth every 6 (six) hours as needed (for headache), Disp: , Rfl: 0  •  Calcium Carb-Cholecalciferol (calcium carbonate-vitamin D) 500 mg-5 mcg tablet, Take 2 tablets by mouth daily with breakfast Do not start before February 10, 2023 , Disp: 60 tablet, Rfl: 0  •  cetirizine (ZyrTEC) 10 mg tablet, Take 10 mg by mouth daily, Disp: , Rfl:   •  cholecalciferol (VITAMIN D3) 1,000 units tablet, Take 3 tablets (3,000 Units total) by mouth daily Do not start before February 10, 2023 , Disp: 90 tablet, Rfl: 0  •  Diclofenac Sodium (VOLTAREN) 1 %, Apply 2 g topically 2 (two) times a day R knee, Disp: , Rfl: 0  •  docusate sodium (COLACE) 100 mg capsule, Take 1 capsule (100 mg total) by mouth 2 (two) times a day, Disp: , Rfl: 0  •  escitalopram (LEXAPRO) 5 mg tablet, Take 1 tablet (5 mg total) by mouth daily Do not start before February 10, 2023 , Disp: 30 tablet, Rfl: 0  •  losartan (COZAAR) 50 mg tablet, Take 1 tablet (50 mg total) by mouth daily Do not start before January 24, 2023 , Disp: , Rfl: 0  •  menthol-methyl salicylate (BENGAY) 14-78 % cream, Apply topically 4 (four) times a day as needed (chest wall pain), Disp: , Rfl: 0  •  methocarbamol (ROBAXIN) 500 mg tablet, Take 1 tablet (500 mg total) by mouth 3 (three) times a day as needed for muscle spasms, Disp: 20 tablet, Rfl: 0  •  metoprolol tartrate (LOPRESSOR) 50 mg tablet, Take 50 mg by mouth 2 (two) times a day, Disp: , Rfl:   •  pantoprazole (PROTONIX) 20 mg tablet, , Disp: , Rfl:   •  pantoprazole (PROTONIX) 40 mg tablet, Take 1 tablet (40 mg total) by mouth daily Do not start before February 10, 2023 , Disp: 30 tablet, Rfl: 0  •  simvastatin (ZOCOR) 20 mg tablet, Take 20 mg by mouth every evening, Disp: , Rfl:   •  apixaban (ELIQUIS) 5 mg, Take 1 tablet (5 mg total) by mouth 2 (two) times a day Do not start before February 10, 2023 , Disp: 126 tablet, Rfl: Stoney 170    Scribe Attestation    I,:  Tab Adams am acting as a scribe while in the presence of the attending physician :       I,:  Johnny Roche MD personally performed the services described in this documentation    as scribed in my presence :

## 2023-06-05 NOTE — LETTER
Calling the patient - I advised and she is going to check with her insurance.    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

## 2023-07-03 ENCOUNTER — OFFICE VISIT (OUTPATIENT)
Dept: OBGYN CLINIC | Facility: MEDICAL CENTER | Age: 73
End: 2023-07-03
Payer: OTHER MISCELLANEOUS

## 2023-07-03 ENCOUNTER — TELEPHONE (OUTPATIENT)
Dept: OBGYN CLINIC | Facility: HOSPITAL | Age: 73
End: 2023-07-03

## 2023-07-03 VITALS
SYSTOLIC BLOOD PRESSURE: 154 MMHG | HEART RATE: 63 BPM | BODY MASS INDEX: 35.26 KG/M2 | HEIGHT: 63 IN | DIASTOLIC BLOOD PRESSURE: 80 MMHG | WEIGHT: 199 LBS

## 2023-07-03 DIAGNOSIS — S72.002D CLOSED FRACTURE OF LEFT HIP WITH ROUTINE HEALING, SUBSEQUENT ENCOUNTER: Primary | ICD-10-CM

## 2023-07-03 PROCEDURE — 99213 OFFICE O/P EST LOW 20 MIN: CPT | Performed by: ORTHOPAEDIC SURGERY

## 2023-07-03 RX ORDER — LOSARTAN POTASSIUM 100 MG/1
TABLET ORAL
COMMUNITY
Start: 2023-06-20

## 2023-07-03 NOTE — TELEPHONE ENCOUNTER
Caller: Patient    Doctor: Kristine Rodriguez    Reason for call: Patient was seen today and needs a more specific RTW letter with restrictions.     Call back#: [phone]

## 2023-07-03 NOTE — LETTER
July 3, 2023     Patient: Alise De  YOB: 1950  Date of Visit: 7/3/2023      To Whom it May Concern:    Alise De is under my professional care. Moy Bland was seen in my office on 7/3/2023. Moy Bland may return to work with limitations of increasing hourly shifts weekly . Week of 7/9/2023 increase to 5 hour shifts  Week of 7/16/2023 increase to 6 hour shifts  Stay at 6 hour shifts until follow up appointment     If you have any questions or concerns, please don't hesitate to call.          Sincerely,          Cici Pineda MD        CC: No Recipients

## 2023-07-03 NOTE — TELEPHONE ENCOUNTER
I did speak with the patient and let her know that the letter she is requesting is in her chart with the specific time frame restrictions that she was requesting.

## 2023-07-03 NOTE — PROGRESS NOTES
Orthopaedic Surgery - Office Note  Mingo Vora (63 y.o. female)   : 1950   MRN: [id number]  Encounter Date: 7/3/2023    Chief Complaint   Patient presents with   • Left Hip - Follow-up       Assessment / Plan  S/p L TFN, with good progression    · Continue progressing through outpatient physical therapy- recommend adding in plantar fascia stretches  · Discussed that the posterior leg pain she is experiencing is likely due to sciatica   · In regards to work status- increase to 5 hour shifts next week and 6 hour shifts the following, stay at 6 hour shifts until next appointment- work note provided  · Ice, heat and anti-inflammatories prn  Return in about 4 weeks (around 2023) for w/ Dr. Emy Simon. History of Present Illness  Mingo Vora is a 67 y.o. female who presents for follow up s/p L TFN on 2023. Today patient states she experiences discomfort over the proximal incision. She also notes with prolonged walking she experiences radiating pain from left buttocks down the posterior leg and into the planter aspect of her foot- she notes this has been presents since she was able to began weightbearing post-op. She notes it is numbness and tingling and feels like she is stepping on a golf ball. Symptoms does subside with rest and heat She has discontinued the use of the cane. Patient continues to progress through physical therapy. She has returned to work, working five days a week at four hour shifts. Patient does have concern about returning to work at a full capacity. Review of Systems  Pertinent items are noted in HPI. All other systems were reviewed and are negative. Physical Exam  /80   Pulse 63   Ht 5' 3" (1.6 m)   Wt 90.3 kg (199 lb)   BMI 35.25 kg/m²   Cons: Appears well. No apparent distress. Psych: Alert. Oriented x3. Mood and affect normal.  Eyes: PERRLA, EOMI  Resp: Normal effort. No audible wheezing or stridor. CV: Palpable pulse.   No discernable arrhythmia. No LE edema. Lymph:  No palpable cervical, axillary, or inguinal lymphadenopathy. Skin: Warm. No palpable masses. No visible lesions. Neuro: Normal muscle tone. Normal and symmetric DTR's. Left Hip Exam  Alignment / Posture:  Normal resting hip posture. Inspection:  No swelling. Incision healed. Palpation:  mild troch tenderness tenderness. ROM:  Hip Flexion 110. Hip ER 70. Hip IR 20. Strength:  5/5 hip flexors and abductors. Stability:  No objective hip instability. Tests:  No pertinent positive or negative tests. Neurovascular:  Sensation intact in DP/SP/Umaña/Sa/T nerve distributions. 2+ DP & PT pulses. Gait:  Antalgic. Studies Reviewed  No studies to review    Procedures  No procedures today. Medical, Surgical, Family, and Social History  The patient's medical history, family history, and social history, were reviewed and updated as appropriate. Past Medical History:   Diagnosis Date   • Hypertension        Past Surgical History:   Procedure Laterality Date   • HIP FRACTURE SURGERY     • [place] OPTX FEM SHFT FX W/INSJ IMED IMPLT W/WO SCREW Left 1/19/2023    Procedure: INSERTION NAIL IM FEMUR ANTEGRADE (TROCHANTERIC); Surgeon: Lala Covarrubias MD;  Location: [facility];  Service: Orthopedics   • REDUCTION MAMMAPLASTY         History reviewed. No pertinent family history.     Social History     Occupational History   • Not on file   Tobacco Use   • Smoking status: Never   • Smokeless tobacco: Never   Vaping Use   • Vaping Use: Never used   Substance and Sexual Activity   • Alcohol use: Never   • Drug use: Never   • Sexual activity: Not on file       Allergies   Allergen Reactions   • Amoxicillin Angioedema   • Sulfa Antibiotics Angioedema and Anaphylaxis     throat "closes "     • Lisinopril Swelling   • Medical Tape Other (See Comments)     rash, blisters         Current Outpatient Medications:   •  acetaminophen (TYLENOL) 325 mg tablet, Take 2 tablets (650 mg total) by mouth every 6 (six) hours as needed (for headache), Disp: , Rfl: 0  •  Calcium Carb-Cholecalciferol (calcium carbonate-vitamin D) 500 mg-5 mcg tablet, Take 2 tablets by mouth daily with breakfast Do not start before February 10, 2023., Disp: 60 tablet, Rfl: 0  •  cetirizine (ZyrTEC) 10 mg tablet, Take 10 mg by mouth daily, Disp: , Rfl:   •  cholecalciferol (VITAMIN D3) 1,000 units tablet, Take 3 tablets (3,000 Units total) by mouth daily Do not start before February 10, 2023., Disp: 90 tablet, Rfl: 0  •  Diclofenac Sodium (VOLTAREN) 1 %, Apply 2 g topically 2 (two) times a day R knee, Disp: , Rfl: 0  •  docusate sodium (COLACE) 100 mg capsule, Take 1 capsule (100 mg total) by mouth 2 (two) times a day, Disp: , Rfl: 0  •  escitalopram (LEXAPRO) 5 mg tablet, Take 1 tablet (5 mg total) by mouth daily Do not start before February 10, 2023., Disp: 30 tablet, Rfl: 0  •  losartan (COZAAR) 50 mg tablet, Take 1 tablet (50 mg total) by mouth daily Do not start before January 24, 2023., Disp: , Rfl: 0  •  metoprolol tartrate (LOPRESSOR) 50 mg tablet, Take 50 mg by mouth 2 (two) times a day, Disp: , Rfl:   •  pantoprazole (PROTONIX) 20 mg tablet, , Disp: , Rfl:   •  simvastatin (ZOCOR) 20 mg tablet, Take 20 mg by mouth every evening, Disp: , Rfl:   •  apixaban (ELIQUIS) 5 mg, Take 1 tablet (5 mg total) by mouth 2 (two) times a day Do not start before February 10, 2023., Disp: 126 tablet, Rfl: 0  •  losartan (COZAAR) 100 MG tablet, , Disp: , Rfl:   •  menthol-methyl salicylate (BENGAY) 62-93 % cream, Apply topically 4 (four) times a day as needed (chest wall pain) (Patient not taking: Reported on 7/3/2023), Disp: , Rfl: 0  •  methocarbamol (ROBAXIN) 500 mg tablet, Take 1 tablet (500 mg total) by mouth 3 (three) times a day as needed for muscle spasms (Patient not taking: Reported on 7/3/2023), Disp: 20 tablet, Rfl: 0  •  pantoprazole (PROTONIX) 40 mg tablet, Take 1 tablet (40 mg total) by mouth daily Do not start before February 10, 2023. (Patient not taking: Reported on 7/3/2023), Disp: 30 tablet, Rfl: 0      Evelia Willamor    Scribe Attestation    I,:  Evelia Echeverria am acting as a scribe while in the presence of the attending physician.:       I,:  Julio Roche MD personally performed the services described in this documentation    as scribed in my presence.:

## 2023-08-11 ENCOUNTER — OFFICE VISIT (OUTPATIENT)
Dept: OBGYN CLINIC | Facility: MEDICAL CENTER | Age: 73
End: 2023-08-11
Payer: OTHER MISCELLANEOUS

## 2023-08-11 ENCOUNTER — APPOINTMENT (OUTPATIENT)
Dept: RADIOLOGY | Facility: MEDICAL CENTER | Age: 73
End: 2023-08-11
Payer: COMMERCIAL

## 2023-08-11 VITALS
HEART RATE: 57 BPM | DIASTOLIC BLOOD PRESSURE: 75 MMHG | BODY MASS INDEX: 34.55 KG/M2 | SYSTOLIC BLOOD PRESSURE: 125 MMHG | WEIGHT: 195 LBS | HEIGHT: 63 IN

## 2023-08-11 DIAGNOSIS — M54.50 LOW BACK PAIN, UNSPECIFIED BACK PAIN LATERALITY, UNSPECIFIED CHRONICITY, UNSPECIFIED WHETHER SCIATICA PRESENT: ICD-10-CM

## 2023-08-11 DIAGNOSIS — S72.002D CLOSED FRACTURE OF LEFT HIP WITH ROUTINE HEALING, SUBSEQUENT ENCOUNTER: ICD-10-CM

## 2023-08-11 DIAGNOSIS — M54.16 LUMBAR RADICULOPATHY: ICD-10-CM

## 2023-08-11 DIAGNOSIS — S72.002D CLOSED FRACTURE OF LEFT HIP WITH ROUTINE HEALING, SUBSEQUENT ENCOUNTER: Primary | ICD-10-CM

## 2023-08-11 PROCEDURE — 99214 OFFICE O/P EST MOD 30 MIN: CPT | Performed by: ORTHOPAEDIC SURGERY

## 2023-08-11 PROCEDURE — 73502 X-RAY EXAM HIP UNI 2-3 VIEWS: CPT

## 2023-08-11 PROCEDURE — 72100 X-RAY EXAM L-S SPINE 2/3 VWS: CPT

## 2023-08-11 NOTE — LETTER
August 11, 2023     Patient: Darrel Canavan  YOB: 1950  Date of Visit: 8/11/2023      To Whom it May Concern:    Darrel Canavan is under my professional care. Jaime Vora was seen in my office on 8/11/2023. Jaime Vora has a permanent restriction of 6 hours per day. If you have any questions or concerns, please don't hesitate to call.          Sincerely,          Fernando Mac MD        CC: No Recipients

## 2023-08-11 NOTE — PROGRESS NOTES
Orthopaedic Surgery - Office Note  Nelson Mayfield (61 y.o. female)   : 1950   MRN: [id number]  Encounter Date: 2023    Chief Complaint   Patient presents with   • Left Hip - Follow-up       Assessment / Plan  Left  TFN on 2023 and lumbar radiculopathy     · Patient is progressing nicely in reference to the left hip, she may continue to improve over the next few months  · Patient has a permanent restriction of 6 hours / day. · MMI estimated at the 1 year cathi. · Patient may want to consider seeing a specialist for the lumbar spine. Patient declined a referral today. Return if symptoms worsen or fail to improve. History of Present Illness  Nelson Mayfield is a 67 y.o. female who presents today for follow up left TFN 23. Patient reports increased pain with prolonged WB activities. She report lateral hip pain that radiates distally to the foot and ankle with associated numbness and tingling. Patient reports ankle swelling by the end of the day. Review of Systems  Pertinent items are noted in HPI. All other systems were reviewed and are negative. Physical Exam  /75   Pulse 57   Ht 5' 3" (1.6 m)   Wt 88.5 kg (195 lb)   BMI 34.54 kg/m²   Cons: Appears well. No apparent distress. Psych: Alert. Oriented x3. Mood and affect normal.  Eyes: PERRLA, EOMI  Resp: Normal effort. No audible wheezing or stridor. CV: Palpable pulse. No discernable arrhythmia. No LE edema. Lymph:  No palpable cervical, axillary, or inguinal lymphadenopathy. Skin: Warm. No palpable masses. No visible lesions. Neuro: Normal muscle tone. Normal and symmetric DTR's. Left Hip Exam  Alignment / Posture:  Normal resting hip posture. Inspection:  No swelling. Incision healed. Palpation:  mild troch tenderness tenderness. ROM:  Hip Flexion 100. Hip ER 60. Hip IR 30. Strength:  4+/5 hip flexors and 5/5 abductors. Stability:  No objective hip instability.   Tests:  No pertinent positive or negative tests. Neurovascular:  Sensation intact in DP/SP/Umaña/Sa/T nerve distributions. 2+ DP & PT pulses. Gait:  Antalgic. Studies Reviewed  XR of left hip - healed fracture, hardware in acceptable alignment and position  XR of lumbar spine - multilevel degenerative changes greatest L1/2 and L2/3, spondylolisthesis L4/5, L5/S1     Procedures  No procedures today. Medical, Surgical, Family, and Social History  The patient's medical history, family history, and social history, were reviewed and updated as appropriate. Past Medical History:   Diagnosis Date   • Hypertension        Past Surgical History:   Procedure Laterality Date   • HIP FRACTURE SURGERY     • NH OPTX FEM SHFT FX W/INSJ IMED IMPLT W/WO SCREW Left 1/19/2023    Procedure: INSERTION NAIL IM FEMUR ANTEGRADE (TROCHANTERIC); Surgeon: Derrick Lozano MD;  Location: [facility] OR;  Service: Orthopedics   • REDUCTION MAMMAPLASTY         History reviewed. No pertinent family history.     Social History     Occupational History   • Not on file   Tobacco Use   • Smoking status: Never     Passive exposure: Never   • Smokeless tobacco: Never   Vaping Use   • Vaping Use: Never used   Substance and Sexual Activity   • Alcohol use: Never   • Drug use: Never   • Sexual activity: Not on file       Allergies   Allergen Reactions   • Amoxicillin Angioedema   • Sulfa Antibiotics Angioedema and Anaphylaxis     throat "closes "     • Lisinopril Swelling   • Medical Tape Other (See Comments)     rash, blisters         Current Outpatient Medications:   •  acetaminophen (TYLENOL) 325 mg tablet, Take 2 tablets (650 mg total) by mouth every 6 (six) hours as needed (for headache), Disp: , Rfl: 0  •  Calcium Carb-Cholecalciferol (calcium carbonate-vitamin D) 500 mg-5 mcg tablet, Take 2 tablets by mouth daily with breakfast Do not start before February 10, 2023., Disp: 60 tablet, Rfl: 0  •  cetirizine (ZyrTEC) 10 mg tablet, Take 10 mg by mouth daily, Disp: , Rfl: •  cholecalciferol (VITAMIN D3) 1,000 units tablet, Take 3 tablets (3,000 Units total) by mouth daily Do not start before February 10, 2023., Disp: 90 tablet, Rfl: 0  •  Diclofenac Sodium (VOLTAREN) 1 %, Apply 2 g topically 2 (two) times a day R knee, Disp: , Rfl: 0  •  docusate sodium (COLACE) 100 mg capsule, Take 1 capsule (100 mg total) by mouth 2 (two) times a day, Disp: , Rfl: 0  •  escitalopram (LEXAPRO) 5 mg tablet, Take 1 tablet (5 mg total) by mouth daily Do not start before February 10, 2023., Disp: 30 tablet, Rfl: 0  •  metoprolol tartrate (LOPRESSOR) 50 mg tablet, Take 50 mg by mouth 2 (two) times a day, Disp: , Rfl:   •  pantoprazole (PROTONIX) 20 mg tablet, , Disp: , Rfl:   •  simvastatin (ZOCOR) 20 mg tablet, Take 20 mg by mouth every evening, Disp: , Rfl:   •  apixaban (ELIQUIS) 5 mg, Take 1 tablet (5 mg total) by mouth 2 (two) times a day Do not start before February 10, 2023., Disp: 126 tablet, Rfl: 0  •  losartan (COZAAR) 100 MG tablet, , Disp: , Rfl:   •  losartan (COZAAR) 50 mg tablet, Take 1 tablet (50 mg total) by mouth daily Do not start before January 24, 2023. (Patient not taking: Reported on 8/11/2023), Disp: , Rfl: 0  •  menthol-methyl salicylate (BENGAY) 72-75 % cream, Apply topically 4 (four) times a day as needed (chest wall pain) (Patient not taking: Reported on 7/3/2023), Disp: , Rfl: 0  •  methocarbamol (ROBAXIN) 500 mg tablet, Take 1 tablet (500 mg total) by mouth 3 (three) times a day as needed for muscle spasms (Patient not taking: Reported on 7/3/2023), Disp: 20 tablet, Rfl: 0  •  pantoprazole (PROTONIX) 40 mg tablet, Take 1 tablet (40 mg total) by mouth daily Do not start before February 10, 2023.  (Patient not taking: Reported on 7/3/2023), Disp: 30 tablet, Rfl: 0      [employer] Attestation    I,:  Humera Zuniga am acting as a scribe while in the presence of the attending physician.:       I,:  Ronit Drummond MD personally performed the services described in this documentation    as scribed in my presence.:
